# Patient Record
Sex: FEMALE | Race: WHITE | HISPANIC OR LATINO | Employment: OTHER | URBAN - METROPOLITAN AREA
[De-identification: names, ages, dates, MRNs, and addresses within clinical notes are randomized per-mention and may not be internally consistent; named-entity substitution may affect disease eponyms.]

---

## 2023-02-12 PROBLEM — M25.552 LEFT HIP PAIN: Status: ACTIVE | Noted: 2023-02-12

## 2023-02-12 PROBLEM — S99.929A FOOT INJURY: Status: ACTIVE | Noted: 2023-02-12

## 2023-02-12 PROBLEM — G47.00 INSOMNIA: Status: ACTIVE | Noted: 2023-02-12

## 2023-02-12 PROBLEM — M19.90 ARTHRITIS: Status: ACTIVE | Noted: 2023-02-12

## 2023-02-12 PROBLEM — M54.9 CHRONIC BACK PAIN: Status: ACTIVE | Noted: 2023-02-12

## 2023-02-12 PROBLEM — E55.9 VITAMIN D DEFICIENCY: Status: ACTIVE | Noted: 2023-02-12

## 2023-02-12 PROBLEM — G89.29 CHRONIC BACK PAIN: Status: ACTIVE | Noted: 2023-02-12

## 2023-02-12 PROBLEM — B35.1 ONYCHOMYCOSIS: Status: ACTIVE | Noted: 2023-02-12

## 2023-02-12 PROBLEM — E11.9 DIABETES (MULTI): Status: ACTIVE | Noted: 2023-02-12

## 2023-02-12 PROBLEM — R05.9 COUGH: Status: ACTIVE | Noted: 2023-02-12

## 2023-02-12 RX ORDER — DULOXETIN HYDROCHLORIDE 60 MG/1
60 CAPSULE, DELAYED RELEASE ORAL DAILY
COMMUNITY
Start: 2017-10-16 | End: 2023-06-26

## 2023-02-12 RX ORDER — TERBINAFINE HYDROCHLORIDE 250 MG/1
250 TABLET ORAL WEEKLY
Status: ON HOLD | COMMUNITY
Start: 2022-08-01 | End: 2024-03-27 | Stop reason: ALTCHOICE

## 2023-02-12 RX ORDER — ACETAMINOPHEN 500 MG
2 TABLET ORAL DAILY
COMMUNITY
Start: 2017-10-16 | End: 2024-04-15 | Stop reason: ALTCHOICE

## 2023-02-12 RX ORDER — RIZATRIPTAN BENZOATE 10 MG/1
TABLET ORAL
COMMUNITY
Start: 2022-10-26 | End: 2023-08-14

## 2023-02-12 RX ORDER — AMLODIPINE BESYLATE 10 MG/1
10 TABLET ORAL DAILY
COMMUNITY
Start: 2021-09-17 | End: 2023-09-19 | Stop reason: SDUPTHER

## 2023-02-12 RX ORDER — ZOLPIDEM TARTRATE 10 MG/1
10 TABLET ORAL NIGHTLY PRN
COMMUNITY
Start: 2022-08-09 | End: 2023-03-23

## 2023-02-12 RX ORDER — KETOROLAC TROMETHAMINE 10 MG/1
10 TABLET, FILM COATED ORAL EVERY 6 HOURS
COMMUNITY
Start: 2022-10-26 | End: 2023-04-12 | Stop reason: ALTCHOICE

## 2023-02-12 RX ORDER — HYDROCHLOROTHIAZIDE 25 MG/1
25 TABLET ORAL DAILY
COMMUNITY
End: 2024-02-05

## 2023-02-12 RX ORDER — BUPRENORPHINE HCL 150 MCG
150 FILM, MEDICATED (EA) BUCCAL 2 TIMES DAILY
COMMUNITY
End: 2023-04-12 | Stop reason: ALTCHOICE

## 2023-02-12 RX ORDER — LEFLUNOMIDE 10 MG/1
10 TABLET ORAL DAILY
COMMUNITY
Start: 2017-10-16 | End: 2024-03-28 | Stop reason: HOSPADM

## 2023-02-12 RX ORDER — OMEPRAZOLE 20 MG/1
20 CAPSULE, DELAYED RELEASE ORAL
COMMUNITY
End: 2023-09-26 | Stop reason: SDUPTHER

## 2023-02-12 RX ORDER — LOSARTAN POTASSIUM 100 MG/1
100 TABLET ORAL DAILY
COMMUNITY
End: 2023-09-19 | Stop reason: SDUPTHER

## 2023-04-12 ENCOUNTER — OFFICE VISIT (OUTPATIENT)
Dept: PRIMARY CARE | Facility: CLINIC | Age: 48
End: 2023-04-12
Payer: COMMERCIAL

## 2023-04-12 VITALS
WEIGHT: 211.9 LBS | OXYGEN SATURATION: 98 % | SYSTOLIC BLOOD PRESSURE: 124 MMHG | BODY MASS INDEX: 39 KG/M2 | HEIGHT: 62 IN | TEMPERATURE: 96.8 F | DIASTOLIC BLOOD PRESSURE: 76 MMHG | HEART RATE: 110 BPM

## 2023-04-12 DIAGNOSIS — G47.00 INSOMNIA, UNSPECIFIED TYPE: Primary | ICD-10-CM

## 2023-04-12 PROBLEM — I10 HYPERTENSION: Status: ACTIVE | Noted: 2023-04-12

## 2023-04-12 PROBLEM — M62.81 MUSCLE WEAKNESS: Status: ACTIVE | Noted: 2023-04-12

## 2023-04-12 PROBLEM — G43.909 MIGRAINE HEADACHE: Status: ACTIVE | Noted: 2023-04-12

## 2023-04-12 PROBLEM — R20.0 NUMBNESS: Status: ACTIVE | Noted: 2023-04-12

## 2023-04-12 PROBLEM — J40 BRONCHITIS: Status: ACTIVE | Noted: 2023-04-12

## 2023-04-12 PROBLEM — R07.9 CHEST PAIN AT REST: Status: ACTIVE | Noted: 2023-04-12

## 2023-04-12 PROBLEM — E66.01 MORBID OBESITY (MULTI): Status: ACTIVE | Noted: 2023-04-12

## 2023-04-12 PROBLEM — M79.602 PAIN OF LEFT UPPER EXTREMITY: Status: ACTIVE | Noted: 2023-04-12

## 2023-04-12 PROBLEM — I51.89 FAMILIAL HEART DISEASE: Status: ACTIVE | Noted: 2023-04-12

## 2023-04-12 PROBLEM — M54.16 LUMBAR RADICULOPATHY: Status: ACTIVE | Noted: 2023-04-12

## 2023-04-12 PROBLEM — M79.601 PAIN OF RIGHT UPPER EXTREMITY: Status: ACTIVE | Noted: 2023-04-12

## 2023-04-12 LAB — C REACTIVE PROTEIN (MG/L) IN SER/PLAS: 0.72 MG/DL

## 2023-04-12 PROCEDURE — 4010F ACE/ARB THERAPY RXD/TAKEN: CPT | Performed by: STUDENT IN AN ORGANIZED HEALTH CARE EDUCATION/TRAINING PROGRAM

## 2023-04-12 PROCEDURE — 3008F BODY MASS INDEX DOCD: CPT | Performed by: STUDENT IN AN ORGANIZED HEALTH CARE EDUCATION/TRAINING PROGRAM

## 2023-04-12 PROCEDURE — 3078F DIAST BP <80 MM HG: CPT | Performed by: STUDENT IN AN ORGANIZED HEALTH CARE EDUCATION/TRAINING PROGRAM

## 2023-04-12 PROCEDURE — 3074F SYST BP LT 130 MM HG: CPT | Performed by: STUDENT IN AN ORGANIZED HEALTH CARE EDUCATION/TRAINING PROGRAM

## 2023-04-12 PROCEDURE — 99214 OFFICE O/P EST MOD 30 MIN: CPT | Performed by: STUDENT IN AN ORGANIZED HEALTH CARE EDUCATION/TRAINING PROGRAM

## 2023-04-12 RX ORDER — GABAPENTIN 100 MG/1
1 CAPSULE ORAL 3 TIMES DAILY
COMMUNITY
Start: 2023-03-29 | End: 2023-09-22 | Stop reason: DRUGHIGH

## 2023-04-12 RX ORDER — TIZANIDINE 4 MG/1
1 TABLET ORAL NIGHTLY
COMMUNITY
Start: 2023-04-05 | End: 2024-02-05

## 2023-04-12 NOTE — PROGRESS NOTES
"Subjective   Patient ID: Kat Kay is a 47 y.o. female who presents for Hypertension and Diabetes.    HPI     Review of Systems    Objective   /76 (BP Location: Right arm, Patient Position: Sitting)   Pulse 110   Temp 36 °C (96.8 °F) (Temporal)   Ht 1.575 m (5' 2\")   Wt 96.1 kg (211 lb 14.4 oz)   SpO2 98%   BMI 38.76 kg/m²     Physical Exam    Assessment/Plan          "

## 2023-04-12 NOTE — PROGRESS NOTES
Subjective   Reason for Visit: Kat Kay is an 47 y.o. female here for a follow up    Patient Care Team:  Dagoberto Garza DO as PCP - General     Review of Systems   All other systems reviewed and are negative.  OARRS:  No data recorded  I have personally reviewed the OARRS report for Kat Kay. I have considered the risks of abuse, dependence, addiction and diversion    Is the patient prescribed a combination of a benzodiazepine and opioid?  Yes, I feel it is clincially indicated to continue the medication and have discussed with the patient risks/benefits/alternatives.    Last Urine Drug Screen / ordered today: No  Recent Results (from the past 07355 hour(s))   OPIATE/OPIOID/BENZO PRESCRIPTION COMPLIANCE    Collection Time: 10/26/22 11:19 AM   Result Value Ref Range    DRUG SCREEN COMMENT URINE SEE BELOW     Creatine, Urine 296.2 mg/dL    Amphetamine Screen, Urine PRESUMPTIVE NEGATIVE NEGATIVE    Barbiturate Screen, Urine PRESUMPTIVE NEGATIVE NEGATIVE    Cannabinoid Screen, Urine PRESUMPTIVE NEGATIVE NEGATIVE    Cocaine Screen, Urine PRESUMPTIVE NEGATIVE NEGATIVE    PCP Screen, Urine PRESUMPTIVE NEGATIVE NEGATIVE    7-Aminoclonazepam <25 Cutoff <25 ng/mL    Alpha-Hydroxyalprazolam <25 Cutoff <25 ng/mL    Alpha-Hydroxymidazolam <25 Cutoff <25 ng/mL    Alprazolam <25 Cutoff <25 ng/mL    Chlordiazepoxide <25 Cutoff <25 ng/mL    Clonazepam <25 Cutoff <25 ng/mL    Diazepam <25 Cutoff <25 ng/mL    Lorazepam <25 Cutoff <25 ng/mL    Midazolam <25 Cutoff <25 ng/mL    Nordiazepam <25 Cutoff <25 ng/mL    Oxazepam <25 Cutoff <25 ng/mL    Temazepam <25 Cutoff <25 ng/mL    Zolpidem 97 (A) Cutoff <25 ng/mL    Zolpidem Metabolite (ZCA) >1000 (A) Cutoff <25 ng/mL    6-Acetylmorphine <25 Cutoff <25 ng/mL    Codeine <50 Cutoff <50 ng/mL    Hydrocodone <25 Cutoff <25 ng/mL    Hydromorphone <25 Cutoff <25 ng/mL    Morphine Urine <50 Cutoff <50 ng/mL    Norhydrocodone <25 Cutoff <25 ng/mL    Noroxycodone <25 Cutoff  "<25 ng/mL    Oxycodone <25 Cutoff <25 ng/mL    Oxymorphone <25 Cutoff <25 ng/mL    Tramadol <50 Cutoff <50 ng/mL    O-Desmethyltramadol <50 Cutoff <50 ng/mL    Fentanyl <2.5 Cutoff<2.5 ng/mL    Norfentanyl <2.5 Cutoff<2.5 ng/mL    METHADONE CONFIRMATION,URINE <25 Cutoff <25 ng/mL    EDDP <25 Cutoff <25 ng/mL     Results are as expected.     Controlled Substance Agreement:  Date of the Last Agreement: last visit  Reviewed Controlled Substance Agreement including but not limited to the benefits, risks, and alternatives to treatment with a Controlled Substance medication(s).    Sleep Aids:   What is the patient's goal of therapy? Sleep aid  Is this being achieved with current treatment? yes    Activities of Daily Living:   Is your overall impression that this patient is benefiting (symptom reduction outweighs side effects) from sleep aid therapy? Yes     1. Physical Functioning: Better  2. Family Relationship: Better  3. Social Relationship: Better  4. Mood: Better  5. Sleep Patterns: Better  6. Overall Function: Better      Objective   Vitals:  /76 (BP Location: Right arm, Patient Position: Sitting)   Pulse 110   Temp 36 °C (96.8 °F) (Temporal)   Ht 1.575 m (5' 2\")   Wt 96.1 kg (211 lb 14.4 oz)   SpO2 98%   BMI 38.76 kg/m²       Physical Exam  Constitutional:       Appearance: Normal appearance.   HENT:      Head: Normocephalic and atraumatic.      Right Ear: Tympanic membrane and ear canal normal.      Left Ear: Tympanic membrane and ear canal normal.      Mouth/Throat:      Mouth: Mucous membranes are moist.      Pharynx: Oropharynx is clear.   Eyes:      Extraocular Movements: Extraocular movements intact.      Conjunctiva/sclera: Conjunctivae normal.      Pupils: Pupils are equal, round, and reactive to light.   Cardiovascular:      Rate and Rhythm: Normal rate and regular rhythm.      Pulses: Normal pulses.      Heart sounds: Normal heart sounds.   Pulmonary:      Effort: Pulmonary effort is normal. "      Breath sounds: Normal breath sounds.   Abdominal:      General: Abdomen is flat. Bowel sounds are normal.      Palpations: Abdomen is soft.   Musculoskeletal:         General: Normal range of motion.      Cervical back: Normal range of motion and neck supple.   Skin:     General: Skin is warm and dry.      Capillary Refill: Capillary refill takes 2 to 3 seconds.   Neurological:      General: No focal deficit present.      Mental Status: She is alert and oriented to person, place, and time. Mental status is at baseline.   Psychiatric:         Mood and Affect: Mood normal.         Behavior: Behavior normal.         Thought Content: Thought content normal.         Judgment: Judgment normal.         Assessment/Plan   1. Insomnia, unspecified type  - OARRS reviewed  - refill needed in 2 weeks  - UDS reviewed  - CSA on file    2: post op swelling  - spinal sugery with post op swelling, draining was admitted and cleaned out

## 2023-04-23 DIAGNOSIS — G47.00 INSOMNIA, UNSPECIFIED: ICD-10-CM

## 2023-04-25 RX ORDER — ZOLPIDEM TARTRATE 10 MG/1
TABLET ORAL
Qty: 30 TABLET | Refills: 0 | Status: SHIPPED | OUTPATIENT
Start: 2023-04-25 | End: 2023-05-27

## 2023-05-03 DIAGNOSIS — M62.838 MUSCLE SPASM: Primary | ICD-10-CM

## 2023-05-04 RX ORDER — METHOCARBAMOL 500 MG/1
TABLET, FILM COATED ORAL
Qty: 90 TABLET | Refills: 0 | Status: SHIPPED | OUTPATIENT
Start: 2023-05-04 | End: 2023-09-06 | Stop reason: SDUPTHER

## 2023-05-25 DIAGNOSIS — G47.00 INSOMNIA, UNSPECIFIED: ICD-10-CM

## 2023-05-27 RX ORDER — ZOLPIDEM TARTRATE 10 MG/1
TABLET ORAL
Qty: 30 TABLET | Refills: 0 | Status: SHIPPED | OUTPATIENT
Start: 2023-05-27 | End: 2023-06-26

## 2023-05-30 ENCOUNTER — APPOINTMENT (OUTPATIENT)
Dept: PRIMARY CARE | Facility: CLINIC | Age: 48
End: 2023-05-30

## 2023-06-24 DIAGNOSIS — G47.00 INSOMNIA, UNSPECIFIED: ICD-10-CM

## 2023-06-24 DIAGNOSIS — M19.90 UNSPECIFIED OSTEOARTHRITIS, UNSPECIFIED SITE: ICD-10-CM

## 2023-06-26 RX ORDER — ZOLPIDEM TARTRATE 10 MG/1
TABLET ORAL
Qty: 30 TABLET | Refills: 0 | Status: SHIPPED | OUTPATIENT
Start: 2023-06-26 | End: 2023-07-31

## 2023-06-26 RX ORDER — DULOXETIN HYDROCHLORIDE 60 MG/1
CAPSULE, DELAYED RELEASE ORAL
Qty: 90 CAPSULE | Refills: 1 | Status: SHIPPED | OUTPATIENT
Start: 2023-06-26 | End: 2023-12-14

## 2023-07-28 DIAGNOSIS — G47.00 INSOMNIA, UNSPECIFIED: ICD-10-CM

## 2023-07-31 RX ORDER — ZOLPIDEM TARTRATE 10 MG/1
10 TABLET ORAL NIGHTLY PRN
Qty: 30 TABLET | Refills: 1 | Status: SHIPPED | OUTPATIENT
Start: 2023-07-31 | End: 2023-10-19

## 2023-08-12 DIAGNOSIS — G43.909 MIGRAINE, UNSPECIFIED, NOT INTRACTABLE, WITHOUT STATUS MIGRAINOSUS: ICD-10-CM

## 2023-08-14 RX ORDER — RIZATRIPTAN BENZOATE 10 MG/1
TABLET ORAL
Qty: 9 TABLET | Refills: 4 | Status: SHIPPED | OUTPATIENT
Start: 2023-08-14 | End: 2024-03-28

## 2023-09-06 DIAGNOSIS — M62.838 MUSCLE SPASM: ICD-10-CM

## 2023-09-07 RX ORDER — METHOCARBAMOL 500 MG/1
TABLET, FILM COATED ORAL
Qty: 90 TABLET | Refills: 0 | Status: SHIPPED | OUTPATIENT
Start: 2023-09-07 | End: 2023-09-22 | Stop reason: DRUGHIGH

## 2023-09-19 DIAGNOSIS — I10 PRIMARY HYPERTENSION: ICD-10-CM

## 2023-09-19 RX ORDER — LOSARTAN POTASSIUM 100 MG/1
100 TABLET ORAL DAILY
Qty: 90 TABLET | Refills: 1 | Status: SHIPPED | OUTPATIENT
Start: 2023-09-19 | End: 2024-04-15 | Stop reason: ALTCHOICE

## 2023-09-19 RX ORDER — AMLODIPINE BESYLATE 10 MG/1
10 TABLET ORAL DAILY
Qty: 90 TABLET | Refills: 1 | Status: SHIPPED | OUTPATIENT
Start: 2023-09-19 | End: 2024-03-28 | Stop reason: HOSPADM

## 2023-09-22 PROBLEM — F41.8 MIXED ANXIETY DEPRESSIVE DISORDER: Status: ACTIVE | Noted: 2023-09-22

## 2023-09-22 PROBLEM — E53.8 VITAMIN B12 DEFICIENCY (NON ANEMIC): Status: ACTIVE | Noted: 2023-09-22

## 2023-09-22 PROBLEM — K21.9 GASTROESOPHAGEAL REFLUX DISEASE: Status: ACTIVE | Noted: 2023-09-22

## 2023-09-22 PROBLEM — M51.369 LUMBAR DEGENERATIVE DISC DISEASE: Status: ACTIVE | Noted: 2023-09-22

## 2023-09-22 PROBLEM — M51.36 LUMBAR DEGENERATIVE DISC DISEASE: Status: ACTIVE | Noted: 2023-09-22

## 2023-09-22 PROBLEM — E87.6 HYPOKALEMIA: Status: ACTIVE | Noted: 2023-09-22

## 2023-09-22 PROBLEM — M16.12 PRIMARY OSTEOARTHRITIS OF LEFT HIP: Status: ACTIVE | Noted: 2023-09-22

## 2023-09-22 RX ORDER — CYCLOBENZAPRINE HCL 10 MG
10 TABLET ORAL 2 TIMES DAILY PRN
COMMUNITY
Start: 2023-09-07 | End: 2024-03-27

## 2023-09-22 RX ORDER — METHOCARBAMOL 750 MG/1
750 TABLET, FILM COATED ORAL 3 TIMES DAILY PRN
Status: ON HOLD | COMMUNITY
Start: 2023-05-17 | End: 2024-03-27 | Stop reason: ALTCHOICE

## 2023-09-22 RX ORDER — GABAPENTIN 300 MG/1
300 CAPSULE ORAL 3 TIMES DAILY
COMMUNITY
Start: 2023-08-24 | End: 2024-02-05

## 2023-09-22 RX ORDER — APIXABAN 2.5 MG/1
2.5 TABLET, FILM COATED ORAL 2 TIMES DAILY
Status: ON HOLD | COMMUNITY
Start: 2023-08-22 | End: 2024-03-27 | Stop reason: ALTCHOICE

## 2023-09-26 DIAGNOSIS — K21.9 GASTROESOPHAGEAL REFLUX DISEASE, UNSPECIFIED WHETHER ESOPHAGITIS PRESENT: ICD-10-CM

## 2023-09-26 RX ORDER — OMEPRAZOLE 20 MG/1
20 CAPSULE, DELAYED RELEASE ORAL
Qty: 90 CAPSULE | Refills: 1 | Status: SHIPPED | OUTPATIENT
Start: 2023-09-26 | End: 2024-02-05

## 2023-10-05 ENCOUNTER — HOSPITAL ENCOUNTER (EMERGENCY)
Facility: HOSPITAL | Age: 48
Discharge: HOME | End: 2023-10-06
Payer: MEDICARE

## 2023-10-05 ENCOUNTER — OFFICE VISIT (OUTPATIENT)
Dept: PRIMARY CARE | Facility: CLINIC | Age: 48
End: 2023-10-05
Payer: MEDICARE

## 2023-10-05 VITALS
HEIGHT: 62 IN | HEART RATE: 82 BPM | DIASTOLIC BLOOD PRESSURE: 66 MMHG | SYSTOLIC BLOOD PRESSURE: 126 MMHG | BODY MASS INDEX: 38.7 KG/M2 | WEIGHT: 210.3 LBS | OXYGEN SATURATION: 97 %

## 2023-10-05 DIAGNOSIS — U07.1 COVID: Primary | ICD-10-CM

## 2023-10-05 DIAGNOSIS — M79.605 LEG PAIN, BILATERAL: ICD-10-CM

## 2023-10-05 DIAGNOSIS — E87.6 HYPOKALEMIA: ICD-10-CM

## 2023-10-05 DIAGNOSIS — J01.01 ACUTE RECURRENT MAXILLARY SINUSITIS: Primary | ICD-10-CM

## 2023-10-05 DIAGNOSIS — M79.604 LEG PAIN, BILATERAL: ICD-10-CM

## 2023-10-05 PROCEDURE — 99285 EMERGENCY DEPT VISIT HI MDM: CPT | Mod: 25 | Performed by: EMERGENCY MEDICINE

## 2023-10-05 PROCEDURE — 4010F ACE/ARB THERAPY RXD/TAKEN: CPT | Performed by: STUDENT IN AN ORGANIZED HEALTH CARE EDUCATION/TRAINING PROGRAM

## 2023-10-05 PROCEDURE — 99284 EMERGENCY DEPT VISIT MOD MDM: CPT

## 2023-10-05 PROCEDURE — 96365 THER/PROPH/DIAG IV INF INIT: CPT | Performed by: EMERGENCY MEDICINE

## 2023-10-05 PROCEDURE — 3008F BODY MASS INDEX DOCD: CPT | Performed by: STUDENT IN AN ORGANIZED HEALTH CARE EDUCATION/TRAINING PROGRAM

## 2023-10-05 PROCEDURE — 1036F TOBACCO NON-USER: CPT | Performed by: STUDENT IN AN ORGANIZED HEALTH CARE EDUCATION/TRAINING PROGRAM

## 2023-10-05 PROCEDURE — 96366 THER/PROPH/DIAG IV INF ADDON: CPT | Performed by: EMERGENCY MEDICINE

## 2023-10-05 PROCEDURE — 3078F DIAST BP <80 MM HG: CPT | Performed by: STUDENT IN AN ORGANIZED HEALTH CARE EDUCATION/TRAINING PROGRAM

## 2023-10-05 PROCEDURE — 99213 OFFICE O/P EST LOW 20 MIN: CPT | Performed by: STUDENT IN AN ORGANIZED HEALTH CARE EDUCATION/TRAINING PROGRAM

## 2023-10-05 PROCEDURE — 96375 TX/PRO/DX INJ NEW DRUG ADDON: CPT | Performed by: EMERGENCY MEDICINE

## 2023-10-05 PROCEDURE — 3074F SYST BP LT 130 MM HG: CPT | Performed by: STUDENT IN AN ORGANIZED HEALTH CARE EDUCATION/TRAINING PROGRAM

## 2023-10-05 RX ORDER — METHYLPREDNISOLONE 4 MG/1
TABLET ORAL
Qty: 21 TABLET | Refills: 0 | Status: SHIPPED | OUTPATIENT
Start: 2023-10-05 | End: 2023-10-12

## 2023-10-05 RX ORDER — AZITHROMYCIN 250 MG/1
TABLET, FILM COATED ORAL
Qty: 6 TABLET | Refills: 0 | Status: SHIPPED | OUTPATIENT
Start: 2023-10-05 | End: 2023-10-10

## 2023-10-05 ASSESSMENT — COLUMBIA-SUICIDE SEVERITY RATING SCALE - C-SSRS
2. HAVE YOU ACTUALLY HAD ANY THOUGHTS OF KILLING YOURSELF?: NO
6. HAVE YOU EVER DONE ANYTHING, STARTED TO DO ANYTHING, OR PREPARED TO DO ANYTHING TO END YOUR LIFE?: NO
1. IN THE PAST MONTH, HAVE YOU WISHED YOU WERE DEAD OR WISHED YOU COULD GO TO SLEEP AND NOT WAKE UP?: NO

## 2023-10-05 ASSESSMENT — ENCOUNTER SYMPTOMS
DEPRESSION: 1
OCCASIONAL FEELINGS OF UNSTEADINESS: 1
LOSS OF SENSATION IN FEET: 0

## 2023-10-05 NOTE — PROGRESS NOTES
"Subjective   Patient ID: Kat Salazar is a 47 y.o. female who presents for Dizziness, Insomnia, Hypertension, Anxiety, and Depression.    HPI     Patient comes in today for having 2 days of upper respiratory infection feeling she has COVID symptoms.  Generalized body aches fevers chills.  Ear pain loss of appetite sinus pressure and congestion.  She was to have her hip aspirated.  She is far with orthopedics tomorrow.  She is having any more pain than normal.  Do not think septic joint.  However she has a post progress per infection.  We have to treat.    Review of Systems   All other systems reviewed and are negative.      Objective   /66 (BP Location: Right arm, Patient Position: Sitting)   Pulse 82   Ht 1.575 m (5' 2\")   Wt 95.4 kg (210 lb 4.8 oz)   SpO2 97%   BMI 38.46 kg/m²     Physical Exam  Constitutional:       Appearance: Normal appearance.   HENT:      Head: Normocephalic and atraumatic.      Right Ear: Tympanic membrane and ear canal normal.      Left Ear: Tympanic membrane and ear canal normal.      Mouth/Throat:      Mouth: Mucous membranes are moist.      Pharynx: Oropharynx is clear.   Eyes:      Extraocular Movements: Extraocular movements intact.      Conjunctiva/sclera: Conjunctivae normal.      Pupils: Pupils are equal, round, and reactive to light.   Cardiovascular:      Rate and Rhythm: Normal rate and regular rhythm.      Pulses: Normal pulses.      Heart sounds: Normal heart sounds.   Pulmonary:      Effort: Pulmonary effort is normal.      Breath sounds: Normal breath sounds.   Abdominal:      General: Abdomen is flat. Bowel sounds are normal.      Palpations: Abdomen is soft.   Musculoskeletal:         General: Normal range of motion.      Cervical back: Normal range of motion and neck supple.   Skin:     General: Skin is warm and dry.      Capillary Refill: Capillary refill takes 2 to 3 seconds.   Neurological:      General: No focal deficit present.      Mental Status: " She is alert and oriented to person, place, and time. Mental status is at baseline.   Psychiatric:         Mood and Affect: Mood normal.         Behavior: Behavior normal.         Thought Content: Thought content normal.         Judgment: Judgment normal.         Assessment/Plan   1. Acute recurrent maxillary sinusitis    - azithromycin (Zithromax) 250 mg tablet; Take 2 tablets (500 mg) by mouth once daily for 1 day, THEN 1 tablet (250 mg) once daily for 4 days. Take 2 tabs (500 mg) by mouth today, than 1 daily for 4 days..  Dispense: 6 tablet; Refill: 0  - methylPREDNISolone (Medrol Dospak) 4 mg tablets; Take as directed on package.  Dispense: 21 tablet; Refill: 0  If she is worse or expensing worsening symptoms should go straight to the ER.  Advised her to check at home COVID test is positive in your Paxlovid.

## 2023-10-05 NOTE — Clinical Note
Kat Salazar was seen and treated in our emergency department on 10/5/2023.  She may return to work on 10/10/2023.       If you have any questions or concerns, please don't hesitate to call.      Tricia Carvajal PA-C

## 2023-10-06 ENCOUNTER — APPOINTMENT (OUTPATIENT)
Dept: ORTHOPEDIC SURGERY | Facility: HOSPITAL | Age: 48
End: 2023-10-06
Payer: COMMERCIAL

## 2023-10-06 ENCOUNTER — APPOINTMENT (OUTPATIENT)
Dept: RADIOLOGY | Facility: HOSPITAL | Age: 48
End: 2023-10-06
Payer: MEDICARE

## 2023-10-06 ENCOUNTER — APPOINTMENT (OUTPATIENT)
Dept: ORTHOPEDIC SURGERY | Facility: CLINIC | Age: 48
End: 2023-10-06
Payer: MEDICARE

## 2023-10-06 VITALS
TEMPERATURE: 99.9 F | SYSTOLIC BLOOD PRESSURE: 102 MMHG | OXYGEN SATURATION: 92 % | RESPIRATION RATE: 18 BRPM | DIASTOLIC BLOOD PRESSURE: 78 MMHG | HEIGHT: 62 IN | HEART RATE: 91 BPM | BODY MASS INDEX: 38.64 KG/M2 | WEIGHT: 210 LBS

## 2023-10-06 LAB
ANION GAP SERPL CALC-SCNC: 12 MMOL/L (ref 10–20)
BUN SERPL-MCNC: 10 MG/DL (ref 6–23)
CALCIUM SERPL-MCNC: 8.4 MG/DL (ref 8.6–10.3)
CARDIAC TROPONIN I PNL SERPL HS: 7 NG/L (ref 0–13)
CHLORIDE SERPL-SCNC: 99 MMOL/L (ref 98–107)
CO2 SERPL-SCNC: 29 MMOL/L (ref 21–32)
CREAT SERPL-MCNC: 0.84 MG/DL (ref 0.5–1.05)
ERYTHROCYTE [DISTWIDTH] IN BLOOD BY AUTOMATED COUNT: 16.7 % (ref 11.5–14.5)
GFR SERPL CREATININE-BSD FRML MDRD: 85 ML/MIN/1.73M*2
GLUCOSE SERPL-MCNC: 95 MG/DL (ref 74–99)
HCT VFR BLD AUTO: 33.4 % (ref 36–46)
HGB BLD-MCNC: 10.2 G/DL (ref 12–16)
MCH RBC QN AUTO: 23.9 PG (ref 26–34)
MCHC RBC AUTO-ENTMCNC: 30.5 G/DL (ref 32–36)
MCV RBC AUTO: 78 FL (ref 80–100)
NRBC BLD-RTO: 0 /100 WBCS (ref 0–0)
PLATELET # BLD AUTO: 347 X10*3/UL (ref 150–450)
PMV BLD AUTO: 9.6 FL (ref 7.5–11.5)
POTASSIUM SERPL-SCNC: 2.8 MMOL/L (ref 3.5–5.3)
POTASSIUM SERPL-SCNC: 3.1 MMOL/L (ref 3.5–5.3)
RBC # BLD AUTO: 4.26 X10*6/UL (ref 4–5.2)
SODIUM SERPL-SCNC: 137 MMOL/L (ref 136–145)
WBC # BLD AUTO: 6.3 X10*3/UL (ref 4.4–11.3)

## 2023-10-06 PROCEDURE — 36415 COLL VENOUS BLD VENIPUNCTURE: CPT | Performed by: PHYSICIAN ASSISTANT

## 2023-10-06 PROCEDURE — 93970 EXTREMITY STUDY: CPT

## 2023-10-06 PROCEDURE — 80048 BASIC METABOLIC PNL TOTAL CA: CPT | Performed by: PHYSICIAN ASSISTANT

## 2023-10-06 PROCEDURE — 2500000001 HC RX 250 WO HCPCS SELF ADMINISTERED DRUGS (ALT 637 FOR MEDICARE OP): Performed by: PHYSICIAN ASSISTANT

## 2023-10-06 PROCEDURE — 71046 X-RAY EXAM CHEST 2 VIEWS: CPT | Mod: FY,FR

## 2023-10-06 PROCEDURE — 2500000004 HC RX 250 GENERAL PHARMACY W/ HCPCS (ALT 636 FOR OP/ED): Performed by: PHYSICIAN ASSISTANT

## 2023-10-06 PROCEDURE — 71046 X-RAY EXAM CHEST 2 VIEWS: CPT | Mod: FOREIGN READ | Performed by: RADIOLOGY

## 2023-10-06 PROCEDURE — 85027 COMPLETE CBC AUTOMATED: CPT | Performed by: PHYSICIAN ASSISTANT

## 2023-10-06 PROCEDURE — 84484 ASSAY OF TROPONIN QUANT: CPT | Performed by: PHYSICIAN ASSISTANT

## 2023-10-06 PROCEDURE — 84132 ASSAY OF SERUM POTASSIUM: CPT | Mod: 59 | Performed by: PHYSICIAN ASSISTANT

## 2023-10-06 PROCEDURE — 93970 EXTREMITY STUDY: CPT | Performed by: SURGERY

## 2023-10-06 RX ORDER — POTASSIUM CHLORIDE 14.9 MG/ML
20 INJECTION INTRAVENOUS ONCE
Status: COMPLETED | OUTPATIENT
Start: 2023-10-06 | End: 2023-10-06

## 2023-10-06 RX ORDER — NAPROXEN SODIUM 220 MG/1
81 TABLET, FILM COATED ORAL ONCE
Status: COMPLETED | OUTPATIENT
Start: 2023-10-06 | End: 2023-10-06

## 2023-10-06 RX ORDER — ONDANSETRON HYDROCHLORIDE 2 MG/ML
4 INJECTION, SOLUTION INTRAVENOUS ONCE
Status: COMPLETED | OUTPATIENT
Start: 2023-10-06 | End: 2023-10-06

## 2023-10-06 RX ORDER — POTASSIUM CHLORIDE 20 MEQ/1
40 TABLET, EXTENDED RELEASE ORAL ONCE
Status: COMPLETED | OUTPATIENT
Start: 2023-10-06 | End: 2023-10-06

## 2023-10-06 RX ORDER — OXYCODONE AND ACETAMINOPHEN 5; 325 MG/1; MG/1
1 TABLET ORAL ONCE
Status: COMPLETED | OUTPATIENT
Start: 2023-10-06 | End: 2023-10-06

## 2023-10-06 RX ORDER — POTASSIUM CHLORIDE 20 MEQ/1
20 TABLET, EXTENDED RELEASE ORAL DAILY
Qty: 4 TABLET | Refills: 0 | Status: SHIPPED | OUTPATIENT
Start: 2023-10-06 | End: 2023-10-10

## 2023-10-06 RX ORDER — KETOROLAC TROMETHAMINE 30 MG/ML
30 INJECTION, SOLUTION INTRAMUSCULAR; INTRAVENOUS ONCE
Status: COMPLETED | OUTPATIENT
Start: 2023-10-06 | End: 2023-10-06

## 2023-10-06 RX ADMIN — POTASSIUM CHLORIDE 20 MEQ: 14.9 INJECTION, SOLUTION INTRAVENOUS at 03:26

## 2023-10-06 RX ADMIN — KETOROLAC TROMETHAMINE 30 MG: 30 INJECTION, SOLUTION INTRAMUSCULAR; INTRAVENOUS at 04:27

## 2023-10-06 RX ADMIN — ONDANSETRON 4 MG: 2 INJECTION INTRAMUSCULAR; INTRAVENOUS at 03:25

## 2023-10-06 RX ADMIN — ASPIRIN 81 MG CHEWABLE TABLET 81 MG: 81 TABLET CHEWABLE at 01:00

## 2023-10-06 RX ADMIN — POTASSIUM CHLORIDE 40 MEQ: 20 TABLET, EXTENDED RELEASE ORAL at 03:25

## 2023-10-06 RX ADMIN — OXYCODONE HYDROCHLORIDE AND ACETAMINOPHEN 1 TABLET: 5; 325 TABLET ORAL at 01:00

## 2023-10-06 ASSESSMENT — PAIN SCALES - GENERAL: PAINLEVEL_OUTOF10: 7

## 2023-10-06 NOTE — ED PROVIDER NOTES
HPI   Chief Complaint   Patient presents with    Generalized Body Aches     Pt presents to ED for productive cough, SOB, generalized body aches, at home pos covid test, headache, nausea, diarrhea, decreased appetitive, loss of smell/taste, abd pain, upper back pain x2 days.     Leg Pain     Pt endorsing worsening pain in L thigh/knee and R hip pain. Pt had hip replacement in  8/23. Pt being treated for R ear infection and prescribed atx today and r side of throat pain. Pt denies taking atx today.      HPI  Patient is a 47-year-old female with complicated PMH including multiple surgeries including recent left hip prosthesis dislocation that required open reduction (complicated by sepsis and infection), HTN, HLD, RA, chronic back pain who presents to the ED for multiple complaints.  She states that she is having left leg/buttock pain.  This pain has been present for a few days.  Starts in her buttock.  Radiates into her left groin and down the anterior portion of her left leg to her knee.  She endorses nontraumatic bruising over the left buttock.  She also endorses right lower extremity pain.  She says it hurts in her right groin.  She thinks is from her left side.  Patient has been having 3 days of nasal congestion, myalgias, headache, ear pain and sore throat.  She saw her PCP yesterday who gave her prescription for azithromycin and Medrol Dosepak for an ear infection and for her cough.  She has not picked it up nor started taking these medications yet.  Went home and took a COVID test which tested positive.  This is her second time having COVID.  She also complaining of chest pain.  She developed chest pain earlier today.  She localizes it to her left anterior chest near left clavicle, radiates down her left arm into her left elbow.  It comes and goes.  Currently 7/10.  It is worse with coughing and deep breathing.  No relieving factors.  She denies hemoptysis, palpitations, shortness of breath, personal or family  history of bleeding or clotting disorder, personal or family history of connective tissue disorder, nausea, abdominal pain.     Patient says she is status post hysterectomy 2013 has not had menstrual cycle since then.  She says she has had C1-C4 surgery and then had to go and had C4-C7 revision.  Left THR with recent open reduction for dislocated prosthesis    Did the medications methocarbamol, gabapentin, tizanidine, losartan, HCTZ.  She reports that she is diet-controlled diabetic.  Allergies: ACE inhibitors allergic reaction cough, tape-rash  Patient History   No past medical history on file.  Past Surgical History:   Procedure Laterality Date    CT GUIDED SOFT TISSUE FLUID DRAIN  6/2/2022    CT GUIDED SOFT TISSUE FLUID DRAIN [facility]    US GUIDED SOFT TISSUE FLUID DRAIN  9/30/2021    US GUIDED SOFT TISSUE FLUID DRAIN [facility]     Family History   Problem Relation Name Age of Onset    Diabetes Mother      Hypertension Mother      Arthritis Other      Psoriasis Neg Hx      Irritable bowel syndrome Neg Hx       Social History     Tobacco Use    Smoking status: Never    Smokeless tobacco: Never   Vaping Use    Vaping Use: Never used   Substance Use Topics    Alcohol use: Not Currently    Drug use: Never     Physical Exam   ED Triage Vitals [10/05/23 2034]   Temp Heart Rate Resp BP   37.7 °C (99.9 °F) 89 20 140/86      SpO2 Temp Source Heart Rate Source Patient Position   100 % Oral -- --      BP Location FiO2 (%)     -- --       Physical Exam  Vitals and nursing note reviewed.   Constitutional:       Appearance: Normal appearance.      Comments: Elevated BMI.   HENT:      Head: Normocephalic and atraumatic.      Right Ear: External ear normal. There is impacted cerumen.      Left Ear: Tympanic membrane, ear canal and external ear normal.      Nose: Congestion and rhinorrhea present.      Mouth/Throat:      Pharynx: Oropharynx is clear. Posterior oropharyngeal erythema present. No oropharyngeal  exudate.   Eyes:      Extraocular Movements: Extraocular movements intact.      Pupils: Pupils are equal, round, and reactive to light.   Cardiovascular:      Rate and Rhythm: Normal rate and regular rhythm.      Pulses: Normal pulses.      Heart sounds: Normal heart sounds.   Pulmonary:      Effort: Pulmonary effort is normal.      Breath sounds: Normal breath sounds.   Abdominal:      General: Bowel sounds are normal.      Palpations: Abdomen is soft.      Tenderness: There is no abdominal tenderness. There is no guarding.   Musculoskeletal:      Cervical back: Normal range of motion.      Comments: 5/5 strength bilateral lower extremities.  Right calf 41 cm circumference.  Left calf 37 cm circumference.  Bilateral hips painful with range of motion.  Normal range of motion of bilateral ankles.  Reports discomfort with ROM testing of knees.  Normal sensation.  Patient has bruising on her left buttock.  Incision appears normal without signs of wound infection.   Lymphadenopathy:      Cervical: Cervical adenopathy present.   Skin:     Capillary Refill: Capillary refill takes less than 2 seconds.   Neurological:      General: No focal deficit present.      Mental Status: She is alert.      Cranial Nerves: No cranial nerve deficit.   Psychiatric:         Mood and Affect: Mood normal.         Behavior: Behavior normal.     ED Course & MDM   ED Course as of 10/06/23 0707   Fri Oct 06, 2023   0057 Patient is 47-year-old female presents to the ED complaining of left leg pain, right groin pain, chest pain and back pain.  Tested positive for COVID today.  On exam her left clavicle is tender to palpation and appears swollen.  Do not see evidence of otitis media.  Right calf 41 cm left calf 37 cm.  Low suspicion for cardiac etiology however her heart score is 3 points without ECG or troponin.  Will initiate cardiac work-up.  Will undergo duplex ultrasounds.  Patient to be given Percocet and aspirin. [KA]   023 I personally  viewed chest x-ray.  No evidence of pneumonia nor pleural effusion.  Radiology reads as cardiomegaly otherwise normal.  I personally reviewed labs.  Microcytic anemia.  Troponin and BNP still in process. [KA]   0300 Troponin normal.  Reviewed patient's BMP.  Hypokalemia of 2.8.  I have ordered 40 mEq of repletion p.o. and 20 mEq of ablation IV.  Patient received 4 mg IV Zofran. [KA]   0301 Personally reviewed ECG.  Normal sinus rhythm.  80 bpm.  Normal axis.  NY interval 138 ms normal.  QTc 454 ms normal.  No ST changes.  No muse. [KA]   0333 Ultrasound DVT negative bilaterally.  Patient reports that she is still experiencing pain.  We will order her dose of Toradol. [KA]   0659 Repeat K improving to 3.1. Will be discharged home with rx for 20 mEq every day x 3d. Follow up with PCP in the 1-2 days. Continue supportive care for COVID, follow CDC guidelines and protocol regarding masking and isolation. Return to ER for any new worsening symptoms.  [KA]   0707 Ambulated with pulse ox and O2 remained normal. [KA]      ED Course User Index  [KA] Tricia Carvajal PA-C         Diagnoses as of 10/06/23 0707   COVID   Hypokalemia   Leg pain, bilateral          Tricia Carvajal PA-C  10/06/23 0707

## 2023-10-06 NOTE — DISCHARGE INSTRUCTIONS
Take potassium pills daily for the next 4 days.   Follow-up with PCP in the next 1-3 days. Return to ER for any new or worsening chest pain, shortness of breath, coughing up blood.   Keep appointment with orthopedics  If your oxygen drops below 90% call ambulance to go to nearest ER.   Recommend rest, hydration, tylenol/ibuprofen as needed for pain and fever. Follow CDC guidelines and protocols regarding masking and isolation.

## 2023-10-06 NOTE — ED NOTES
To ED from home, pt c/o body, muscle and joint aches x 2 days. States SOB, HA, loss of smell and taste and f/c's as well. States neck stiffness/soreness. Was at her PCP's yesterday who diagnosed her with an ear infection. She took a home covid test and states it was positive. She states she is having chest pain, states after every time she coughs, her pain in her chest worsens.     Pt reports she had her left hip replaced on 8/23 and states her left leg has been bothering her the last 2 days as well.     Lungs are clear. Pt is A&Ox4. Neuro intact.      Lenny Dickens RN  10/06/23 0234

## 2023-10-06 NOTE — ED PROVIDER NOTES
I checked on patient prior to discharge.  She was interested in Paxlovid.  I discussed with pharmacy and okay to proceed.  I sent in a prescription to her pharmacy.     Rossy Gonzalez MD  10/06/23 0839

## 2023-10-12 ENCOUNTER — APPOINTMENT (OUTPATIENT)
Dept: PRIMARY CARE | Facility: CLINIC | Age: 48
End: 2023-10-12
Payer: MEDICARE

## 2023-10-18 DIAGNOSIS — G47.00 INSOMNIA, UNSPECIFIED: ICD-10-CM

## 2023-10-19 RX ORDER — ZOLPIDEM TARTRATE 10 MG/1
10 TABLET ORAL NIGHTLY PRN
Qty: 15 TABLET | Refills: 3 | Status: SHIPPED | OUTPATIENT
Start: 2023-10-19 | End: 2023-11-30

## 2023-11-01 ENCOUNTER — OFFICE VISIT (OUTPATIENT)
Dept: ORTHOPEDIC SURGERY | Facility: CLINIC | Age: 48
End: 2023-11-01
Payer: MEDICARE

## 2023-11-01 ENCOUNTER — ANCILLARY PROCEDURE (OUTPATIENT)
Dept: RADIOLOGY | Facility: CLINIC | Age: 48
End: 2023-11-01
Payer: MEDICARE

## 2023-11-01 DIAGNOSIS — Z96.642 S/P TOTAL LEFT HIP ARTHROPLASTY: Primary | ICD-10-CM

## 2023-11-01 DIAGNOSIS — Z96.642 S/P TOTAL LEFT HIP ARTHROPLASTY: ICD-10-CM

## 2023-11-01 PROCEDURE — 73502 X-RAY EXAM HIP UNI 2-3 VIEWS: CPT | Mod: LEFT SIDE | Performed by: RADIOLOGY

## 2023-11-01 PROCEDURE — 99024 POSTOP FOLLOW-UP VISIT: CPT | Performed by: STUDENT IN AN ORGANIZED HEALTH CARE EDUCATION/TRAINING PROGRAM

## 2023-11-01 PROCEDURE — 1036F TOBACCO NON-USER: CPT | Performed by: STUDENT IN AN ORGANIZED HEALTH CARE EDUCATION/TRAINING PROGRAM

## 2023-11-01 PROCEDURE — 4010F ACE/ARB THERAPY RXD/TAKEN: CPT | Performed by: STUDENT IN AN ORGANIZED HEALTH CARE EDUCATION/TRAINING PROGRAM

## 2023-11-01 PROCEDURE — 73502 X-RAY EXAM HIP UNI 2-3 VIEWS: CPT | Mod: LT,FY

## 2023-11-01 PROCEDURE — 3008F BODY MASS INDEX DOCD: CPT | Performed by: STUDENT IN AN ORGANIZED HEALTH CARE EDUCATION/TRAINING PROGRAM

## 2023-11-01 ASSESSMENT — PAIN SCALES - GENERAL: PAINLEVEL_OUTOF10: 3

## 2023-11-01 ASSESSMENT — PAIN - FUNCTIONAL ASSESSMENT: PAIN_FUNCTIONAL_ASSESSMENT: 0-10

## 2023-11-01 NOTE — PROGRESS NOTES
Diagnosis: L KYRIE instability  Procedure Performed: L total hip arthroplasty revision  Date of Surgery: August 17, 2023    Subjective:  Kat Salazar is here for regularly scheduled follow-up after the above procedure. The patient has no specific complaints other than occasional discomfort. The patient has weaned off all narcotic pain medicine, continues to use NSAID'S/Tylenol and ice as needed.      Kat Salazar has been compliant with KEVAN compression stocking as prescribed. They completed home physical therapy. The patient is ambulatory with a cane 50% weight bearing left lower extremity. Patient is compliant with posterior hip precautions including sleeping with bed time abduction pillow. Patient is no longer using abduction brace due to discomfort. The patient denies: fevers, chills, incisional drainage, joint instability, chest or calf pain, shortness of breath, and night sweats. The patient has been compliant with their prescribed aspirin for VTE prophylaxis. There are no complaints of numbness or tingling in the operative extremity.     There has been no interval change in this patient's past medical, surgical, medications, allergies, family history or social history since the most recent visit to a provider within our department.  14 point review of systems was performed, reviewed, and negative except for pertinent positives documented in the history of present illness.    Physical Examination:   General: Patient is alert and oriented x 3 and appears comfortable.  Gait: Patient ambulates with cane slight antalgic gait.  Wound: Incision is well healed. There is no erythema, incisional drainage, fluctuance, or suture abscess.   Hip Exam: ROM deferred given acute post-operative state but no pain with log roll   Knee: Painless ROM of the knee.   Calf: No swelling or tenderness  Able to dorsi-flex and plantar-flex the ankle with appropriate strength. Able to wiggle all toes.   The foot is warm and  well perfused with palpable pulses.   Sensation is intact to light touch.     Radiographs: AP Pelvis: Left total hip arthroplasty in place. There is no evidence of subsidence, fracture or dislocation. The joint is located. Compared to immediate post-operative x-rays, no change in appearance.     Assessment and Plan: The patient is progressing well approximately 10 weeks s/p revision left total hip arthroplasty.  I am pleased with the appearance of her radiographs.    1. A thorough discussion was had with the patient concerning the postoperative course and the patient is in agreement with the plan.    2. We reviewed posterior hip precautions. They will remain in place for life.  Patient can advance to full weightbearing at this juncture.    3. Tylenol as needed/tolerated for pain and stiffness.    4. Reviewed the need for prophylactic antibiotics prior to any dental or other invasive procedures.    5. Follow-up in 2 months with radiographs (3 views of the left hip: AP pelvis, frog lateral and shoot through lateral) or sooner if there are any concerns.    The patient was seen and examined with my resident Dr. Peter who assisted with documentation.  I independently interviewed the patient to obtain a history and performed physical examination.  I formulated the plan of care.    *This office note was dictated using Dragon voice to text software and was not proofread for spelling or grammatical errors *

## 2023-11-30 DIAGNOSIS — G47.00 INSOMNIA, UNSPECIFIED: ICD-10-CM

## 2023-11-30 RX ORDER — ZOLPIDEM TARTRATE 10 MG/1
10 TABLET ORAL NIGHTLY PRN
Qty: 90 TABLET | Refills: 1 | Status: SHIPPED | OUTPATIENT
Start: 2023-11-30 | End: 2024-06-10 | Stop reason: SDUPTHER

## 2023-12-01 ENCOUNTER — TELEPHONE (OUTPATIENT)
Dept: PRIMARY CARE | Facility: CLINIC | Age: 48
End: 2023-12-01
Payer: MEDICARE

## 2023-12-01 NOTE — TELEPHONE ENCOUNTER
Pt called in asking for us to re do her paperwork for accomodation for her to live with her mother as she has had many surgeries lately and need her assistance.

## 2023-12-04 RX ORDER — TIZANIDINE 4 MG/1
4 TABLET ORAL NIGHTLY
Qty: 30 TABLET | Refills: 1 | OUTPATIENT
Start: 2023-12-04

## 2023-12-14 DIAGNOSIS — M19.90 UNSPECIFIED OSTEOARTHRITIS, UNSPECIFIED SITE: ICD-10-CM

## 2023-12-14 RX ORDER — DULOXETIN HYDROCHLORIDE 60 MG/1
CAPSULE, DELAYED RELEASE ORAL
Qty: 90 CAPSULE | Refills: 1 | Status: SHIPPED | OUTPATIENT
Start: 2023-12-14

## 2024-01-03 ENCOUNTER — APPOINTMENT (OUTPATIENT)
Dept: ORTHOPEDIC SURGERY | Facility: CLINIC | Age: 49
End: 2024-01-03

## 2024-02-04 DIAGNOSIS — I10 ESSENTIAL (PRIMARY) HYPERTENSION: ICD-10-CM

## 2024-02-04 DIAGNOSIS — K21.9 GASTROESOPHAGEAL REFLUX DISEASE, UNSPECIFIED WHETHER ESOPHAGITIS PRESENT: ICD-10-CM

## 2024-02-05 ENCOUNTER — APPOINTMENT (OUTPATIENT)
Dept: CARDIOLOGY | Facility: CLINIC | Age: 49
End: 2024-02-05
Payer: MEDICARE

## 2024-02-05 RX ORDER — OMEPRAZOLE 20 MG/1
20 CAPSULE, DELAYED RELEASE ORAL
Qty: 90 CAPSULE | Refills: 1 | Status: SHIPPED | OUTPATIENT
Start: 2024-02-05 | End: 2024-05-13

## 2024-02-05 RX ORDER — HYDROCHLOROTHIAZIDE 25 MG/1
25 TABLET ORAL DAILY
Qty: 90 TABLET | Refills: 3 | Status: SHIPPED | OUTPATIENT
Start: 2024-02-05 | End: 2024-04-15 | Stop reason: ALTCHOICE

## 2024-02-13 ENCOUNTER — TELEPHONE (OUTPATIENT)
Dept: SURGERY | Facility: CLINIC | Age: 49
End: 2024-02-13

## 2024-02-13 NOTE — TELEPHONE ENCOUNTER
Called office for appointment/ patient has not been seen since 2019.  Patient describes pain in area where she had hernia repair/states that she can not tell if there is a bulge/ patient has nausea.  There is pain when she bends.  She states the pain started over the weekend.  Hx since she has been here last include two surgeries for her hip, and she did have covid.  Patient made aware that Dr. Santana will be notified of pain/ and she will get a call back on 02/14/2024 to be scheduled/ patient states understanding and agrees.

## 2024-02-20 ENCOUNTER — APPOINTMENT (OUTPATIENT)
Dept: RADIOLOGY | Facility: HOSPITAL | Age: 49
End: 2024-02-20
Payer: MEDICAID

## 2024-02-20 ENCOUNTER — APPOINTMENT (OUTPATIENT)
Dept: CARDIOLOGY | Facility: HOSPITAL | Age: 49
End: 2024-02-20
Payer: MEDICAID

## 2024-02-20 ENCOUNTER — HOSPITAL ENCOUNTER (EMERGENCY)
Facility: HOSPITAL | Age: 49
Discharge: HOME | End: 2024-02-20
Attending: STUDENT IN AN ORGANIZED HEALTH CARE EDUCATION/TRAINING PROGRAM
Payer: MEDICAID

## 2024-02-20 VITALS
HEART RATE: 63 BPM | WEIGHT: 211.2 LBS | RESPIRATION RATE: 13 BRPM | HEIGHT: 62 IN | OXYGEN SATURATION: 97 % | TEMPERATURE: 98.4 F | SYSTOLIC BLOOD PRESSURE: 105 MMHG | BODY MASS INDEX: 38.87 KG/M2 | DIASTOLIC BLOOD PRESSURE: 67 MMHG

## 2024-02-20 DIAGNOSIS — R07.89 ANTERIOR CHEST WALL PAIN: Primary | ICD-10-CM

## 2024-02-20 LAB
ALBUMIN SERPL-MCNC: 3.9 G/DL (ref 3.5–5)
ALP BLD-CCNC: 110 U/L (ref 35–125)
ALT SERPL-CCNC: 9 U/L (ref 5–40)
ANION GAP SERPL CALC-SCNC: 10 MMOL/L
AST SERPL-CCNC: 15 U/L (ref 5–40)
BASOPHILS # BLD AUTO: 0.03 X10*3/UL (ref 0–0.1)
BASOPHILS NFR BLD AUTO: 0.3 %
BILIRUB SERPL-MCNC: 0.4 MG/DL (ref 0.1–1.2)
BUN SERPL-MCNC: 10 MG/DL (ref 8–25)
CALCIUM SERPL-MCNC: 9.3 MG/DL (ref 8.5–10.4)
CHLORIDE SERPL-SCNC: 106 MMOL/L (ref 97–107)
CO2 SERPL-SCNC: 25 MMOL/L (ref 24–31)
CREAT SERPL-MCNC: 0.6 MG/DL (ref 0.4–1.6)
EGFRCR SERPLBLD CKD-EPI 2021: >90 ML/MIN/1.73M*2
EOSINOPHIL # BLD AUTO: 0.03 X10*3/UL (ref 0–0.7)
EOSINOPHIL NFR BLD AUTO: 0.3 %
ERYTHROCYTE [DISTWIDTH] IN BLOOD BY AUTOMATED COUNT: 14.4 % (ref 11.5–14.5)
FLUAV RNA RESP QL NAA+PROBE: NOT DETECTED
FLUBV RNA RESP QL NAA+PROBE: NOT DETECTED
GLUCOSE SERPL-MCNC: 117 MG/DL (ref 65–99)
HCT VFR BLD AUTO: 35.7 % (ref 36–46)
HGB BLD-MCNC: 11.4 G/DL (ref 12–16)
IMM GRANULOCYTES # BLD AUTO: 0.04 X10*3/UL (ref 0–0.7)
IMM GRANULOCYTES NFR BLD AUTO: 0.4 % (ref 0–0.9)
LYMPHOCYTES # BLD AUTO: 1.97 X10*3/UL (ref 1.2–4.8)
LYMPHOCYTES NFR BLD AUTO: 21.9 %
MCH RBC QN AUTO: 25.6 PG (ref 26–34)
MCHC RBC AUTO-ENTMCNC: 31.9 G/DL (ref 32–36)
MCV RBC AUTO: 80 FL (ref 80–100)
MONOCYTES # BLD AUTO: 0.51 X10*3/UL (ref 0.1–1)
MONOCYTES NFR BLD AUTO: 5.7 %
NEUTROPHILS # BLD AUTO: 6.4 X10*3/UL (ref 1.2–7.7)
NEUTROPHILS NFR BLD AUTO: 71.4 %
NRBC BLD-RTO: 0 /100 WBCS (ref 0–0)
NT-PROBNP SERPL-MCNC: 208 PG/ML (ref 0–192)
PLATELET # BLD AUTO: 370 X10*3/UL (ref 150–450)
POTASSIUM SERPL-SCNC: 3.5 MMOL/L (ref 3.4–5.1)
PROT SERPL-MCNC: 7.2 G/DL (ref 5.9–7.9)
RBC # BLD AUTO: 4.46 X10*6/UL (ref 4–5.2)
SARS-COV-2 RNA RESP QL NAA+PROBE: NOT DETECTED
SODIUM SERPL-SCNC: 141 MMOL/L (ref 133–145)
TROPONIN T SERPL-MCNC: 10 NG/L
TROPONIN T SERPL-MCNC: 7 NG/L
WBC # BLD AUTO: 9 X10*3/UL (ref 4.4–11.3)

## 2024-02-20 PROCEDURE — 84484 ASSAY OF TROPONIN QUANT: CPT | Performed by: STUDENT IN AN ORGANIZED HEALTH CARE EDUCATION/TRAINING PROGRAM

## 2024-02-20 PROCEDURE — 99284 EMERGENCY DEPT VISIT MOD MDM: CPT | Mod: 25

## 2024-02-20 PROCEDURE — 93005 ELECTROCARDIOGRAM TRACING: CPT

## 2024-02-20 PROCEDURE — 71046 X-RAY EXAM CHEST 2 VIEWS: CPT

## 2024-02-20 PROCEDURE — 36415 COLL VENOUS BLD VENIPUNCTURE: CPT | Performed by: STUDENT IN AN ORGANIZED HEALTH CARE EDUCATION/TRAINING PROGRAM

## 2024-02-20 PROCEDURE — 80053 COMPREHEN METABOLIC PANEL: CPT | Performed by: STUDENT IN AN ORGANIZED HEALTH CARE EDUCATION/TRAINING PROGRAM

## 2024-02-20 PROCEDURE — 96375 TX/PRO/DX INJ NEW DRUG ADDON: CPT

## 2024-02-20 PROCEDURE — 83880 ASSAY OF NATRIURETIC PEPTIDE: CPT | Performed by: PHYSICIAN ASSISTANT

## 2024-02-20 PROCEDURE — 74177 CT ABD & PELVIS W/CONTRAST: CPT

## 2024-02-20 PROCEDURE — 2500000004 HC RX 250 GENERAL PHARMACY W/ HCPCS (ALT 636 FOR OP/ED): Performed by: PHYSICIAN ASSISTANT

## 2024-02-20 PROCEDURE — 87636 SARSCOV2 & INF A&B AMP PRB: CPT | Performed by: STUDENT IN AN ORGANIZED HEALTH CARE EDUCATION/TRAINING PROGRAM

## 2024-02-20 PROCEDURE — 96374 THER/PROPH/DIAG INJ IV PUSH: CPT | Mod: 59

## 2024-02-20 PROCEDURE — 96376 TX/PRO/DX INJ SAME DRUG ADON: CPT

## 2024-02-20 PROCEDURE — 85025 COMPLETE CBC W/AUTO DIFF WBC: CPT | Performed by: STUDENT IN AN ORGANIZED HEALTH CARE EDUCATION/TRAINING PROGRAM

## 2024-02-20 PROCEDURE — 2550000001 HC RX 255 CONTRASTS: Performed by: STUDENT IN AN ORGANIZED HEALTH CARE EDUCATION/TRAINING PROGRAM

## 2024-02-20 RX ORDER — KETOROLAC TROMETHAMINE 30 MG/ML
15 INJECTION, SOLUTION INTRAMUSCULAR; INTRAVENOUS ONCE
Status: COMPLETED | OUTPATIENT
Start: 2024-02-20 | End: 2024-02-20

## 2024-02-20 RX ORDER — ONDANSETRON HYDROCHLORIDE 2 MG/ML
4 INJECTION, SOLUTION INTRAVENOUS ONCE
Status: COMPLETED | OUTPATIENT
Start: 2024-02-20 | End: 2024-02-20

## 2024-02-20 RX ADMIN — ONDANSETRON 4 MG: 2 INJECTION INTRAMUSCULAR; INTRAVENOUS at 11:22

## 2024-02-20 RX ADMIN — KETOROLAC TROMETHAMINE 15 MG: 30 INJECTION INTRAMUSCULAR; INTRAVENOUS at 11:21

## 2024-02-20 RX ADMIN — KETOROLAC TROMETHAMINE 15 MG: 30 INJECTION INTRAMUSCULAR; INTRAVENOUS at 12:47

## 2024-02-20 RX ADMIN — IOHEXOL 75 ML: 350 INJECTION, SOLUTION INTRAVENOUS at 11:55

## 2024-02-20 ASSESSMENT — PAIN DESCRIPTION - ORIENTATION
ORIENTATION: MID
ORIENTATION: MID

## 2024-02-20 ASSESSMENT — PAIN - FUNCTIONAL ASSESSMENT
PAIN_FUNCTIONAL_ASSESSMENT: 0-10

## 2024-02-20 ASSESSMENT — PAIN DESCRIPTION - ONSET: ONSET: GRADUAL

## 2024-02-20 ASSESSMENT — PAIN SCALES - GENERAL
PAINLEVEL_OUTOF10: 8
PAINLEVEL_OUTOF10: 6

## 2024-02-20 ASSESSMENT — PAIN DESCRIPTION - DESCRIPTORS
DESCRIPTORS: ACHING
DESCRIPTORS: ACHING

## 2024-02-20 ASSESSMENT — COLUMBIA-SUICIDE SEVERITY RATING SCALE - C-SSRS
6. HAVE YOU EVER DONE ANYTHING, STARTED TO DO ANYTHING, OR PREPARED TO DO ANYTHING TO END YOUR LIFE?: NO
2. HAVE YOU ACTUALLY HAD ANY THOUGHTS OF KILLING YOURSELF?: NO
1. IN THE PAST MONTH, HAVE YOU WISHED YOU WERE DEAD OR WISHED YOU COULD GO TO SLEEP AND NOT WAKE UP?: NO

## 2024-02-20 ASSESSMENT — PAIN DESCRIPTION - LOCATION
LOCATION: CHEST

## 2024-02-20 ASSESSMENT — PAIN DESCRIPTION - PROGRESSION
CLINICAL_PROGRESSION: NOT CHANGED
CLINICAL_PROGRESSION: GRADUALLY WORSENING

## 2024-02-20 ASSESSMENT — PAIN DESCRIPTION - FREQUENCY: FREQUENCY: CONSTANT/CONTINUOUS

## 2024-02-20 ASSESSMENT — PAIN DESCRIPTION - PAIN TYPE: TYPE: ACUTE PAIN

## 2024-02-20 NOTE — ED PROVIDER NOTES
HPI   Chief Complaint   Patient presents with    Chest Pain     Chest pain started this morning radiates down left arm, arm pain is recreateable, pt is nausea, hx of rheumatoid arthritis, abd pain x 1 week concerned its her hernia , pt has headache and ear pressure    Abdominal Pain    Flu Symptoms       HPI     Patient is a 48-year-old female with a history of rheumatoid arthritis senting for evaluation of chest pain that began last night.  The chest pain is located to the left side of her chest and radiates into her armpit.  It is worse with movement and with palpation.  Pain is intermittent.  She is also complaining of abdominal discomfort for 1 week and believes it may be her hernia where mesh was placed.  She admitted to some nausea without vomiting.  No shortness of breath.  No vomiting, diarrhea, constipation or urinary symptoms.               [place] Coma Scale Score: 15                     Patient History   No past medical history on file.  Past Surgical History:   Procedure Laterality Date    CT GUIDED SOFT TISSUE FLUID DRAIN  6/2/2022    CT GUIDED SOFT TISSUE FLUID DRAIN [facility]    US GUIDED SOFT TISSUE FLUID DRAIN  9/30/2021    US GUIDED SOFT TISSUE FLUID DRAIN [facility]     Family History   Problem Relation Name Age of Onset    Diabetes Mother      Hypertension Mother      Arthritis Other      Psoriasis Neg Hx      Irritable bowel syndrome Neg Hx       Social History     Tobacco Use    Smoking status: Never    Smokeless tobacco: Never   Vaping Use    Vaping Use: Never used   Substance Use Topics    Alcohol use: Not Currently    Drug use: Never       Physical Exam   ED Triage Vitals [02/20/24 0947]   Temperature Heart Rate Respirations BP   36.9 °C (98.4 °F) 87 18 (!) 144/97      Pulse Ox Temp Source Heart Rate Source Patient Position   98 % Oral Monitor Sitting      BP Location FiO2 (%)     Right arm --       Physical Exam    GENERAL: Well nourished and well developed. Answers  questions appropriately.    SKIN: Clean and dry without evidence of rashes.    HEENT: Skull normocephalic, atraumatic. No scleral icterus. PERRLA, with EOMI.  Mucous membranes moist and pink in color.     RESPIRATORY: Clear to ausculation with normal effort. No adventitious sounds, intercostal retractions or shallow breathing.    CARDIOVASCULAR: Regular rate and rhythm with normal S1, S2. No S3, S4, murmurs or gallops. Capillary refill brisk, less than 3 seconds bilaterally. No unilateral lower extremity edema.    ABD/: Soft, nontender abdomen. Bowel sounds equal in all four quadrants. No tenderness, rebound, guarding or rigidity.    MSK: Spontaneously moves all 4 extremities without difficulty.  No injury or obvious deformity.  Reproducible chest pain on palpation of the left anterior chest wall, worse near the collarbone.  Full range of motion intact of the left upper extremity, including the shoulder, elbow and wrist.    NEURO/PSYCH: A&Ox3. Cranial nerves II-XII grossly intact. No focal motor or sensory deficits. Appropriate mood and behavior.    ED Course & MDM   Diagnoses as of 02/20/24 1357   Anterior chest wall pain       Medical Decision Making  Parts of this chart have been completed using voice recognition software. Please excuse any errors of transcription. Despite the medical decision making time stamp above-my medical decision making has taken place during the patient's entire visit. My thought process and reason for plan has been formulated from the time that I saw the patient until the time of disposition and is not specific to one specific moment during their visit and furthermore my MDM encompasses this entire chart and not only this text box.      HPI: Detailed above.    Exam: A medically appropriate exam performed, outlined above, given the known history and presentation.    History obtained from: Patient    Social Determinants of Health considered during this visit: Age    EKG interpreted by my  attending physician, reviewed by myself.    Labs Reviewed   COMPREHENSIVE METABOLIC PANEL - Abnormal       Result Value    Glucose 117 (*)     Sodium 141      Potassium 3.5      Chloride 106      Bicarbonate 25      Urea Nitrogen 10      Creatinine 0.60      eGFR >90      Calcium 9.3      Albumin 3.9      Alkaline Phosphatase 110      Total Protein 7.2      AST 15      Bilirubin, Total 0.4      ALT 9      Anion Gap 10     CBC WITH AUTO DIFFERENTIAL - Abnormal    WBC 9.0      nRBC 0.0      RBC 4.46      Hemoglobin 11.4 (*)     Hematocrit 35.7 (*)     MCV 80      MCH 25.6 (*)     MCHC 31.9 (*)     RDW 14.4      Platelets 370      Neutrophils % 71.4      Immature Granulocytes %, Automated 0.4      Lymphocytes % 21.9      Monocytes % 5.7      Eosinophils % 0.3      Basophils % 0.3      Neutrophils Absolute 6.40      Immature Granulocytes Absolute, Automated 0.04      Lymphocytes Absolute 1.97      Monocytes Absolute 0.51      Eosinophils Absolute 0.03      Basophils Absolute 0.03     N-TERMINAL PROBNP - Abnormal    PROBNP 208 (*)     Narrative:     Reference ranges are based on clinical submission data. These ranges represent the 95th percentile of normal cut-off points. As NT Pro- BNP values approach 1000 pg/ml, clinical symptoms are more likely associated with CHF.   SARS-COV-2 AND INFLUENZA A/B PCR - Normal    Flu A Result Not Detected      Flu B Result Not Detected      Coronavirus 2019, PCR Not Detected      Narrative:     This assay has received FDA Emergency Use Authorization (EUA) and  is only authorized for the duration of time that circumstances exist to justify the authorization of the emergency use of in vitro diagnostic tests for the detection of SARS-CoV-2 virus and/or diagnosis of COVID-19 infection under section 564(b)(1) of the Act, 21 U.S.C. 360bbb-3(b)(1). Testing for SARS-CoV-2 is only recommended for patients who meet current clinical and/or epidemiological criteria as defined by federal, state, or  local public health directives. This assay is an in vitro diagnostic nucleic acid amplification test for the qualitative detection of SARS-CoV-2, Influenza A, and Influenza B from nasopharyngeal specimens and has been validated for use at [facility]. Negative results do not preclude COVID-19 infections or Influenza A/B infections, and should not be used as the sole basis for diagnosis, treatment, or other management decisions. If Influenza A/B and RSV PCR results are negative, testing for Parainfluenza virus, Adenovirus and Metapneumovirus is routinely performed for [facility] pediatric oncology and intensive care inpatients, and is available on other patients by placing an add-on request.    SERIAL TROPONIN, INITIAL ([facility]) - Normal    Troponin T, High Sensitivity 7     SERIAL TROPONIN,  2 HOUR ([facility]) - Normal    Troponin T, High Sensitivity 10     TROPONIN T SERIES, HIGH SENSITIVITY (0, 2 HR, 6 HR)    Narrative:     The following orders were created for panel order Troponin T Series, High Sensitivity (0, 2HR, 6HR).  Procedure                               Abnormality         Status                     ---------                               -----------         ------                     Serial Troponin, Initial...[[id number]]  Normal              Final result               Serial Troponin, 2 Hour ...[[id number]]  Normal              Final result               Serial Troponin, 6 Hour ...[[id number]]                                                   Please view results for these tests on the individual orders.   SERIAL TROPONIN, 6 HOUR ([facility])      CT abdomen pelvis w IV contrast   Final Result   No CT evidence for acute pathology within the abdomen or pelvis.        There are a couple of left adnexal cysts, the larger measuring 2.3 cm   in diameter.        Signed by: Supa Alexander 2/20/2024 1:38 PM   Dictation workstation:   [id number]      XR chest 2 views   Final Result   No acute cardiopulmonary  process.             Signed by: Supa Alexander 2/20/2024 11:32 AM   Dictation workstation:   [id number]           Medications   ketorolac (Toradol) injection 15 mg (15 mg intravenous Given 2/20/24 1121)   ondansetron (Zofran) injection 4 mg (4 mg intravenous Given 2/20/24 1122)   iohexol (OMNIPaque) 350 mg iodine/mL solution 75 mL (75 mL intravenous Given 2/20/24 1155)   ketorolac (Toradol) injection 15 mg (15 mg intravenous Given 2/20/24 1247)        Differential diagnoses considered for this visit include: Acute coronary syndrome, musculoskeletal sprain, musculoskeletal strain, electrolyte imbalance, robotic service, fluid overload    Considerations/further MDM:    Patient is a 48-year-old female complaining of chest and abdominal pain.  Vital signs within normal limits and hemodynamically stable.  Physical exam appreciated reproducible pain of the chest with palpation as documented.  CBC and CMP were unremarkable.  Initial troponin of 7, repeat of 10.  proBNP 208.  COVID and influenza test both negative. Chest x-ray was interpreted by radiology and reviewed myself, negative for acute cardiopulmonary processes. Chest x-ray was interpreted by radiology reviewed by myself, negative for acute cardiac pulmonary processes.  CT abdomen pelvis was interpreted by radiology reviewed by myself, negative for evidence of acute pathology within the abdomen or pelvis.  Evidence of left adnexal cyst.  Results discussed with the patient at the bedside.  There were no acute findings warranting further workup or hospital admission.  Heart score of 2 for age and hypertension.  I do not believe that the patient's chest pain is ischemic or cardiac in nature given the reproducible pain.  The pain is most likely secondary to a rheumatoid arthritis flare or musculoskeletal in nature.  The patient was counseled on these results and we had a discussion at the bedside regarding her disposition.  Discharge was found to be appropriate.   Patient was understanding during the discussion and felt comfortable to follow-up outpatient after this workup today.  She will continue to take her steroid Dosepak as prescribed.  Return precautions were discussed with the patient, and she was ultimately released to home in good condition.    I estimate there is LOW risk for ACUTE CORONARY SYNDROME INCLUDING MI, INTRACRANIAL HEMORRHAGE, MALIGNANT DYSRHYTHMIA or HYPERTENSION, PERICARDIAL TAMPONADE, PNEUMOTHORAX, PULMONARY EMBOLISM, SEPSIS, SUBARACHNOID HEMORRHAGE, SUBDURAL HEMATOMA, STROKE, TIA, PNA RESPIRATORY DISTRESS/COMPRIMISE, PERICARIDIAL EFFUSION, or THORACIC AORTIC DISSECTION, thus I consider the discharge disposition reasonable. We have discussed the diagnosis and risks, and we agree with discharging home to follow-up with their primary doctor or specialist as discussed and as provided. We also discussed returning to the Emergency Department immediately if new or worsening symptoms occur. We have discussed the symptoms which are most concerning (e.g., bloody sputum, fever, worsening pain or shortness of breath, vomiting, weakness) that necessitate immediate return.    Patient was seen in conjunction with attending physician Dr. Guido Chow   Patient's history, physical exam, diagnostic studies, and treatment plan were discussed thoroughly.    Procedure  Procedures     Malena Vazquez PA-C  02/20/24 8488

## 2024-02-20 NOTE — ED PROVIDER NOTES
Supervisory note:  Patient seen in conjunction with HARINI Serrano.  Patient presents with multiple complaints.  She reports that over the last several days, she feels as though she may be having a rheumatoid arthritis flare.  She did see her physician who prescribed her a tapering steroid Dosepak.  Despite this, she continues to have pain on the left side of the chest as well as the left collarbone and into the left armpit.  She also has pain in the periumbilical region.  Her doctor did prescribe her nystatin cream which has helped with some of the redness, smell, and discharge from the umbilicus but the pain continues.  On examination, there is no significant surrounding erythema of the umbilicus.  Heart and lung sounds are unremarkable.  Pain in the chest is somewhat reproducible with palpation.    EKG interpreted by me: Normal sinus rhythm, rate 73.  Normal axis.  Borderline LVH with repolarization abnormality.    Laboratory studies reveal mild anemia but are otherwise unremarkable.  My suspicion for acute coronary syndrome is very low.  Abdominal CT does not reveal any acute findings but does reveal some adnexal cysts.  Location of pain not felt to be consistent with these cysts.  My suspicion for bowel obstruction, ischemia, AAA, aortic dissection, appendicitis, cholecystitis, ectopic pregnancy, ovarian torsion as etiology of symptoms is very low.  Patient advised to follow-up with primary care physician.  Return precautions given for any worsening symptoms.    I personally saw the patient and made/approved the management plan and take responsiblity for the patient management.  Parts of this chart were completed with dictation software, please excuse any errors in transcription.     Guido Chow MD  02/20/24 9717

## 2024-02-20 NOTE — DISCHARGE INSTRUCTIONS
Thank you for choosing [facility]  for your emergency care.    Please return to the Emergency Department immediately if new or worsening symptoms occur. Symptoms of that are most concerning include worsening chest pain or shortness of breath.    It is important to remember that your care does not end here and you must continue to monitor your condition closely. Please return to the emergency department for any worsening or concerning signs or symptoms as directed by our conversations and the discharge instructions. Otherwise please follow up with your doctor in 2 days if no better or worse. If you do not have a doctor please contact the referral number on your discharge instructions. Please contact any physician specialists provided in your discharge notes as it is very important to follow up with them regarding your condition. If you are unable to reach the physicians provided, please come back to the Emergency Department at any time.      As always, please take medications as directed. If you have any questions at all regarding your medications, please contact the pharmacist, the emergency department, or your doctor. Before taking any medication prescribed in the Emergency Department, please review the medication side effects and drug interactions as they may interact with your home medications.     Education materials have been provided to you about your encounter today.  Please review the attachments at your earliest convenience.

## 2024-02-20 NOTE — Clinical Note
Kat Salazar was seen and treated in our emergency department on 2/20/2024.  She may return to work on 02/22/2024.       If you have any questions or concerns, please don't hesitate to call.      Guido Chow MD

## 2024-03-12 ENCOUNTER — PATIENT MESSAGE (OUTPATIENT)
Dept: PRIMARY CARE | Facility: CLINIC | Age: 49
End: 2024-03-12
Payer: COMMERCIAL

## 2024-03-12 NOTE — TELEPHONE ENCOUNTER
Have her see me I can try to get her set up with infusions. Her blood counts are fine so there is no urgency

## 2024-03-19 ENCOUNTER — HOSPITAL ENCOUNTER (EMERGENCY)
Facility: HOSPITAL | Age: 49
Discharge: HOME | End: 2024-03-19
Payer: COMMERCIAL

## 2024-03-19 ENCOUNTER — APPOINTMENT (OUTPATIENT)
Dept: PRIMARY CARE | Facility: CLINIC | Age: 49
End: 2024-03-19
Payer: COMMERCIAL

## 2024-03-19 VITALS
WEIGHT: 202.82 LBS | HEIGHT: 62 IN | RESPIRATION RATE: 15 BRPM | BODY MASS INDEX: 37.32 KG/M2 | SYSTOLIC BLOOD PRESSURE: 125 MMHG | DIASTOLIC BLOOD PRESSURE: 72 MMHG | TEMPERATURE: 97 F | OXYGEN SATURATION: 96 % | HEART RATE: 73 BPM

## 2024-03-19 DIAGNOSIS — H66.90 ACUTE OTITIS MEDIA, UNSPECIFIED OTITIS MEDIA TYPE: ICD-10-CM

## 2024-03-19 DIAGNOSIS — M54.50 LUMBAR BACK PAIN: ICD-10-CM

## 2024-03-19 DIAGNOSIS — H60.501 ACUTE OTITIS EXTERNA OF RIGHT EAR, UNSPECIFIED TYPE: Primary | ICD-10-CM

## 2024-03-19 LAB
ALBUMIN SERPL-MCNC: 4 G/DL (ref 3.5–5)
ALP BLD-CCNC: 105 U/L (ref 35–125)
ALT SERPL-CCNC: 9 U/L (ref 5–40)
ANION GAP SERPL CALC-SCNC: 9 MMOL/L
APPEARANCE UR: CLEAR
AST SERPL-CCNC: 13 U/L (ref 5–40)
BASOPHILS # BLD AUTO: 0.03 X10*3/UL (ref 0–0.1)
BASOPHILS NFR BLD AUTO: 0.3 %
BILIRUB SERPL-MCNC: 0.3 MG/DL (ref 0.1–1.2)
BILIRUB UR STRIP.AUTO-MCNC: NEGATIVE MG/DL
BUN SERPL-MCNC: 10 MG/DL (ref 8–25)
CALCIUM SERPL-MCNC: 9.5 MG/DL (ref 8.5–10.4)
CHLORIDE SERPL-SCNC: 102 MMOL/L (ref 97–107)
CO2 SERPL-SCNC: 30 MMOL/L (ref 24–31)
COLOR UR: NORMAL
CREAT SERPL-MCNC: 0.7 MG/DL (ref 0.4–1.6)
EGFRCR SERPLBLD CKD-EPI 2021: >90 ML/MIN/1.73M*2
EOSINOPHIL # BLD AUTO: 0.06 X10*3/UL (ref 0–0.7)
EOSINOPHIL NFR BLD AUTO: 0.6 %
ERYTHROCYTE [DISTWIDTH] IN BLOOD BY AUTOMATED COUNT: 15.1 % (ref 11.5–14.5)
FLUAV RNA RESP QL NAA+PROBE: NOT DETECTED
FLUBV RNA RESP QL NAA+PROBE: NOT DETECTED
GLUCOSE SERPL-MCNC: 129 MG/DL (ref 65–99)
GLUCOSE UR STRIP.AUTO-MCNC: NORMAL MG/DL
HCT VFR BLD AUTO: 38.7 % (ref 36–46)
HGB BLD-MCNC: 12.3 G/DL (ref 12–16)
IMM GRANULOCYTES # BLD AUTO: 0.03 X10*3/UL (ref 0–0.7)
IMM GRANULOCYTES NFR BLD AUTO: 0.3 % (ref 0–0.9)
KETONES UR STRIP.AUTO-MCNC: NEGATIVE MG/DL
LEUKOCYTE ESTERASE UR QL STRIP.AUTO: NEGATIVE
LYMPHOCYTES # BLD AUTO: 1.35 X10*3/UL (ref 1.2–4.8)
LYMPHOCYTES NFR BLD AUTO: 13.1 %
MCH RBC QN AUTO: 25.4 PG (ref 26–34)
MCHC RBC AUTO-ENTMCNC: 31.8 G/DL (ref 32–36)
MCV RBC AUTO: 80 FL (ref 80–100)
MONOCYTES # BLD AUTO: 0.5 X10*3/UL (ref 0.1–1)
MONOCYTES NFR BLD AUTO: 4.9 %
NEUTROPHILS # BLD AUTO: 8.31 X10*3/UL (ref 1.2–7.7)
NEUTROPHILS NFR BLD AUTO: 80.8 %
NITRITE UR QL STRIP.AUTO: NEGATIVE
NRBC BLD-RTO: 0 /100 WBCS (ref 0–0)
PH UR STRIP.AUTO: 7.5 [PH]
PLATELET # BLD AUTO: 425 X10*3/UL (ref 150–450)
POTASSIUM SERPL-SCNC: 3.4 MMOL/L (ref 3.4–5.1)
PROT SERPL-MCNC: 7.4 G/DL (ref 5.9–7.9)
PROT UR STRIP.AUTO-MCNC: NEGATIVE MG/DL
RBC # BLD AUTO: 4.85 X10*6/UL (ref 4–5.2)
RBC # UR STRIP.AUTO: NEGATIVE /UL
SARS-COV-2 RNA RESP QL NAA+PROBE: NOT DETECTED
SODIUM SERPL-SCNC: 141 MMOL/L (ref 133–145)
SP GR UR STRIP.AUTO: 1.02
UROBILINOGEN UR STRIP.AUTO-MCNC: NORMAL MG/DL
WBC # BLD AUTO: 10.3 X10*3/UL (ref 4.4–11.3)

## 2024-03-19 PROCEDURE — 96361 HYDRATE IV INFUSION ADD-ON: CPT

## 2024-03-19 PROCEDURE — 80053 COMPREHEN METABOLIC PANEL: CPT | Performed by: PHYSICIAN ASSISTANT

## 2024-03-19 PROCEDURE — 87636 SARSCOV2 & INF A&B AMP PRB: CPT | Performed by: EMERGENCY MEDICINE

## 2024-03-19 PROCEDURE — 81003 URINALYSIS AUTO W/O SCOPE: CPT | Performed by: PHYSICIAN ASSISTANT

## 2024-03-19 PROCEDURE — 96374 THER/PROPH/DIAG INJ IV PUSH: CPT

## 2024-03-19 PROCEDURE — 99284 EMERGENCY DEPT VISIT MOD MDM: CPT | Mod: 25

## 2024-03-19 PROCEDURE — 2500000001 HC RX 250 WO HCPCS SELF ADMINISTERED DRUGS (ALT 637 FOR MEDICARE OP): Performed by: PHYSICIAN ASSISTANT

## 2024-03-19 PROCEDURE — 36415 COLL VENOUS BLD VENIPUNCTURE: CPT | Performed by: PHYSICIAN ASSISTANT

## 2024-03-19 PROCEDURE — 96375 TX/PRO/DX INJ NEW DRUG ADDON: CPT

## 2024-03-19 PROCEDURE — 85025 COMPLETE CBC W/AUTO DIFF WBC: CPT | Performed by: PHYSICIAN ASSISTANT

## 2024-03-19 PROCEDURE — 2500000004 HC RX 250 GENERAL PHARMACY W/ HCPCS (ALT 636 FOR OP/ED): Performed by: PHYSICIAN ASSISTANT

## 2024-03-19 RX ORDER — AMOXICILLIN AND CLAVULANATE POTASSIUM 875; 125 MG/1; MG/1
1 TABLET, FILM COATED ORAL EVERY 12 HOURS
Qty: 14 TABLET | Refills: 0 | Status: SHIPPED | OUTPATIENT
Start: 2024-03-19 | End: 2024-03-28 | Stop reason: HOSPADM

## 2024-03-19 RX ORDER — AMOXICILLIN AND CLAVULANATE POTASSIUM 875; 125 MG/1; MG/1
1 TABLET, FILM COATED ORAL ONCE
Status: COMPLETED | OUTPATIENT
Start: 2024-03-19 | End: 2024-03-19

## 2024-03-19 RX ORDER — KETOROLAC TROMETHAMINE 30 MG/ML
15 INJECTION, SOLUTION INTRAMUSCULAR; INTRAVENOUS ONCE
Status: COMPLETED | OUTPATIENT
Start: 2024-03-19 | End: 2024-03-19

## 2024-03-19 RX ORDER — AMOXICILLIN AND CLAVULANATE POTASSIUM 875; 125 MG/1; MG/1
1 TABLET, FILM COATED ORAL EVERY 12 HOURS
Qty: 14 TABLET | Refills: 0 | Status: SHIPPED | OUTPATIENT
Start: 2024-03-19 | End: 2024-03-19 | Stop reason: SDUPTHER

## 2024-03-19 RX ORDER — OFLOXACIN 3 MG/ML
3 SOLUTION AURICULAR (OTIC) 2 TIMES DAILY
Qty: 5 ML | Refills: 0 | Status: ON HOLD | OUTPATIENT
Start: 2024-03-19 | End: 2024-03-27 | Stop reason: ALTCHOICE

## 2024-03-19 RX ORDER — OFLOXACIN 3 MG/ML
5 SOLUTION AURICULAR (OTIC) DAILY
Status: DISCONTINUED | OUTPATIENT
Start: 2024-03-19 | End: 2024-03-19 | Stop reason: HOSPADM

## 2024-03-19 RX ORDER — OFLOXACIN 3 MG/ML
3 SOLUTION AURICULAR (OTIC) 2 TIMES DAILY
Qty: 5 ML | Refills: 0 | Status: SHIPPED | OUTPATIENT
Start: 2024-03-19 | End: 2024-03-19 | Stop reason: SDUPTHER

## 2024-03-19 RX ORDER — DIPHENHYDRAMINE HYDROCHLORIDE 50 MG/ML
25 INJECTION INTRAMUSCULAR; INTRAVENOUS ONCE
Status: COMPLETED | OUTPATIENT
Start: 2024-03-19 | End: 2024-03-19

## 2024-03-19 RX ORDER — IBUPROFEN 600 MG/1
600 TABLET ORAL EVERY 6 HOURS PRN
Qty: 12 TABLET | Refills: 0 | Status: SHIPPED | OUTPATIENT
Start: 2024-03-19 | End: 2024-03-19 | Stop reason: SDUPTHER

## 2024-03-19 RX ORDER — DEXAMETHASONE SODIUM PHOSPHATE 100 MG/10ML
10 INJECTION INTRAMUSCULAR; INTRAVENOUS ONCE
Status: COMPLETED | OUTPATIENT
Start: 2024-03-19 | End: 2024-03-19

## 2024-03-19 RX ORDER — IBUPROFEN 600 MG/1
600 TABLET ORAL EVERY 6 HOURS PRN
Qty: 12 TABLET | Refills: 0 | Status: SHIPPED | OUTPATIENT
Start: 2024-03-19 | End: 2024-03-28 | Stop reason: HOSPADM

## 2024-03-19 RX ORDER — PROCHLORPERAZINE EDISYLATE 5 MG/ML
10 INJECTION INTRAMUSCULAR; INTRAVENOUS ONCE
Status: COMPLETED | OUTPATIENT
Start: 2024-03-19 | End: 2024-03-19

## 2024-03-19 RX ADMIN — OFLOXACIN 5 DROP: 3 SOLUTION AURICULAR (OTIC) at 19:47

## 2024-03-19 RX ADMIN — PROCHLORPERAZINE EDISYLATE 10 MG: 5 INJECTION INTRAMUSCULAR; INTRAVENOUS at 19:47

## 2024-03-19 RX ADMIN — KETOROLAC TROMETHAMINE 15 MG: 30 INJECTION, SOLUTION INTRAMUSCULAR at 19:48

## 2024-03-19 RX ADMIN — DEXAMETHASONE SODIUM PHOSPHATE 10 MG: 10 INJECTION INTRAMUSCULAR; INTRAVENOUS at 19:47

## 2024-03-19 RX ADMIN — DIPHENHYDRAMINE HYDROCHLORIDE 25 MG: 50 INJECTION, SOLUTION INTRAMUSCULAR; INTRAVENOUS at 19:47

## 2024-03-19 RX ADMIN — AMOXICILLIN AND CLAVULANATE POTASSIUM 1 TABLET: 875; 125 TABLET, FILM COATED ORAL at 19:48

## 2024-03-19 RX ADMIN — SODIUM CHLORIDE 1000 ML: 900 INJECTION, SOLUTION INTRAVENOUS at 19:47

## 2024-03-19 ASSESSMENT — PAIN SCALES - GENERAL: PAINLEVEL_OUTOF10: 9

## 2024-03-19 ASSESSMENT — PAIN - FUNCTIONAL ASSESSMENT: PAIN_FUNCTIONAL_ASSESSMENT: 0-10

## 2024-03-19 ASSESSMENT — PAIN DESCRIPTION - LOCATION: LOCATION: EAR

## 2024-03-19 NOTE — ED PROVIDER NOTES
HPI   Chief Complaint   Patient presents with    Flu Symptoms     Flu like s/s. Right ear pain with pain radiating to neck with associated migraine        HPI   48-year-old female here for evaluation of right ear pain, started on Saturday got worse on Sunday, and it progressed now she is having pain throughout the right ear that is triggering a migraine, she has a history of migraines, she took her  Maxalt medication it helped a little bit but did not completely resolve it.  She also has chronic back issues and states that there was no injury or trauma or fall, she not having any saddle anesthesia numbness or tingling or urinary bladder or bowel incontinence or dysfunction but is having an exacerbation of her chronic lumbar back pain.                  [place] Coma Scale Score: 15                     Patient History   No past medical history on file.  Past Surgical History:   Procedure Laterality Date    CT GUIDED SOFT TISSUE FLUID DRAIN  6/2/2022    CT GUIDED SOFT TISSUE FLUID DRAIN [facility]    US GUIDED SOFT TISSUE FLUID DRAIN  9/30/2021    US GUIDED SOFT TISSUE FLUID DRAIN [facility]     Family History   Problem Relation Name Age of Onset    Diabetes Mother      Hypertension Mother      Arthritis Other      Psoriasis Neg Hx      Irritable bowel syndrome Neg Hx       Social History     Tobacco Use    Smoking status: Never    Smokeless tobacco: Never   Vaping Use    Vaping Use: Never used   Substance Use Topics    Alcohol use: Not Currently    Drug use: Never       Physical Exam   ED Triage Vitals [03/19/24 1811]   Temperature Heart Rate Respirations BP   36.1 °C (97 °F) 97 18 (!) 136/92      Pulse Ox Temp Source Heart Rate Source Patient Position   97 % Temporal Monitor --      BP Location FiO2 (%)     -- --       Physical Exam    PHYSICAL EXAMINATION    GENERAL APPEARANCE: Awake and alert.     VITAL SIGNS: As per the nurses' triage record.     HEENT: Normocephalic, atraumatic. Extraocular  muscles are intact. Pupils equal round and reactive to light. Conjunctiva are pink. Negative scleral icterus. Mucous membranes are moist. Tongue in the midline. Pharynx was without erythema or exudates, uvula midline  Left TM has some fluid visualized with a little bit of erythema.  However no sign of otitis externa.  No mastoid tenderness.  Right however has bulging erythematous with evidence of findings consistent with otitis media and otitis externa.  No clear perforation identified, no pain on palpation of the mastoid.    NECK: Soft Nontender and supple, full gross ROM, no meningeal signs.    CHEST: Nontender to palpation. Clear to auscultation bilaterally. No rales, rhonchi, or wheezing.     HEART: S1, S2. Regular rate and rhythm. No murmurs, gallops or rubs.  Strong and equal pulses in the extremities.     ABDOMEN: Soft, nontender, nondistended, positive bowel sounds, no palpable masses.    MUSCULOSKELETAL: The calves are nontender to palpation. Full gross active range of motion. Ambulating on own with no acute difficulties     NEUROLOGICAL: Awake, alert and oriented x 3. Power intact in the upper and lower extremities. Sensation is intact to light touch in the upper and lower extremities.       Interval Baseline; National Institutes of [employer] Stroke Scale Score  1a. LOC Alert, Keenly Responsive (0), 1b. LOC Questions Answers Both Questions Correctly (0), 1c. LOC Commands Performs Both Task Correctly (0), 2. Best Gaze Normal (0), 3. Visual No Visual Loss (0), 4. Facial Palsy Normal Symmetrical Movements (0), 5a. Motor Arm Left No Drift - Limb Holds 90 or 45 Degrees for Full 10 Seconds (0), 5b. Motor Arm Right No Drift - Limb Holds 90 or 45 Degrees for Full 10 Seconds (0), 6a. Motor Leg Left No Drift - Limb Holds 90 or 45 Degrees for Full 10 Seconds (0), 6b. Motor Leg Right No Drift - Limb Holds 90 or 45 Degrees for Full 10 Seconds (0), 7. Limb Ataxia Absent (0), 8. Sensory Normal - No Sensory Loss (0),  9. Best Language No Aphasia - Normal (0), 10. Dysarthria Normal (0), 11. Extinction and Inattention No Abnormality (0), Stroke Scale Total 0.    IMMUNOLOGICAL:   Minimal right-sided anterior cervical chain lymphadenopathy appreciated    DERM: No petechiae, rashes, or ecchymoses.  ED Course & MDM   Diagnoses as of 03/19/24 2055   Acute otitis externa of right ear, unspecified type   Lumbar back pain   Acute otitis media, unspecified otitis media type       Medical Decision Making  Parts of this chart have been completed using voice recognition software. Please excuse any errors of transcription.  My thought process and reason for plan has been formulated from the time that I saw the patient until the time of disposition and is not specific to one specific moment during their visit and furthermore my MDM encompasses this entire chart and not only this text box.      HPI: Detailed above.    Exam: A medically appropriate exam performed, outlined above, given the known history and presentation.    History Limited by:  nothing    History obtained from:  the patient    External/internal records reviewed:  reviewed ED record from February 20, 2024     Social Determinants of Health considered during this visit:  lives at home    Chronic conditions impacting care:  history of migraine headaches     Medications given during visit:  Medications   sodium chloride 0.9 % bolus 1,000 mL (has no administration in time range)   ketorolac (Toradol) injection 15 mg (has no administration in time range)   diphenhydrAMINE (BENADryl) injection 25 mg (has no administration in time range)   prochlorperazine (Compazine) injection 10 mg (has no administration in time range)   dexAMETHasone (Decadron) injection 10 mg (has no administration in time range)   amoxicillin-pot clavulanate (Augmentin) 875-125 mg per tablet 1 tablet (has no administration in time range)   ofloxacin (Floxin) 0.3 % otic solution 5 drop (has no administration in time  range)        Diagnostic/tests  Labs Reviewed   SARS-COV-2 AND INFLUENZA A/B PCR - Normal       Result Value    Flu A Result Not Detected      Flu B Result Not Detected      Coronavirus 2019, PCR Not Detected      Narrative:     This assay has received FDA Emergency Use Authorization (EUA) and  is only authorized for the duration of time that circumstances exist to justify the authorization of the emergency use of in vitro diagnostic tests for the detection of SARS-CoV-2 virus and/or diagnosis of COVID-19 infection under section 564(b)(1) of the Act, 21 U.S.C. 360bbb-3(b)(1). Testing for SARS-CoV-2 is only recommended for patients who meet current clinical and/or epidemiological criteria as defined by federal, state, or local public health directives. This assay is an in vitro diagnostic nucleic acid amplification test for the qualitative detection of SARS-CoV-2, Influenza A, and Influenza B from nasopharyngeal specimens and has been validated for use at [facility]. Negative results do not preclude COVID-19 infections or Influenza A/B infections, and should not be used as the sole basis for diagnosis, treatment, or other management decisions. If Influenza A/B and RSV PCR results are negative, testing for Parainfluenza virus, Adenovirus and Metapneumovirus is routinely performed for [facility] pediatric oncology and intensive care inpatients, and is available on other patients by placing an add-on request.    CBC WITH AUTO DIFFERENTIAL   COMPREHENSIVE METABOLIC PANEL   URINALYSIS WITH REFLEX CULTURE AND MICROSCOPIC    Narrative:     The following orders were created for panel order Urinalysis with Reflex Culture and Microscopic.  Procedure                               Abnormality         Status                     ---------                               -----------         ------                     Urinalysis with Reflex C...[[id number]]                                                 Extra Urine  Alberto Tube[[id number]]                                                         Please view results for these tests on the individual orders.   URINALYSIS WITH REFLEX CULTURE AND MICROSCOPIC   EXTRA URINE GRAY TUBE      No orders to display              Considerations/further MDM:  My differential includes otitis externa, otitis media, mastoiditis, TM perforation, retained foreign object, cholesteatoma, also considered sinusitis and viral etiology.  Here the patient's back pain she does have some chronic back issues, I believe that this is an acute exacerbation of her chronic symptoms.  She has no neurologic deficits or new neurologic findings, no saddle anesthesia or urinary bladder bowel incontinence or dysfunction or any other such symptoms that would increase my concern for cauda equina or spinal epidural involvement.  The patient is feeling much better with the medications, she also has a history of migraines which medication were given in the ER she feels much better now.  Recommend antibiotics for the otitis media/otitis externa, anti-inflammatories for the low back pain, some steroids were given which will also assist in the low back pain complaint, return precautions follow-up instructions and PCP follow-up recommended.      Procedure  Procedures     Prosper Morin PA-C  03/19/24 2057

## 2024-03-26 ENCOUNTER — PATIENT MESSAGE (OUTPATIENT)
Dept: PRIMARY CARE | Facility: CLINIC | Age: 49
End: 2024-03-26
Payer: COMMERCIAL

## 2024-03-26 PROCEDURE — 99285 EMERGENCY DEPT VISIT HI MDM: CPT

## 2024-03-26 PROCEDURE — 99291 CRITICAL CARE FIRST HOUR: CPT | Performed by: EMERGENCY MEDICINE

## 2024-03-27 ENCOUNTER — HOSPITAL ENCOUNTER (OUTPATIENT)
Dept: CARDIOLOGY | Facility: HOSPITAL | Age: 49
Discharge: HOME | End: 2024-03-27
Payer: COMMERCIAL

## 2024-03-27 ENCOUNTER — HOSPITAL ENCOUNTER (OUTPATIENT)
Facility: HOSPITAL | Age: 49
Setting detail: OBSERVATION
Discharge: HOME | End: 2024-03-28
Attending: EMERGENCY MEDICINE | Admitting: INTERNAL MEDICINE
Payer: COMMERCIAL

## 2024-03-27 ENCOUNTER — APPOINTMENT (OUTPATIENT)
Dept: RADIOLOGY | Facility: HOSPITAL | Age: 49
End: 2024-03-27
Payer: COMMERCIAL

## 2024-03-27 ENCOUNTER — APPOINTMENT (OUTPATIENT)
Dept: PRIMARY CARE | Facility: CLINIC | Age: 49
End: 2024-03-27
Payer: COMMERCIAL

## 2024-03-27 ENCOUNTER — PHARMACY VISIT (OUTPATIENT)
Dept: PHARMACY | Facility: CLINIC | Age: 49
End: 2024-03-27
Payer: MEDICAID

## 2024-03-27 DIAGNOSIS — R79.89 ELEVATED D-DIMER: ICD-10-CM

## 2024-03-27 DIAGNOSIS — R60.0 BILATERAL LOWER EXTREMITY EDEMA: ICD-10-CM

## 2024-03-27 DIAGNOSIS — N30.00 ACUTE CYSTITIS WITHOUT HEMATURIA: ICD-10-CM

## 2024-03-27 DIAGNOSIS — E87.6 HYPOKALEMIA: ICD-10-CM

## 2024-03-27 DIAGNOSIS — S39.011A STRAIN OF MUSCLE OF RIGHT GROIN REGION: ICD-10-CM

## 2024-03-27 DIAGNOSIS — M54.17 LUMBOSACRAL RADICULOPATHY: ICD-10-CM
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
## 2024-03-27 DIAGNOSIS — R07.9 CHEST PAIN, UNSPECIFIED TYPE: ICD-10-CM

## 2024-03-27 DIAGNOSIS — S70.02XA CONTUSION OF LEFT HIP, INITIAL ENCOUNTER: ICD-10-CM

## 2024-03-27 LAB
ALBUMIN SERPL-MCNC: 4 G/DL (ref 3.5–5)
ALP BLD-CCNC: 99 U/L (ref 35–125)
ALT SERPL-CCNC: 5 U/L (ref 5–40)
ANION GAP SERPL CALC-SCNC: 10 MMOL/L
APPEARANCE UR: ABNORMAL
AST SERPL-CCNC: 12 U/L (ref 5–40)
BACTERIA #/AREA URNS AUTO: ABNORMAL /HPF
BASOPHILS # BLD AUTO: 0.04 X10*3/UL (ref 0–0.1)
BASOPHILS NFR BLD AUTO: 0.4 %
BILIRUB SERPL-MCNC: <0.2 MG/DL (ref 0.1–1.2)
BILIRUB UR STRIP.AUTO-MCNC: NEGATIVE MG/DL
BUN SERPL-MCNC: 11 MG/DL (ref 8–25)
CALCIUM SERPL-MCNC: 9.1 MG/DL (ref 8.5–10.4)
CHLORIDE SERPL-SCNC: 103 MMOL/L (ref 97–107)
CO2 SERPL-SCNC: 26 MMOL/L (ref 24–31)
COLOR UR: ABNORMAL
CREAT SERPL-MCNC: 0.7 MG/DL (ref 0.4–1.6)
D DIMER PPP FEU-MCNC: 1.54 MG/L FEU (ref 0.19–0.5)
EGFRCR SERPLBLD CKD-EPI 2021: >90 ML/MIN/1.73M*2
EOSINOPHIL # BLD AUTO: 0.04 X10*3/UL (ref 0–0.7)
EOSINOPHIL NFR BLD AUTO: 0.4 %
ERYTHROCYTE [DISTWIDTH] IN BLOOD BY AUTOMATED COUNT: 15.2 % (ref 11.5–14.5)
GLUCOSE BLD MANUAL STRIP-MCNC: 156 MG/DL (ref 74–99)
GLUCOSE BLD MANUAL STRIP-MCNC: 197 MG/DL (ref 74–99)
GLUCOSE BLD MANUAL STRIP-MCNC: 204 MG/DL (ref 74–99)
GLUCOSE SERPL-MCNC: 103 MG/DL (ref 65–99)
GLUCOSE UR STRIP.AUTO-MCNC: NORMAL MG/DL
HCT VFR BLD AUTO: 34.7 % (ref 36–46)
HGB BLD-MCNC: 11 G/DL (ref 12–16)
HOLD SPECIMEN: NORMAL
IMM GRANULOCYTES # BLD AUTO: 0.05 X10*3/UL (ref 0–0.7)
IMM GRANULOCYTES NFR BLD AUTO: 0.5 % (ref 0–0.9)
KETONES UR STRIP.AUTO-MCNC: NEGATIVE MG/DL
LEUKOCYTE ESTERASE UR QL STRIP.AUTO: ABNORMAL
LYMPHOCYTES # BLD AUTO: 2.47 X10*3/UL (ref 1.2–4.8)
LYMPHOCYTES NFR BLD AUTO: 23.9 %
MAGNESIUM SERPL-MCNC: 2.1 MG/DL (ref 1.6–3.1)
MCH RBC QN AUTO: 25.3 PG (ref 26–34)
MCHC RBC AUTO-ENTMCNC: 31.7 G/DL (ref 32–36)
MCV RBC AUTO: 80 FL (ref 80–100)
MONOCYTES # BLD AUTO: 0.7 X10*3/UL (ref 0.1–1)
MONOCYTES NFR BLD AUTO: 6.8 %
MUCOUS THREADS #/AREA URNS AUTO: ABNORMAL /LPF
NEUTROPHILS # BLD AUTO: 7.02 X10*3/UL (ref 1.2–7.7)
NEUTROPHILS NFR BLD AUTO: 68 %
NITRITE UR QL STRIP.AUTO: NEGATIVE
NRBC BLD-RTO: 0 /100 WBCS (ref 0–0)
NT-PROBNP SERPL-MCNC: 76 PG/ML (ref 0–192)
PH UR STRIP.AUTO: 7 [PH]
PLATELET # BLD AUTO: 408 X10*3/UL (ref 150–450)
POTASSIUM SERPL-SCNC: 3.2 MMOL/L (ref 3.4–5.1)
PROT SERPL-MCNC: 7.3 G/DL (ref 5.9–7.9)
PROT UR STRIP.AUTO-MCNC: ABNORMAL MG/DL
RBC # BLD AUTO: 4.35 X10*6/UL (ref 4–5.2)
RBC # UR STRIP.AUTO: NEGATIVE /UL
RBC #/AREA URNS AUTO: >20 /HPF
SODIUM SERPL-SCNC: 139 MMOL/L (ref 133–145)
SP GR UR STRIP.AUTO: >1.05
SQUAMOUS #/AREA URNS AUTO: ABNORMAL /HPF
TROPONIN T SERPL-MCNC: 6 NG/L
TROPONIN T SERPL-MCNC: 7 NG/L
TROPONIN T SERPL-MCNC: 9 NG/L
UROBILINOGEN UR STRIP.AUTO-MCNC: NORMAL MG/DL
WBC # BLD AUTO: 10.3 X10*3/UL (ref 4.4–11.3)
WBC #/AREA URNS AUTO: ABNORMAL /HPF

## 2024-03-27 PROCEDURE — 2500000002 HC RX 250 W HCPCS SELF ADMINISTERED DRUGS (ALT 637 FOR MEDICARE OP, ALT 636 FOR OP/ED): Performed by: NURSE PRACTITIONER

## 2024-03-27 PROCEDURE — 82947 ASSAY GLUCOSE BLOOD QUANT: CPT | Mod: 59

## 2024-03-27 PROCEDURE — 2500000004 HC RX 250 GENERAL PHARMACY W/ HCPCS (ALT 636 FOR OP/ED): Performed by: EMERGENCY MEDICINE

## 2024-03-27 PROCEDURE — 2500000005 HC RX 250 GENERAL PHARMACY W/O HCPCS: Performed by: EMERGENCY MEDICINE

## 2024-03-27 PROCEDURE — 93970 EXTREMITY STUDY: CPT

## 2024-03-27 PROCEDURE — G0378 HOSPITAL OBSERVATION PER HR: HCPCS

## 2024-03-27 PROCEDURE — 2500000004 HC RX 250 GENERAL PHARMACY W/ HCPCS (ALT 636 FOR OP/ED): Performed by: NURSE PRACTITIONER

## 2024-03-27 PROCEDURE — 83880 ASSAY OF NATRIURETIC PEPTIDE: CPT | Performed by: EMERGENCY MEDICINE

## 2024-03-27 PROCEDURE — 73521 X-RAY EXAM HIPS BI 2 VIEWS: CPT

## 2024-03-27 PROCEDURE — 93010 ELECTROCARDIOGRAM REPORT: CPT | Performed by: INTERNAL MEDICINE

## 2024-03-27 PROCEDURE — 2500000001 HC RX 250 WO HCPCS SELF ADMINISTERED DRUGS (ALT 637 FOR MEDICARE OP): Performed by: NURSE PRACTITIONER

## 2024-03-27 PROCEDURE — 99222 1ST HOSP IP/OBS MODERATE 55: CPT | Performed by: NURSE PRACTITIONER

## 2024-03-27 PROCEDURE — 96376 TX/PRO/DX INJ SAME DRUG ADON: CPT

## 2024-03-27 PROCEDURE — 93971 EXTREMITY STUDY: CPT | Performed by: RADIOLOGY

## 2024-03-27 PROCEDURE — 84484 ASSAY OF TROPONIN QUANT: CPT | Performed by: EMERGENCY MEDICINE

## 2024-03-27 PROCEDURE — 87086 URINE CULTURE/COLONY COUNT: CPT | Mod: WESLAB | Performed by: EMERGENCY MEDICINE

## 2024-03-27 PROCEDURE — 71046 X-RAY EXAM CHEST 2 VIEWS: CPT | Performed by: RADIOLOGY

## 2024-03-27 PROCEDURE — 85300 ANTITHROMBIN III ACTIVITY: CPT | Performed by: EMERGENCY MEDICINE

## 2024-03-27 PROCEDURE — 71046 X-RAY EXAM CHEST 2 VIEWS: CPT

## 2024-03-27 PROCEDURE — RXMED WILLOW AMBULATORY MEDICATION CHARGE

## 2024-03-27 PROCEDURE — 74177 CT ABD & PELVIS W/CONTRAST: CPT | Performed by: RADIOLOGY

## 2024-03-27 PROCEDURE — 84075 ASSAY ALKALINE PHOSPHATASE: CPT | Performed by: EMERGENCY MEDICINE

## 2024-03-27 PROCEDURE — 96375 TX/PRO/DX INJ NEW DRUG ADDON: CPT

## 2024-03-27 PROCEDURE — 2550000001 HC RX 255 CONTRASTS: Performed by: EMERGENCY MEDICINE

## 2024-03-27 PROCEDURE — 93005 ELECTROCARDIOGRAM TRACING: CPT

## 2024-03-27 PROCEDURE — 36415 COLL VENOUS BLD VENIPUNCTURE: CPT | Performed by: EMERGENCY MEDICINE

## 2024-03-27 PROCEDURE — 74177 CT ABD & PELVIS W/CONTRAST: CPT

## 2024-03-27 PROCEDURE — 85025 COMPLETE CBC W/AUTO DIFF WBC: CPT | Performed by: EMERGENCY MEDICINE

## 2024-03-27 PROCEDURE — 96374 THER/PROPH/DIAG INJ IV PUSH: CPT | Mod: 59

## 2024-03-27 PROCEDURE — 81001 URINALYSIS AUTO W/SCOPE: CPT | Performed by: EMERGENCY MEDICINE

## 2024-03-27 PROCEDURE — 80053 COMPREHEN METABOLIC PANEL: CPT | Performed by: EMERGENCY MEDICINE

## 2024-03-27 PROCEDURE — 83735 ASSAY OF MAGNESIUM: CPT | Performed by: EMERGENCY MEDICINE

## 2024-03-27 PROCEDURE — 2500000001 HC RX 250 WO HCPCS SELF ADMINISTERED DRUGS (ALT 637 FOR MEDICARE OP): Performed by: EMERGENCY MEDICINE

## 2024-03-27 PROCEDURE — 73523 X-RAY EXAM HIPS BI 5/> VIEWS: CPT | Mod: BILATERAL PROCEDURE | Performed by: RADIOLOGY

## 2024-03-27 PROCEDURE — 2500000002 HC RX 250 W HCPCS SELF ADMINISTERED DRUGS (ALT 637 FOR MEDICARE OP, ALT 636 FOR OP/ED): Performed by: EMERGENCY MEDICINE

## 2024-03-27 PROCEDURE — 71260 CT THORAX DX C+: CPT | Performed by: RADIOLOGY

## 2024-03-27 PROCEDURE — 96372 THER/PROPH/DIAG INJ SC/IM: CPT | Mod: 59 | Performed by: NURSE PRACTITIONER

## 2024-03-27 RX ORDER — ONDANSETRON HYDROCHLORIDE 2 MG/ML
4 INJECTION, SOLUTION INTRAVENOUS ONCE
Status: COMPLETED | OUTPATIENT
Start: 2024-03-27 | End: 2024-03-27

## 2024-03-27 RX ORDER — MORPHINE SULFATE 4 MG/ML
4 INJECTION, SOLUTION INTRAMUSCULAR; INTRAVENOUS ONCE
Status: COMPLETED | OUTPATIENT
Start: 2024-03-27 | End: 2024-03-27

## 2024-03-27 RX ORDER — DULOXETIN HYDROCHLORIDE 60 MG/1
60 CAPSULE, DELAYED RELEASE ORAL DAILY
Status: DISCONTINUED | OUTPATIENT
Start: 2024-03-27 | End: 2024-03-28 | Stop reason: HOSPADM

## 2024-03-27 RX ORDER — ZOLPIDEM TARTRATE 5 MG/1
10 TABLET ORAL NIGHTLY PRN
Status: DISCONTINUED | OUTPATIENT
Start: 2024-03-27 | End: 2024-03-28 | Stop reason: HOSPADM

## 2024-03-27 RX ORDER — HYDROCHLOROTHIAZIDE 25 MG/1
25 TABLET ORAL DAILY
Status: DISCONTINUED | OUTPATIENT
Start: 2024-03-27 | End: 2024-03-28 | Stop reason: HOSPADM

## 2024-03-27 RX ORDER — HEPARIN SODIUM 5000 [USP'U]/ML
5000 INJECTION, SOLUTION INTRAVENOUS; SUBCUTANEOUS EVERY 8 HOURS
Status: DISCONTINUED | OUTPATIENT
Start: 2024-03-27 | End: 2024-03-28 | Stop reason: HOSPADM

## 2024-03-27 RX ORDER — DEXAMETHASONE SODIUM PHOSPHATE 100 MG/10ML
10 INJECTION INTRAMUSCULAR; INTRAVENOUS ONCE
Status: COMPLETED | OUTPATIENT
Start: 2024-03-27 | End: 2024-03-27

## 2024-03-27 RX ORDER — DEXTROSE 50 % IN WATER (D50W) INTRAVENOUS SYRINGE
25
Status: DISCONTINUED | OUTPATIENT
Start: 2024-03-27 | End: 2024-03-28 | Stop reason: HOSPADM

## 2024-03-27 RX ORDER — AMLODIPINE BESYLATE 10 MG/1
10 TABLET ORAL DAILY
Status: DISCONTINUED | OUTPATIENT
Start: 2024-03-27 | End: 2024-03-27

## 2024-03-27 RX ORDER — ONDANSETRON HYDROCHLORIDE 2 MG/ML
4 INJECTION, SOLUTION INTRAVENOUS EVERY 6 HOURS PRN
Status: DISCONTINUED | OUTPATIENT
Start: 2024-03-27 | End: 2024-03-28 | Stop reason: HOSPADM

## 2024-03-27 RX ORDER — CEPHALEXIN 500 MG/1
500 CAPSULE ORAL 2 TIMES DAILY
Qty: 14 CAPSULE | Refills: 0 | Status: SHIPPED | OUTPATIENT
Start: 2024-03-27 | End: 2024-03-27 | Stop reason: HOSPADM

## 2024-03-27 RX ORDER — KETOROLAC TROMETHAMINE 30 MG/ML
15 INJECTION, SOLUTION INTRAMUSCULAR; INTRAVENOUS ONCE
Status: COMPLETED | OUTPATIENT
Start: 2024-03-27 | End: 2024-03-27

## 2024-03-27 RX ORDER — ACETAMINOPHEN 325 MG/1
650 TABLET ORAL EVERY 4 HOURS PRN
Status: DISCONTINUED | OUTPATIENT
Start: 2024-03-27 | End: 2024-03-28 | Stop reason: HOSPADM

## 2024-03-27 RX ORDER — CYANOCOBALAMIN (VITAMIN B-12) 250 MCG
250 TABLET ORAL DAILY
COMMUNITY
End: 2024-04-15 | Stop reason: ALTCHOICE

## 2024-03-27 RX ORDER — AMLODIPINE BESYLATE 2.5 MG/1
2.5 TABLET ORAL DAILY
Qty: 30 TABLET | Refills: 0 | Status: SHIPPED | OUTPATIENT
Start: 2024-03-28 | End: 2024-04-15 | Stop reason: ALTCHOICE

## 2024-03-27 RX ORDER — LIDOCAINE 560 MG/1
1 PATCH PERCUTANEOUS; TOPICAL; TRANSDERMAL DAILY
Status: DISCONTINUED | OUTPATIENT
Start: 2024-03-28 | End: 2024-03-28 | Stop reason: HOSPADM

## 2024-03-27 RX ORDER — CYCLOBENZAPRINE HCL 10 MG
10 TABLET ORAL 2 TIMES DAILY PRN
Qty: 20 TABLET | Refills: 0 | Status: SHIPPED | OUTPATIENT
Start: 2024-03-27 | End: 2024-03-27 | Stop reason: HOSPADM

## 2024-03-27 RX ORDER — FUROSEMIDE 20 MG/1
20 TABLET ORAL DAILY
Qty: 10 TABLET | Refills: 0 | Status: SHIPPED | OUTPATIENT
Start: 2024-03-27 | End: 2024-03-27 | Stop reason: HOSPADM

## 2024-03-27 RX ORDER — PANTOPRAZOLE SODIUM 40 MG/1
40 TABLET, DELAYED RELEASE ORAL DAILY
Status: DISCONTINUED | OUTPATIENT
Start: 2024-03-27 | End: 2024-03-28 | Stop reason: HOSPADM

## 2024-03-27 RX ORDER — TIZANIDINE 4 MG/1
4 TABLET ORAL NIGHTLY
Status: DISCONTINUED | OUTPATIENT
Start: 2024-03-27 | End: 2024-03-28 | Stop reason: HOSPADM

## 2024-03-27 RX ORDER — POTASSIUM CHLORIDE 20 MEQ/1
20 TABLET, EXTENDED RELEASE ORAL 2 TIMES DAILY
Qty: 20 TABLET | Refills: 0 | Status: SHIPPED | OUTPATIENT
Start: 2024-03-27 | End: 2024-03-27 | Stop reason: HOSPADM

## 2024-03-27 RX ORDER — BISMUTH SUBSALICYLATE 262 MG
1 TABLET,CHEWABLE ORAL DAILY
COMMUNITY

## 2024-03-27 RX ORDER — CEPHALEXIN 500 MG/1
500 CAPSULE ORAL ONCE
Status: COMPLETED | OUTPATIENT
Start: 2024-03-27 | End: 2024-03-27

## 2024-03-27 RX ORDER — POTASSIUM CHLORIDE 1.5 G/1.58G
40 POWDER, FOR SOLUTION ORAL ONCE
Status: DISCONTINUED | OUTPATIENT
Start: 2024-03-27 | End: 2024-03-27

## 2024-03-27 RX ORDER — LIDOCAINE 560 MG/1
1 PATCH PERCUTANEOUS; TOPICAL; TRANSDERMAL ONCE
Status: COMPLETED | OUTPATIENT
Start: 2024-03-27 | End: 2024-03-27

## 2024-03-27 RX ORDER — CEPHALEXIN 500 MG/1
500 CAPSULE ORAL 2 TIMES DAILY
Qty: 14 CAPSULE | Refills: 0 | Status: SHIPPED | OUTPATIENT
Start: 2024-03-27 | End: 2024-04-03

## 2024-03-27 RX ORDER — CEPHALEXIN 500 MG/1
500 CAPSULE ORAL EVERY 6 HOURS SCHEDULED
Status: DISCONTINUED | OUTPATIENT
Start: 2024-03-27 | End: 2024-03-27

## 2024-03-27 RX ORDER — GABAPENTIN 300 MG/1
300 CAPSULE ORAL ONCE
Status: COMPLETED | OUTPATIENT
Start: 2024-03-27 | End: 2024-03-27

## 2024-03-27 RX ORDER — INSULIN LISPRO 100 [IU]/ML
0-5 INJECTION, SOLUTION INTRAVENOUS; SUBCUTANEOUS
Status: DISCONTINUED | OUTPATIENT
Start: 2024-03-27 | End: 2024-03-28 | Stop reason: HOSPADM

## 2024-03-27 RX ORDER — LOSARTAN POTASSIUM 100 MG/1
100 TABLET ORAL DAILY
Status: DISCONTINUED | OUTPATIENT
Start: 2024-03-27 | End: 2024-03-28 | Stop reason: HOSPADM

## 2024-03-27 RX ORDER — ACETAMINOPHEN 160 MG/5ML
650 SOLUTION ORAL EVERY 4 HOURS PRN
Status: DISCONTINUED | OUTPATIENT
Start: 2024-03-27 | End: 2024-03-28 | Stop reason: HOSPADM

## 2024-03-27 RX ORDER — KETOROLAC TROMETHAMINE 30 MG/ML
15 INJECTION, SOLUTION INTRAMUSCULAR; INTRAVENOUS EVERY 6 HOURS PRN
Status: DISCONTINUED | OUTPATIENT
Start: 2024-03-27 | End: 2024-03-28 | Stop reason: HOSPADM

## 2024-03-27 RX ORDER — ACETAMINOPHEN 325 MG/1
650 TABLET ORAL ONCE
Status: COMPLETED | OUTPATIENT
Start: 2024-03-27 | End: 2024-03-27

## 2024-03-27 RX ORDER — LANOLIN ALCOHOL/MO/W.PET/CERES
400 CREAM (GRAM) TOPICAL ONCE
Status: COMPLETED | OUTPATIENT
Start: 2024-03-27 | End: 2024-03-27

## 2024-03-27 RX ORDER — DEXTROSE 50 % IN WATER (D50W) INTRAVENOUS SYRINGE
12.5
Status: DISCONTINUED | OUTPATIENT
Start: 2024-03-27 | End: 2024-03-28 | Stop reason: HOSPADM

## 2024-03-27 RX ORDER — POLYETHYLENE GLYCOL 3350 17 G/17G
17 POWDER, FOR SOLUTION ORAL 2 TIMES DAILY
Status: DISCONTINUED | OUTPATIENT
Start: 2024-03-27 | End: 2024-03-28 | Stop reason: HOSPADM

## 2024-03-27 RX ORDER — GABAPENTIN 300 MG/1
300 CAPSULE ORAL 3 TIMES DAILY
Status: DISCONTINUED | OUTPATIENT
Start: 2024-03-27 | End: 2024-03-28 | Stop reason: HOSPADM

## 2024-03-27 RX ORDER — NITROGLYCERIN 0.4 MG/1
0.4 TABLET SUBLINGUAL EVERY 5 MIN PRN
Status: DISCONTINUED | OUTPATIENT
Start: 2024-03-27 | End: 2024-03-28 | Stop reason: HOSPADM

## 2024-03-27 RX ORDER — ACETAMINOPHEN 650 MG/1
650 SUPPOSITORY RECTAL EVERY 4 HOURS PRN
Status: DISCONTINUED | OUTPATIENT
Start: 2024-03-27 | End: 2024-03-28 | Stop reason: HOSPADM

## 2024-03-27 RX ORDER — FUROSEMIDE 10 MG/ML
40 INJECTION INTRAMUSCULAR; INTRAVENOUS ONCE
Status: COMPLETED | OUTPATIENT
Start: 2024-03-27 | End: 2024-03-27

## 2024-03-27 RX ORDER — AMLODIPINE BESYLATE 2.5 MG/1
2.5 TABLET ORAL DAILY
Status: DISCONTINUED | OUTPATIENT
Start: 2024-03-27 | End: 2024-03-28 | Stop reason: HOSPADM

## 2024-03-27 RX ORDER — POTASSIUM CHLORIDE 20 MEQ/1
20 TABLET, EXTENDED RELEASE ORAL
Status: DISCONTINUED | OUTPATIENT
Start: 2024-03-28 | End: 2024-03-28

## 2024-03-27 RX ORDER — POTASSIUM CHLORIDE 20 MEQ/1
40 TABLET, EXTENDED RELEASE ORAL ONCE
Status: COMPLETED | OUTPATIENT
Start: 2024-03-27 | End: 2024-03-27

## 2024-03-27 RX ORDER — POTASSIUM CHLORIDE 20 MEQ/1
20 TABLET, EXTENDED RELEASE ORAL
Qty: 30 TABLET | Refills: 0 | Status: SHIPPED | OUTPATIENT
Start: 2024-03-28 | End: 2024-04-27

## 2024-03-27 RX ORDER — CEPHALEXIN 500 MG/1
500 CAPSULE ORAL 2 TIMES DAILY
Status: DISCONTINUED | OUTPATIENT
Start: 2024-03-27 | End: 2024-03-28 | Stop reason: HOSPADM

## 2024-03-27 RX ORDER — NYSTATIN 100000 U/G
CREAM TOPICAL AS NEEDED
COMMUNITY

## 2024-03-27 RX ADMIN — CEPHALEXIN 500 MG: 500 CAPSULE ORAL at 20:51

## 2024-03-27 RX ADMIN — MORPHINE SULFATE 4 MG: 4 INJECTION, SOLUTION INTRAMUSCULAR; INTRAVENOUS at 05:07

## 2024-03-27 RX ADMIN — ACETAMINOPHEN 650 MG: 325 TABLET ORAL at 01:27

## 2024-03-27 RX ADMIN — LIDOCAINE 1 PATCH: 4 PATCH TOPICAL at 01:28

## 2024-03-27 RX ADMIN — FUROSEMIDE 40 MG: 10 INJECTION, SOLUTION INTRAMUSCULAR; INTRAVENOUS at 08:41

## 2024-03-27 RX ADMIN — HYDROCHLOROTHIAZIDE 25 MG: 25 TABLET ORAL at 12:55

## 2024-03-27 RX ADMIN — TIZANIDINE 4 MG: 4 TABLET ORAL at 20:51

## 2024-03-27 RX ADMIN — LOSARTAN POTASSIUM 100 MG: 100 TABLET, FILM COATED ORAL at 12:55

## 2024-03-27 RX ADMIN — ACETAMINOPHEN 650 MG: 325 TABLET ORAL at 20:51

## 2024-03-27 RX ADMIN — Medication 400 MG: at 10:30

## 2024-03-27 RX ADMIN — KETOROLAC TROMETHAMINE 15 MG: 30 INJECTION, SOLUTION INTRAMUSCULAR at 18:55

## 2024-03-27 RX ADMIN — GABAPENTIN 300 MG: 300 CAPSULE ORAL at 14:53

## 2024-03-27 RX ADMIN — IOHEXOL 75 ML: 350 INJECTION, SOLUTION INTRAVENOUS at 02:53

## 2024-03-27 RX ADMIN — AMLODIPINE BESYLATE 2.5 MG: 2.5 TABLET ORAL at 12:55

## 2024-03-27 RX ADMIN — HEPARIN SODIUM 5000 UNITS: 5000 INJECTION, SOLUTION INTRAVENOUS; SUBCUTANEOUS at 13:00

## 2024-03-27 RX ADMIN — DULOXETINE HYDROCHLORIDE 60 MG: 60 CAPSULE, DELAYED RELEASE ORAL at 12:55

## 2024-03-27 RX ADMIN — DEXAMETHASONE SODIUM PHOSPHATE 10 MG: 10 INJECTION INTRAMUSCULAR; INTRAVENOUS at 01:27

## 2024-03-27 RX ADMIN — MORPHINE SULFATE 4 MG: 4 INJECTION, SOLUTION INTRAMUSCULAR; INTRAVENOUS at 08:41

## 2024-03-27 RX ADMIN — GABAPENTIN 300 MG: 300 CAPSULE ORAL at 20:51

## 2024-03-27 RX ADMIN — POTASSIUM CHLORIDE 40 MEQ: 1500 TABLET, EXTENDED RELEASE ORAL at 10:30

## 2024-03-27 RX ADMIN — HEPARIN SODIUM 5000 UNITS: 5000 INJECTION, SOLUTION INTRAVENOUS; SUBCUTANEOUS at 22:02

## 2024-03-27 RX ADMIN — PANTOPRAZOLE SODIUM 40 MG: 40 TABLET, DELAYED RELEASE ORAL at 12:55

## 2024-03-27 RX ADMIN — CEPHALEXIN 500 MG: 500 CAPSULE ORAL at 10:30

## 2024-03-27 RX ADMIN — ONDANSETRON 4 MG: 2 INJECTION INTRAMUSCULAR; INTRAVENOUS at 05:07

## 2024-03-27 RX ADMIN — KETOROLAC TROMETHAMINE 15 MG: 30 INJECTION, SOLUTION INTRAMUSCULAR at 01:27

## 2024-03-27 RX ADMIN — GABAPENTIN 300 MG: 300 CAPSULE ORAL at 01:27

## 2024-03-27 SDOH — SOCIAL STABILITY: SOCIAL INSECURITY: ABUSE: ADULT

## 2024-03-27 SDOH — ECONOMIC STABILITY: TRANSPORTATION INSECURITY
IN THE PAST 12 MONTHS, HAS LACK OF TRANSPORTATION KEPT YOU FROM MEETINGS, WORK, OR FROM GETTING THINGS NEEDED FOR DAILY LIVING?: NO

## 2024-03-27 SDOH — SOCIAL STABILITY: SOCIAL INSECURITY: HAVE YOU HAD THOUGHTS OF HARMING ANYONE ELSE?: NO

## 2024-03-27 SDOH — ECONOMIC STABILITY: INCOME INSECURITY: HOW HARD IS IT FOR YOU TO PAY FOR THE VERY BASICS LIKE FOOD, HOUSING, MEDICAL CARE, AND HEATING?: NOT HARD AT ALL

## 2024-03-27 SDOH — SOCIAL STABILITY: SOCIAL INSECURITY: HAS ANYONE EVER THREATENED TO HURT YOUR FAMILY OR YOUR PETS?: NO

## 2024-03-27 SDOH — SOCIAL STABILITY: SOCIAL INSECURITY: DO YOU FEEL ANYONE HAS EXPLOITED OR TAKEN ADVANTAGE OF YOU FINANCIALLY OR OF YOUR PERSONAL PROPERTY?: NO

## 2024-03-27 SDOH — ECONOMIC STABILITY: HOUSING INSECURITY: IN THE LAST 12 MONTHS, HOW MANY PLACES HAVE YOU LIVED?: 1

## 2024-03-27 SDOH — ECONOMIC STABILITY: INCOME INSECURITY: IN THE LAST 12 MONTHS, WAS THERE A TIME WHEN YOU WERE NOT ABLE TO PAY THE MORTGAGE OR RENT ON TIME?: NO

## 2024-03-27 SDOH — SOCIAL STABILITY: SOCIAL INSECURITY: ARE YOU OR HAVE YOU BEEN THREATENED OR ABUSED PHYSICALLY, EMOTIONALLY, OR SEXUALLY BY ANYONE?: NO

## 2024-03-27 SDOH — HEALTH STABILITY: PHYSICAL HEALTH: ON AVERAGE, HOW MANY MINUTES DO YOU ENGAGE IN EXERCISE AT THIS LEVEL?: 30 MIN

## 2024-03-27 SDOH — ECONOMIC STABILITY: INCOME INSECURITY
IN THE PAST 12 MONTHS, HAS THE ELECTRIC, GAS, OIL, OR WATER COMPANY THREATENED TO SHUT OFF SERVICE IN YOUR HOME?: PATIENT DECLINED

## 2024-03-27 SDOH — ECONOMIC STABILITY: HOUSING INSECURITY
IN THE LAST 12 MONTHS, WAS THERE A TIME WHEN YOU DID NOT HAVE A STEADY PLACE TO SLEEP OR SLEPT IN A SHELTER (INCLUDING NOW)?: NO

## 2024-03-27 SDOH — SOCIAL STABILITY: SOCIAL INSECURITY: DOES ANYONE TRY TO KEEP YOU FROM HAVING/CONTACTING OTHER FRIENDS OR DOING THINGS OUTSIDE YOUR HOME?: NO

## 2024-03-27 SDOH — SOCIAL STABILITY: SOCIAL INSECURITY: DO YOU FEEL UNSAFE GOING BACK TO THE PLACE WHERE YOU ARE LIVING?: NO

## 2024-03-27 SDOH — SOCIAL STABILITY: SOCIAL INSECURITY: WERE YOU ABLE TO COMPLETE ALL THE BEHAVIORAL HEALTH SCREENINGS?: YES

## 2024-03-27 SDOH — SOCIAL STABILITY: SOCIAL INSECURITY: ARE THERE ANY APPARENT SIGNS OF INJURIES/BEHAVIORS THAT COULD BE RELATED TO ABUSE/NEGLECT?: NO

## 2024-03-27 SDOH — ECONOMIC STABILITY: TRANSPORTATION INSECURITY
IN THE PAST 12 MONTHS, HAS THE LACK OF TRANSPORTATION KEPT YOU FROM MEDICAL APPOINTMENTS OR FROM GETTING MEDICATIONS?: NO

## 2024-03-27 SDOH — HEALTH STABILITY: PHYSICAL HEALTH: ON AVERAGE, HOW MANY DAYS PER WEEK DO YOU ENGAGE IN MODERATE TO STRENUOUS EXERCISE (LIKE A BRISK WALK)?: 3 DAYS

## 2024-03-27 ASSESSMENT — COGNITIVE AND FUNCTIONAL STATUS - GENERAL
MOBILITY SCORE: 19
DAILY ACTIVITIY SCORE: 24
CLIMB 3 TO 5 STEPS WITH RAILING: A LITTLE
TOILETING: A LITTLE
WALKING IN HOSPITAL ROOM: A LITTLE
MOBILITY SCORE: 19
MOBILITY SCORE: 23
WALKING IN HOSPITAL ROOM: A LITTLE
CLIMB 3 TO 5 STEPS WITH RAILING: A LITTLE
TOILETING: A LITTLE
PATIENT BASELINE BEDBOUND: NO
MOVING TO AND FROM BED TO CHAIR: A LITTLE
STANDING UP FROM CHAIR USING ARMS: A LITTLE
STANDING UP FROM CHAIR USING ARMS: A LITTLE
MOVING TO AND FROM BED TO CHAIR: A LITTLE
TURNING FROM BACK TO SIDE WHILE IN FLAT BAD: A LITTLE
CLIMB 3 TO 5 STEPS WITH RAILING: A LITTLE
DAILY ACTIVITIY SCORE: 22
DRESSING REGULAR LOWER BODY CLOTHING: A LITTLE
DAILY ACTIVITIY SCORE: 22
DRESSING REGULAR LOWER BODY CLOTHING: A LITTLE
TURNING FROM BACK TO SIDE WHILE IN FLAT BAD: A LITTLE

## 2024-03-27 ASSESSMENT — ACTIVITIES OF DAILY LIVING (ADL)
HEARING - RIGHT EAR: FUNCTIONAL
LACK_OF_TRANSPORTATION: NO
BATHING: INDEPENDENT
GROOMING: INDEPENDENT
WALKS IN HOME: INDEPENDENT
HEARING - LEFT EAR: FUNCTIONAL
DRESSING YOURSELF: INDEPENDENT
PATIENT'S MEMORY ADEQUATE TO SAFELY COMPLETE DAILY ACTIVITIES?: YES
TOILETING: NEEDS ASSISTANCE
FEEDING YOURSELF: INDEPENDENT
ADEQUATE_TO_COMPLETE_ADL: YES
JUDGMENT_ADEQUATE_SAFELY_COMPLETE_DAILY_ACTIVITIES: YES

## 2024-03-27 ASSESSMENT — LIFESTYLE VARIABLES
EVER FELT BAD OR GUILTY ABOUT YOUR DRINKING: NO
AUDIT-C TOTAL SCORE: 0
PRESCIPTION_ABUSE_PAST_12_MONTHS: NO
TOTAL SCORE: 0
SUBSTANCE_ABUSE_PAST_12_MONTHS: NO
HAVE PEOPLE ANNOYED YOU BY CRITICIZING YOUR DRINKING: NO
SKIP TO QUESTIONS 9-10: 1
HOW OFTEN DO YOU HAVE A DRINK CONTAINING ALCOHOL: NEVER
HOW MANY STANDARD DRINKS CONTAINING ALCOHOL DO YOU HAVE ON A TYPICAL DAY: PATIENT DOES NOT DRINK
HAVE YOU EVER FELT YOU SHOULD CUT DOWN ON YOUR DRINKING: NO
AUDIT-C TOTAL SCORE: 0
EVER HAD A DRINK FIRST THING IN THE MORNING TO STEADY YOUR NERVES TO GET RID OF A HANGOVER: NO
HOW OFTEN DO YOU HAVE 6 OR MORE DRINKS ON ONE OCCASION: NEVER

## 2024-03-27 ASSESSMENT — PATIENT HEALTH QUESTIONNAIRE - PHQ9
2. FEELING DOWN, DEPRESSED OR HOPELESS: NOT AT ALL
SUM OF ALL RESPONSES TO PHQ9 QUESTIONS 1 & 2: 0
1. LITTLE INTEREST OR PLEASURE IN DOING THINGS: NOT AT ALL

## 2024-03-27 ASSESSMENT — PAIN SCALES - GENERAL
PAINLEVEL_OUTOF10: 4
PAINLEVEL_OUTOF10: 9
PAINLEVEL_OUTOF10: 0 - NO PAIN
PAINLEVEL_OUTOF10: 3
PAINLEVEL_OUTOF10: 0 - NO PAIN
PAINLEVEL_OUTOF10: 6
PAINLEVEL_OUTOF10: 6

## 2024-03-27 ASSESSMENT — PAIN - FUNCTIONAL ASSESSMENT
PAIN_FUNCTIONAL_ASSESSMENT: 0-10

## 2024-03-27 ASSESSMENT — PAIN DESCRIPTION - ORIENTATION
ORIENTATION: RIGHT;LEFT
ORIENTATION: LEFT

## 2024-03-27 ASSESSMENT — PAIN DESCRIPTION - LOCATION
LOCATION: BACK
LOCATION: HIP
LOCATION: LEG

## 2024-03-27 ASSESSMENT — PAIN DESCRIPTION - PAIN TYPE: TYPE: CHRONIC PAIN;ACUTE PAIN

## 2024-03-27 NOTE — CARE PLAN
The patient's goals for the shift include      The clinical goals for the shift include no falls    Over the shift, the patient did not make progress toward the following goals. Barriers to progression include NO FALLS. Recommendations to address these barriers include DC IN AM.

## 2024-03-27 NOTE — ED PROVIDER NOTES
HPI   Chief Complaint   Patient presents with    Generalized Body Aches     Pt states that she fell on her left hip today about a hour ago, she had a hip replacement surgery in August of 2023 and a spine surgery in feb of 2023. She states that her right hips hurt as well and her legs are very swollen. This is the first time she seen legs get that big.Pt states he lower spine is aching as well.       48-year-old female with a history of hypertension, peripheral vascular disease, lumbar spine fusion comes to the emergency department with a chief complaint of back pain.  Patient states that she takes furosemide for lower extremity edema and has been well-controlled.  She notes that she awoke this morning and both of her legs were more swollen than normal she also states that they were feeling heavy and she then had a fall due to the heaviness and pain in her legs landing on her left hip and sustaining groin pain in her right hip midline lumbar spine pain and left hip pain.  She states that she has had surgeries on the lumbar spine previously.  She denies any paresthesias.  She had to drive herself to the emergency department so that she did not take any of her home medications that included gabapentin as needed.  She also notes that she has a strange discomfort above her chest that she describes as burning.      History provided by:  Patient   used: No                        No data recorded                   Patient History   Past Medical History:   Diagnosis Date    Diabetes mellitus (CMS/[facility])     Hypertension      Past Surgical History:   Procedure Laterality Date    CT GUIDED SOFT TISSUE FLUID DRAIN  6/2/2022    CT GUIDED SOFT TISSUE FLUID DRAIN [facility]    US GUIDED SOFT TISSUE FLUID DRAIN  9/30/2021    US GUIDED SOFT TISSUE FLUID DRAIN [facility]     Family History   Problem Relation Name Age of Onset    Diabetes Mother      Hypertension Mother      Arthritis Other       Psoriasis Neg Hx      Irritable bowel syndrome Neg Hx       Social History     Tobacco Use    Smoking status: Never    Smokeless tobacco: Never   Vaping Use    Vaping Use: Never used   Substance Use Topics    Alcohol use: Not Currently    Drug use: Never       Physical Exam   ED Triage Vitals [03/26/24 2342]   Temperature Heart Rate Respirations BP   36.6 °C (97.9 °F) 83 19 122/84      Pulse Ox Temp Source Heart Rate Source Patient Position   100 % Tympanic -- --      BP Location FiO2 (%)     -- --       Physical Exam  Vitals and nursing note reviewed.   Constitutional:       General: She is not in acute distress.     Appearance: She is well-developed.   HENT:      Head: Normocephalic and atraumatic.   Eyes:      Conjunctiva/sclera: Conjunctivae normal.   Cardiovascular:      Rate and Rhythm: Normal rate and regular rhythm.      Heart sounds: No murmur heard.  Pulmonary:      Effort: Pulmonary effort is normal. No respiratory distress.      Breath sounds: Normal breath sounds.   Abdominal:      Palpations: Abdomen is soft.      Tenderness: There is no abdominal tenderness.   Musculoskeletal:         General: Swelling and tenderness present.      Cervical back: Neck supple.      Right lower leg: Edema present.      Left lower leg: Edema present.      Comments: Bilateral lower extremities with nonpitting edema to the knees and marked socks.  Brisk capillary refill.  Diminished pulses equal bilateral.    Lumbar spine with remote surgical scar that is well-healed and significant midline tenderness without paraspinal tenderness or evidence of muscle spasm.  Able to bear weight with very antalgic gait.   Skin:     General: Skin is warm and dry.      Capillary Refill: Capillary refill takes less than 2 seconds.   Neurological:      Mental Status: She is alert.   Psychiatric:         Mood and Affect: Mood normal.         ED Course & MDM   ED Course as of 03/28/24 0813   Wed Mar 27, 2024   0148 D-Dimer, Quantitative Non  VTE(!)  Significantly elevated dimer at 1.54 [EG]   0149 CBC and Auto Differential(!)  Noncontributory [EG]   0149 XR chest 2 views  IMPRESSION:  No airspace consolidation or pleural effusion.   [EG]   0150 ECG 12 lead  ECG performed on March 27, 2024 and 1:37 AM and interpreted at 1:40 AM showing a sinus bradycardia with a ventricular rate of 59, borderline left axis deviation, intervals within normal limits, left ventricular hypertrophy, with repolarization abnormality.  No STEMI.  Similar to previous from October 6, 2023 [EG]   0843 Urinalysis with Reflex Culture and Microscopic(!)  Urinalysis with evidence of urinary tract infection with positive leukocyte Estrace and 11-20 white blood cells on microscopic with 4+ bacteria.  This still could be a contaminant as there are multiple squamous epithelial cells, however as this cannot be ruled out as an infection she will be given Keflex [EG]   0844 Comprehensive Metabolic Panel(!)  No acute kidney injury, within normal limits except for hypokalemia which is just below her normal [EG]   0846 CT chest abdomen pelvis w IV contrast  IMPRESSION:  No acute traumatic findings in the chest. Bronchial thickening. Heart  size is enlarged.      Hepatic steatosis. Changes of gastric bypass. Fusion changes detected  in the lumbar spine as well as left hip arthroplasty. The appearance  is similar. If concern for acute traumatic findings of the left hip  given the artifact, consider dedicated radiographs. Fluid collection  projects adjacent to the left greater trochanter presumed seroma. No  gas seen within it   [EG]   0846 Vascular US lower extremity venous duplex bilateral  IMPRESSION:  No sonographic evidence for deep vein thrombosis within the evaluated  veins of the bilateral lower extremities.   [EG]   0846 XR hips bilateral 2 VW w pelvis when performed  IMPRESSION:  No fracture or dislocation of either hip.   [EG]      ED Course User Index  [EG] Selene Osullivan MD          Diagnoses as of 03/28/24 0813   Fall, initial encounter   Contusion of left hip, initial encounter   Bilateral lower extremity edema   Strain of muscle of right groin region   Lumbosacral radiculopathy   Elevated d-dimer   Acute cystitis without hematuria   Hypokalemia       Medical Decision Making    HPI:  As Above  PMHx/PSHx/Meds/Allergies/SH/FH as per nursing documentation and reviewed.  Review of systems: Total of 10 systems reviewed and otherwise negative except as noted elsewhere    DDX: As described in [facility]    If performed, radiology listed above interpreted by me and confirmed by the Radiologist.  Medications administered during this visit (name and route): see MAR  Social determinants of health considered for this visit: lives at home  If performed, EKG interpreted by me and detailed above    [facility] Summary/considerations:  48-year-old female presenting with multiple areas of pain after a fall due to leg edema and heaviness.  Patient had a positive D-dimer which prompted evaluation for VTE.  She had complained of both chest pain, hip pain, and back pain as well as lower extremity pain with swelling.  CT scan of the chest is negative for pulmonary embolism and ultrasound of the bilateral lower extremities is also negative for DVT.  The patient was also evaluated with a CT scan of the abdomen and pelvis that show no acute bony abnormalities, however there is orthopedic hardware which causes artifact.  Repeat x-rays of the pelvis with bilateral hips show no evidence of acute fractures.  The patient's right-sided hip pain is most likely groin strain and the left hip likely has a contusion.  Reexamination of her hip throughout the emergency department stay shows no evidence of an active bleed or growing hematoma.  When the patient had her surgeries several months ago she was on a blood thinner and currently is not taking that blood thinner as she has never had VTE previously.  The patient initially was  treated conservatively without narcotic medications.  She has had multiple orthopedic interventions and is not narcotic naïve.  She was treated with morphine with minimal symptom relief.  She was given a second dose of morphine and was improving with her pain and able to ambulate, but with much difficulty.  She was concerned that she lives by herself and that she might have difficulty taking care of her dog as she is unable to ambulate without falling.  The patient's lower extremity swelling and pain is most likely peripheral vascular disease.  She initially had stated that she was on a blood thinner, however upon review of her current medications she is on hydrochlorothiazide and not furosemide.  The patient was given a dose of Lasix as well as potassium replacement as she has a minimally depleted potassium and Lasix can exacerbate hypokalemia.  She was going to be discharged home, however given her difficulty with her activities of daily living and multiple doses of narcotic medications to achieve minimal improvement of pain she will be admitted to the observation unit for PT OT eval.  Lab work was also showing evidence of a urinary tract infection and she was treated with ceftriaxone.    The patient was seen and triaged by our nursing/medic staff, their vitals were taken and the staff notes were reviewed.  If the patient arrived by an EMS squad or an outside agency, we discussed the case with transporting EMS medic, police, or other historians. My initial assessment was attention to their airway, breathing, and circulatory status.  We addressed any immediate or life threatening findings and completed a medical history and a physical exam if the patient or those legally responsible were in agreement with this.   Prior to the patient being discharged, I or my PA/NP or the nursing staff discussed the differential, results and discharge plan with the patient and/or family/friend/caregiver if present.  I emphasized  the importance of follow-up in 2-3 days unless otherwise specified.  I explained reasons for the patient to return to the Emergency Department. Additional verbal discharge instructions were also given and discussed with the patient to supplement those generated by the EMR. We also discussed medications that were prescribed (if any) including common side effects and interactions. The patient was advised to abstain from driving, operating heavy machinery or making significant decisions while taking medications such as antihistamines, benzodiazepines, opiates and muscle relaxers. All questions were addressed.  They understand return precautions and discharge instructions. The patient and/or family/friend/caregiver expressed understanding.  **Disclaimer:  This note was dictated by speech recognition technology.  Minor errors in transcription may be present.  Please contact for clarification or corrections.    In the case the patient eloped or refused treatment/admission, we offered to the best of our ability to provide care to the patient at the time of this encounter.    Amount and/or Complexity of Data Reviewed  External Data Reviewed: labs and ECG.  Labs: ordered. Decision-making details documented in ED Course.  Radiology: ordered and independent interpretation performed. Decision-making details documented in ED Course.  ECG/medicine tests: ordered and independent interpretation performed. Decision-making details documented in ED Course.        Procedure  Critical Care    Performed by: Selene Osullivan MD  Authorized by: Selene Osullivan MD    Critical care provider statement:     Critical care time (minutes):  31    Critical care time was exclusive of:  Separately billable procedures and treating other patients    Critical care was necessary to treat or prevent imminent or life-threatening deterioration of the following conditions: Concern for VTE.    Critical care was time spent personally by me on  the following activities:  Development of treatment plan with patient or surrogate, evaluation of patient's response to treatment, examination of patient, obtaining history from patient or surrogate, ordering and performing treatments and interventions, ordering and review of laboratory studies, ordering and review of radiographic studies, pulse oximetry and re-evaluation of patient's condition    Care discussed with: admitting provider         Selene Osullivan MD  03/28/24 0888

## 2024-03-27 NOTE — H&P
"History Of Present Illness  Kat Salazar is a 48 y.o. female PMH: HTN, Type II Diabetes, PVD, Lumbar spine fusion (02/23), RA,  left hip replacement (08/23), GERD, presenting with multiple issues including chest discomfort, ble/numbness, and bilateral hip pain s/p mechanical fall today (03/27/24), patient went to sit in a chair and states \" my feet let go, they felt numb\", she then assisted herself back into the chair at home by herself. She has been up walking independently since with no assisted devices.   Patient endorses chest pain as feeling like a \"sunburn\" with radiation to the back. No shortness of breath , no n/v, no diarrhea, no diaphoresis, no chills noted. No known history of ACS or PE. Endorses known history of HTN.   Endorses that chest discomfort started today after her fall. Reports that chest discomfort continues and feels like \"sunburn\" during my examination. On my examination BLE +1 noted. Patient endorses feeling \"sore\" to b/l hip areas s/p fall.     Last Echocardiogram 02/10/23: 1. Left ventricular systolic function is normal with a 60% estimated ejection fraction.   2. Spectral Doppler shows an impaired relaxation pattern of left ventricular diastolic filling.    ED Course:  EKG on arrival:  ECG performed on March 27, 2024 and 1:37 AM and interpreted at 1:40 AM showing a sinus bradycardia with a ventricular rate of 59, borderline left axis deviation, intervals within normal limits, left ventricular hypertrophy, with repolarization abnormality. No STEMI.   High Sensitivity Troponin's: 7, 6  Ddimer: 1.54  CT Chest/Abdomen/pelvis :No acute traumatic findings in the chest. Bronchial thickening. Heart   size is enlarged.   Hepatic steatosis. Changes of gastric bypass. Fusion changes detected   in the lumbar spine as well as left hip arthroplasty. The appearance   is similar. If concern for acute traumatic findings of the left hip   given the artifact, consider dedicated radiographs. Fluid " collection   projects adjacent to the left greater trochanter presumed seroma. No   gas seen within it .  CXR:No airspace consolidation or pleural effusion.   XR hips bilateral:No fracture or dislocation of either hip.   UA: Leukocytes, WBC, 4+Bacteria  ED Medications Administered:  Tylenol 650 mg x1  Keflex 500mg PO x1  Decadron 10mg IV push x1  Lasix 40 mg IV push x1  Neurontin 300mg PO x1  Toradol 15mg IV push x1  Mag Ox 400mg PO x1  Morphine 4mg IV push x2  Zofran 4mg IV push x1  Potassium Chloride 40 meq PO x1       Past Medical History  No past medical history on file.    Surgical History  Past Surgical History:   Procedure Laterality Date    CT GUIDED SOFT TISSUE FLUID DRAIN  6/2/2022    CT GUIDED SOFT TISSUE FLUID DRAIN [facility]    US GUIDED SOFT TISSUE FLUID DRAIN  9/30/2021    US GUIDED SOFT TISSUE FLUID DRAIN [facility]        Social History  She reports that she has never smoked. She has never used smokeless tobacco. She reports that she does not currently use alcohol. She reports that she does not use drugs.    Family History  Family History   Problem Relation Name Age of Onset    Diabetes Mother      Hypertension Mother      Arthritis Other      Psoriasis Neg Hx      Irritable bowel syndrome Neg Hx          Allergies  Ace inhibitors and Adhesive tape-silicones    Review of Systems     Physical Exam  Vitals and nursing note reviewed.   Constitutional:       Appearance: Normal appearance.   HENT:      Head: Normocephalic and atraumatic.      Nose: Nose normal.      Mouth/Throat:      Mouth: Mucous membranes are moist.   Eyes:      Pupils: Pupils are equal, round, and reactive to light.   Cardiovascular:      Rate and Rhythm: Normal rate and regular rhythm.      Comments: Bradycardia, hr 58  Pulmonary:      Effort: Pulmonary effort is normal.   Abdominal:      General: Abdomen is flat.   Genitourinary:     Comments: deferred  Musculoskeletal:         General: Normal range of motion.  "     Cervical back: Normal range of motion.      Right lower leg: Edema present.      Left lower leg: Edema present.      Comments: +1BLE nonpitting.   Skin:     Capillary Refill: Capillary refill takes less than 2 seconds.   Neurological:      General: No focal deficit present.      Mental Status: She is alert and oriented to person, place, and time. Mental status is at baseline.   Psychiatric:         Mood and Affect: Mood normal.         Behavior: Behavior normal.         Thought Content: Thought content normal.         Judgment: Judgment normal.          Last Recorded Vitals  Blood pressure 111/85, pulse 84, temperature 36.6 °C (97.9 °F), temperature source Tympanic, resp. rate 19, height 1.575 m (5' 2\"), weight 91.6 kg (202 lb), SpO2 99 %.    Relevant Results  Results for orders placed or performed during the hospital encounter of 03/27/24 (from the past 24 hour(s))   CBC and Auto Differential   Result Value Ref Range    WBC 10.3 4.4 - 11.3 x10*3/uL    nRBC 0.0 0.0 - 0.0 /100 WBCs    RBC 4.35 4.00 - 5.20 x10*6/uL    Hemoglobin 11.0 (L) 12.0 - 16.0 g/dL    Hematocrit 34.7 (L) 36.0 - 46.0 %    MCV 80 80 - 100 fL    MCH 25.3 (L) 26.0 - 34.0 pg    MCHC 31.7 (L) 32.0 - 36.0 g/dL    RDW 15.2 (H) 11.5 - 14.5 %    Platelets 408 150 - 450 x10*3/uL    Neutrophils % 68.0 40.0 - 80.0 %    Immature Granulocytes %, Automated 0.5 0.0 - 0.9 %    Lymphocytes % 23.9 13.0 - 44.0 %    Monocytes % 6.8 2.0 - 10.0 %    Eosinophils % 0.4 0.0 - 6.0 %    Basophils % 0.4 0.0 - 2.0 %    Neutrophils Absolute 7.02 1.20 - 7.70 x10*3/uL    Immature Granulocytes Absolute, Automated 0.05 0.00 - 0.70 x10*3/uL    Lymphocytes Absolute 2.47 1.20 - 4.80 x10*3/uL    Monocytes Absolute 0.70 0.10 - 1.00 x10*3/uL    Eosinophils Absolute 0.04 0.00 - 0.70 x10*3/uL    Basophils Absolute 0.04 0.00 - 0.10 x10*3/uL   Comprehensive Metabolic Panel   Result Value Ref Range    Glucose 103 (H) 65 - 99 mg/dL    Sodium 139 133 - 145 mmol/L    Potassium 3.2 (L) 3.4 " - 5.1 mmol/L    Chloride 103 97 - 107 mmol/L    Bicarbonate 26 24 - 31 mmol/L    Urea Nitrogen 11 8 - 25 mg/dL    Creatinine 0.70 0.40 - 1.60 mg/dL    eGFR >90 >60 mL/min/1.73m*2    Calcium 9.1 8.5 - 10.4 mg/dL    Albumin 4.0 3.5 - 5.0 g/dL    Alkaline Phosphatase 99 35 - 125 U/L    Total Protein 7.3 5.9 - 7.9 g/dL    AST 12 5 - 40 U/L    Bilirubin, Total <0.2 0.1 - 1.2 mg/dL    ALT 5 5 - 40 U/L    Anion Gap 10 <=19 mmol/L   Magnesium   Result Value Ref Range    Magnesium 2.10 1.60 - 3.10 mg/dL   NT Pro-BNP   Result Value Ref Range    PROBNP 76 0 - 192 pg/mL   D-Dimer, Quantitative Non VTE   Result Value Ref Range    D-Dimer Non VTE, Quant (mg/L FEU) 1.54 (H) 0.19 - 0.50 mg/L FEU   Serial Troponin, Initial ([facility])   Result Value Ref Range    Troponin T, High Sensitivity 7 <=14 ng/L   Urinalysis with Reflex Culture and Microscopic   Result Value Ref Range    Color, Urine Light-Yellow Light-Yellow, Yellow, Dark-Yellow    Appearance, Urine Turbid (N) Clear    Specific Gravity, Urine >1.050 (N) 1.005 - 1.035    pH, Urine 7.0 5.0, 5.5, 6.0, 6.5, 7.0, 7.5, 8.0    Protein, Urine 10 (TRACE) NEGATIVE, 10 (TRACE), 20 (TRACE) mg/dL    Glucose, Urine Normal Normal mg/dL    Blood, Urine NEGATIVE NEGATIVE    Ketones, Urine NEGATIVE NEGATIVE mg/dL    Bilirubin, Urine NEGATIVE NEGATIVE    Urobilinogen, Urine Normal Normal mg/dL    Nitrite, Urine NEGATIVE NEGATIVE    Leukocyte Esterase, Urine 500 Luis/µL (A) NEGATIVE   Microscopic Only, Urine   Result Value Ref Range    WBC, Urine 11-20 (A) 1-5, NONE /HPF    RBC, Urine >20 (A) NONE, 1-2, 3-5 /HPF    Squamous Epithelial Cells, Urine 26-50 (1+) Reference range not established. /HPF    Bacteria, Urine 4+ (A) NONE SEEN /HPF    Mucus, Urine FEW Reference range not established. /LPF   Serial Troponin, 2 Hour ([facility])   Result Value Ref Range    Troponin T, High Sensitivity 6 <=14 ng/L          Assessment/Plan   Principal Problem:    Chest pain  Chest Pain  -Trend High Sensitivity Troponin's:  7, 6  -EKG on arrival : Sinus bradycardia no ischemia noted.  -EKG PRN  -Telemetry  -PRN Nitroglycerin   -Vitals Q4h  -Cardiac Diet, NPO at midnight if warranted.    2. S/P Mechanical Fall/BLE  -PT/OT consult if warranted.   -Up walking by self in ED  -No fractures noted on imaging  -Lasix 40 mg IV push x1 given in ED, +1 BLE noted    3. Elevated Ddimer  -Ddimer 1.54  -CT imaging negative for blood clots  -Vascular US, negative    4. Hypokalemia  -Potassium 3.2, Potassium 40meq PO given x1 in ED  -Will repeat labs in am    5. UTI  -keflex 500mg Pox1 given in ED, will continue     6.HTN  -Continue home medications    7. Type II Diabetes  -Accuchecks per protocol  -SSI    8. DVT Prophylaxis  -SCDS  -Heparin subcutaneous    03/27/24 @1033 : Admit to CDU . Plan -Trend High Sensitivity Troponin's given patient ongoing chest discomfort, Telemetry monitoring. Will continue antibiotic for UTI. Will repeat lab in am after Potassium supplementation for hypokalemia. Anticipate discharge upon resolution of all symptoms.     Addendum 03/37/24 @1508:  Patient endorses ongoing numbness in lower extremities. Patient able to walk independently to bathroom. Patient is cleared for discharge from a cardiac standpoint. However, patient feels she would benefit from overnight stay. Will continue overnight telemetry, repeat labs in am. Plan-Patient to dc to n in am, will place discharge for am. Patient in agreement with plan. Attending updated.      I spent 40 minutes in the professional and overall care of this patient.      Phuong Phillips, APRN-CNP

## 2024-03-27 NOTE — PROGRESS NOTES
Pharmacy Medication History Review    Kat Salazar is a 48 y.o. female admitted for Chest pain. Pharmacy reviewed the patient's [id number] medications and allergies for accuracy.    Medications ADDED:  Nystatin cream  Vitamin B-12  Multivitamin  Medications CHANGED:  Leflunomide 10mg - holding   Augmentin - not taking  Medications REMOVED:   Amlodpine 10mg - duplicate  Eliquis 2.5mg - not taking  Eliquis 2.5mg - duplicate not taking  Cyclopenzaprine 10mg -   Doxycycline 100mg - not taking  Furosemide 20mg - not taking   Losartan 100mg - duplicate   Methocarbamol 750mg - not taking  Miralax - not taking  Terbinafine 250mg - not taking    The list below reflects the updated PTA list. Comments regarding how patient may be taking medications differently can be found in the Admit Orders Activity  Prior to Admission Medications   Prescriptions Last Dose Informant   DULoxetine (Cymbalta) 60 mg DR capsule 3/26/2024 Self   Sig: TAKE 1 CAPSULE BY MOUTH ONCE DAILY   amLODIPine (Norvasc) 10 mg tablet  Self   Sig: TAKE 1 TABLET BY MOUTH ONCE DAILY   cholecalciferol (Vitamin D-3) 5,000 Units tablet 3/26/2024 Self   Sig: Take 2 tablets (10,000 Units) by mouth once daily.   cyanocobalamin (Vitamin B-12) 250 mcg tablet 3/26/2024 Self   Sig: Take 1 tablet (250 mcg) by mouth once daily.   cyclobenzaprine (Flexeril) 10 mg tablet  Self   Sig: Take 1 tablet (10 mg) by mouth 2 times a day as needed for muscle spasms for up to 10 days.   gabapentin (Neurontin) 300 mg capsule 3/25/2024 Self   Sig: TAKE 1 CAPSULE BY MOUTH THREE TIMES A DAY   hydroCHLOROthiazide (HYDRODiuril) 25 mg tablet 3/26/2024 Self   Sig: TAKE 1 TABLET BY MOUTH EVERY DAY   ibuprofen 600 mg tablet     Sig: Take 1 tablet (600 mg) by mouth every 6 hours if needed for mild pain (1 - 3) for up to 7 days.   leflunomide (Arava) 10 mg tablet  Self   Sig: Take 1 tablet (10 mg) by mouth once daily.   losartan (Cozaar) 100 mg tablet 3/26/2024 Self   Sig:  TAKE 1 TABLET BY MOUTH ONCE DAILY   multivitamin tablet 3/26/2024 Self   Sig: Take 1 tablet by mouth once daily.   nystatin (Mycostatin) cream  Self   Sig: Apply topically if needed.   ofloxacin (Floxin) 0.3 % otic solution 3/26/2024 Self   Sig: Administer 3 drops into affected ear(s) 2 times a day for 7 days. 3 drops in affected ear   omeprazole (PriLOSEC) 20 mg DR capsule 3/26/2024 Self   Sig: TAKE 1 CAPSULE (20 MG) BY MOUTH ONCE DAILY IN THE MORNING. TAKE BEFORE MEALS. DO NOT CRUSH OR CHEW.   rizatriptan (Maxalt) 10 mg tablet 3/26/2024 Self   Sig: TAKE 1 TABLET AT ONSET OF HEADACHE. MAY REPEAT EVERY 2 HOURS AS NEEDED. MAXIMUM 3 TABLETS/24 HOURS.   tiZANidine (Zanaflex) 4 mg tablet 3/25/2024 Self   Sig: TAKE 1 TABLET BY MOUTH EVERYDAY AT BEDTIME   zolpidem (Ambien) 10 mg tablet 3/25/2024 Self   Sig: TAKE 1 TABLET BY MOUTH EVERY DAY AT BEDTIME AS NEEDED FOR SLEEP      Facility-Administered Medications: None        The list below reflects the updated allergy list. Please review each documented allergy for additional clarification and justification.  Allergies  Reviewed by Tamra Dorman on 3/27/2024        Severity Reactions Comments    Ace Inhibitors Medium Cough     Adhesive Tape-silicones Low Rash             Pharmacy has been updated to [facility].    Sources used to complete the med history include dispense history, AVS, PTA medication list, patient interview. Patient is a good historian.    Below are additional concerns with the patient's PTA list.  none    Tamra Dorman  Please reach out via [employer] Secure Chat for questions

## 2024-03-27 NOTE — DISCHARGE SUMMARY
"Discharge Diagnosis  Chest pain    Issues Requiring Follow-Up  Follow up with your PCP in 1-2 weeks post hospital discharge.    Test Results Pending At Discharge  Pending Labs       Order Current Status    Urine Culture In process            Hospital Course   HPI During Admission:   Kat Salazar is a 48 y.o. female PMH: HTN, Type II Diabetes, PVD, Lumbar spine fusion (02/23), RA,  left hip replacement (08/23), GERD, presenting with multiple issues including chest discomfort, ble/numbness, and bilateral hip pain s/p mechanical fall today (03/27/24), patient went to sit in a chair and states \" my feet let go, they felt numb\", she then assisted herself back into the chair at home by herself. She has been up walking independently since with no assisted devices.   Patient endorses chest pain as feeling like a \"sunburn\" with radiation to the back. No shortness of breath , no n/v, no diarrhea, no diaphoresis, no chills noted. No known history of ACS or PE. Endorses known history of HTN.   Endorses that chest discomfort started today after her fall. Reports that chest discomfort continues and feels like \"sunburn\" during my examination. On my examination BLE +1 noted. Patient endorses feeling \"sore\" to b/l hip areas s/p fall.      Last Echocardiogram 02/10/23: 1. Left ventricular systolic function is normal with a 60% estimated ejection fraction.   2. Spectral Doppler shows an impaired relaxation pattern of left ventricular diastolic filling.     ED Course:  EKG on arrival:  ECG performed on March 27, 2024 and 1:37 AM and interpreted at 1:40 AM showing a sinus bradycardia with a ventricular rate of 59, borderline left axis deviation, intervals within normal limits, left ventricular hypertrophy, with repolarization abnormality. No STEMI.   High Sensitivity Troponin's: 7, 6  Ddimer: 1.54  CT Chest/Abdomen/pelvis :No acute traumatic findings in the chest. Bronchial thickening. Heart   size is enlarged.   Hepatic " steatosis. Changes of gastric bypass. Fusion changes detected   in the lumbar spine as well as left hip arthroplasty. The appearance   is similar. If concern for acute traumatic findings of the left hip   given the artifact, consider dedicated radiographs. Fluid collection   projects adjacent to the left greater trochanter presumed seroma. No   gas seen within it .  CXR:No airspace consolidation or pleural effusion.   XR hips bilateral:No fracture or dislocation of either hip.   UA: Leukocytes, WBC, 4+Bacteria  ED Medications Administered:  Tylenol 650 mg x1  Keflex 500mg PO x1  Decadron 10mg IV push x1  Lasix 40 mg IV push x1  Neurontin 300mg PO x1  Toradol 15mg IV push x1  Mag Ox 400mg PO x1  Morphine 4mg IV push x2  Zofran 4mg IV push x1  Potassium Chloride 40 meq PO x1    Hospital Course:  You were seen by Cardiology and have been cleared for discharge 03/28/24 in am. Your cardiac workup has been negative. Please follow up with your PCP outpatient in 1-2 weeks post hospital discharge. You have been discharged on oral antibiotics (Keflex) for 7 days for UTI, and Potassium Chloride 20meq Po daily for hypokalemia. Your dose of amlodipine for high blood pressure has changed from 10 mg to 2.5mg oral daily. Above discussed in detail with Attending.    Pertinent Physical Exam At Time of Discharge    Physical Exam  deferred  Home Medications     Medication List      START taking these medications     * cephalexin 500 mg capsule; Commonly known as: Keflex; Take 1 capsule   (500 mg) by mouth 2 times a day for 7 days.   * cephalexin 500 mg capsule; Commonly known as: Keflex; Take 1 capsule   (500 mg) by mouth 2 times a day for 7 days.   potassium chloride CR 20 mEq ER tablet; Commonly known as: Klor-Con M20;   Take 1 tablet (20 mEq) by mouth once daily with breakfast. Do not crush or   chew. Do not start before March 28, 2024.; Start taking on: March 28, 2024  * This list has 2 medication(s) that are the same as other  medications   prescribed for you. Read the directions carefully, and ask your doctor or   other care provider to review them with you.     CHANGE how you take these medications     amLODIPine 2.5 mg tablet; Commonly known as: Norvasc; Take 1 tablet (2.5   mg) by mouth once daily. Do not start before March 28, 2024.; Start taking   on: March 28, 2024; What changed: medication strength, how much to take     CONTINUE taking these medications     cholecalciferol 5,000 Units tablet; Commonly known as: Vitamin D-3   cyanocobalamin 250 mcg tablet; Commonly known as: Vitamin B-12   cyclobenzaprine 10 mg tablet; Commonly known as: Flexeril; Take 1 tablet   (10 mg) by mouth 2 times a day as needed for muscle spasms for up to 10   days.   DULoxetine 60 mg DR capsule; Commonly known as: Cymbalta; TAKE 1 CAPSULE   BY MOUTH ONCE DAILY   gabapentin 300 mg capsule; Commonly known as: Neurontin; TAKE 1 CAPSULE   BY MOUTH THREE TIMES A DAY   hydroCHLOROthiazide 25 mg tablet; Commonly known as: HYDRODiuril; TAKE 1   TABLET BY MOUTH EVERY DAY   * losartan 100 mg tablet; Commonly known as: Cozaar; TAKE 1 TABLET BY   MOUTH ONCE DAILY   * losartan 100 mg tablet; Commonly known as: Cozaar; Take 1 tablet (100   mg) by mouth once daily.   multivitamin tablet   nystatin cream; Commonly known as: Mycostatin   omeprazole 20 mg DR capsule; Commonly known as: PriLOSEC; TAKE 1 CAPSULE   (20 MG) BY MOUTH ONCE DAILY IN THE MORNING. TAKE BEFORE MEALS. DO NOT   CRUSH OR CHEW.   rizatriptan 10 mg tablet; Commonly known as: Maxalt; TAKE 1 TABLET AT   ONSET OF HEADACHE. MAY REPEAT EVERY 2 HOURS AS NEEDED. MAXIMUM 3   TABLETS/24 HOURS.   tiZANidine 4 mg tablet; Commonly known as: Zanaflex; TAKE 1 TABLET BY   MOUTH EVERYDAY AT BEDTIME   zolpidem 10 mg tablet; Commonly known as: Ambien; TAKE 1 TABLET BY MOUTH   EVERY DAY AT BEDTIME AS NEEDED FOR SLEEP  * This list has 2 medication(s) that are the same as other medications   prescribed for you. Read the  directions carefully, and ask your doctor or   other care provider to review them with you.     STOP taking these medications     amoxicillin-pot clavulanate 875-125 mg tablet; Commonly known as:   Augmentin   ibuprofen 600 mg tablet   leflunomide 10 mg tablet; Commonly known as: Arava   SmoothLax 17 gram/dose powder; Generic drug: polyethylene glycol       Outpatient Follow-Up  Future Appointments   Date Time Provider Department Center   4/3/2024  1:30 PM Sanket Navas MD [id number] [facility]   4/12/2024  1:00 PM De Santana MD [id number] [facility]   4/15/2024  2:15 PM Gabe Lorenzo MD [id number] [facility]   4/16/2024  3:45 PM Jose Terry MD [id number] [facility]   5/10/2024  4:00 PM Ryan Quigley MD [id number] [facility]       Phuong Phillips, APRN-CNP

## 2024-03-27 NOTE — ED NOTES
Pt c/o left hip pain and right groin pain that started immediately after falling about 1 hour pta. She landed on her left hip on hardwood floor surface. Pt reports she woke with this am with bilateral leg swelling and states the swelling has significantly worsened throughout the day. She states this has made her legs weak and painful d/t the tightness and believes this is what caused her to fall. Pt also reports having urinary frequency for the past couple of days as well. Pt states she has chronic lower back pain, states since the fall the pain has worsened. She has hx of lumbar surgery.     Pt has hx of left hip replacement in August of 2023.    She states she has an appointment with her PCP tomorrow because of the urinary frequency and the edema, but states she came into the ER because of the fall and the pain in the left hip and right groin.     Pt denies hitting her. Denies HA, dizziness, vision changes, CP/SOB, abd pain, N/V/D, f/c's.   Denies C and T spines pain with palp.     Pt is A&Ox4. Neuro intact.      Lenny Dickens RN  03/27/24 [id number]

## 2024-03-27 NOTE — NURSING NOTE
RECEIVED PT FROM ER VIA CART PT WALKED TO BED PER SELF TOLERATED WELL. ORIENTED TO ROOM CALL SYSTEM ETC. TELE APPLIED

## 2024-03-28 VITALS
BODY MASS INDEX: 37.17 KG/M2 | TEMPERATURE: 97.2 F | OXYGEN SATURATION: 96 % | HEART RATE: 64 BPM | HEIGHT: 62 IN | DIASTOLIC BLOOD PRESSURE: 61 MMHG | WEIGHT: 202 LBS | SYSTOLIC BLOOD PRESSURE: 97 MMHG | RESPIRATION RATE: 17 BRPM

## 2024-03-28 DIAGNOSIS — G43.909 MIGRAINE, UNSPECIFIED, NOT INTRACTABLE, WITHOUT STATUS MIGRAINOSUS: ICD-10-CM

## 2024-03-28 LAB
ALBUMIN SERPL-MCNC: 3.6 G/DL (ref 3.5–5)
ALP BLD-CCNC: 88 U/L (ref 35–125)
ALT SERPL-CCNC: 13 U/L (ref 5–40)
ANION GAP SERPL CALC-SCNC: 8 MMOL/L
AST SERPL-CCNC: 13 U/L (ref 5–40)
BACTERIA UR CULT: NORMAL
BILIRUB SERPL-MCNC: 0.2 MG/DL (ref 0.1–1.2)
BUN SERPL-MCNC: 16 MG/DL (ref 8–25)
CALCIUM SERPL-MCNC: 8.9 MG/DL (ref 8.5–10.4)
CHLORIDE SERPL-SCNC: 105 MMOL/L (ref 97–107)
CO2 SERPL-SCNC: 28 MMOL/L (ref 24–31)
CREAT SERPL-MCNC: 0.8 MG/DL (ref 0.4–1.6)
EGFRCR SERPLBLD CKD-EPI 2021: 90 ML/MIN/1.73M*2
ERYTHROCYTE [DISTWIDTH] IN BLOOD BY AUTOMATED COUNT: 15.3 % (ref 11.5–14.5)
GLUCOSE BLD MANUAL STRIP-MCNC: 118 MG/DL (ref 74–99)
GLUCOSE BLD MANUAL STRIP-MCNC: 126 MG/DL (ref 74–99)
GLUCOSE SERPL-MCNC: 125 MG/DL (ref 65–99)
HCT VFR BLD AUTO: 33.7 % (ref 36–46)
HGB BLD-MCNC: 10.3 G/DL (ref 12–16)
MCH RBC QN AUTO: 24.6 PG (ref 26–34)
MCHC RBC AUTO-ENTMCNC: 30.6 G/DL (ref 32–36)
MCV RBC AUTO: 81 FL (ref 80–100)
NRBC BLD-RTO: 0 /100 WBCS (ref 0–0)
PLATELET # BLD AUTO: 401 X10*3/UL (ref 150–450)
POTASSIUM SERPL-SCNC: 3.3 MMOL/L (ref 3.4–5.1)
PROT SERPL-MCNC: 6.4 G/DL (ref 5.9–7.9)
RBC # BLD AUTO: 4.18 X10*6/UL (ref 4–5.2)
SODIUM SERPL-SCNC: 141 MMOL/L (ref 133–145)
WBC # BLD AUTO: 11.8 X10*3/UL (ref 4.4–11.3)

## 2024-03-28 PROCEDURE — 99232 SBSQ HOSP IP/OBS MODERATE 35: CPT

## 2024-03-28 PROCEDURE — 84075 ASSAY ALKALINE PHOSPHATASE: CPT | Performed by: NURSE PRACTITIONER

## 2024-03-28 PROCEDURE — 2500000001 HC RX 250 WO HCPCS SELF ADMINISTERED DRUGS (ALT 637 FOR MEDICARE OP): Performed by: NURSE PRACTITIONER

## 2024-03-28 PROCEDURE — 2500000002 HC RX 250 W HCPCS SELF ADMINISTERED DRUGS (ALT 637 FOR MEDICARE OP, ALT 636 FOR OP/ED)

## 2024-03-28 PROCEDURE — G0378 HOSPITAL OBSERVATION PER HR: HCPCS

## 2024-03-28 PROCEDURE — 2500000002 HC RX 250 W HCPCS SELF ADMINISTERED DRUGS (ALT 637 FOR MEDICARE OP, ALT 636 FOR OP/ED): Performed by: NURSE PRACTITIONER

## 2024-03-28 PROCEDURE — 85027 COMPLETE CBC AUTOMATED: CPT | Performed by: NURSE PRACTITIONER

## 2024-03-28 PROCEDURE — 96372 THER/PROPH/DIAG INJ SC/IM: CPT | Mod: ZK | Performed by: NURSE PRACTITIONER

## 2024-03-28 PROCEDURE — 2500000004 HC RX 250 GENERAL PHARMACY W/ HCPCS (ALT 636 FOR OP/ED): Performed by: NURSE PRACTITIONER

## 2024-03-28 PROCEDURE — 36415 COLL VENOUS BLD VENIPUNCTURE: CPT | Performed by: NURSE PRACTITIONER

## 2024-03-28 PROCEDURE — 2500000005 HC RX 250 GENERAL PHARMACY W/O HCPCS: Performed by: NURSE PRACTITIONER

## 2024-03-28 PROCEDURE — 82947 ASSAY GLUCOSE BLOOD QUANT: CPT | Mod: 59

## 2024-03-28 RX ORDER — RIZATRIPTAN BENZOATE 10 MG/1
TABLET ORAL
Qty: 9 TABLET | Refills: 4 | Status: SHIPPED | OUTPATIENT
Start: 2024-03-28

## 2024-03-28 RX ORDER — POTASSIUM CHLORIDE 20 MEQ/1
40 TABLET, EXTENDED RELEASE ORAL
Status: DISCONTINUED | OUTPATIENT
Start: 2024-03-28 | End: 2024-03-28 | Stop reason: HOSPADM

## 2024-03-28 RX ADMIN — GABAPENTIN 300 MG: 300 CAPSULE ORAL at 08:43

## 2024-03-28 RX ADMIN — LIDOCAINE 1 PATCH: 4 PATCH TOPICAL at 08:43

## 2024-03-28 RX ADMIN — POTASSIUM CHLORIDE 40 MEQ: 1500 TABLET, EXTENDED RELEASE ORAL at 08:43

## 2024-03-28 RX ADMIN — HYDROCHLOROTHIAZIDE 25 MG: 25 TABLET ORAL at 08:43

## 2024-03-28 RX ADMIN — CEPHALEXIN 500 MG: 500 CAPSULE ORAL at 08:43

## 2024-03-28 RX ADMIN — PANTOPRAZOLE SODIUM 40 MG: 40 TABLET, DELAYED RELEASE ORAL at 08:43

## 2024-03-28 RX ADMIN — LOSARTAN POTASSIUM 100 MG: 100 TABLET, FILM COATED ORAL at 08:43

## 2024-03-28 RX ADMIN — HEPARIN SODIUM 5000 UNITS: 5000 INJECTION, SOLUTION INTRAVENOUS; SUBCUTANEOUS at 05:31

## 2024-03-28 RX ADMIN — DULOXETINE HYDROCHLORIDE 60 MG: 60 CAPSULE, DELAYED RELEASE ORAL at 08:43

## 2024-03-28 RX ADMIN — AMLODIPINE BESYLATE 2.5 MG: 2.5 TABLET ORAL at 08:43

## 2024-03-28 ASSESSMENT — COGNITIVE AND FUNCTIONAL STATUS - GENERAL
DAILY ACTIVITIY SCORE: 22
WALKING IN HOSPITAL ROOM: A LITTLE
TOILETING: A LITTLE
DRESSING REGULAR LOWER BODY CLOTHING: A LITTLE
MOVING TO AND FROM BED TO CHAIR: A LITTLE
STANDING UP FROM CHAIR USING ARMS: A LITTLE
CLIMB 3 TO 5 STEPS WITH RAILING: A LITTLE
TURNING FROM BACK TO SIDE WHILE IN FLAT BAD: A LITTLE
MOBILITY SCORE: 19

## 2024-03-28 NOTE — NURSING NOTE
Discharge paperwork given to patient and explained . Patient acknowledged instructions verbally. Morning meds tolerated . IV discontinued  Transport initiated

## 2024-03-28 NOTE — CARE PLAN
The patient's goals for the shift include      The clinical goals for the shift include pain management, safety    Over the shift, the patient did make progress toward the following goals

## 2024-03-28 NOTE — CARE PLAN
Problem: Pain  Goal: My pain/discomfort is manageable  Outcome: Adequate for Discharge     Problem: Safety  Goal: Patient will be injury free during hospitalization  Outcome: Adequate for Discharge  Goal: I will remain free of falls  Outcome: Adequate for Discharge     Problem: Daily Care  Goal: Daily care needs are met  Outcome: Adequate for Discharge     Problem: Psychosocial Needs  Goal: Demonstrates ability to cope with hospitalization/illness  Outcome: Adequate for Discharge  Goal: Collaborate with me, my family, and caregiver to identify my specific goals  Outcome: Adequate for Discharge     Problem: Discharge Barriers  Goal: My discharge needs are met  Outcome: Adequate for Discharge     Problem: Fall/Injury  Goal: Not fall by end of shift  Outcome: Adequate for Discharge  Goal: Be free from injury by end of the shift  Outcome: Adequate for Discharge  Goal: Verbalize understanding of personal risk factors for fall in the hospital  Outcome: Adequate for Discharge  Goal: Verbalize understanding of risk factor reduction measures to prevent injury from fall in the home  Outcome: Adequate for Discharge  Goal: Use assistive devices by end of the shift  Outcome: Adequate for Discharge  Goal: Pace activities to prevent fatigue by end of the shift  Outcome: Adequate for Discharge     Problem: Pain - Adult  Goal: Verbalizes/displays adequate comfort level or baseline comfort level  Outcome: Adequate for Discharge     Problem: Safety - Adult  Goal: Free from fall injury  Outcome: Adequate for Discharge     Problem: Safety - Adult  Goal: Free from fall injury  Outcome: Adequate for Discharge     Problem: Discharge Planning  Goal: Discharge to home or other facility with appropriate resources  Outcome: Adequate for Discharge     Problem: Chronic Conditions and Co-morbidities  Goal: Patient's chronic conditions and co-morbidity symptoms are monitored and maintained or improved  Outcome: Adequate for Discharge      Problem: Diabetes  Goal: Achieve decreasing blood glucose levels by end of shift  Outcome: Adequate for Discharge  Goal: Increase stability of blood glucose readings by end of shift  Outcome: Adequate for Discharge  Goal: Decrease in ketones present in urine by end of shift  Outcome: Adequate for Discharge  Goal: Maintain electrolyte levels within acceptable range throughout shift  Outcome: Adequate for Discharge  Goal: Maintain glucose levels >70mg/dl to <250mg/dl throughout shift  Outcome: Adequate for Discharge  Goal: No changes in neurological exam by end of shift  Outcome: Adequate for Discharge  Goal: Learn about and adhere to nutrition recommendations by end of shift  Outcome: Adequate for Discharge  Goal: Vital signs within normal range for age by end of shift  Outcome: Adequate for Discharge  Goal: Increase self care and/or family involovement by end of shift  Outcome: Adequate for Discharge  Goal: Receive DSME education by end of shift  Outcome: Adequate for Discharge     Problem: Diabetes  Goal: Achieve decreasing blood glucose levels by end of shift  Outcome: Adequate for Discharge  Goal: Increase stability of blood glucose readings by end of shift  Outcome: Adequate for Discharge  Goal: Decrease in ketones present in urine by end of shift  Outcome: Adequate for Discharge  Goal: Maintain electrolyte levels within acceptable range throughout shift  Outcome: Adequate for Discharge  Goal: Maintain glucose levels >70mg/dl to <250mg/dl throughout shift  Outcome: Adequate for Discharge  Goal: No changes in neurological exam by end of shift  Outcome: Adequate for Discharge  Goal: Learn about and adhere to nutrition recommendations by end of shift  Outcome: Adequate for Discharge  Goal: Vital signs within normal range for age by end of shift  Outcome: Adequate for Discharge  Goal: Increase self care and/or family involovement by end of shift  Outcome: Adequate for Discharge  Goal: Receive DSME education by end  of shift  Outcome: Adequate for Discharge     Problem: Pain  Goal: Takes deep breaths with improved pain control throughout the shift  Outcome: Adequate for Discharge  Goal: Turns in bed with improved pain control throughout the shift  Outcome: Adequate for Discharge  Goal: Walks with improved pain control throughout the shift  Outcome: Adequate for Discharge  Goal: Performs ADL's with improved pain control throughout shift  Outcome: Adequate for Discharge  Goal: Participates in PT with improved pain control throughout the shift  Outcome: Adequate for Discharge  Goal: Free from opioid side effects throughout the shift  Outcome: Adequate for Discharge  Goal: Free from acute confusion related to pain meds throughout the shift  Outcome: Adequate for Discharge   The patient's goals for the shift include      The clinical goals for the shift include pain management, safety

## 2024-03-28 NOTE — PROGRESS NOTES
Kat Salazar is a 48 y.o. female on day 0 of admission presenting with Chest pain.      Subjective   Patient seen and examined, alert oriented x 3.  Patient denies chest pain, shortness of breath, fever, chills, dizziness/lightheadedness and nausea/vomiting/diarrhea.  No ACS reported overnight.  Patient is stable to discharge home today.       Objective     Last Recorded Vitals  BP 97/61 (BP Location: Right arm, Patient Position: Lying)   Pulse 64   Temp 36.2 °C (97.2 °F) (Oral)   Resp 17   Wt 91.6 kg (202 lb)   SpO2 96%   Intake/Output last 3 Shifts:    Intake/Output Summary (Last 24 hours) at 3/28/2024 0920  Last data filed at 3/28/2024 0800  Gross per 24 hour   Intake 600 ml   Output --   Net 600 ml       Admission Weight  Weight: 91.6 kg (202 lb) (03/26/24 2342)    Daily Weight  03/26/24 : 91.6 kg (202 lb)    Image Results  XR hips bilateral 2 VW w pelvis when performed  Narrative: Interpreted By:  Sandy Simon,   STUDY:  XR HIPS BILATERAL 2 VW WITH PELVIS WHEN PERFORMED 3/27/2024 5:38 am      INDICATION:  Bilateral hip pain after falling      COMPARISON:  11/01/2023      ACCESSION NUMBER(S):  [id number]      ORDERING CLINICIAN:  ALEE MAHER      TECHNIQUE:  AP view of the pelvis with AP and lateral views of each hip.      FINDINGS:  There is postoperative change from bipolar left hip arthroplasty. No  dislocation of the prosthesis is demonstrated. The hardware is intact.      No fracture or dislocation of either hip is seen. The pelvic ring is  maintained. Sacroiliac joints and pubic symphysis are normally  visualized.      There is postoperative change from midline decompressive laminectomy  and posterior spinal fusion at the L4-5 level with interbody disc  replacement at L4-5 level as well.      Impression: No fracture or dislocation of either hip.      Signed by: Sandy Simon 3/27/2024 8:06 AM  Dictation workstation:   [id number]  Vascular US lower extremity venous duplex  bilateral  Narrative: Interpreted By:  Warren Prescott,   STUDY:  VAS US LOWER EXTREMITY VENOUS DUPLEX BILATERAL;  3/27/2024 7:15 am      INDICATION:  Signs/Symptoms:Bilateral lower extremity swelling with elevated  D-dimer.      COMPARISON:  10/06/2023      ACCESSION NUMBER(S):  [id number]      ORDERING CLINICIAN:  ALEE MAHER      TECHNIQUE:  Vascular ultrasound of the bilateral lower extremities was performed.  Real-time compression views as well as Gray scale, color Doppler and  spectral Doppler waveform analysis was performed.      FINDINGS:  Evaluation of the visualized portions of the bilateral common  femoral, proximal, mid, and distal femoral, and popliteal veins was  performed.  Evaluation of the visualized portions of the posterior  tibial and peroneal veins was also performed.      Limitations: None      The evaluated veins demonstrate normal compressibility. There is  intact venous flow demonstrating normal respiratory variability and  normal augmentation of flow with calf compression.          Impression: No sonographic evidence for deep vein thrombosis within the evaluated  veins of the bilateral lower extremities.      MACRO:  None      Signed by: Warren Prescott 3/27/2024 7:28 AM  Dictation workstation:   [id number]  CT chest abdomen pelvis w IV contrast  Narrative: Interpreted By:  Arsh Tse,   STUDY:  CT CHEST ABDOMEN PELVIS W IV CONTRAST;  3/27/2024 3:01 am      INDICATION:  Signs/Symptoms:Chest pain, elevated dimer, fall, lower back and hip  pain.      COMPARISON:  02/20/2024      ACCESSION NUMBER(S):  [id number]      ORDERING CLINICIAN:  ALEE MAHER      TECHNIQUE:  Contiguous axial images of the chest, abdomen, and pelvis were  obtained after the intravenous administration of  mL Omnipaque 350  contrast.  Coronal and sagittal reformatted images were reconstructed  from the axial data.      Multiplanar reformatted images of the thoracic and lumbar spine  were  reconstructed from source data of concurrent CT chest/abdomen/pelvis  acquisition.      FINDINGS:  CT CHEST:  Bronchial thickening is detected. No pneumothorax. No pleural  effusion. Heart size is enlarged. No adenopathy. No central airway  lesion. No features of pneumonia or pulmonary edema          CT ABDOMEN/PELVIS:      Changes of gastric bypass      Hepatic steatosis. Unremarkable adrenal glands pancreas and spleen.  Gallbladder has been removed. There is no aneurysm. No ascites.  Stable kidneys. 1 cm left ovarian follicle or cyst. Right ovary is  stable. No free fluid. Left hip arthroplasty obscures evaluation of  the pelvis. There is a fluid collection adjacent to the left hip  arthroplasty measuring about 4.9 x 3.2 cm presumed seroma. This is  unchanged from prior examination.      Fusion changes detected in the lumbar spine. The hardware appears  intact. No findings of acute lumbar spine fracture.      Impression: No acute traumatic findings in the chest. Bronchial thickening. Heart  size is enlarged.      Hepatic steatosis. Changes of gastric bypass. Fusion changes detected  in the lumbar spine as well as left hip arthroplasty. The appearance  is similar. If concern for acute traumatic findings of the left hip  given the artifact, consider dedicated radiographs. Fluid collection  projects adjacent to the left greater trochanter presumed seroma. No  gas seen within it          Signed by: Arsh Tse 3/27/2024 3:55 AM  Dictation workstation:   [id number]  XR chest 2 views  Narrative: Interpreted By:  Eladio Hampton,   STUDY:  XR CHEST 2 VIEWS;  3/27/2024 1:13 am      INDICATION:  Signs/Symptoms:Chest Pain.      COMPARISON:  2/20/2024      ACCESSION NUMBER(S):  [id number]      ORDERING CLINICIAN:  ALEE MAHER      FINDINGS:  The cardiac silhouette is normal in size. No focal airspace  consolidation or pleural effusion. No pneumothorax.      Impression: No airspace consolidation or  pleural effusion.      MACRO:  None      Signed by: Eladio Hampton 3/27/2024 1:30 AM  Dictation workstation:   [id number]      Physical Exam  Vitals and nursing note reviewed.   Constitutional:       General: She is not in acute distress.     Appearance: She is obese. She is not diaphoretic.   HENT:      Head: Normocephalic and atraumatic.      Nose: Nose normal. No congestion or rhinorrhea.      Mouth/Throat:      Mouth: Mucous membranes are moist.   Eyes:      General: No scleral icterus.  Cardiovascular:      Rate and Rhythm: Normal rate and regular rhythm.      Pulses: Normal pulses.      Heart sounds: Normal heart sounds. No murmur heard.     No gallop.   Pulmonary:      Effort: Pulmonary effort is normal. No respiratory distress.      Breath sounds: Normal breath sounds. No wheezing or rales.   Abdominal:      General: Bowel sounds are normal. There is no distension.      Palpations: Abdomen is soft.      Tenderness: There is no abdominal tenderness. There is no right CVA tenderness, left CVA tenderness or guarding.   Musculoskeletal:         General: Normal range of motion.      Cervical back: Normal range of motion and neck supple. No rigidity or tenderness.      Right lower leg: No edema.      Left lower leg: No edema.   Skin:     General: Skin is warm and dry.      Capillary Refill: Capillary refill takes less than 2 seconds.   Neurological:      General: No focal deficit present.      Mental Status: She is alert and oriented to person, place, and time.   Psychiatric:         Mood and Affect: Mood normal.         Behavior: Behavior normal.         Relevant Results               Assessment/Plan      Principal Problem:    Chest pain  Chest Pain  -Trend High Sensitivity Troponin's: 7, 6, 9  -EKG on arrival : Sinus bradycardia no ischemia noted.  -EKG PRN  -Telemetry monitor HR 80's   -PRN Nitroglycerin   -Vitals Q4h  -Cardiac Diet, NPO at midnight if warranted.     2. S/P Mechanical Fall/BLE  -PT/OT consult  if warranted.   -Up walking by self in ED  -No fractures noted on imaging  -Lasix 40 mg IV push x1 given in ED, +1 BLE noted     3. Elevated Ddimer  -Ddimer 1.54  -CT imaging negative for blood clots  -Vascular US, negative     4. Hypokalemia  -Potassium 3.3  -Potassium 40meq PO x 1     5. UTI  -keflex 500mg Pox1      6.HTN  -Continue home medications  -Amlodipine 2.5 mg daily     7. Type II Diabetes  -Accuchecks per protocol  -SSI     8. DVT Prophylaxis  -SCDS  -Heparin subcutaneous  03/27/24 @1033 : Admit to CDU . Plan -Trend High Sensitivity Troponin's given patient ongoing chest discomfort, Telemetry monitoring. Will continue antibiotic for UTI. Will repeat lab in am after Potassium supplementation for hypokalemia. Anticipate discharge upon resolution of all symptoms.      Addendum 03/37/24 @1508:  Patient endorses ongoing numbness in lower extremities. Patient able to walk independently to bathroom. Patient is cleared for discharge from a cardiac standpoint. However, patient feels she would benefit from overnight stay. Will continue overnight telemetry, repeat labs in am. Plan-Patient to dc to n in am, will place discharge for am. Patient in agreement with plan. Attending updated.    03/28/2024 @ 0830: No ACS symptoms reported overnight, patient is chest pain-free, high-sensitivity troponins are negative.  Amlodipine decreased to 2.5 mg daily due to hypotension. Chest pain not typical for angina probably a musculoskeletal etiology.  Defer additional cardiac testing at this time.  Patient is stable to discharge home today and follow-up with her PCP and cardiology.     I spent 20 minutes in the professional and overall care of this patient.       Barrett Perez, APRN-CNP

## 2024-03-31 LAB
ATRIAL RATE: 73 BPM
P AXIS: 31 DEGREES
P OFFSET: 202 MS
P ONSET: 148 MS
PR INTERVAL: 142 MS
Q ONSET: 219 MS
QRS COUNT: 12 BEATS
QRS DURATION: 86 MS
QT INTERVAL: 400 MS
QTC CALCULATION(BAZETT): 440 MS
QTC FREDERICIA: 427 MS
R AXIS: 0 DEGREES
T AXIS: 65 DEGREES
T OFFSET: 419 MS
VENTRICULAR RATE: 73 BPM

## 2024-04-01 LAB
ATRIAL RATE: 59 BPM
P AXIS: 48 DEGREES
P OFFSET: 203 MS
P ONSET: 147 MS
PR INTERVAL: 140 MS
Q ONSET: 217 MS
QRS COUNT: 10 BEATS
QRS DURATION: 88 MS
QT INTERVAL: 438 MS
QTC CALCULATION(BAZETT): 433 MS
QTC FREDERICIA: 435 MS
R AXIS: -1 DEGREES
T AXIS: 31 DEGREES
T OFFSET: 436 MS
VENTRICULAR RATE: 59 BPM

## 2024-04-03 ENCOUNTER — APPOINTMENT (OUTPATIENT)
Dept: ORTHOPEDIC SURGERY | Facility: CLINIC | Age: 49
End: 2024-04-03
Payer: COMMERCIAL

## 2024-04-03 ENCOUNTER — APPOINTMENT (OUTPATIENT)
Dept: PRIMARY CARE | Facility: CLINIC | Age: 49
End: 2024-04-03
Payer: COMMERCIAL

## 2024-04-09 ENCOUNTER — OFFICE VISIT (OUTPATIENT)
Dept: PRIMARY CARE | Facility: CLINIC | Age: 49
End: 2024-04-09
Payer: COMMERCIAL

## 2024-04-09 VITALS
DIASTOLIC BLOOD PRESSURE: 88 MMHG | SYSTOLIC BLOOD PRESSURE: 128 MMHG | WEIGHT: 210 LBS | HEART RATE: 84 BPM | BODY MASS INDEX: 38.41 KG/M2 | OXYGEN SATURATION: 99 %

## 2024-04-09 DIAGNOSIS — M06.9 RHEUMATOID ARTHRITIS, INVOLVING UNSPECIFIED SITE, UNSPECIFIED WHETHER RHEUMATOID FACTOR PRESENT (MULTI): Primary | ICD-10-CM

## 2024-04-09 LAB
25(OH)D3 SERPL-MCNC: 41 NG/ML (ref 30–100)
CRP SERPL-MCNC: 0.37 MG/DL
ERYTHROCYTE [SEDIMENTATION RATE] IN BLOOD BY WESTERGREN METHOD: 24 MM/H (ref 0–20)
EST. AVERAGE GLUCOSE BLD GHB EST-MCNC: 137 MG/DL
HBA1C MFR BLD: 6.4 %
RHEUMATOID FACT SER NEPH-ACNC: 18 IU/ML (ref 0–15)
TSH SERPL-ACNC: 1.89 MIU/L (ref 0.44–3.98)

## 2024-04-09 PROCEDURE — 86431 RHEUMATOID FACTOR QUANT: CPT

## 2024-04-09 PROCEDURE — 36415 COLL VENOUS BLD VENIPUNCTURE: CPT

## 2024-04-09 PROCEDURE — 82306 VITAMIN D 25 HYDROXY: CPT

## 2024-04-09 PROCEDURE — 99214 OFFICE O/P EST MOD 30 MIN: CPT | Performed by: STUDENT IN AN ORGANIZED HEALTH CARE EDUCATION/TRAINING PROGRAM

## 2024-04-09 PROCEDURE — 1036F TOBACCO NON-USER: CPT | Performed by: STUDENT IN AN ORGANIZED HEALTH CARE EDUCATION/TRAINING PROGRAM

## 2024-04-09 PROCEDURE — 86140 C-REACTIVE PROTEIN: CPT

## 2024-04-09 PROCEDURE — 3079F DIAST BP 80-89 MM HG: CPT | Performed by: STUDENT IN AN ORGANIZED HEALTH CARE EDUCATION/TRAINING PROGRAM

## 2024-04-09 PROCEDURE — 4010F ACE/ARB THERAPY RXD/TAKEN: CPT | Performed by: STUDENT IN AN ORGANIZED HEALTH CARE EDUCATION/TRAINING PROGRAM

## 2024-04-09 PROCEDURE — 84443 ASSAY THYROID STIM HORMONE: CPT

## 2024-04-09 PROCEDURE — 85652 RBC SED RATE AUTOMATED: CPT

## 2024-04-09 PROCEDURE — 3074F SYST BP LT 130 MM HG: CPT | Performed by: STUDENT IN AN ORGANIZED HEALTH CARE EDUCATION/TRAINING PROGRAM

## 2024-04-09 PROCEDURE — 83036 HEMOGLOBIN GLYCOSYLATED A1C: CPT

## 2024-04-09 PROCEDURE — 3008F BODY MASS INDEX DOCD: CPT | Performed by: STUDENT IN AN ORGANIZED HEALTH CARE EDUCATION/TRAINING PROGRAM

## 2024-04-09 PROCEDURE — 86038 ANTINUCLEAR ANTIBODIES: CPT

## 2024-04-09 ASSESSMENT — PATIENT HEALTH QUESTIONNAIRE - PHQ9
1. LITTLE INTEREST OR PLEASURE IN DOING THINGS: NOT AT ALL
2. FEELING DOWN, DEPRESSED OR HOPELESS: NOT AT ALL
SUM OF ALL RESPONSES TO PHQ9 QUESTIONS 1 AND 2: 0

## 2024-04-09 NOTE — PROGRESS NOTES
Subjective   Patient ID: Kat Tineo is a 48 y.o. female who presents for Hospital Follow-up (Still swollen/dizzy/Can't get enough no drink/Has a bunch of specliast appts set up/3days in a row has had headaches).    HPI     Patient has multiple medical complaints.  States she UTI symptoms started amoxicillin started having leg swelling on.  She went to [facility].  He was kept overnight.  She had an MI blood pressure noted to chest pain.  For observation increase amlodipine to 10 mg.  She since then she is still having aching pain as she is a muscular chest pain swelling of fingers and digits.  She was off her arthritis meds the same to her surgery.  And is severe fatigue tenderness.  All her labs and imaging all negative negative.  ER visit was reviewed.    Review of Systems   All other systems reviewed and are negative.      Objective   /88 (BP Location: Right arm, Patient Position: Sitting, BP Cuff Size: Large adult)   Pulse 84   Wt 95.3 kg (210 lb)   SpO2 99%   BMI 38.41 kg/m²     Physical Exam  Constitutional:       Appearance: Normal appearance.   HENT:      Head: Normocephalic and atraumatic.      Right Ear: Tympanic membrane and ear canal normal.      Left Ear: Tympanic membrane and ear canal normal.      Mouth/Throat:      Mouth: Mucous membranes are moist.      Pharynx: Oropharynx is clear.   Eyes:      Extraocular Movements: Extraocular movements intact.      Conjunctiva/sclera: Conjunctivae normal.      Pupils: Pupils are equal, round, and reactive to light.   Cardiovascular:      Rate and Rhythm: Normal rate and regular rhythm.      Pulses: Normal pulses.      Heart sounds: Normal heart sounds.   Pulmonary:      Effort: Pulmonary effort is normal.      Breath sounds: Normal breath sounds.   Abdominal:      General: Abdomen is flat. Bowel sounds are normal.      Palpations: Abdomen is soft.   Musculoskeletal:         General: Normal range of motion.      Cervical back: Normal range  of motion and neck supple.   Skin:     General: Skin is warm and dry.      Capillary Refill: Capillary refill takes 2 to 3 seconds.   Neurological:      General: No focal deficit present.      Mental Status: She is alert and oriented to person, place, and time. Mental status is at baseline.   Psychiatric:         Mood and Affect: Mood normal.         Behavior: Behavior normal.         Thought Content: Thought content normal.         Judgment: Judgment normal.         Assessment/Plan   1. Rheumatoid arthritis, involving unspecified site, unspecified whether rheumatoid factor present (CMS/[facility])    - Hemoglobin A1C  - TSH with reflex to Free T4 if abnormal  - Vitamin D 25-Hydroxy,Total (for eval of Vitamin D levels)  - CHRISTINE with Reflex to LUI  - Sedimentation Rate  - C-Reactive Protein  - Rheumatoid Factor  I suspect her symptoms are secondary to her RA flareup.  Will get lab work and check this.  [place]al follow with rheumatologist for treatment.  Will monitor symptoms potentially start steroids pending lab work.

## 2024-04-10 PROBLEM — R10.9 ABDOMINAL PAIN: Status: ACTIVE | Noted: 2024-04-10

## 2024-04-10 PROBLEM — R60.0 EDEMA OF BOTH LOWER EXTREMITIES: Status: ACTIVE | Noted: 2024-04-10

## 2024-04-10 PROBLEM — M48.02 STENOSIS, CERVICAL SPINE: Status: ACTIVE | Noted: 2024-04-10

## 2024-04-10 PROBLEM — M79.605 PAIN IN BOTH LOWER EXTREMITIES: Status: ACTIVE | Noted: 2024-04-10

## 2024-04-10 PROBLEM — E66.812 CLASS 2 OBESITY: Status: ACTIVE | Noted: 2023-04-12

## 2024-04-10 PROBLEM — T81.9XXA COMPLICATION OF SURGICAL PROCEDURE: Status: ACTIVE | Noted: 2024-04-10

## 2024-04-10 PROBLEM — G62.9 POLYNEUROPATHY, UNSPECIFIED: Status: ACTIVE | Noted: 2022-05-29

## 2024-04-10 PROBLEM — M25.9 DISORDER OF HIP REGION: Status: ACTIVE | Noted: 2024-04-10

## 2024-04-10 PROBLEM — M54.17 LUMBOSACRAL RADICULITIS: Status: ACTIVE | Noted: 2024-04-10

## 2024-04-10 PROBLEM — S46.819A STRAIN OF TRAPEZIUS MUSCLE: Status: ACTIVE | Noted: 2024-04-10

## 2024-04-10 PROBLEM — S73.005A HIP DISLOCATION, LEFT (MULTI): Status: ACTIVE | Noted: 2024-04-10

## 2024-04-10 PROBLEM — G95.9 DISEASE OF SPINAL CORD, UNSPECIFIED (MULTI): Status: ACTIVE | Noted: 2022-10-01

## 2024-04-10 PROBLEM — I51.7 CARDIOMEGALY: Status: ACTIVE | Noted: 2024-04-10

## 2024-04-10 PROBLEM — M47.816 LUMBAR SPONDYLOSIS: Status: ACTIVE | Noted: 2024-04-10

## 2024-04-10 PROBLEM — S72.009A FRACTURE OF BONE OF HIP (MULTI): Status: ACTIVE | Noted: 2022-05-29

## 2024-04-10 PROBLEM — J06.9 UPPER RESPIRATORY TRACT INFECTION: Status: ACTIVE | Noted: 2024-04-10

## 2024-04-10 PROBLEM — L76.82 PAIN AT SURGICAL INCISION: Status: ACTIVE | Noted: 2024-04-10

## 2024-04-10 PROBLEM — Z98.1 S/P LUMBAR FUSION: Status: ACTIVE | Noted: 2024-04-10

## 2024-04-10 PROBLEM — M19.90 CHRONIC OSTEOARTHRITIS: Status: ACTIVE | Noted: 2022-05-29

## 2024-04-10 PROBLEM — E66.9 CLASS 2 OBESITY: Status: ACTIVE | Noted: 2023-04-12

## 2024-04-10 PROBLEM — Z96.642 HISTORY OF TOTAL LEFT HIP ARTHROPLASTY: Status: ACTIVE | Noted: 2024-04-10

## 2024-04-10 PROBLEM — M79.18 MUSCULOSKELETAL PAIN: Status: ACTIVE | Noted: 2024-04-10

## 2024-04-10 PROBLEM — R51.9 HEADACHE: Status: ACTIVE | Noted: 2024-04-10

## 2024-04-10 PROBLEM — R20.2 PARESTHESIA OF UPPER EXTREMITY: Status: ACTIVE | Noted: 2024-04-10

## 2024-04-10 PROBLEM — M70.62 TROCHANTERIC BURSITIS OF LEFT HIP: Status: ACTIVE | Noted: 2024-04-10

## 2024-04-10 PROBLEM — U07.1 DISEASE DUE TO SEVERE ACUTE RESPIRATORY SYNDROME CORONAVIRUS 2 (SARS-COV-2): Status: ACTIVE | Noted: 2022-05-29

## 2024-04-10 PROBLEM — M79.604 PAIN IN BOTH LOWER EXTREMITIES: Status: ACTIVE | Noted: 2024-04-10

## 2024-04-10 PROBLEM — M25.459: Status: ACTIVE | Noted: 2024-04-10

## 2024-04-10 PROBLEM — N39.0 ACUTE URINARY TRACT INFECTION: Status: ACTIVE | Noted: 2024-04-10

## 2024-04-10 PROBLEM — J04.0 LARYNGITIS: Status: ACTIVE | Noted: 2024-04-10

## 2024-04-10 PROBLEM — H60.501 ACUTE OTITIS EXTERNA OF RIGHT EAR: Status: ACTIVE | Noted: 2024-04-10

## 2024-04-10 PROBLEM — M79.606 PAIN OF LOWER EXTREMITY: Status: ACTIVE | Noted: 2024-04-10

## 2024-04-10 PROBLEM — R11.0 NAUSEA: Status: ACTIVE | Noted: 2024-04-10

## 2024-04-10 PROBLEM — L03.119 CELLULITIS OF LEG, EXCEPT FOOT: Status: ACTIVE | Noted: 2024-04-10

## 2024-04-10 PROBLEM — M79.18 MYOFASCIAL PAIN: Status: ACTIVE | Noted: 2024-04-10

## 2024-04-10 PROBLEM — K90.9 INTESTINAL MALABSORPTION (HHS-HCC): Status: ACTIVE | Noted: 2024-04-10

## 2024-04-10 PROBLEM — R42 LIGHTHEADEDNESS: Status: ACTIVE | Noted: 2024-04-10

## 2024-04-10 PROBLEM — D63.8 ANEMIA IN OTHER CHRONIC DISEASES CLASSIFIED ELSEWHERE: Status: ACTIVE | Noted: 2024-04-10

## 2024-04-10 PROBLEM — G89.18 POSTOPERATIVE PAIN: Status: ACTIVE | Noted: 2024-04-10
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
## 2024-04-10 PROBLEM — G47.9 SLEEP DISTURBANCE: Status: ACTIVE | Noted: 2024-04-10

## 2024-04-10 PROBLEM — K42.9 UMBILICAL HERNIA: Status: ACTIVE | Noted: 2024-04-10

## 2024-04-10 PROBLEM — J11.2 INFLUENZA WITH GASTROINTESTINAL TRACT INVOLVEMENT: Status: ACTIVE | Noted: 2024-04-10

## 2024-04-10 PROBLEM — K90.9 MALABSORPTION SYNDROME (HHS-HCC): Status: ACTIVE | Noted: 2024-04-10

## 2024-04-10 PROBLEM — Z98.84 BARIATRIC SURGERY STATUS: Status: ACTIVE | Noted: 2022-05-29

## 2024-04-10 PROBLEM — N91.2 AMENORRHEA: Status: ACTIVE | Noted: 2024-04-10

## 2024-04-10 PROBLEM — Z96.642 HISTORY OF REVISION OF TOTAL REPLACEMENT OF LEFT HIP JOINT: Status: ACTIVE | Noted: 2024-04-10

## 2024-04-10 PROBLEM — M25.352 INSTABILITY OF LEFT HIP JOINT: Status: ACTIVE | Noted: 2024-04-10

## 2024-04-10 PROBLEM — R94.31 ELECTROCARDIOGRAM ABNORMAL: Status: ACTIVE | Noted: 2024-04-10

## 2024-04-10 PROBLEM — K80.20 GALLSTONE: Status: ACTIVE | Noted: 2024-04-10

## 2024-04-10 PROBLEM — M89.8X5 PAIN IN FEMUR: Status: ACTIVE | Noted: 2024-04-10

## 2024-04-10 PROBLEM — M06.9 RHEUMATOID ARTHRITIS (MULTI): Status: ACTIVE | Noted: 2022-12-14

## 2024-04-10 LAB — ANA SER QL HEP2 SUBST: NEGATIVE

## 2024-04-10 RX ORDER — FLUCONAZOLE 150 MG/1
TABLET ORAL
COMMUNITY
Start: 2024-02-15 | End: 2024-04-15 | Stop reason: ALTCHOICE

## 2024-04-10 RX ORDER — PREDNISONE 10 MG/1
TABLET ORAL
COMMUNITY
Start: 2024-02-16 | End: 2024-04-15 | Stop reason: ALTCHOICE

## 2024-04-10 RX ORDER — COVID-19 ANTIGEN TEST
KIT MISCELLANEOUS
COMMUNITY
Start: 2024-03-12

## 2024-04-12 ENCOUNTER — TELEPHONE (OUTPATIENT)
Dept: PRIMARY CARE | Facility: CLINIC | Age: 49
End: 2024-04-12

## 2024-04-12 ENCOUNTER — PATIENT MESSAGE (OUTPATIENT)
Dept: PRIMARY CARE | Facility: CLINIC | Age: 49
End: 2024-04-12

## 2024-04-12 ENCOUNTER — LAB (OUTPATIENT)
Dept: LAB | Facility: LAB | Age: 49
End: 2024-04-12
Payer: COMMERCIAL

## 2024-04-12 ENCOUNTER — OFFICE VISIT (OUTPATIENT)
Dept: SURGERY | Facility: CLINIC | Age: 49
End: 2024-04-12
Payer: COMMERCIAL

## 2024-04-12 VITALS
DIASTOLIC BLOOD PRESSURE: 96 MMHG | HEIGHT: 62 IN | WEIGHT: 213 LBS | HEART RATE: 81 BPM | SYSTOLIC BLOOD PRESSURE: 143 MMHG | BODY MASS INDEX: 39.2 KG/M2

## 2024-04-12 DIAGNOSIS — Z98.84 S/P LAPAROSCOPIC SLEEVE GASTRECTOMY: ICD-10-CM

## 2024-04-12 DIAGNOSIS — E55.9 VITAMIN D DEFICIENCY: ICD-10-CM

## 2024-04-12 DIAGNOSIS — K21.9 GASTROESOPHAGEAL REFLUX DISEASE WITHOUT ESOPHAGITIS: Primary | ICD-10-CM

## 2024-04-12 DIAGNOSIS — R76.8 ELEVATED RHEUMATOID FACTOR: ICD-10-CM

## 2024-04-12 DIAGNOSIS — E53.8 VITAMIN B12 DEFICIENCY (NON ANEMIC): ICD-10-CM

## 2024-04-12 DIAGNOSIS — K90.9 INTESTINAL MALABSORPTION, UNSPECIFIED TYPE (HHS-HCC): ICD-10-CM

## 2024-04-12 LAB
FERRITIN SERPL-MCNC: 12 NG/ML (ref 13–150)
FOLATE SERPL-MCNC: 7.2 NG/ML (ref 4.2–19.9)
IRON SATN MFR SERPL: 18 % (ref 12–50)
IRON SERPL-MCNC: 66 UG/DL (ref 30–160)
PTH-INTACT SERPL-MCNC: 103.9 PG/ML (ref 18.5–88)
TIBC SERPL-MCNC: 363 UG/DL (ref 228–428)
UIBC SERPL-MCNC: 297 UG/DL (ref 110–370)
VIT B12 SERPL-MCNC: 1335 PG/ML (ref 211–946)

## 2024-04-12 PROCEDURE — 83540 ASSAY OF IRON: CPT

## 2024-04-12 PROCEDURE — 82525 ASSAY OF COPPER: CPT

## 2024-04-12 PROCEDURE — 3077F SYST BP >= 140 MM HG: CPT | Performed by: SURGERY

## 2024-04-12 PROCEDURE — 3044F HG A1C LEVEL LT 7.0%: CPT | Performed by: SURGERY

## 2024-04-12 PROCEDURE — 3008F BODY MASS INDEX DOCD: CPT | Performed by: SURGERY

## 2024-04-12 PROCEDURE — 83970 ASSAY OF PARATHORMONE: CPT

## 2024-04-12 PROCEDURE — 36415 COLL VENOUS BLD VENIPUNCTURE: CPT

## 2024-04-12 PROCEDURE — 82652 VIT D 1 25-DIHYDROXY: CPT

## 2024-04-12 PROCEDURE — 3080F DIAST BP >= 90 MM HG: CPT | Performed by: SURGERY

## 2024-04-12 PROCEDURE — 82746 ASSAY OF FOLIC ACID SERUM: CPT

## 2024-04-12 PROCEDURE — 83550 IRON BINDING TEST: CPT

## 2024-04-12 PROCEDURE — 82607 VITAMIN B-12: CPT

## 2024-04-12 PROCEDURE — 99204 OFFICE O/P NEW MOD 45 MIN: CPT | Performed by: SURGERY

## 2024-04-12 PROCEDURE — 84425 ASSAY OF VITAMIN B-1: CPT

## 2024-04-12 PROCEDURE — 82728 ASSAY OF FERRITIN: CPT

## 2024-04-12 PROCEDURE — 84630 ASSAY OF ZINC: CPT

## 2024-04-12 ASSESSMENT — COLUMBIA-SUICIDE SEVERITY RATING SCALE - C-SSRS
1. IN THE PAST MONTH, HAVE YOU WISHED YOU WERE DEAD OR WISHED YOU COULD GO TO SLEEP AND NOT WAKE UP?: NO
6. HAVE YOU EVER DONE ANYTHING, STARTED TO DO ANYTHING, OR PREPARED TO DO ANYTHING TO END YOUR LIFE?: NO
2. HAVE YOU ACTUALLY HAD ANY THOUGHTS OF KILLING YOURSELF?: NO

## 2024-04-12 ASSESSMENT — ENCOUNTER SYMPTOMS
LOSS OF SENSATION IN FEET: 0
OCCASIONAL FEELINGS OF UNSTEADINESS: 0
DEPRESSION: 0

## 2024-04-12 ASSESSMENT — PAIN SCALES - GENERAL: PAINLEVEL: 8

## 2024-04-12 ASSESSMENT — PATIENT HEALTH QUESTIONNAIRE - PHQ9
2. FEELING DOWN, DEPRESSED OR HOPELESS: NOT AT ALL
1. LITTLE INTEREST OR PLEASURE IN DOING THINGS: NOT AT ALL
SUM OF ALL RESPONSES TO PHQ9 QUESTIONS 1 AND 2: 0

## 2024-04-12 NOTE — PROGRESS NOTES
Subjective   Patient ID: Kat Tineo is a 48 y.o. female who presents for No chief complaint on file..  HPI  New patient (due to has not had an appt in over 3 yrs) having pain in area where had hernia repair w/ mesh  Went to the ER and was admitted for observation  Labs ordered by Dr. Garza  Had fuv with him on April 9th  Review of Systems     Allergy/Immunologic:          HIV / AIDS No.  Hepatitis A No.  Hepatitis B No.  Hepatitis C No.  Immunosuppressent drugs yes          HEENT:          Headache yes          CARDIOLOGY:   HAS ENLARGED HEART SEES CARDIOLOGY ANNUALLY         History of Hyperlipidemia No.  Last stress test N/A.  Last echocardiogram N/A.  Chest pain yes  High blood pressure yes  Irregular heart beat No.  Known coronary artery disease No.  Pacemaker No.  Palpitations No.         RESPIRATORY:          Hx steroid useyes   ER visits or Hospitalizations for breathing problems No.  Sleep Apnea SNORING, HAS MORNING HEADACHES, INSOMNIA  IBARRA (dyspnea on exertion) No.  Hx of Asthma/COPD No.         GASTROENTEROLOGY:          Peptic ulcer No, Last EGD N/A, Last UGI N/A.  Colonoscopy Last Colonoscopy N/A.  Heartburn yes         ENDOCRINOLOGY:          Diabetes gestastional dm  yes Thyroid disorder No.         EXTREMITIES:          Varicose Veins No.  Stasis Ulcers No.  Ankle swelling No.  Personal history DVT No.  Personal history PE No.  Personal history of other thrombolic events No.  Family history of VTE No.  Known genetic bleeding or clotting disorder No.         FEMALE REPRODUCTIVE:          Uterine fibroids No.  Ovarian Cyst No.  Infertility No.  Menstrual history Menopausal.         UROLOGY:          Kidney disease No.  Kidney stones No.  Previous UTIs No.  Urinary incontinence No.         MUSCULOSKELETAL:          Osteoporosis/Osteopenia No.  Arthritis yes RA.  Joint pain No.         SKIN:          Hidradenitis No.  Open skin wounds No.  Rosacea No.  Healing problems No.         PSYCHOLOGY:           Anxiety none.  Depression none.  Eating disorder denies.    Objective   Physical Exam    Assessment/Plan            Iram Villa LPN 04/12/24 12:53 PM

## 2024-04-12 NOTE — H&P
History Of Present Illness  Kat Tineo is a 48 y.o.woman with a history of vertical sleeve gastrectomy in [place] in 2017 that came to see me for abdominal discomfort in 2017.  I performed a cholecystectomy, lysis of adhesions from adhesions secondary to hysterectomy and repair of incisional hernia with 6 inch round ventralight ST mesh on 11/22/2017.  She saw me once in 2020 for incisional discomfort.  I have not seen her since 9/11/2020.  She takes an mvi, vitamin B12 (sublingual), vitamin D, calcium citrate once per day.  She has daily reflux.  She takes omeprazole 20mg daily.  If she misses a dose, she has nocturnal regurgitation and subxiphoid burning not related to eating or drinking.  She will occasionally have subxiphoid burning with oral intake but will not regurgitate her meals unless she lays down after eating or drinking.  She is now almost 7 years out from her G and has never had an EGD.  She tells me she has had a yeast infection in her umbilicus and was given nystatin.      Past Medical History  Past Medical History:   Diagnosis Date    Diabetes mellitus (Multi)     Hypertension     Rheumatoid arthritis (Multi)        Surgical History  Past Surgical History:   Procedure Laterality Date    CT GUIDED SOFT TISSUE FLUID DRAIN  06/02/2022    CT GUIDED SOFT TISSUE FLUID DRAIN [facility]    OTHER SURGICAL HISTORY  11/22/2017    laparoscopic cholecystectomy wiht intraoperative cholangiogram, lysis of adhesions, mesh repair of incisional henia using 6 in round ventralight st mesh    US GUIDED SOFT TISSUE FLUID DRAIN  09/30/2021    US GUIDED SOFT TISSUE FLUID DRAIN [facility]        Social History  She reports that she has never smoked. She has never used smokeless tobacco. She reports that she does not currently use alcohol. She reports that she does not use drugs.    Family History  Family History   Problem Relation Name Age of Onset    Diabetes Mother      Hypertension Mother       Arthritis Other      Psoriasis Neg Hx      Irritable bowel syndrome Neg Hx          Allergies  Ace inhibitors, Adhesive tape-silicones, and Amoxicillin    Review of Systems   Allergy/Immunologic:          HIV / AIDS No.  Hepatitis A No.  Hepatitis B No.  Hepatitis C No.  Immunosuppressent drugs yes          HEENT:          Headache yes          CARDIOLOGY:   HAS ENLARGED HEART SEES CARDIOLOGY ANNUALLY         History of Hyperlipidemia No.  Last stress test N/A.  Last echocardiogram N/A.  Chest pain yes  High blood pressure yes  Irregular heart beat No.  Known coronary artery disease No.  Pacemaker No.  Palpitations No.         RESPIRATORY:          Hx steroid use yes   ER visits or Hospitalizations for breathing problems No.  Sleep Apnea SNORING, HAS MORNING HEADACHES, INSOMNIA  IBARRA (dyspnea on exertion) No.  Hx of Asthma/COPD No.         GASTROENTEROLOGY:          Peptic ulcer No, Last EGD N/A, Last UGI N/A.  Colonoscopy Last Colonoscopy N/A.  Heartburn yes         ENDOCRINOLOGY:          Diabetes gestastional dm  yes Thyroid disorder No.         EXTREMITIES:          Varicose Veins No.  Stasis Ulcers No.  Ankle swelling No.  Personal history DVT No.  Personal history PE No.  Personal history of other thrombolic events No.  Family history of VTE No.  Known genetic bleeding or clotting disorder No.         FEMALE REPRODUCTIVE:          Uterine fibroids No.  Ovarian Cyst No.  Infertility No.  Menstrual history Menopausal.         UROLOGY:          Kidney disease No.  Kidney stones No.  Previous UTIs No.  Urinary incontinence No.         MUSCULOSKELETAL:          Osteoporosis/Osteopenia No.  Arthritis yes RA.  Joint pain No.         SKIN:          Hidradenitis No.  Open skin wounds No.  Rosacea No.  Healing problems No.         PSYCHOLOGY:          Anxiety none.  Depression none.  Eating disorder denies.          Physical Exam       General Examination:         GENERAL APPEARANCE: alert and oriented x 3, Pleasant and  "cooperative, No Acute Distress.          HEENT: PERRLA.          NECK: no lymphadenopathy, no thyromegaly, no JVD, normal flexion, normal extension.          HEART: regular rate and rhythm.          LUNGS: clear to auscultation bilaterally.          CHEST: normal shape and expansion.          ABDOMEN: obese, no palpable hernias present, umbilicus reddish-pink and irritated but no skin breakdown, soft and not tender, no guarding, no CVA tenderness.                 BACK: no CVA tenderness.       Last Recorded Vitals  Blood pressure (!) 143/96, pulse 81, height 1.575 m (5' 2\"), weight 96.6 kg (213 lb).    Relevant Results     CT chest abdomen pelvis w IV contrast  Status: Final result     PACS Images     Show images for CT chest abdomen pelvis w IV contrast  Signed by    Signed Time Phone Pager   Arsh Tse MD 3/27/2024 03:55 [phone]     Exam Information    Status Exam Begun Exam Ended   Final 3/27/2024 02:27 3/27/2024 03:01     Study Result    Narrative & Impression   Interpreted By:  Arsh Tse,   STUDY:  CT CHEST ABDOMEN PELVIS W IV CONTRAST;  3/27/2024 3:01 am      INDICATION:  Signs/Symptoms:Chest pain, elevated dimer, fall, lower back and hip  pain.      COMPARISON:  02/20/2024      ACCESSION NUMBER(S):  [id number]      ORDERING CLINICIAN:  ALEE MAHER      TECHNIQUE:  Contiguous axial images of the chest, abdomen, and pelvis were  obtained after the intravenous administration of  mL Omnipaque 350  contrast.  Coronal and sagittal reformatted images were reconstructed  from the axial data.      Multiplanar reformatted images of the thoracic and lumbar spine were  reconstructed from source data of concurrent CT chest/abdomen/pelvis  acquisition.      FINDINGS:  CT CHEST:  Bronchial thickening is detected. No pneumothorax. No pleural  effusion. Heart size is enlarged. No adenopathy. No central airway  lesion. No features of pneumonia or pulmonary edema          CT ABDOMEN/PELVIS:    "   Changes of gastric bypass      Hepatic steatosis. Unremarkable adrenal glands pancreas and spleen.  Gallbladder has been removed. There is no aneurysm. No ascites.  Stable kidneys. 1 cm left ovarian follicle or cyst. Right ovary is  stable. No free fluid. Left hip arthroplasty obscures evaluation of  the pelvis. There is a fluid collection adjacent to the left hip  arthroplasty measuring about 4.9 x 3.2 cm presumed seroma. This is  unchanged from prior examination.      Fusion changes detected in the lumbar spine. The hardware appears  intact. No findings of acute lumbar spine fracture.      IMPRESSION:  No acute traumatic findings in the chest. Bronchial thickening. Heart  size is enlarged.      Hepatic steatosis. Changes of gastric bypass. Fusion changes detected  in the lumbar spine as well as left hip arthroplasty. The appearance  is similar. If concern for acute traumatic findings of the left hip  given the artifact, consider dedicated radiographs. Fluid collection  projects adjacent to the left greater trochanter presumed seroma. No  gas seen within it          Signed by: Arsh Tse 3/27/2024 3:55 AM  Dictation workstation:   [id number]     Assessment/Plan   Problem List Items Addressed This Visit             ICD-10-CM    Vitamin D deficiency E55.9    Relevant Orders    Parathyroid Hormone, Intact    Vitamin D 1,25 Dihydroxy (for eval of hypercalcemia)    Gastroesophageal reflux disease - Primary K21.9    Vitamin B12 deficiency (non anemic) E53.8    Relevant Orders    Vitamin B12    S/P laparoscopic sleeve gastrectomy Z98.84    Malabsorption (HHS-HCC) K90.9    Relevant Orders    Copper, Blood    Ferritin    Folate    Iron and TIBC    Parathyroid Hormone, Intact    Vitamin B1, Whole Blood    Vitamin B12    Vitamin D 1,25 Dihydroxy (for eval of hypercalcemia)    Zinc, Serum or Plasma       We reviewed the rules necessary for long term success after weight loss surgery.  We discussed the need for  lifelong nutritional supplementation after weight loss surgery and she was given a handout.  We will obtain labwork today.  We discussed the importance of excercising with moderate intensity a minimum of five days per week for at least 30 minutes.  I gave her a lab req to review her bariatric labs.  I encouraged her to take calcium citrate bid and gave her a handout.  I told her there is fluid adjacent to her hip replacement from 8/2023 which she will mention to Dr. Terry.  I recommended an EGD as she is almost 7 years s/p VSG with chronic GERD and has not had an EGD to rule out Baron's.  I recommended that she continue to use nystatin in her umbilicus to avoid recurrent yeast infections.         De Santana MD

## 2024-04-15 ENCOUNTER — OFFICE VISIT (OUTPATIENT)
Dept: CARDIOLOGY | Facility: CLINIC | Age: 49
End: 2024-04-15
Payer: COMMERCIAL

## 2024-04-15 VITALS
HEIGHT: 62 IN | HEART RATE: 68 BPM | WEIGHT: 213 LBS | DIASTOLIC BLOOD PRESSURE: 82 MMHG | BODY MASS INDEX: 39.2 KG/M2 | SYSTOLIC BLOOD PRESSURE: 126 MMHG | OXYGEN SATURATION: 96 % | RESPIRATION RATE: 16 BRPM

## 2024-04-15 DIAGNOSIS — R07.9 CHEST PAIN AT REST: ICD-10-CM

## 2024-04-15 DIAGNOSIS — I10 PRIMARY HYPERTENSION: ICD-10-CM

## 2024-04-15 DIAGNOSIS — I51.89 FAMILIAL HEART DISEASE: Primary | ICD-10-CM

## 2024-04-15 DIAGNOSIS — R60.9 EDEMA, UNSPECIFIED TYPE: ICD-10-CM

## 2024-04-15 LAB
1,25(OH)2D3 SERPL-MCNC: 72.5 PG/ML (ref 19.9–79.3)
COPPER SERPL-MCNC: 107.6 UG/DL (ref 80–155)
ZINC SERPL-MCNC: 59.4 UG/DL (ref 60–120)

## 2024-04-15 PROCEDURE — 3079F DIAST BP 80-89 MM HG: CPT | Performed by: INTERNAL MEDICINE

## 2024-04-15 PROCEDURE — 99204 OFFICE O/P NEW MOD 45 MIN: CPT | Performed by: INTERNAL MEDICINE

## 2024-04-15 PROCEDURE — 99214 OFFICE O/P EST MOD 30 MIN: CPT | Performed by: INTERNAL MEDICINE

## 2024-04-15 PROCEDURE — 3074F SYST BP LT 130 MM HG: CPT | Performed by: INTERNAL MEDICINE

## 2024-04-15 PROCEDURE — 3008F BODY MASS INDEX DOCD: CPT | Performed by: INTERNAL MEDICINE

## 2024-04-15 PROCEDURE — 3044F HG A1C LEVEL LT 7.0%: CPT | Performed by: INTERNAL MEDICINE

## 2024-04-15 RX ORDER — LOSARTAN POTASSIUM AND HYDROCHLOROTHIAZIDE 25; 100 MG/1; MG/1
1 TABLET ORAL DAILY
Qty: 90 TABLET | Refills: 3 | Status: SHIPPED | OUTPATIENT
Start: 2024-04-15 | End: 2025-04-15

## 2024-04-15 ASSESSMENT — ENCOUNTER SYMPTOMS
OCCASIONAL FEELINGS OF UNSTEADINESS: 1
LOSS OF SENSATION IN FEET: 0
DEPRESSION: 0

## 2024-04-15 ASSESSMENT — PAIN SCALES - GENERAL: PAINLEVEL: 6

## 2024-04-15 NOTE — PATIENT INSTRUCTIONS
Follow up in six months  May discontinue Amlodipine   Lab work was done this month  Combine losartan and hydrochlorothiazide

## 2024-04-15 NOTE — PROGRESS NOTES
"Primary Care Physician: Dagoberto Garza DO  Date of Visit: 04/15/2024  2:15 PM EDT  Location of visit: [facility] [place] [address]     Chief Complaint: Hospital follow up for chest pain, hypertension, and leg edema.    HPI / Summary:   Kat Tineo is a 48 y.o. female presents to establish Cardiology care. Patient has multiple medical complaints. She went to [facility] in March after a fall with chest and hip pain- see discharge notes. She is still having muscular chest pain and leg and finger swelling. Complains of \"extreme fatigue\" and headaches. Reviewed medical and surgical history, lab/test results, and current medications.     ROS  All other systems reviewed and are negative.     Medical History:   She has a past medical history of Acid reflux, Diabetes mellitus (Multi), DM (diabetes mellitus), gestational (HHS-HCC), Hypertension, and Rheumatoid arthritis (Multi).  Surgical Hx:   She has a past surgical history that includes US guided soft tissue fluid drain (09/30/2021); CT guided soft tissue fluid drainage (06/02/2022); Other surgical history (11/22/2017); Neck surgery; Other surgical history (Left); Laparoscopy gastrectomy partial / total (2017); and Spine surgery (02/14/2023).   Social Hx:   She reports that she has never smoked. She has never used smokeless tobacco. She reports that she does not currently use alcohol. She reports that she does not use drugs.  Family Hx:   Her family history includes Arthritis in an other family member; Diabetes in her mother; Hypertension in her mother; htn in her father; morbid obesity in her mother.   Allergies:  Allergies   Allergen Reactions    Ace Inhibitors Cough, Shortness of breath and Unknown    Adhesive Tape-Silicones Rash and Unknown    Amoxicillin Unknown     Urinary      Outpatient Medications:  Current Outpatient Medications   Medication Instructions    amLODIPine (NORVASC) 2.5 mg, oral, Daily    cholecalciferol (Vitamin D-3) 5,000 Units tablet 2 tablets, oral, " Daily    cyanocobalamin (VITAMIN B-12) 250 mcg, oral, Daily    DULoxetine (Cymbalta) 60 mg DR capsule TAKE 1 CAPSULE BY MOUTH ONCE DAILY    fluconazole (Diflucan) 150 mg tablet 1 TABLET BY MOUTH DAILY ONE DOSE, MAY REPEAT ONCE    gabapentin (NEURONTIN) 300 mg, oral, 3 times daily    hydroCHLOROthiazide (HYDRODIURIL) 25 mg, oral, Daily    losartan (Cozaar) 100 mg tablet TAKE 1 TABLET BY MOUTH ONCE DAILY    losartan (COZAAR) 100 mg, oral, Daily    multivitamin tablet 1 tablet, oral, Daily    nystatin (Mycostatin) cream Topical, As needed    omeprazole (PRILOSEC) 20 mg, oral, Daily before breakfast, Do not crush or chew.    potassium chloride CR (Klor-Con M20) 20 mEq ER tablet 20 mEq, oral, Daily with breakfast, Do not crush or chew.    predniSONE (Deltasone) 10 mg tablet 4 TABLETS DAILY X3 DAYS, 3 TABS DAILY X3 DAYS, 2 TABS DAILY X3 DAYS, 1 TAB DAILY X3 DAYS THEN STOP    Quick[employer] At-Home COVID-19 Test kit     rizatriptan (Maxalt) 10 mg tablet TAKE 1 TABLET AT ONSET OF HEADACHE. MAY REPEAT EVERY 2 HOURS AS NEEDED. MAXIMUM 3 TABLETS/24 HOURS.    tiZANidine (ZANAFLEX) 4 mg, oral, Nightly    zolpidem (AMBIEN) 10 mg, oral, Nightly PRN     Physical Exam:  There were no vitals filed for this visit.  Wt Readings from Last 5 Encounters:   04/12/24 96.6 kg (213 lb)   04/09/24 95.3 kg (210 lb)   03/26/24 91.6 kg (202 lb)   03/19/24 92 kg (202 lb 13.2 oz)   02/20/24 95.8 kg (211 lb 3.2 oz)     Physical Exam  JVP not elevated. Carotid impulses are 2+ without overlying bruit.   Chest exhibits fair to good air movement with completely clear breath sounds.   The cardiac rhythm is regular with no premature beats.   Normal S1 and S2. No gallop, murmur or rub, or click.   Abdomen is soft and benign without focal tenderness.   With trace lower leg edema. The pedal pulses are intact.     Last Labs:  Lab on 04/12/2024   Component Date Value    Ferritin 04/12/2024 12 (L)     Folate, Serum 04/12/2024 7.2     Iron 04/12/2024 66     UIBC  04/12/2024 297     TIBC 04/12/2024 363     % Saturation 04/12/2024 18     Parathyroid Hormone, Int* 04/12/2024 103.9 (H)     Vitamin B12 04/12/2024 1,335 (H)     Vit D, 1,25-Dihydroxy 04/12/2024 72.5    Office Visit on 04/09/2024   Component Date Value    Hemoglobin A1C 04/09/2024 6.4 (H)     Estimated Average Glucose 04/09/2024 137     Thyroid Stimulating Horm* 04/09/2024 1.89     Vitamin D, 25-Hydroxy, T* 04/09/2024 41     CHRISTINE 04/09/2024 Negative     Sedimentation Rate 04/09/2024 24 (H)     C-Reactive Protein 04/09/2024 0.37     Rheumatoid Factor 04/09/2024 18 (H)    Admission on 03/27/2024, Discharged on 03/28/2024   Component Date Value    WBC 03/27/2024 10.3     nRBC 03/27/2024 0.0     RBC 03/27/2024 4.35     Hemoglobin 03/27/2024 11.0 (L)     Hematocrit 03/27/2024 34.7 (L)     MCV 03/27/2024 80     MCH 03/27/2024 25.3 (L)     MCHC 03/27/2024 31.7 (L)     RDW 03/27/2024 15.2 (H)     Platelets 03/27/2024 408     Neutrophils % 03/27/2024 68.0     Immature Granulocytes %,* 03/27/2024 0.5     Lymphocytes % 03/27/2024 23.9     Monocytes % 03/27/2024 6.8     Eosinophils % 03/27/2024 0.4     Basophils % 03/27/2024 0.4     Neutrophils Absolute 03/27/2024 7.02     Immature Granulocytes Ab* 03/27/2024 0.05     Lymphocytes Absolute 03/27/2024 2.47     Monocytes Absolute 03/27/2024 0.70     Eosinophils Absolute 03/27/2024 0.04     Basophils Absolute 03/27/2024 0.04     Glucose 03/27/2024 103 (H)     Sodium 03/27/2024 139     Potassium 03/27/2024 3.2 (L)     Chloride 03/27/2024 103     Bicarbonate 03/27/2024 26     Urea Nitrogen 03/27/2024 11     Creatinine 03/27/2024 0.70     eGFR 03/27/2024 >90     Calcium 03/27/2024 9.1     Albumin 03/27/2024 4.0     Alkaline Phosphatase 03/27/2024 99     Total Protein 03/27/2024 7.3     AST 03/27/2024 12     Bilirubin, Total 03/27/2024 <0.2     ALT 03/27/2024 5     Anion Gap 03/27/2024 10     Magnesium 03/27/2024 2.10     Ventricular Rate 03/27/2024 59     Atrial Rate 03/27/2024 59      HI Interval 03/27/2024 140     QRS Duration 03/27/2024 88     QT Interval 03/27/2024 438     QTC Calculation(Bazett) 03/27/2024 433     P Axis 03/27/2024 48     R Axis 03/27/2024 -1     T Axis 03/27/2024 31     QRS Count 03/27/2024 10     Q Onset 03/27/2024 217     P Onset 03/27/2024 147     P Offset 03/27/2024 203     T Offset 03/27/2024 436     QTC Fredericia 03/27/2024 435     PROBNP 03/27/2024 76     D-Dimer Non VTE, Quant (* 03/27/2024 1.54 (H)     Troponin T, High Sensiti* 03/27/2024 7     Color, Urine 03/27/2024 Light-Yellow     Appearance, Urine 03/27/2024 Turbid (N)     Specific Gravity, Urine 03/27/2024 >1.050 (N)     pH, Urine 03/27/2024 7.0     Protein, Urine 03/27/2024 10 (TRACE)     Glucose, Urine 03/27/2024 Normal     Blood, Urine 03/27/2024 NEGATIVE     Ketones, Urine 03/27/2024 NEGATIVE     Bilirubin, Urine 03/27/2024 NEGATIVE     Urobilinogen, Urine 03/27/2024 Normal     Nitrite, Urine 03/27/2024 NEGATIVE     Leukocyte Esterase, Urine 03/27/2024 500 Luis/µL (A)     Extra Tube 03/27/2024 Hold for add-ons.     Troponin T, High Sensiti* 03/27/2024 6     WBC, Urine 03/27/2024 11-20 (A)     RBC, Urine 03/27/2024 >20 (A)     Squamous Epithelial Cell* 03/27/2024 26-50 (1+)     Bacteria, Urine 03/27/2024 4+ (A)     Mucus, Urine 03/27/2024 FEW     Urine Culture 03/27/2024 Normal genitourinary brittney     Troponin T, High Sensiti* 03/27/2024 9     POCT Glucose 03/27/2024 204 (H)     POCT Glucose 03/27/2024 156 (H)     WBC 03/28/2024 11.8 (H)     nRBC 03/28/2024 0.0     RBC 03/28/2024 4.18     Hemoglobin 03/28/2024 10.3 (L)     Hematocrit 03/28/2024 33.7 (L)     MCV 03/28/2024 81     MCH 03/28/2024 24.6 (L)     MCHC 03/28/2024 30.6 (L)     RDW 03/28/2024 15.3 (H)     Platelets 03/28/2024 401     Glucose 03/28/2024 125 (H)     Sodium 03/28/2024 141     Potassium 03/28/2024 3.3 (L)     Chloride 03/28/2024 105     Bicarbonate 03/28/2024 28     Urea Nitrogen 03/28/2024 16     Creatinine 03/28/2024 0.80     eGFR  03/28/2024 90     Calcium 03/28/2024 8.9     Albumin 03/28/2024 3.6     Alkaline Phosphatase 03/28/2024 88     Total Protein 03/28/2024 6.4     AST 03/28/2024 13     Bilirubin, Total 03/28/2024 0.2     ALT 03/28/2024 13     Anion Gap 03/28/2024 8     POCT Glucose 03/27/2024 197 (H)     POCT Glucose 03/28/2024 118 (H)     POCT Glucose 03/28/2024 126 (H)    Admission on 03/19/2024, Discharged on 03/19/2024   Component Date Value    Flu A Result 03/19/2024 Not Detected     Flu B Result 03/19/2024 Not Detected     Coronavirus 2019, PCR 03/19/2024 Not Detected     WBC 03/19/2024 10.3     nRBC 03/19/2024 0.0     RBC 03/19/2024 4.85     Hemoglobin 03/19/2024 12.3     Hematocrit 03/19/2024 38.7     MCV 03/19/2024 80     MCH 03/19/2024 25.4 (L)     MCHC 03/19/2024 31.8 (L)     RDW 03/19/2024 15.1 (H)     Platelets 03/19/2024 425     Neutrophils % 03/19/2024 80.8     Immature Granulocytes %,* 03/19/2024 0.3     Lymphocytes % 03/19/2024 13.1     Monocytes % 03/19/2024 4.9     Eosinophils % 03/19/2024 0.6     Basophils % 03/19/2024 0.3     Neutrophils Absolute 03/19/2024 8.31 (H)     Immature Granulocytes Ab* 03/19/2024 0.03     Lymphocytes Absolute 03/19/2024 1.35     Monocytes Absolute 03/19/2024 0.50     Eosinophils Absolute 03/19/2024 0.06     Basophils Absolute 03/19/2024 0.03     Glucose 03/19/2024 129 (H)     Sodium 03/19/2024 141     Potassium 03/19/2024 3.4     Chloride 03/19/2024 102     Bicarbonate 03/19/2024 30     Urea Nitrogen 03/19/2024 10     Creatinine 03/19/2024 0.70     eGFR 03/19/2024 >90     Calcium 03/19/2024 9.5     Albumin 03/19/2024 4.0     Alkaline Phosphatase 03/19/2024 105     Total Protein 03/19/2024 7.4     AST 03/19/2024 13     Bilirubin, Total 03/19/2024 0.3     ALT 03/19/2024 9     Anion Gap 03/19/2024 9     Color, Urine 03/19/2024 Light-Yellow     Appearance, Urine 03/19/2024 Clear     Specific Gravity, Urine 03/19/2024 1.016     pH, Urine 03/19/2024 7.5     Protein, Urine 03/19/2024 NEGATIVE      Glucose, Urine 03/19/2024 Normal     Blood, Urine 03/19/2024 NEGATIVE     Ketones, Urine 03/19/2024 NEGATIVE     Bilirubin, Urine 03/19/2024 NEGATIVE     Urobilinogen, Urine 03/19/2024 Normal     Nitrite, Urine 03/19/2024 NEGATIVE     Leukocyte Esterase, Urine 03/19/2024 NEGATIVE    Admission on 02/20/2024, Discharged on 02/20/2024   Component Date Value    Ventricular Rate 02/20/2024 73     Atrial Rate 02/20/2024 73     VA Interval 02/20/2024 142     QRS Duration 02/20/2024 86     QT Interval 02/20/2024 400     QTC Calculation(Bazett) 02/20/2024 440     P [place] 02/20/2024 31     R Axis 02/20/2024 0     T Axis 02/20/2024 65     QRS Count 02/20/2024 12     Q Onset 02/20/2024 219     P Onset 02/20/2024 148     P Offset 02/20/2024 202     T Offset 02/20/2024 419     QTC Fredericia 02/20/2024 427     Glucose 02/20/2024 117 (H)     Sodium 02/20/2024 141     Potassium 02/20/2024 3.5     Chloride 02/20/2024 106     Bicarbonate 02/20/2024 25     Urea Nitrogen 02/20/2024 10     Creatinine 02/20/2024 0.60     eGFR 02/20/2024 >90     Calcium 02/20/2024 9.3     Albumin 02/20/2024 3.9     Alkaline Phosphatase 02/20/2024 110     Total Protein 02/20/2024 7.2     AST 02/20/2024 15     Bilirubin, Total 02/20/2024 0.4     ALT 02/20/2024 9     Anion Gap 02/20/2024 10     WBC 02/20/2024 9.0     nRBC 02/20/2024 0.0     RBC 02/20/2024 4.46     Hemoglobin 02/20/2024 11.4 (L)     Hematocrit 02/20/2024 35.7 (L)     MCV 02/20/2024 80     MCH 02/20/2024 25.6 (L)     MCHC 02/20/2024 31.9 (L)     RDW 02/20/2024 14.4     Platelets 02/20/2024 370     Neutrophils % 02/20/2024 71.4     Immature Granulocytes %,* 02/20/2024 0.4     Lymphocytes % 02/20/2024 21.9     Monocytes % 02/20/2024 5.7     Eosinophils % 02/20/2024 0.3     Basophils % 02/20/2024 0.3     Neutrophils Absolute 02/20/2024 6.40     Immature Granulocytes Ab* 02/20/2024 0.04     Lymphocytes Absolute 02/20/2024 1.97     Monocytes Absolute 02/20/2024 0.51     Eosinophils Absolute  02/20/2024 0.03     Basophils Absolute 02/20/2024 0.03     Flu A Result 02/20/2024 Not Detected     Flu B Result 02/20/2024 Not Detected     Coronavirus 2019, PCR 02/20/2024 Not Detected     Troponin T, High Sensiti* 02/20/2024 7     Troponin T, High Sensiti* 02/20/2024 10     PROBNP 02/20/2024 208 (H)       Assessment/Plan     1.  Hypertension.  Patient is a middle-aged white female who does have a longstanding history of hypertension currently treated with losartan, HCTZ, and amlodipine.  Blood pressure values are actually well within normal range.  Will discontinue amlodipine and combine the losartan 100 mg daily and HCTZ 25 mg daily into a single medication.  The patient did have a echocardiogram performed 2/10/2023 showing a normal LV ejection fraction of 60%.  Patient recently experienced a fall off a chair onto her left hip with some subsequent swelling.  She was seen at the emergency room.  Of note her EKG showed sinus bradycardia left axis deviation left ventricular hypertrophy with repolarization abnormality.  Lower extremity ultrasound negative for DVT.    2.  Type 2 diabetes.  The patient's diabetes is currently under dietary management.  Her last glycohemoglobin was 6.4% on 4/9/2024    3.?  Hyperlipidemia.  Diagnosis not confirmed by lipid panel in 5/2022 with cholesterol 167 LDL 89 HDL 42 triglyceride 182.    4.  Non-smoker.    5.  Status post cholecystectomy with hernia repair 2018.    6.  Status post lumbosacral spinal surgery 2/2022.    7.  Status post cervical spine surgery.    8.  Status post gastric sleeve 2017.  Patient is scheduled to follow-up with general surgery and will have an EGD repeated.She currently is experiencing some umbilical pain and was placed on omeprazole for reflux and regurgitation.    9.  Rheumatoid arthritis.  The patient is currently not on any biologic agent.  Lab work 4/9/2024 included sedimentation rate 24 CRP of 0.37    10.  Status post left total hip replacement.  The  patient required 3 revisions for dislocation with the last being in 8/2023.  The ball evidently had to be replaced with a ceramic device.  She is following up with [facility] orthopedics.  Orders:  No orders of the defined types were placed in this encounter.     Followup Appts:  Future Appointments   Date Time Provider Department Center   4/15/2024  2:15 PM Gabe Lorenzo MD [id number] [facility]   4/16/2024  3:45 PM Jose Terry MD [id number] [facility]   4/16/2024  3:45 PM [facility] RIS X-RAY 4 URDR [facility]   4/26/2024 10:50 AM Dagoberto Patel MD [id number] [facility]   5/10/2024  4:00 PM Ryan Quigley MD [id number] [facility]   5/30/2024  9:00 AM De Santana MD [id number] [facility]   6/20/2024 11:00 AM De Santana MD [id number] [facility]

## 2024-04-16 ENCOUNTER — OFFICE VISIT (OUTPATIENT)
Dept: ORTHOPEDIC SURGERY | Facility: CLINIC | Age: 49
End: 2024-04-16
Payer: COMMERCIAL

## 2024-04-16 ENCOUNTER — HOSPITAL ENCOUNTER (OUTPATIENT)
Dept: RADIOLOGY | Facility: CLINIC | Age: 49
Discharge: HOME | End: 2024-04-16
Payer: COMMERCIAL

## 2024-04-16 DIAGNOSIS — M16.11 PRIMARY OSTEOARTHRITIS OF RIGHT HIP: Primary | ICD-10-CM

## 2024-04-16 DIAGNOSIS — Z96.642 S/P TOTAL LEFT HIP ARTHROPLASTY: ICD-10-CM

## 2024-04-16 PROCEDURE — 99214 OFFICE O/P EST MOD 30 MIN: CPT | Performed by: STUDENT IN AN ORGANIZED HEALTH CARE EDUCATION/TRAINING PROGRAM

## 2024-04-16 PROCEDURE — 3044F HG A1C LEVEL LT 7.0%: CPT | Performed by: STUDENT IN AN ORGANIZED HEALTH CARE EDUCATION/TRAINING PROGRAM

## 2024-04-16 PROCEDURE — 3008F BODY MASS INDEX DOCD: CPT | Performed by: STUDENT IN AN ORGANIZED HEALTH CARE EDUCATION/TRAINING PROGRAM

## 2024-04-16 PROCEDURE — 73502 X-RAY EXAM HIP UNI 2-3 VIEWS: CPT | Mod: LEFT SIDE | Performed by: STUDENT IN AN ORGANIZED HEALTH CARE EDUCATION/TRAINING PROGRAM

## 2024-04-16 PROCEDURE — 73502 X-RAY EXAM HIP UNI 2-3 VIEWS: CPT | Mod: LT

## 2024-04-16 ASSESSMENT — PAIN SCALES - GENERAL: PAINLEVEL_OUTOF10: 7

## 2024-04-16 ASSESSMENT — PAIN DESCRIPTION - DESCRIPTORS: DESCRIPTORS: ACHING

## 2024-04-16 ASSESSMENT — PAIN - FUNCTIONAL ASSESSMENT: PAIN_FUNCTIONAL_ASSESSMENT: 0-10

## 2024-04-18 LAB — VIT B1 PYROPHOSHATE BLD-SCNC: 68 NMOL/L (ref 70–180)

## 2024-04-19 ENCOUNTER — DOCUMENTATION (OUTPATIENT)
Dept: SURGERY | Facility: CLINIC | Age: 49
End: 2024-04-19
Payer: COMMERCIAL

## 2024-04-23 DIAGNOSIS — K90.9 INTESTINAL MALABSORPTION, UNSPECIFIED TYPE (HHS-HCC): Primary | ICD-10-CM

## 2024-04-23 DIAGNOSIS — E60 ZINC DEFICIENCY: ICD-10-CM

## 2024-04-23 DIAGNOSIS — E51.9 THIAMINE DEFICIENCY: ICD-10-CM

## 2024-04-23 DIAGNOSIS — E53.8 VITAMIN B12 DEFICIENCY (NON ANEMIC): ICD-10-CM

## 2024-04-23 DIAGNOSIS — E21.3 HYPERPARATHYROIDISM (MULTI): ICD-10-CM

## 2024-04-23 DIAGNOSIS — E55.9 VITAMIN D DEFICIENCY: ICD-10-CM

## 2024-04-23 DIAGNOSIS — E61.1 IRON DEFICIENCY: ICD-10-CM

## 2024-04-23 RX ORDER — LANOLIN ALCOHOL/MO/W.PET/CERES
100 CREAM (GRAM) TOPICAL DAILY
Qty: 90 TABLET | Refills: 3 | Status: SHIPPED | OUTPATIENT
Start: 2024-04-23 | End: 2025-04-23

## 2024-04-25 ENCOUNTER — OFFICE VISIT (OUTPATIENT)
Dept: ORTHOPEDIC SURGERY | Facility: HOSPITAL | Age: 49
End: 2024-04-25
Payer: COMMERCIAL

## 2024-04-25 ENCOUNTER — HOSPITAL ENCOUNTER (OUTPATIENT)
Dept: RADIOLOGY | Facility: EXTERNAL LOCATION | Age: 49
Discharge: HOME | End: 2024-04-25

## 2024-04-25 ENCOUNTER — TRANSCRIBE ORDERS (OUTPATIENT)
Dept: ORTHOPEDIC SURGERY | Facility: HOSPITAL | Age: 49
End: 2024-04-25
Payer: COMMERCIAL

## 2024-04-25 VITALS — HEIGHT: 62 IN | BODY MASS INDEX: 39.56 KG/M2 | WEIGHT: 215 LBS

## 2024-04-25 DIAGNOSIS — M54.16 LUMBAR RADICULOPATHY: ICD-10-CM

## 2024-04-25 DIAGNOSIS — M16.11 PRIMARY LOCALIZED OSTEOARTHRITIS OF RIGHT HIP: Primary | ICD-10-CM

## 2024-04-25 DIAGNOSIS — M76.02 GLUTEAL TENDONITIS OF LEFT BUTTOCK: ICD-10-CM

## 2024-04-25 DIAGNOSIS — M19.90 ARTHRITIS: ICD-10-CM

## 2024-04-25 DIAGNOSIS — Z96.642 HISTORY OF REVISION OF TOTAL REPLACEMENT OF LEFT HIP JOINT: ICD-10-CM

## 2024-04-25 PROCEDURE — 2500000004 HC RX 250 GENERAL PHARMACY W/ HCPCS (ALT 636 FOR OP/ED): Performed by: FAMILY MEDICINE

## 2024-04-25 PROCEDURE — 20611 DRAIN/INJ JOINT/BURSA W/US: CPT | Mod: RT | Performed by: FAMILY MEDICINE

## 2024-04-25 PROCEDURE — 99214 OFFICE O/P EST MOD 30 MIN: CPT | Mod: 59 | Performed by: FAMILY MEDICINE

## 2024-04-25 PROCEDURE — 3008F BODY MASS INDEX DOCD: CPT | Performed by: FAMILY MEDICINE

## 2024-04-25 PROCEDURE — 20550 NJX 1 TENDON SHEATH/LIGAMENT: CPT | Performed by: FAMILY MEDICINE

## 2024-04-25 PROCEDURE — 3044F HG A1C LEVEL LT 7.0%: CPT | Performed by: FAMILY MEDICINE

## 2024-04-25 PROCEDURE — 99204 OFFICE O/P NEW MOD 45 MIN: CPT | Performed by: FAMILY MEDICINE

## 2024-04-25 PROCEDURE — 76942 ECHO GUIDE FOR BIOPSY: CPT | Performed by: FAMILY MEDICINE

## 2024-04-25 PROCEDURE — 2500000005 HC RX 250 GENERAL PHARMACY W/O HCPCS: Performed by: FAMILY MEDICINE

## 2024-04-25 PROCEDURE — 1036F TOBACCO NON-USER: CPT | Performed by: FAMILY MEDICINE

## 2024-04-25 RX ORDER — LIDOCAINE HYDROCHLORIDE 10 MG/ML
2 INJECTION INFILTRATION; PERINEURAL
Status: COMPLETED | OUTPATIENT
Start: 2024-04-25 | End: 2024-04-25

## 2024-04-25 RX ORDER — TRIAMCINOLONE ACETONIDE 40 MG/ML
80 INJECTION, SUSPENSION INTRA-ARTICULAR; INTRAMUSCULAR
Status: COMPLETED | OUTPATIENT
Start: 2024-04-25 | End: 2024-04-25

## 2024-04-25 RX ORDER — LIDOCAINE HYDROCHLORIDE 10 MG/ML
4 INJECTION INFILTRATION; PERINEURAL
Status: COMPLETED | OUTPATIENT
Start: 2024-04-25 | End: 2024-04-25

## 2024-04-25 RX ORDER — TRIAMCINOLONE ACETONIDE 40 MG/ML
20 INJECTION, SUSPENSION INTRA-ARTICULAR; INTRAMUSCULAR
Status: COMPLETED | OUTPATIENT
Start: 2024-04-25 | End: 2024-04-25

## 2024-04-25 RX ADMIN — TRIAMCINOLONE ACETONIDE 80 MG: 400 INJECTION, SUSPENSION INTRA-ARTICULAR; INTRAMUSCULAR at 15:57

## 2024-04-25 RX ADMIN — TRIAMCINOLONE ACETONIDE 20 MG: 400 INJECTION, SUSPENSION INTRA-ARTICULAR; INTRAMUSCULAR at 15:57

## 2024-04-25 RX ADMIN — LIDOCAINE HYDROCHLORIDE 4 ML: 10 INJECTION, SOLUTION INFILTRATION; PERINEURAL at 15:57

## 2024-04-25 RX ADMIN — LIDOCAINE HYDROCHLORIDE 2 ML: 10 INJECTION, SOLUTION INFILTRATION; PERINEURAL at 15:57

## 2024-04-25 ASSESSMENT — PAIN SCALES - GENERAL: PAINLEVEL_OUTOF10: 10 - WORST POSSIBLE PAIN

## 2024-04-25 ASSESSMENT — PAIN - FUNCTIONAL ASSESSMENT: PAIN_FUNCTIONAL_ASSESSMENT: 0-10

## 2024-04-25 ASSESSMENT — PAIN DESCRIPTION - DESCRIPTORS: DESCRIPTORS: RADIATING

## 2024-04-25 NOTE — PROGRESS NOTES
** Please excuse any errors in grammar or translation related to this dictation. Voice recognition software was utilized to prepare this document. **    Assessment & Plan:    Dx: 1. Right hip osteoarthritis       2. Left gluteal tendonitis    Due to the severity of her current symptoms, offered intra-articular steroid injection of her right hip and gluteal sheath steroid injection of her left hip.  Can utilize conservative measures of ice pack, heating pad and OTC analgesics as needed for further pain medication.  In regards to her left hip aspiration, I informed patient that I was not contacted by Dr. Elio Lew and I do not see his request documented for this.  I will reach out to Dr. Elio Lew to inquire about this and if he would like to have this performed, patient will be rescheduled accordingly.  All questions answered patient agrees with plan of care.      Chief complaint:  Bilateral hip OA    HPI:  49y/o patient, hx RA and left KYRIE with revision x2 most recently in August 2023 with Dr. Elio Lew, presents with bilateral hip pain.  Right hip pain starts in her groin and radiates down her anterior thigh.  It has been ongoing for several months but was exacerbated in the recent ground-level fall 2 weeks ago.  Reports she has dealt with left hip pain since revision surgery.  Pain is prominently over her lateral hip.  It was also recently exacerbated from a fall.  She reports that she was supposed to be started on a new medication for rheumatoid arthritis.  However given her left hip periprosthetic infection history she was advised to have testing done to ensure no active infection was present within her left hip.  She reports that she saw Dr. Elio Lew recently and he advised aspirations so testing could be done. Currently using gabapentin, and tizanidine, for symptom control.      Patient Active Problem List   Diagnosis    Arthritis    Chronic back pain    Cough    Diabetes (Multi)    Foot injury     Insomnia    Left hip pain    Onychomycosis    Vitamin D deficiency    Chest pain at rest    Familial heart disease    Hypertension    Lumbar radiculopathy    Migraine headache    Class 2 obesity    Muscle weakness    Numbness    Pain of left upper extremity    Pain of right upper extremity    Bronchitis    Gastroesophageal reflux disease    Hypokalemia    Lumbar degenerative disc disease    Mixed anxiety depressive disorder    Primary osteoarthritis of left hip    Vitamin B12 deficiency (non anemic)    Chest pain    Acute otitis externa of right ear    Acute urinary tract infection    Amenorrhea    Cardiomegaly    Cellulitis of leg, except foot    Chronic osteoarthritis    Complication of surgical procedure    Disease due to severe acute respiratory syndrome coronavirus 2 (SARS-CoV-2)    Disorder of hip region    Edema of both lower extremities    Electrocardiogram abnormal    Fall    Female infertility    Gallstone    Fracture of bone of hip (Multi)    Hip dislocation, left (Multi)    History of revision of total replacement of left hip joint    History of total left hip arthroplasty    Influenza with gastrointestinal tract involvement    Instability of left hip joint    Lightheadedness    Lumbar spondylosis    Lumbosacral radiculitis    Intestinal malabsorption (HHS-HCC)    Malabsorption syndrome (HHS-HCC)    Headache    Musculoskeletal pain    Myofascial pain    Pain at surgical incision    Nausea    Pain in both lower extremities    Pain in femur    Postoperative pain    S/P lumbar fusion    Sleep disturbance    Strain of trapezius muscle    Trochanteric bursitis of left hip    Umbilical hernia    Upper respiratory tract infection    Laryngitis    Rheumatoid arthritis (Multi)    S/P laparoscopic sleeve gastrectomy    Polyneuropathy, unspecified    Disease of spinal cord, unspecified (Multi)    Swelling of hip joint    Pain of lower extremity    Paresthesia of upper extremity    Abdominal pain    Stenosis,  cervical spine    Anemia in other chronic diseases classified elsewhere    Malabsorption ([facility])    Zinc deficiency    Iron deficiency    Thiamine deficiency     Past Surgical History:   Procedure Laterality Date    CT GUIDED SOFT TISSUE FLUID DRAIN  06/02/2022    CT GUIDED SOFT TISSUE FLUID DRAIN [facility]    LAPAROSCOPY GASTRECTOMY PARTIAL / TOTAL  2017    vertical sleeve    NECK SURGERY      OTHER SURGICAL HISTORY  11/22/2017    laparoscopic cholecystectomy wiht intraoperative cholangiogram, lysis of adhesions, mesh repair of incisional henia using 6 in round ventralight st mesh    OTHER SURGICAL HISTORY Left     revision x 2 hip    SPINE SURGERY  02/14/2023    L4-L5 SPINAL SURGERY ([facility])    US GUIDED SOFT TISSUE FLUID DRAIN  09/30/2021    US GUIDED SOFT TISSUE FLUID DRAIN [facility]     Current Outpatient Medications on File Prior to Visit   Medication Sig Dispense Refill    DULoxetine (Cymbalta) 60 mg DR capsule TAKE 1 CAPSULE BY MOUTH ONCE DAILY 90 capsule 1    gabapentin (Neurontin) 300 mg capsule TAKE 1 CAPSULE BY MOUTH THREE TIMES A DAY 90 capsule 3    losartan-hydrochlorothiazide (Hyzaar) 100-25 mg tablet Take 1 tablet by mouth once daily. 90 tablet 3    multivitamin tablet Take 1 tablet by mouth once daily.      nystatin (Mycostatin) cream Apply topically if needed.      omeprazole (PriLOSEC) 20 mg DR capsule TAKE 1 CAPSULE (20 MG) BY MOUTH ONCE DAILY IN THE MORNING. TAKE BEFORE MEALS. DO NOT CRUSH OR CHEW. 90 capsule 1    potassium chloride CR (Klor-Con M20) 20 mEq ER tablet Take 1 tablet (20 mEq) by mouth once daily with breakfast. Do not crush or chew. Do not start before March 28, 2024. 30 tablet 0    QuickVue At-Home COVID-19 Test kit       rizatriptan (Maxalt) 10 mg tablet TAKE 1 TABLET AT ONSET OF HEADACHE. MAY REPEAT EVERY 2 HOURS AS NEEDED. MAXIMUM 3 TABLETS/24 HOURS. 9 tablet 4    thiamine (Vitamin B-1) 100 mg tablet Take 1 tablet (100 mg) by mouth once daily. 90  tablet 3    tiZANidine (Zanaflex) 4 mg tablet TAKE 1 TABLET BY MOUTH EVERYDAY AT BEDTIME 30 tablet 1    zolpidem (Ambien) 10 mg tablet TAKE 1 TABLET BY MOUTH EVERY DAY AT BEDTIME AS NEEDED FOR SLEEP 90 tablet 1     No current facility-administered medications on file prior to visit.         Exam:  RIGHT Hip Exam:  Antalgic gait  No warmth, erythema or ecchymosis overlying.  Active flexion >90 degrees.  Limited IR and ER.  Mild TTP over greater trochanter  [5]/5 strength of hip flexion, abduction, & adduction  SILT  [ - ]Log roll pain, [+ ]FADIR pain, [ + ]VAUGHN pain, [ + ]Stinchfield    LEFT Hip Exam:  Antalgic gait  No warmth, erythema or ecchymosis overlying.  Active flexion >90 degrees.   Exquisite TTP over greater trochanter  [5]/5 strength of hip flexion, abduction, & adduction  SILT  [ - ]Log roll pain, [- ]FADIR pain, [ + ]VAUGHN pain, [ - ]Stinchfield  Increased pain with resisted side-lying abduction.    General Exam:  Constitutional - NAD, AAO x 3, conversing appropriately.  HEENT- Normocephalic and atraumatic. EOMI, PERRLA, No scleral icterus. No facial deformities. Hearing grossly normal.  Lungs - Breathing non-labored with normal rate. No accessory muscle use.  CV - Extremities warm and well-perfused, brisk capillary refill present.   Neuro - CN II-XII grossly intact.    Results:  X-rays of hips obtained 4/16/2024 personally interpreted as moderate degenerative changes of the right hip joint.  Normal-appearing total left hip arthroplasty without evidence of periprosthetic fracture.    Lab Results   Component Value Date    HGBA1C 6.4 (H) 04/09/2024    HGBA1C 6.1 07/25/2022    CREATININE 0.80 03/28/2024    EGFR 90 03/28/2024      Procedure:   Patient ID: Kat Tineo is a 48 y.o. female.    L Inj/Asp: R hip joint on 4/25/2024 3:57 PM  Indications: pain  Details: 22 G needle, ultrasound-guided anterior approach  Medications: 80 mg triamcinolone acetonide 40 mg/mL; 4 mL lidocaine 10 mg/mL (1 %)  Outcome:  tolerated well, no immediate complications    Procedure risk factors to include increased pain, bleeding, infection, neurovascular injury, soft tissue injury, transient elevation of blood glucose and blood pressure, and adverse reaction to medication were discussed with the patient. Patient understands there is a moderate risk of morbidity from undergoing the procedure.    Procedure, treatment alternatives, risks and benefits explained, specific risks discussed. Consent was given by the patient. Immediately prior to procedure a time out was called to verify the correct patient, procedure, equipment, support staff and site/side marked as required. Patient was prepped and draped in the usual sterile fashion.       Tendon Sheath Injection on 4/25/2024 3:57 PM  Indications: pain and therapeutic benefit  Details: 25 G needle, ultrasound-guided lateral approach  Medications: 20 mg triamcinolone acetonide 40 mg/mL; 2 mL lidocaine 10 mg/mL (1 %)  Outcome: tolerated well, no immediate complications    Procedure risk factors to include increased pain, bleeding, infection, neurovascular injury, soft tissue injury, transient elevation of blood glucose and blood pressure, and adverse reaction to medication were discussed with the patient. Patient understands there is a moderate risk of morbidity from undergoing the procedure.    Procedure, treatment alternatives, risks and benefits explained, specific risks discussed. Consent was given by the patient. Immediately prior to procedure a time out was called to verify the correct patient, procedure, equipment, support staff and site/side marked as required. Patient was prepped and draped in the usual sterile fashion.

## 2024-04-26 ENCOUNTER — OFFICE VISIT (OUTPATIENT)
Dept: ORTHOPEDIC SURGERY | Facility: CLINIC | Age: 49
End: 2024-04-26
Payer: COMMERCIAL

## 2024-04-26 ENCOUNTER — HOSPITAL ENCOUNTER (OUTPATIENT)
Dept: RADIOLOGY | Facility: CLINIC | Age: 49
Discharge: HOME | End: 2024-04-26
Payer: COMMERCIAL

## 2024-04-26 VITALS — BODY MASS INDEX: 39.56 KG/M2 | HEIGHT: 62 IN | WEIGHT: 215 LBS

## 2024-04-26 DIAGNOSIS — M96.1 LUMBAR POST-LAMINECTOMY SYNDROME: Primary | ICD-10-CM

## 2024-04-26 DIAGNOSIS — M54.16 LUMBAR RADICULOPATHY: ICD-10-CM

## 2024-04-26 PROCEDURE — 72110 X-RAY EXAM L-2 SPINE 4/>VWS: CPT

## 2024-04-26 PROCEDURE — 3008F BODY MASS INDEX DOCD: CPT | Performed by: ORTHOPAEDIC SURGERY

## 2024-04-26 PROCEDURE — 3044F HG A1C LEVEL LT 7.0%: CPT | Performed by: ORTHOPAEDIC SURGERY

## 2024-04-26 PROCEDURE — 99214 OFFICE O/P EST MOD 30 MIN: CPT | Performed by: ORTHOPAEDIC SURGERY

## 2024-04-26 PROCEDURE — 72110 X-RAY EXAM L-2 SPINE 4/>VWS: CPT | Performed by: RADIOLOGY

## 2024-04-26 NOTE — LETTER
April 29, 2024     Dagoberto Garza DO  [address] [place] [address]    Patient: Kat Tineo   YOB: 1975   Date of Visit: 4/26/2024       Dear Dr. Dagoberto Garza DO:    Thank you for referring Kat Tineo to me for evaluation. Below are my notes for this consultation.  If you have questions, please do not hesitate to call me. I look forward to following your patient along with you.       Sincerely,     Dagoberto Patel MD      CC: No Recipients  ______________________________________________________________________________________    HPI:Kat Tineo is a 48-year-old woman, and previous patient of Dr. Sanket Navas, who comes in today for a second opinion.  She had surgery with Dr. Navas in February 2023.  It sounds like, they had returned to the operating room postoperatively for a second procedure, which sounds like an evacuation of a hematoma.  She has struggled with back pain since.  She describes a throbbing and burning sensation which worsens when she stands.  It started about 6 months after surgery.  She also has a subjective popping or clicking sensation in her back.  She has not had any specific physical therapy for her back and/or steroid injections.      ROS:  Reviewed on EMR and patient intake sheet.    PMH/SH:  Reviewed on EMR and patient intake sheet.    Exam:  Physical Exam    Constitutional: Well appearing; no acute distress  Eyes: pupils are equal and round  Psych: normal affect  Respiratory: non-labored breathing  Cardiovascular: regular rate and rhythm  GI: non-distended abdomen  Musculoskeletal: no pain with range of motion of the hips bilaterally  Neurologic: [4]/5 strength in the lower extremities bilaterally]; [negative] straight leg raise    Radiology:     X-rays demonstrate L4-5 TLIF.  No obvious instrumentation failure is noted.    Diagnosis:    Lumbar laminectomy syndrome    Assessment and Plan:   48-year-old woman little over 1 year status post L4-5 TLIF with Dr. Coles  Yosef, who is having ongoing activity related back pain.  There may be a claudication component to it.  At this time I recommend some physical therapy and steroid injections.  However if her symptoms do not improve, then a follow-up MRI and possible CT scan to assess for fusion, would be appropriate.  I can see her back at that point if helpful.  She was grateful for the second opinion today.    The patient was in agreement with the plan. At the end of the visit today, the patient felt that all questions had been answered satisfactorily.  The patient was pleased with the visit and very appreciative for the care rendered.     Thank you very much for the kind referral.  It is a privilege, and a pleasure, to partner with you in the care of your patients.  I would be delighted to assist you with any further consultations as needed.          Dagoberto Patel MD    Chief of Spine Surgery, [facility]  Director of Spine Service, [facility]  , Department of Orthopaedics  [facility] [employer]  [address] [id number]  P: [phone]  [facility]    This note was dictated with voice recognition software.  It has not been proofread for grammatical errors, typographical mistakes or other semantic inconsistencies.

## 2024-04-28 NOTE — PROGRESS NOTES
Diagnosis: L KYRIE instability  Procedure Performed: L total hip arthroplasty revision  Date of Surgery: August 17, 2023    Subjective:  Kat Tineo is here for unscheduled follow-up after the above procedure.  The patient continues to complain of pain over the lateral aspect of her hip.  She also had a fall recently.  Pain is mostly located over the lateral aspect of the hip.  She denies any drainage from surgical incision.  She has been weightbearing as tolerated without assistive devices.  No subluxation, dislocation, clicking or snapping in the hip noted.    Kat has also noted an increase in her right hip pain.    There has been no interval change in this patient's past medical, surgical, medications, allergies, family history or social history since the most recent visit to a provider within our department.  14 point review of systems was performed, reviewed, and negative except for pertinent positives documented in the history of present illness.    Physical Examination:   General: Patient is alert and oriented x 3 and appears comfortable.  Gait: Patient ambulates with cane slight antalgic gait.  Wound: Incision is well healed. There is no erythema, incisional drainage, fluctuance, or suture abscess.   Hip Exam: No pain with gentle range of motion of the hip.  Knee: Painless ROM of the knee.   Calf: No swelling or tenderness  Able to dorsi-flex and plantar-flex the ankle with appropriate strength. Able to wiggle all toes.   The foot is warm and well perfused with palpable pulses.   Sensation is intact to light touch.     Radiographs: AP Pelvis, shoot through lateral and frog lateral: Left total hip arthroplasty in place. There is no evidence of subsidence, fracture or dislocation. The joint is located. Compared to immediate post-operative x-rays, no change in appearance.     Assessment and Plan: The patient is progressing well approximately 8 months s/p revision left total hip arthroplasty.  Patient is  complaining of lateral hip pain.  Given the fact that I increased her offset in order to achieve stability of her hip, I am not surprised that she has some lateral thigh pain.  It is likely due to gluteal tendinitis or trochanteric bursitis.    However, the possibility of an occult infection exists.  Patient recently had lab work.  Her CRP was within normal limits but her ESR was elevated slightly to 24 with a normal reference ranges 0-20.    I have a low index of suspicion for occult infection.  However, given her infection history I think is reasonable to consider hip aspiration at this juncture.  I did inform the patient that my suspicion is low and there are risks to aspiration as your could theoretically contaminate the joint and cause PJI. Patient did want to consider this option.     1. A thorough discussion was had with the patient concerning the postoperative course and the patient is in agreement with the plan.    2. We reviewed posterior hip precautions. They will remain in place for life.  Patient can advance to full weightbearing at this juncture.    3. Tylenol as needed/tolerated for pain and stiffness.    4. Reviewed the need for prophylactic antibiotics prior to any dental or other invasive procedures.    5.  Patient should follow-up with me in 6 months.  Sooner if needed.  I did refer the patient to Dr. Jones for consideration of an intra-articular injection to her right hip and a left hip aspiration.    *This office note was dictated using Dragon voice to text software and was not proofread for spelling or grammatical errors *

## 2024-04-29 NOTE — PROGRESS NOTES
HPI:Kat Tineo is a 48-year-old woman, and previous patient of Dr. Sanket Navas, who comes in today for a second opinion.  She had surgery with Dr. Navas in February 2023.  It sounds like, they had returned to the operating room postoperatively for a second procedure, which sounds like an evacuation of a hematoma.  She has struggled with back pain since.  She describes a throbbing and burning sensation which worsens when she stands.  It started about 6 months after surgery.  She also has a subjective popping or clicking sensation in her back.  She has not had any specific physical therapy for her back and/or steroid injections.      ROS:  Reviewed on EMR and patient intake sheet.    PMH/SH:  Reviewed on EMR and patient intake sheet.    Exam:  Physical Exam    Constitutional: Well appearing; no acute distress  Eyes: pupils are equal and round  Psych: normal affect  Respiratory: non-labored breathing  Cardiovascular: regular rate and rhythm  GI: non-distended abdomen  Musculoskeletal: no pain with range of motion of the hips bilaterally  Neurologic: [4]/5 strength in the lower extremities bilaterally]; [negative] straight leg raise    Radiology:     X-rays demonstrate L4-5 TLIF.  No obvious instrumentation failure is noted.    Diagnosis:    Lumbar laminectomy syndrome    Assessment and Plan:   48-year-old woman little over 1 year status post L4-5 TLIF with Dr. Sanket Navas, who is having ongoing activity related back pain.  There may be a claudication component to it.  At this time I recommend some physical therapy and steroid injections.  However if her symptoms do not improve, then a follow-up MRI and possible CT scan to assess for fusion, would be appropriate.  I can see her back at that point if helpful.  She was grateful for the second opinion today.    The patient was in agreement with the plan. At the end of the visit today, the patient felt that all questions had been answered satisfactorily.  The  patient was pleased with the visit and very appreciative for the care rendered.     Thank you very much for the kind referral.  It is a privilege, and a pleasure, to partner with you in the care of your patients.  I would be delighted to assist you with any further consultations as needed.          Dagoberto Patel MD    Chief of Spine Surgery, [facility]  Director of Spine Service, [facility]  , Department of Orthopaedics  [facility] [employer] [facility]  [address] [id number]  P: [phone]  [facility]    This note was dictated with voice recognition software.  It has not been proofread for grammatical errors, typographical mistakes or other semantic inconsistencies.

## 2024-05-01 DIAGNOSIS — I10 HYPERTENSION, UNSPECIFIED TYPE: Primary | ICD-10-CM

## 2024-05-01 RX ORDER — LOSARTAN POTASSIUM 100 MG/1
100 TABLET ORAL DAILY
Qty: 90 TABLET | Refills: 1 | Status: SHIPPED | OUTPATIENT
Start: 2024-05-01

## 2024-05-01 RX ORDER — AMLODIPINE BESYLATE 10 MG/1
10 TABLET ORAL DAILY
Qty: 90 TABLET | Refills: 1 | Status: SHIPPED | OUTPATIENT
Start: 2024-05-01

## 2024-05-09 ENCOUNTER — HOSPITAL ENCOUNTER (OUTPATIENT)
Dept: RADIOLOGY | Facility: EXTERNAL LOCATION | Age: 49
Discharge: HOME | End: 2024-05-09

## 2024-05-09 ENCOUNTER — OFFICE VISIT (OUTPATIENT)
Dept: ORTHOPEDIC SURGERY | Facility: HOSPITAL | Age: 49
End: 2024-05-09
Payer: COMMERCIAL

## 2024-05-09 DIAGNOSIS — Z96.642 S/P TOTAL LEFT HIP ARTHROPLASTY: ICD-10-CM

## 2024-05-09 DIAGNOSIS — M25.552 LEFT HIP PAIN: Primary | ICD-10-CM

## 2024-05-09 PROCEDURE — 3044F HG A1C LEVEL LT 7.0%: CPT | Performed by: FAMILY MEDICINE

## 2024-05-09 PROCEDURE — 3008F BODY MASS INDEX DOCD: CPT | Performed by: FAMILY MEDICINE

## 2024-05-09 PROCEDURE — 20611 DRAIN/INJ JOINT/BURSA W/US: CPT | Performed by: FAMILY MEDICINE

## 2024-05-09 PROCEDURE — 99213 OFFICE O/P EST LOW 20 MIN: CPT | Performed by: FAMILY MEDICINE

## 2024-05-09 PROCEDURE — 4010F ACE/ARB THERAPY RXD/TAKEN: CPT | Performed by: FAMILY MEDICINE

## 2024-05-09 PROCEDURE — 20610 DRAIN/INJ JOINT/BURSA W/O US: CPT | Mod: LT | Performed by: FAMILY MEDICINE

## 2024-05-09 NOTE — LETTER
May 9, 2024     Jose Terry MD  [address] [place] [address]    Patient: Kat Tineo   YOB: 1975   Date of Visit: 5/9/2024       Dear Dr. Jose Terry MD:    Thank you for referring Kat Tineo to me for evaluation. Below are my notes for this consultation.  If you have questions, please do not hesitate to call me. I look forward to following your patient along with you.       Sincerely,     Pb Jones, DO      CC: No Recipients  ______________________________________________________________________________________    ** Please excuse any errors in grammar or translation related to this dictation. Voice recognition software was utilized to prepare this document. **    Assessment & Plan:  Patient returns today for left hip aspiration.  She has a history of left hip PJI status post revision by Dr. Elio Lew.  I discussed patient case with Dr. Elio Lew.  It was his opinion that patient had low likelihood of having occult periprosthetic infection.  It was surmised that her elevated inflammatory markers are related to her rheumatological disease.  He felt it was reasonable to start on DMARDs without aspiration however if patient wanted to proceed with this he gave her that option.  Prior to aspiration attempt today patient was informed of possibility that no fluid would be collected.  She was also informed that introducing a needle into the hip joint could potentially contaminate it.  She elected to proceed with procedure and unfortunately no fluid was aspirated from the hip joint.  From an orthopedic standpoint, no additional evaluation is necessary and patient should be allowed to start on her Xeljanz prescription at the discretion of her rheumatologist as an occult infection risk is low.    Chief complaint:  Bilateral hip OA    HPI:  5/9/24: Patient returns today for completion of left hip aspiration.  She states that her rheumatologist wanted to ensure that she had no  current hip joint infection prior to starting on Xeljanz.  The steroid injection from 4/25 in the right hip provided her good relief.      4/25/24: 47y/o patient, hx RA and left KYRIE with revision x2 most recently in August 2023 with Dr. Elio Lew, presents with bilateral hip pain.  Right hip pain starts in her groin and radiates down her anterior thigh.  It has been ongoing for several months but was exacerbated in the recent ground-level fall 2 weeks ago.  Reports she has dealt with left hip pain since revision surgery.  Pain is prominently over her lateral hip.  It was also recently exacerbated from a fall.  She reports that she was supposed to be started on a new medication for rheumatoid arthritis.  However given her left hip periprosthetic infection history she was advised to have testing done to ensure no active infection was present within her left hip.  She reports that she saw Dr. Elio Lew recently and he advised aspirations so testing could be done. Currently using gabapentin, and tizanidine, for symptom control.      Patient Active Problem List   Diagnosis   • Arthritis   • Chronic back pain   • Cough   • Diabetes (Multi)   • Foot injury   • Insomnia   • Left hip pain   • Onychomycosis   • Vitamin D deficiency   • Chest pain at rest   • Familial heart disease   • Hypertension   • Lumbar radiculopathy   • Migraine headache   • Class 2 obesity   • Muscle weakness   • Numbness   • Pain of left upper extremity   • Pain of right upper extremity   • Bronchitis   • Gastroesophageal reflux disease   • Hypokalemia   • Lumbar degenerative disc disease   • Mixed anxiety depressive disorder   • Primary osteoarthritis of left hip   • Vitamin B12 deficiency (non anemic)   • Chest pain   • Acute otitis externa of right ear   • Acute urinary tract infection   • Amenorrhea   • Cardiomegaly   • Cellulitis of leg, except foot   • Chronic osteoarthritis   • Complication of surgical procedure   • Disease due to severe  acute respiratory syndrome coronavirus 2 (SARS-CoV-2)   • Disorder of hip region   • Edema of both lower extremities   • Electrocardiogram abnormal   • Fall   • Female infertility   • Gallstone   • Fracture of bone of hip (Multi)   • Hip dislocation, left (Multi)   • History of revision of total replacement of left hip joint   • History of total left hip arthroplasty   • Influenza with gastrointestinal tract involvement   • Instability of left hip joint   • Lightheadedness   • Lumbar spondylosis   • Lumbosacral radiculitis   • Intestinal malabsorption (HHS-HCC)   • Malabsorption syndrome (HHS-HCC)   • Headache   • Musculoskeletal pain   • Myofascial pain   • Pain at surgical incision   • Nausea   • Pain in both lower extremities   • Pain in femur   • Postoperative pain   • S/P lumbar fusion   • Sleep disturbance   • Strain of trapezius muscle   • Trochanteric bursitis of left hip   • Umbilical hernia   • Upper respiratory tract infection   • Laryngitis   • Rheumatoid arthritis (Multi)   • S/P laparoscopic sleeve gastrectomy   • Polyneuropathy, unspecified   • Disease of spinal cord, unspecified (Multi)   • Swelling of hip joint   • Pain of lower extremity   • Paresthesia of upper extremity   • Abdominal pain   • Stenosis, cervical spine   • Anemia in other chronic diseases classified elsewhere   • Malabsorption (HHS-HCC)   • Zinc deficiency   • Iron deficiency   • Thiamine deficiency     Past Surgical History:   Procedure Laterality Date   • CT GUIDED SOFT TISSUE FLUID DRAIN  06/02/2022    CT GUIDED SOFT TISSUE FLUID DRAIN [facility]   • LAPAROSCOPY GASTRECTOMY PARTIAL / TOTAL  2017    vertical sleeve   • NECK SURGERY     • OTHER SURGICAL HISTORY  11/22/2017    laparoscopic cholecystectomy wiht intraoperative cholangiogram, lysis of adhesions, mesh repair of incisional henia using 6 in round ventralight st mesh   • OTHER SURGICAL HISTORY Left     revision x 2 hip   • SPINE SURGERY  02/14/2023    L4-L5  SPINAL SURGERY ( [facility])   • US GUIDED SOFT TISSUE FLUID DRAIN  09/30/2021    US GUIDED SOFT TISSUE FLUID DRAIN [facility]     Current Outpatient Medications on File Prior to Visit   Medication Sig Dispense Refill   • amLODIPine (Norvasc) 10 mg tablet TAKE 1 TABLET BY MOUTH EVERY DAY 90 tablet 1   • DULoxetine (Cymbalta) 60 mg DR capsule TAKE 1 CAPSULE BY MOUTH ONCE DAILY 90 capsule 1   • gabapentin (Neurontin) 300 mg capsule TAKE 1 CAPSULE BY MOUTH THREE TIMES A DAY 90 capsule 3   • losartan (Cozaar) 100 mg tablet TAKE 1 TABLET BY MOUTH EVERY DAY 90 tablet 1   • losartan-hydrochlorothiazide (Hyzaar) 100-25 mg tablet Take 1 tablet by mouth once daily. 90 tablet 3   • multivitamin tablet Take 1 tablet by mouth once daily.     • nystatin (Mycostatin) cream Apply topically if needed.     • omeprazole (PriLOSEC) 20 mg DR capsule TAKE 1 CAPSULE (20 MG) BY MOUTH ONCE DAILY IN THE MORNING. TAKE BEFORE MEALS. DO NOT CRUSH OR CHEW. 90 capsule 1   • QuickVue At-Home COVID-19 Test kit      • rizatriptan (Maxalt) 10 mg tablet TAKE 1 TABLET AT ONSET OF HEADACHE. MAY REPEAT EVERY 2 HOURS AS NEEDED. MAXIMUM 3 TABLETS/24 HOURS. 9 tablet 4   • thiamine (Vitamin B-1) 100 mg tablet Take 1 tablet (100 mg) by mouth once daily. 90 tablet 3   • tiZANidine (Zanaflex) 4 mg tablet TAKE 1 TABLET BY MOUTH EVERYDAY AT BEDTIME 30 tablet 1   • zolpidem (Ambien) 10 mg tablet TAKE 1 TABLET BY MOUTH EVERY DAY AT BEDTIME AS NEEDED FOR SLEEP 90 tablet 1     No current facility-administered medications on file prior to visit.       Exam:  No overlying warmth, ecchymosis or erythema of the left hip joint.    General Exam:  Constitutional - NAD, AAO x 3, conversing appropriately.  HEENT- Normocephalic and atraumatic. EOMI, PERRLA, No scleral icterus. No facial deformities. Hearing grossly normal.  Lungs - Breathing non-labored with normal rate. No accessory muscle use.  CV - Extremities warm and well-perfused, brisk capillary refill  present.   Neuro - CN II-XII grossly intact.    Results:  X-rays of hips obtained 4/16/2024 personally interpreted as moderate degenerative changes of the right hip joint.  Normal-appearing total left hip arthroplasty without evidence of periprosthetic fracture.    Lab Results   Component Value Date    HGBA1C 6.4 (H) 04/09/2024    HGBA1C 6.1 07/25/2022    CREATININE 0.80 03/28/2024    EGFR 90 03/28/2024      Procedure:   Patient ID: Kat Tineo is a 48 y.o. female.    L Inj/Asp: L hip joint on 5/9/2024 9:19 AM  Indications: pain  Details: 22 G needle, ultrasound-guided  Aspirate: 0 mL  Outcome: tolerated well, no immediate complications    Procedure risk factors to include increased pain, bleeding, infection, neurovascular injury, soft tissue injury were discussed with the patient.  Patient understands that even with aspiration attempt, there is possibly no fluid is collected.  Patient understands there is a moderate risk of morbidity from undergoing the procedure.    Procedure, treatment alternatives, risks and benefits explained, specific risks discussed. Consent was given by the patient. Immediately prior to procedure a time out was called to verify the correct patient, procedure, equipment, support staff and site/side marked as required. Patient was prepped and draped in the usual sterile fashion.

## 2024-05-09 NOTE — PROGRESS NOTES
** Please excuse any errors in grammar or translation related to this dictation. Voice recognition software was utilized to prepare this document. **    Assessment & Plan:  Patient returns today for left hip aspiration.  She has a history of left hip PJI status post revision by Dr. Elio Lew.  I discussed patient case with Dr. Elio Lew.  It was his opinion that patient had low likelihood of having occult periprosthetic infection.  It was surmised that her elevated inflammatory markers are related to her rheumatological disease.  He felt it was reasonable to start on DMARDs without aspiration however if patient wanted to proceed with this he gave her that option.  Prior to aspiration attempt today patient was informed of possibility that no fluid would be collected.  She was also informed that introducing a needle into the hip joint could potentially contaminate it.  She elected to proceed with procedure and unfortunately no fluid was aspirated from the hip joint.  From an orthopedic standpoint, no additional evaluation is necessary and patient should be allowed to start on her Xeljanz prescription at the discretion of her rheumatologist as an occult infection risk is low.    Chief complaint:  Bilateral hip OA    HPI:  5/9/24: Patient returns today for completion of left hip aspiration.  She states that her rheumatologist wanted to ensure that she had no current hip joint infection prior to starting on Xeljanz.  The steroid injection from 4/25 in the right hip provided her good relief.      4/25/24: 49y/o patient, hx RA and left KYRIE with revision x2 most recently in August 2023 with Dr. Elio Lew, presents with bilateral hip pain.  Right hip pain starts in her groin and radiates down her anterior thigh.  It has been ongoing for several months but was exacerbated in the recent ground-level fall 2 weeks ago.  Reports she has dealt with left hip pain since revision surgery.  Pain is prominently over her lateral hip.   It was also recently exacerbated from a fall.  She reports that she was supposed to be started on a new medication for rheumatoid arthritis.  However given her left hip periprosthetic infection history she was advised to have testing done to ensure no active infection was present within her left hip.  She reports that she saw Dr. Elio Lew recently and he advised aspirations so testing could be done. Currently using gabapentin, and tizanidine, for symptom control.      Patient Active Problem List   Diagnosis    Arthritis    Chronic back pain    Cough    Diabetes (Multi)    Foot injury    Insomnia    Left hip pain    Onychomycosis    Vitamin D deficiency    Chest pain at rest    Familial heart disease    Hypertension    Lumbar radiculopathy    Migraine headache    Class 2 obesity    Muscle weakness    Numbness    Pain of left upper extremity    Pain of right upper extremity    Bronchitis    Gastroesophageal reflux disease    Hypokalemia    Lumbar degenerative disc disease    Mixed anxiety depressive disorder    Primary osteoarthritis of left hip    Vitamin B12 deficiency (non anemic)    Chest pain    Acute otitis externa of right ear    Acute urinary tract infection    Amenorrhea    Cardiomegaly    Cellulitis of leg, except foot    Chronic osteoarthritis    Complication of surgical procedure    Disease due to severe acute respiratory syndrome coronavirus 2 (SARS-CoV-2)    Disorder of hip region    Edema of both lower extremities    Electrocardiogram abnormal    Fall    Female infertility    Gallstone    Fracture of bone of hip (Multi)    Hip dislocation, left (Multi)    History of revision of total replacement of left hip joint    History of total left hip arthroplasty    Influenza with gastrointestinal tract involvement    Instability of left hip joint    Lightheadedness    Lumbar spondylosis    Lumbosacral radiculitis    Intestinal malabsorption (HHS-HCC)    Malabsorption syndrome (HHS-HCC)    Headache     Musculoskeletal pain    Myofascial pain    Pain at surgical incision    Nausea    Pain in both lower extremities    Pain in femur    Postoperative pain    S/P lumbar fusion    Sleep disturbance    Strain of trapezius muscle    Trochanteric bursitis of left hip    Umbilical hernia    Upper respiratory tract infection    Laryngitis    Rheumatoid arthritis (Multi)    S/P laparoscopic sleeve gastrectomy    Polyneuropathy, unspecified    Disease of spinal cord, unspecified (Multi)    Swelling of hip joint    Pain of lower extremity    Paresthesia of upper extremity    Abdominal pain    Stenosis, cervical spine    Anemia in other chronic diseases classified elsewhere    Malabsorption (HHS-HCC)    Zinc deficiency    Iron deficiency    Thiamine deficiency     Past Surgical History:   Procedure Laterality Date    CT GUIDED SOFT TISSUE FLUID DRAIN  06/02/2022    CT GUIDED SOFT TISSUE FLUID DRAIN [facility]    LAPAROSCOPY GASTRECTOMY PARTIAL / TOTAL  2017    vertical sleeve    NECK SURGERY      OTHER SURGICAL HISTORY  11/22/2017    laparoscopic cholecystectomy wiht intraoperative cholangiogram, lysis of adhesions, mesh repair of incisional henia using 6 in round ventralight st mesh    OTHER SURGICAL HISTORY Left     revision x 2 hip    SPINE SURGERY  02/14/2023    L4-L5 SPINAL SURGERY ([facility])    US GUIDED SOFT TISSUE FLUID DRAIN  09/30/2021    US GUIDED SOFT TISSUE FLUID DRAIN [facility]     Current Outpatient Medications on File Prior to Visit   Medication Sig Dispense Refill    amLODIPine (Norvasc) 10 mg tablet TAKE 1 TABLET BY MOUTH EVERY DAY 90 tablet 1    DULoxetine (Cymbalta) 60 mg DR capsule TAKE 1 CAPSULE BY MOUTH ONCE DAILY 90 capsule 1    gabapentin (Neurontin) 300 mg capsule TAKE 1 CAPSULE BY MOUTH THREE TIMES A DAY 90 capsule 3    losartan (Cozaar) 100 mg tablet TAKE 1 TABLET BY MOUTH EVERY DAY 90 tablet 1    losartan-hydrochlorothiazide (Hyzaar) 100-25 mg tablet Take 1 tablet by mouth  once daily. 90 tablet 3    multivitamin tablet Take 1 tablet by mouth once daily.      nystatin (Mycostatin) cream Apply topically if needed.      omeprazole (PriLOSEC) 20 mg DR capsule TAKE 1 CAPSULE (20 MG) BY MOUTH ONCE DAILY IN THE MORNING. TAKE BEFORE MEALS. DO NOT CRUSH OR CHEW. 90 capsule 1    QuickVue At-Home COVID-19 Test kit       rizatriptan (Maxalt) 10 mg tablet TAKE 1 TABLET AT ONSET OF HEADACHE. MAY REPEAT EVERY 2 HOURS AS NEEDED. MAXIMUM 3 TABLETS/24 HOURS. 9 tablet 4    thiamine (Vitamin B-1) 100 mg tablet Take 1 tablet (100 mg) by mouth once daily. 90 tablet 3    tiZANidine (Zanaflex) 4 mg tablet TAKE 1 TABLET BY MOUTH EVERYDAY AT BEDTIME 30 tablet 1    zolpidem (Ambien) 10 mg tablet TAKE 1 TABLET BY MOUTH EVERY DAY AT BEDTIME AS NEEDED FOR SLEEP 90 tablet 1     No current facility-administered medications on file prior to visit.       Exam:  No overlying warmth, ecchymosis or erythema of the left hip joint.    General Exam:  Constitutional - NAD, AAO x 3, conversing appropriately.  HEENT- Normocephalic and atraumatic. EOMI, PERRLA, No scleral icterus. No facial deformities. Hearing grossly normal.  Lungs - Breathing non-labored with normal rate. No accessory muscle use.  CV - Extremities warm and well-perfused, brisk capillary refill present.   Neuro - CN II-XII grossly intact.    Results:  X-rays of hips obtained 4/16/2024 personally interpreted as moderate degenerative changes of the right hip joint.  Normal-appearing total left hip arthroplasty without evidence of periprosthetic fracture.    Lab Results   Component Value Date    HGBA1C 6.4 (H) 04/09/2024    HGBA1C 6.1 07/25/2022    CREATININE 0.80 03/28/2024    EGFR 90 03/28/2024      Procedure:   Patient ID: Kat Tineo is a 48 y.o. female.    L Inj/Asp: L hip joint on 5/9/2024 9:19 AM  Indications: pain  Details: 22 G needle, ultrasound-guided  Aspirate: 0 mL  Outcome: tolerated well, no immediate complications    Procedure risk factors to  include increased pain, bleeding, infection, neurovascular injury, soft tissue injury were discussed with the patient.  Patient understands that even with aspiration attempt, there is possibly no fluid is collected.  Patient understands there is a moderate risk of morbidity from undergoing the procedure.    Procedure, treatment alternatives, risks and benefits explained, specific risks discussed. Consent was given by the patient. Immediately prior to procedure a time out was called to verify the correct patient, procedure, equipment, support staff and site/side marked as required. Patient was prepped and draped in the usual sterile fashion.

## 2024-05-10 ENCOUNTER — OFFICE VISIT (OUTPATIENT)
Dept: HEMATOLOGY/ONCOLOGY | Facility: CLINIC | Age: 49
End: 2024-05-10
Payer: COMMERCIAL

## 2024-05-10 VITALS
WEIGHT: 216.49 LBS | HEIGHT: 62 IN | RESPIRATION RATE: 18 BRPM | BODY MASS INDEX: 39.84 KG/M2 | TEMPERATURE: 97.7 F | SYSTOLIC BLOOD PRESSURE: 168 MMHG | OXYGEN SATURATION: 97 % | HEART RATE: 81 BPM | DIASTOLIC BLOOD PRESSURE: 119 MMHG

## 2024-05-10 DIAGNOSIS — E61.1 IRON DEFICIENCY: Primary | ICD-10-CM

## 2024-05-10 DIAGNOSIS — K91.89 COMPLICATIONS OF GASTRIC BYPASS SURGERY: ICD-10-CM
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
## 2024-05-10 DIAGNOSIS — D64.9 SEVERE ANEMIA: ICD-10-CM

## 2024-05-10 DIAGNOSIS — D63.8 ANEMIA IN OTHER CHRONIC DISEASES CLASSIFIED ELSEWHERE: ICD-10-CM

## 2024-05-10 PROCEDURE — 99214 OFFICE O/P EST MOD 30 MIN: CPT | Performed by: INTERNAL MEDICINE

## 2024-05-10 PROCEDURE — 3008F BODY MASS INDEX DOCD: CPT | Performed by: INTERNAL MEDICINE

## 2024-05-10 PROCEDURE — 3044F HG A1C LEVEL LT 7.0%: CPT | Performed by: INTERNAL MEDICINE

## 2024-05-10 PROCEDURE — 4010F ACE/ARB THERAPY RXD/TAKEN: CPT | Performed by: INTERNAL MEDICINE

## 2024-05-10 PROCEDURE — 3080F DIAST BP >= 90 MM HG: CPT | Performed by: INTERNAL MEDICINE

## 2024-05-10 PROCEDURE — 99204 OFFICE O/P NEW MOD 45 MIN: CPT | Performed by: INTERNAL MEDICINE

## 2024-05-10 PROCEDURE — 3077F SYST BP >= 140 MM HG: CPT | Performed by: INTERNAL MEDICINE

## 2024-05-10 RX ORDER — HEPARIN SODIUM,PORCINE/PF 10 UNIT/ML
50 SYRINGE (ML) INTRAVENOUS AS NEEDED
Status: CANCELLED | OUTPATIENT
Start: 2024-05-10

## 2024-05-10 RX ORDER — EPINEPHRINE 0.3 MG/.3ML
0.3 INJECTION SUBCUTANEOUS EVERY 5 MIN PRN
OUTPATIENT
Start: 2024-05-17

## 2024-05-10 RX ORDER — ALBUTEROL SULFATE 0.83 MG/ML
3 SOLUTION RESPIRATORY (INHALATION) AS NEEDED
Status: CANCELLED | OUTPATIENT
Start: 2024-05-22

## 2024-05-10 RX ORDER — DIPHENHYDRAMINE HYDROCHLORIDE 50 MG/ML
50 INJECTION INTRAMUSCULAR; INTRAVENOUS AS NEEDED
Status: CANCELLED | OUTPATIENT
Start: 2024-05-22

## 2024-05-10 RX ORDER — ALBUTEROL SULFATE 0.83 MG/ML
3 SOLUTION RESPIRATORY (INHALATION) AS NEEDED
OUTPATIENT
Start: 2024-05-17

## 2024-05-10 RX ORDER — EPINEPHRINE 0.3 MG/.3ML
0.3 INJECTION SUBCUTANEOUS EVERY 5 MIN PRN
Status: CANCELLED | OUTPATIENT
Start: 2024-05-22

## 2024-05-10 RX ORDER — FAMOTIDINE 10 MG/ML
20 INJECTION INTRAVENOUS ONCE AS NEEDED
Status: CANCELLED | OUTPATIENT
Start: 2024-05-22

## 2024-05-10 RX ORDER — FAMOTIDINE 10 MG/ML
20 INJECTION INTRAVENOUS ONCE AS NEEDED
OUTPATIENT
Start: 2024-05-17

## 2024-05-10 RX ORDER — DIPHENHYDRAMINE HYDROCHLORIDE 50 MG/ML
50 INJECTION INTRAMUSCULAR; INTRAVENOUS AS NEEDED
OUTPATIENT
Start: 2024-05-17

## 2024-05-10 RX ORDER — HEPARIN 100 UNIT/ML
500 SYRINGE INTRAVENOUS AS NEEDED
Status: CANCELLED | OUTPATIENT
Start: 2024-05-10

## 2024-05-10 ASSESSMENT — PATIENT HEALTH QUESTIONNAIRE - PHQ9
2. FEELING DOWN, DEPRESSED OR HOPELESS: SEVERAL DAYS
1. LITTLE INTEREST OR PLEASURE IN DOING THINGS: NOT AT ALL
10. IF YOU CHECKED OFF ANY PROBLEMS, HOW DIFFICULT HAVE THESE PROBLEMS MADE IT FOR YOU TO DO YOUR WORK, TAKE CARE OF THINGS AT HOME, OR GET ALONG WITH OTHER PEOPLE: VERY DIFFICULT
SUM OF ALL RESPONSES TO PHQ9 QUESTIONS 1 AND 2: 1

## 2024-05-10 ASSESSMENT — ENCOUNTER SYMPTOMS
LOSS OF SENSATION IN FEET: 0
DEPRESSION: 0
OCCASIONAL FEELINGS OF UNSTEADINESS: 0

## 2024-05-10 ASSESSMENT — COLUMBIA-SUICIDE SEVERITY RATING SCALE - C-SSRS
6. HAVE YOU EVER DONE ANYTHING, STARTED TO DO ANYTHING, OR PREPARED TO DO ANYTHING TO END YOUR LIFE?: NO
1. IN THE PAST MONTH, HAVE YOU WISHED YOU WERE DEAD OR WISHED YOU COULD GO TO SLEEP AND NOT WAKE UP?: NO
2. HAVE YOU ACTUALLY HAD ANY THOUGHTS OF KILLING YOURSELF?: NO

## 2024-05-10 ASSESSMENT — PAIN SCALES - GENERAL: PAINLEVEL: 9

## 2024-05-10 NOTE — PROGRESS NOTES
"Patient ID: Kat Tineo is a 48 y.o. female.  Referring Physician: Dagoberto Garza DO  [address] [place] [address]  Primary Care Provider: Dagoberto Garza DO  Visit Type:  Initial Visit     Subjective    HPI  Anemia: Rheumatoid Arthritis 20 years ago.Gastric Bypass surgery 2017: Iron deficiency: Malabsorption.:   Review of Systems   Constitutional: Negative.    HENT:  Negative.     Eyes: Negative.    Respiratory: Negative.     Musculoskeletal:  Positive for arthralgias and myalgias.        Objective   BSA: 2.08 meters squared  BP (!) 168/119 (BP Location: Left arm, Patient Position: Sitting, BP Cuff Size: Large adult)   Pulse 81   Temp 36.5 °C (97.7 °F) (Temporal)   Resp 18   Ht (S) 1.585 m (5' 2.4\")   Wt 98.2 kg (216 lb 7.9 oz)   SpO2 97%   BMI 39.09 kg/m²      has a past medical history of Acid reflux, Diabetes mellitus (Multi), DM (diabetes mellitus), gestational ([facility]-HCC), Hypertension, and Rheumatoid arthritis (Multi).   has a past surgical history that includes US guided soft tissue fluid drain (09/30/2021); CT guided soft tissue fluid drainage (06/02/2022); Other surgical history (11/22/2017); Neck surgery; Other surgical history (Left); Laparoscopy gastrectomy partial / total (2017); and Spine surgery (02/14/2023).  Family History   Problem Relation Name Age of Onset    Diabetes Mother      Hypertension Mother      Other (morbid obesity) Mother      Other (htn) Father      Arthritis Other      Psoriasis Neg Hx      Irritable bowel syndrome Neg Hx       Oncology History    No history exists.       Kat Tineo  reports that she has never smoked. She has never used smokeless tobacco.  She  reports that she does not currently use alcohol.  She  reports no history of drug use.    Physical Exam  Constitutional:       Appearance: Normal appearance.   HENT:      Head: Normocephalic and atraumatic.      Mouth/Throat:      Pharynx: No oropharyngeal exudate.   Eyes:      Pupils: Pupils are equal, round, and " reactive to light.   Neurological:      Mental Status: She is alert.         WBC   Date/Time Value Ref Range Status   03/28/2024 05:35 AM 11.8 (H) 4.4 - 11.3 x10*3/uL Final   03/27/2024 01:23 AM 10.3 4.4 - 11.3 x10*3/uL Final   03/19/2024 07:37 PM 10.3 4.4 - 11.3 x10*3/uL Final     nRBC   Date Value Ref Range Status   03/28/2024 0.0 0.0 - 0.0 /100 WBCs Final   03/27/2024 0.0 0.0 - 0.0 /100 WBCs Final   03/19/2024 0.0 0.0 - 0.0 /100 WBCs Final     RBC   Date Value Ref Range Status   03/28/2024 4.18 4.00 - 5.20 x10*6/uL Final   03/27/2024 4.35 4.00 - 5.20 x10*6/uL Final   03/19/2024 4.85 4.00 - 5.20 x10*6/uL Final     Hemoglobin   Date Value Ref Range Status   03/28/2024 10.3 (L) 12.0 - 16.0 g/dL Final   03/27/2024 11.0 (L) 12.0 - 16.0 g/dL Final   03/19/2024 12.3 12.0 - 16.0 g/dL Final     Hematocrit   Date Value Ref Range Status   03/28/2024 33.7 (L) 36.0 - 46.0 % Final   03/27/2024 34.7 (L) 36.0 - 46.0 % Final   03/19/2024 38.7 36.0 - 46.0 % Final     MCV   Date/Time Value Ref Range Status   03/28/2024 05:35 AM 81 80 - 100 fL Final   03/27/2024 01:23 AM 80 80 - 100 fL Final   03/19/2024 07:37 PM 80 80 - 100 fL Final     MCH   Date/Time Value Ref Range Status   03/28/2024 05:35 AM 24.6 (L) 26.0 - 34.0 pg Final   03/27/2024 01:23 AM 25.3 (L) 26.0 - 34.0 pg Final   03/19/2024 07:37 PM 25.4 (L) 26.0 - 34.0 pg Final     MCHC   Date/Time Value Ref Range Status   03/28/2024 05:35 AM 30.6 (L) 32.0 - 36.0 g/dL Final   03/27/2024 01:23 AM 31.7 (L) 32.0 - 36.0 g/dL Final   03/19/2024 07:37 PM 31.8 (L) 32.0 - 36.0 g/dL Final     RDW   Date/Time Value Ref Range Status   03/28/2024 05:35 AM 15.3 (H) 11.5 - 14.5 % Final   03/27/2024 01:23 AM 15.2 (H) 11.5 - 14.5 % Final   03/19/2024 07:37 PM 15.1 (H) 11.5 - 14.5 % Final     Platelets   Date/Time Value Ref Range Status   03/28/2024 05:35  150 - 450 x10*3/uL Final   03/27/2024 01:23  150 - 450 x10*3/uL Final   03/19/2024 07:37  150 - 450 x10*3/uL Final     MPV    Date/Time Value Ref Range Status   10/06/2023 02:06 AM 9.6 7.5 - 11.5 fL Final   04/29/2023 07:31 PM 10.0 7.0 - 12.6 CU Final   12/14/2022 02:39 PM 10.2 7.0 - 12.6 CU Final     Neutrophils %   Date/Time Value Ref Range Status   03/27/2024 01:23 AM 68.0 40.0 - 80.0 % Final   03/19/2024 07:37 PM 80.8 40.0 - 80.0 % Final   02/20/2024 11:17 AM 71.4 40.0 - 80.0 % Final     Immature Granulocytes %, Automated   Date/Time Value Ref Range Status   03/27/2024 01:23 AM 0.5 0.0 - 0.9 % Final     Comment:     Immature Granulocyte Count (IG) includes promyelocytes, myelocytes and metamyelocytes but does not include bands. Percent differential counts (%) should be interpreted in the context of the absolute cell counts (cells/UL).   03/19/2024 07:37 PM 0.3 0.0 - 0.9 % Final     Comment:     Immature Granulocyte Count (IG) includes promyelocytes, myelocytes and metamyelocytes but does not include bands. Percent differential counts (%) should be interpreted in the context of the absolute cell counts (cells/UL).   02/20/2024 11:17 AM 0.4 0.0 - 0.9 % Final     Comment:     Immature Granulocyte Count (IG) includes promyelocytes, myelocytes and metamyelocytes but does not include bands. Percent differential counts (%) should be interpreted in the context of the absolute cell counts (cells/UL).     Lymphocytes %   Date/Time Value Ref Range Status   03/27/2024 01:23 AM 23.9 13.0 - 44.0 % Final   03/19/2024 07:37 PM 13.1 13.0 - 44.0 % Final   02/20/2024 11:17 AM 21.9 13.0 - 44.0 % Final     Monocytes %   Date/Time Value Ref Range Status   03/27/2024 01:23 AM 6.8 2.0 - 10.0 % Final   03/19/2024 07:37 PM 4.9 2.0 - 10.0 % Final   02/20/2024 11:17 AM 5.7 2.0 - 10.0 % Final     Eosinophils %   Date/Time Value Ref Range Status   03/27/2024 01:23 AM 0.4 0.0 - 6.0 % Final   03/19/2024 07:37 PM 0.6 0.0 - 6.0 % Final   02/20/2024 11:17 AM 0.3 0.0 - 6.0 % Final     Basophils %   Date/Time Value Ref Range Status   03/27/2024 01:23 AM 0.4 0.0 - 2.0 %  "Final   03/19/2024 07:37 PM 0.3 0.0 - 2.0 % Final   02/20/2024 11:17 AM 0.3 0.0 - 2.0 % Final     Neutrophils Absolute   Date/Time Value Ref Range Status   03/27/2024 01:23 AM 7.02 1.20 - 7.70 x10*3/uL Final     Comment:     Percent differential counts (%) should be interpreted in the context of the absolute cell counts (cells/uL).   03/19/2024 07:37 PM 8.31 (H) 1.20 - 7.70 x10*3/uL Final     Comment:     Percent differential counts (%) should be interpreted in the context of the absolute cell counts (cells/uL).   02/20/2024 11:17 AM 6.40 1.20 - 7.70 x10*3/uL Final     Comment:     Percent differential counts (%) should be interpreted in the context of the absolute cell counts (cells/uL).     Immature Granulocytes Absolute, Automated   Date/Time Value Ref Range Status   03/27/2024 01:23 AM 0.05 0.00 - 0.70 x10*3/uL Final   03/19/2024 07:37 PM 0.03 0.00 - 0.70 x10*3/uL Final   02/20/2024 11:17 AM 0.04 0.00 - 0.70 x10*3/uL Final     Lymphocytes Absolute   Date/Time Value Ref Range Status   03/27/2024 01:23 AM 2.47 1.20 - 4.80 x10*3/uL Final   03/19/2024 07:37 PM 1.35 1.20 - 4.80 x10*3/uL Final   02/20/2024 11:17 AM 1.97 1.20 - 4.80 x10*3/uL Final     Monocytes Absolute   Date/Time Value Ref Range Status   03/27/2024 01:23 AM 0.70 0.10 - 1.00 x10*3/uL Final   03/19/2024 07:37 PM 0.50 0.10 - 1.00 x10*3/uL Final   02/20/2024 11:17 AM 0.51 0.10 - 1.00 x10*3/uL Final     Eosinophils Absolute   Date/Time Value Ref Range Status   03/27/2024 01:23 AM 0.04 0.00 - 0.70 x10*3/uL Final   03/19/2024 07:37 PM 0.06 0.00 - 0.70 x10*3/uL Final   02/20/2024 11:17 AM 0.03 0.00 - 0.70 x10*3/uL Final     Basophils Absolute   Date/Time Value Ref Range Status   03/27/2024 01:23 AM 0.04 0.00 - 0.10 x10*3/uL Final   03/19/2024 07:37 PM 0.03 0.00 - 0.10 x10*3/uL Final   02/20/2024 11:17 AM 0.03 0.00 - 0.10 x10*3/uL Final       No components found for: \"PT\"  aPTT   Date/Time Value Ref Range Status   08/17/2023 05:40 AM 27 27 - 38 sec Final     " Comment:     Note new reference range as of 6/20/2023 at 10:00am.   03/03/2023 11:51 AM 27 26 - 39 sec Final     Comment:       THE APTT IS NO LONGER USED FOR MONITORING     UNFRACTIONATED HEPARIN THERAPY.    FOR MONITORING HEPARIN THERAPY,     USE THE HEPARIN ASSAY.     02/02/2023 10:46 AM 24 (L) 26 - 39 sec Final     Comment:       THE APTT IS NO LONGER USED FOR MONITORING     UNFRACTIONATED HEPARIN THERAPY.    FOR MONITORING HEPARIN THERAPY,     USE THE HEPARIN ASSAY.         Assessment/Plan      Iron Deficiency: S/P Gastric bypass surgery: PCP to check Ferritin Q 6 months : If Low refer for IV iron: Feraheme ordered and patient to follow with PCP:RTC PRN.     Diagnoses and all orders for this visit:  Iron deficiency  Severe anemia  -     Referral to Hematology and Oncology  -     Celiac Panel; Future  -     CBC and Auto Differential; Future  -     Iron and TIBC; Future  -     Serum Protein Electrophoresis; Future  Complications of gastric bypass surgery  Anemia in other chronic diseases classified elsewhere  Other orders  -     ferumoxytol (Feraheme) 510 mg in sodium chloride 0.9% 100 mL IV  -     sodium chloride 0.9 % bolus 500 mL  -     dextrose 5 % in water (D5W) bolus  -     diphenhydrAMINE (BENADryl) injection 50 mg  -     methylPREDNISolone sod succinate (SOLU-Medrol) 40 mg/mL injection 40 mg  -     famotidine PF (Pepcid) injection 20 mg  -     EPINEPHrine (Epipen) injection syringe 0.3 mg  -     albuterol 2.5 mg /3 mL (0.083 %) nebulizer solution 3 mL  -     heparin flush 10 unit/mL syringe 50 Units  -     heparin flush 100 unit/mL syringe 500 Units  -     alteplase (Cathflo Activase) injection 2 mg  -     ferumoxytol (Feraheme) 510 mg in sodium chloride 0.9% 100 mL IV  -     sodium chloride 0.9 % bolus 500 mL  -     dextrose 5 % in water (D5W) bolus  -     diphenhydrAMINE (BENADryl) injection 50 mg  -     methylPREDNISolone sod succinate (SOLU-Medrol) 40 mg/mL injection 40 mg  -     famotidine PF  (Pepcid) injection 20 mg  -     EPINEPHrine (Epipen) injection syringe 0.3 mg  -     albuterol 2.5 mg /3 mL (0.083 %) nebulizer solution 3 mL           Ryan Quigley MD

## 2024-05-10 NOTE — PATIENT INSTRUCTIONS
Today you met with your hematologist/oncologist.  Recent concerns were discussed and questions answered.  Scheduling orders were placed.  While we appreciate that you verbalized understanding, if any questions arise after leaving, please do not hesitate to call the office to discuss.  [phone] Elizabeth Lopez.    You will be receiving Feraheme.  You were given an information sheet to assist you.  Your follow up with be with your PCP and if you are not feeling well, please follow up and if any infusion is indicated, Dr. Quigley will assist with ordering your medication.   Feel free to call with any concerns.

## 2024-05-11 DIAGNOSIS — K21.9 GASTROESOPHAGEAL REFLUX DISEASE, UNSPECIFIED WHETHER ESOPHAGITIS PRESENT: ICD-10-CM

## 2024-05-11 ASSESSMENT — ENCOUNTER SYMPTOMS
EYES NEGATIVE: 1
CONSTITUTIONAL NEGATIVE: 1
ARTHRALGIAS: 1
RESPIRATORY NEGATIVE: 1
MYALGIAS: 1

## 2024-05-13 RX ORDER — OMEPRAZOLE 20 MG/1
20 CAPSULE, DELAYED RELEASE ORAL
Qty: 90 CAPSULE | Refills: 1 | OUTPATIENT
Start: 2024-05-13 | End: 2024-05-30

## 2024-05-21 ENCOUNTER — LAB (OUTPATIENT)
Dept: LAB | Facility: LAB | Age: 49
End: 2024-05-21
Payer: COMMERCIAL

## 2024-05-21 DIAGNOSIS — K91.89 COMPLICATIONS OF GASTRIC BYPASS SURGERY: ICD-10-CM
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
## 2024-05-21 DIAGNOSIS — E61.1 IRON DEFICIENCY: ICD-10-CM

## 2024-05-21 DIAGNOSIS — D64.9 SEVERE ANEMIA: ICD-10-CM

## 2024-05-21 LAB
BASOPHILS # BLD AUTO: 0.03 X10*3/UL (ref 0–0.1)
BASOPHILS NFR BLD AUTO: 0.2 %
EOSINOPHIL # BLD AUTO: 0.06 X10*3/UL (ref 0–0.7)
EOSINOPHIL NFR BLD AUTO: 0.5 %
ERYTHROCYTE [DISTWIDTH] IN BLOOD BY AUTOMATED COUNT: 16.5 % (ref 11.5–14.5)
FERRITIN SERPL-MCNC: 13 NG/ML (ref 13–150)
HCT VFR BLD AUTO: 42.2 % (ref 36–46)
HGB BLD-MCNC: 12.7 G/DL (ref 12–16)
IMM GRANULOCYTES # BLD AUTO: 0.05 X10*3/UL (ref 0–0.7)
IMM GRANULOCYTES NFR BLD AUTO: 0.4 % (ref 0–0.9)
IRON SATN MFR SERPL: 13 % (ref 12–50)
IRON SERPL-MCNC: 51 UG/DL (ref 30–160)
LYMPHOCYTES # BLD AUTO: 2.06 X10*3/UL (ref 1.2–4.8)
LYMPHOCYTES NFR BLD AUTO: 16.2 %
MCH RBC QN AUTO: 24.5 PG (ref 26–34)
MCHC RBC AUTO-ENTMCNC: 30.1 G/DL (ref 32–36)
MCV RBC AUTO: 81 FL (ref 80–100)
MONOCYTES # BLD AUTO: 0.59 X10*3/UL (ref 0.1–1)
MONOCYTES NFR BLD AUTO: 4.6 %
NEUTROPHILS # BLD AUTO: 9.94 X10*3/UL (ref 1.2–7.7)
NEUTROPHILS NFR BLD AUTO: 78.1 %
NRBC BLD-RTO: 0 /100 WBCS (ref 0–0)
PLATELET # BLD AUTO: 471 X10*3/UL (ref 150–450)
RBC # BLD AUTO: 5.19 X10*6/UL (ref 4–5.2)
TIBC SERPL-MCNC: 405 UG/DL (ref 228–428)
UIBC SERPL-MCNC: 354 UG/DL (ref 110–370)
WBC # BLD AUTO: 12.7 X10*3/UL (ref 4.4–11.3)

## 2024-05-21 PROCEDURE — 83516 IMMUNOASSAY NONANTIBODY: CPT

## 2024-05-21 PROCEDURE — 84165 PROTEIN E-PHORESIS SERUM: CPT

## 2024-05-21 PROCEDURE — 84155 ASSAY OF PROTEIN SERUM: CPT

## 2024-05-21 PROCEDURE — 82728 ASSAY OF FERRITIN: CPT

## 2024-05-21 PROCEDURE — 85025 COMPLETE CBC W/AUTO DIFF WBC: CPT

## 2024-05-21 PROCEDURE — 36415 COLL VENOUS BLD VENIPUNCTURE: CPT

## 2024-05-21 PROCEDURE — 83540 ASSAY OF IRON: CPT

## 2024-05-21 PROCEDURE — 83550 IRON BINDING TEST: CPT

## 2024-05-21 PROCEDURE — 84165 PROTEIN E-PHORESIS SERUM: CPT | Performed by: INTERNAL MEDICINE

## 2024-05-22 ENCOUNTER — INFUSION (OUTPATIENT)
Dept: HEMATOLOGY/ONCOLOGY | Facility: CLINIC | Age: 49
End: 2024-05-22
Payer: COMMERCIAL

## 2024-05-22 VITALS
OXYGEN SATURATION: 95 % | HEART RATE: 84 BPM | DIASTOLIC BLOOD PRESSURE: 71 MMHG | BODY MASS INDEX: 39.27 KG/M2 | RESPIRATION RATE: 18 BRPM | SYSTOLIC BLOOD PRESSURE: 120 MMHG | WEIGHT: 217.48 LBS | TEMPERATURE: 98.6 F

## 2024-05-22 DIAGNOSIS — K90.9 INTESTINAL MALABSORPTION, UNSPECIFIED TYPE (HHS-HCC): ICD-10-CM

## 2024-05-22 DIAGNOSIS — E61.1 IRON DEFICIENCY: ICD-10-CM

## 2024-05-22 DIAGNOSIS — K91.89 COMPLICATIONS OF GASTRIC BYPASS SURGERY: ICD-10-CM

## 2024-05-22 DIAGNOSIS — D64.9 SEVERE ANEMIA: ICD-10-CM
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
## 2024-05-22 LAB
ALBUMIN: 4 G/DL (ref 3.4–5)
ALPHA 1 GLOBULIN: 0.3 G/DL (ref 0.2–0.6)
ALPHA 2 GLOBULIN: 0.9 G/DL (ref 0.4–1.1)
BETA GLOBULIN: 1 G/DL (ref 0.5–1.2)
GAMMA GLOBULIN: 1.3 G/DL (ref 0.5–1.4)
GLIADIN PEPTIDE IGA SER IA-ACNC: <1 U/ML
PATH REVIEW-SERUM PROTEIN ELECTROPHORESIS: NORMAL
PROT SERPL-MCNC: 7.5 G/DL (ref 6.4–8.2)
PROTEIN ELECTROPHORESIS COMMENT: NORMAL
TTG IGA SER IA-ACNC: <1 U/ML

## 2024-05-22 PROCEDURE — 2500000004 HC RX 250 GENERAL PHARMACY W/ HCPCS (ALT 636 FOR OP/ED): Mod: JZ,SE | Performed by: INTERNAL MEDICINE

## 2024-05-22 PROCEDURE — 96365 THER/PROPH/DIAG IV INF INIT: CPT | Mod: INF

## 2024-05-22 RX ORDER — EPINEPHRINE 0.3 MG/.3ML
0.3 INJECTION SUBCUTANEOUS EVERY 5 MIN PRN
OUTPATIENT
Start: 2024-05-29

## 2024-05-22 RX ORDER — DIPHENHYDRAMINE HYDROCHLORIDE 50 MG/ML
50 INJECTION INTRAMUSCULAR; INTRAVENOUS AS NEEDED
OUTPATIENT
Start: 2024-05-29

## 2024-05-22 RX ORDER — EPINEPHRINE 0.3 MG/.3ML
0.3 INJECTION SUBCUTANEOUS EVERY 5 MIN PRN
Status: DISCONTINUED | OUTPATIENT
Start: 2024-05-22 | End: 2024-05-22 | Stop reason: HOSPADM

## 2024-05-22 RX ORDER — ALBUTEROL SULFATE 0.83 MG/ML
3 SOLUTION RESPIRATORY (INHALATION) AS NEEDED
Status: DISCONTINUED | OUTPATIENT
Start: 2024-05-22 | End: 2024-05-22 | Stop reason: HOSPADM

## 2024-05-22 RX ORDER — FAMOTIDINE 10 MG/ML
20 INJECTION INTRAVENOUS ONCE AS NEEDED
OUTPATIENT
Start: 2024-05-29

## 2024-05-22 RX ORDER — HEPARIN 100 UNIT/ML
500 SYRINGE INTRAVENOUS AS NEEDED
Status: CANCELLED | OUTPATIENT
Start: 2024-05-22

## 2024-05-22 RX ORDER — FAMOTIDINE 10 MG/ML
20 INJECTION INTRAVENOUS ONCE AS NEEDED
Status: DISCONTINUED | OUTPATIENT
Start: 2024-05-22 | End: 2024-05-22 | Stop reason: HOSPADM

## 2024-05-22 RX ORDER — HEPARIN SODIUM,PORCINE/PF 10 UNIT/ML
50 SYRINGE (ML) INTRAVENOUS AS NEEDED
Status: CANCELLED | OUTPATIENT
Start: 2024-05-22

## 2024-05-22 RX ORDER — ALBUTEROL SULFATE 0.83 MG/ML
3 SOLUTION RESPIRATORY (INHALATION) AS NEEDED
OUTPATIENT
Start: 2024-05-29

## 2024-05-22 RX ORDER — DIPHENHYDRAMINE HYDROCHLORIDE 50 MG/ML
50 INJECTION INTRAMUSCULAR; INTRAVENOUS AS NEEDED
Status: DISCONTINUED | OUTPATIENT
Start: 2024-05-22 | End: 2024-05-22 | Stop reason: HOSPADM

## 2024-05-22 RX ADMIN — FERUMOXYTOL 510 MG: 510 INJECTION INTRAVENOUS at 14:19

## 2024-05-22 ASSESSMENT — PAIN SCALES - GENERAL: PAINLEVEL: 7

## 2024-05-23 LAB
GLIADIN PEPTIDE IGG SER IA-ACNC: 0.63 FLU (ref 0–4.99)
TTG IGG SER IA-ACNC: <0.82 FLU (ref 0–4.99)

## 2024-05-29 ENCOUNTER — ANESTHESIA EVENT (OUTPATIENT)
Dept: GASTROENTEROLOGY | Facility: HOSPITAL | Age: 49
End: 2024-05-29
Payer: COMMERCIAL

## 2024-05-30 ENCOUNTER — HOSPITAL ENCOUNTER (OUTPATIENT)
Dept: GASTROENTEROLOGY | Facility: HOSPITAL | Age: 49
Discharge: HOME | End: 2024-05-30
Payer: COMMERCIAL

## 2024-05-30 ENCOUNTER — ANESTHESIA (OUTPATIENT)
Dept: GASTROENTEROLOGY | Facility: HOSPITAL | Age: 49
End: 2024-05-30
Payer: COMMERCIAL

## 2024-05-30 ENCOUNTER — APPOINTMENT (OUTPATIENT)
Dept: PAIN MEDICINE | Facility: HOSPITAL | Age: 49
End: 2024-05-30
Payer: COMMERCIAL

## 2024-05-30 VITALS
DIASTOLIC BLOOD PRESSURE: 77 MMHG | HEART RATE: 74 BPM | RESPIRATION RATE: 16 BRPM | TEMPERATURE: 96.8 F | SYSTOLIC BLOOD PRESSURE: 135 MMHG | OXYGEN SATURATION: 100 %

## 2024-05-30 DIAGNOSIS — K21.9 GASTRO-ESOPHAGEAL REFLUX DISEASE WITHOUT ESOPHAGITIS: ICD-10-CM

## 2024-05-30 DIAGNOSIS — K21.9 GASTROESOPHAGEAL REFLUX DISEASE, UNSPECIFIED WHETHER ESOPHAGITIS PRESENT: ICD-10-CM

## 2024-05-30 LAB — GLUCOSE BLD MANUAL STRIP-MCNC: 112 MG/DL (ref 74–99)

## 2024-05-30 PROCEDURE — 7100000001 HC RECOVERY ROOM TIME - INITIAL BASE CHARGE

## 2024-05-30 PROCEDURE — 7100000010 HC PHASE TWO TIME - EACH INCREMENTAL 1 MINUTE

## 2024-05-30 PROCEDURE — 82947 ASSAY GLUCOSE BLOOD QUANT: CPT

## 2024-05-30 PROCEDURE — 7100000002 HC RECOVERY ROOM TIME - EACH INCREMENTAL 1 MINUTE

## 2024-05-30 PROCEDURE — 43239 EGD BIOPSY SINGLE/MULTIPLE: CPT | Performed by: SURGERY

## 2024-05-30 PROCEDURE — 2500000004 HC RX 250 GENERAL PHARMACY W/ HCPCS (ALT 636 FOR OP/ED): Performed by: SURGERY

## 2024-05-30 PROCEDURE — 2500000004 HC RX 250 GENERAL PHARMACY W/ HCPCS (ALT 636 FOR OP/ED): Performed by: ANESTHESIOLOGIST ASSISTANT

## 2024-05-30 PROCEDURE — 88305 TISSUE EXAM BY PATHOLOGIST: CPT | Mod: TC | Performed by: SURGERY

## 2024-05-30 PROCEDURE — 3700000002 HC GENERAL ANESTHESIA TIME - EACH INCREMENTAL 1 MINUTE

## 2024-05-30 PROCEDURE — 3700000001 HC GENERAL ANESTHESIA TIME - INITIAL BASE CHARGE

## 2024-05-30 PROCEDURE — 7100000009 HC PHASE TWO TIME - INITIAL BASE CHARGE

## 2024-05-30 RX ORDER — SODIUM CHLORIDE, SODIUM LACTATE, POTASSIUM CHLORIDE, CALCIUM CHLORIDE 600; 310; 30; 20 MG/100ML; MG/100ML; MG/100ML; MG/100ML
50 INJECTION, SOLUTION INTRAVENOUS CONTINUOUS
Status: ACTIVE | OUTPATIENT
Start: 2024-05-30 | End: 2024-05-30

## 2024-05-30 RX ORDER — OMEPRAZOLE 40 MG/1
40 CAPSULE, DELAYED RELEASE ORAL
Qty: 30 CAPSULE | Refills: 11 | Status: SHIPPED | OUTPATIENT
Start: 2024-05-30 | End: 2025-05-30

## 2024-05-30 RX ORDER — SODIUM CHLORIDE, SODIUM LACTATE, POTASSIUM CHLORIDE, CALCIUM CHLORIDE 600; 310; 30; 20 MG/100ML; MG/100ML; MG/100ML; MG/100ML
50 INJECTION, SOLUTION INTRAVENOUS CONTINUOUS
Status: DISCONTINUED | OUTPATIENT
Start: 2024-05-30 | End: 2024-05-31 | Stop reason: HOSPADM

## 2024-05-30 RX ORDER — ALBUTEROL SULFATE 0.83 MG/ML
2.5 SOLUTION RESPIRATORY (INHALATION) ONCE AS NEEDED
Status: DISPENSED | OUTPATIENT
Start: 2024-05-30 | End: 2024-05-30

## 2024-05-30 RX ORDER — ONDANSETRON HYDROCHLORIDE 2 MG/ML
4 INJECTION, SOLUTION INTRAVENOUS ONCE AS NEEDED
Status: DISCONTINUED | OUTPATIENT
Start: 2024-05-30 | End: 2024-05-31 | Stop reason: HOSPADM

## 2024-05-30 RX ORDER — MIDAZOLAM HYDROCHLORIDE 1 MG/ML
INJECTION, SOLUTION INTRAMUSCULAR; INTRAVENOUS AS NEEDED
Status: DISCONTINUED | OUTPATIENT
Start: 2024-05-30 | End: 2024-05-30

## 2024-05-30 RX ORDER — PROPOFOL 10 MG/ML
INJECTION, EMULSION INTRAVENOUS AS NEEDED
Status: DISCONTINUED | OUTPATIENT
Start: 2024-05-30 | End: 2024-05-30

## 2024-05-30 RX ORDER — SODIUM CHLORIDE, SODIUM LACTATE, POTASSIUM CHLORIDE, CALCIUM CHLORIDE 600; 310; 30; 20 MG/100ML; MG/100ML; MG/100ML; MG/100ML
50 INJECTION, SOLUTION INTRAVENOUS CONTINUOUS
Status: CANCELLED | OUTPATIENT
Start: 2024-05-30

## 2024-05-30 RX ADMIN — SODIUM CHLORIDE, SODIUM LACTATE, POTASSIUM CHLORIDE, AND CALCIUM CHLORIDE 50 ML/HR: 600; 310; 30; 20 INJECTION, SOLUTION INTRAVENOUS at 07:55

## 2024-05-30 RX ADMIN — PROPOFOL 100 MG: 10 INJECTION, EMULSION INTRAVENOUS at 08:11

## 2024-05-30 RX ADMIN — PROPOFOL 100 MG: 10 INJECTION, EMULSION INTRAVENOUS at 08:06

## 2024-05-30 RX ADMIN — PROPOFOL 50 MG: 10 INJECTION, EMULSION INTRAVENOUS at 08:20

## 2024-05-30 RX ADMIN — MIDAZOLAM 2 MG: 1 INJECTION INTRAMUSCULAR; INTRAVENOUS at 08:06

## 2024-05-30 SDOH — HEALTH STABILITY: MENTAL HEALTH: CURRENT SMOKER: 0

## 2024-05-30 ASSESSMENT — PAIN - FUNCTIONAL ASSESSMENT
PAIN_FUNCTIONAL_ASSESSMENT: 0-10
PAIN_FUNCTIONAL_ASSESSMENT: 0-10
PAIN_FUNCTIONAL_ASSESSMENT: FLACC (FACE, LEGS, ACTIVITY, CRY, CONSOLABILITY)
PAIN_FUNCTIONAL_ASSESSMENT: 0-10
PAIN_FUNCTIONAL_ASSESSMENT: 0-10

## 2024-05-30 ASSESSMENT — PAIN SCALES - GENERAL
PAINLEVEL_OUTOF10: 0 - NO PAIN
PAIN_LEVEL: 2
PAINLEVEL_OUTOF10: 0 - NO PAIN
PAINLEVEL_OUTOF10: 0 - NO PAIN

## 2024-05-30 NOTE — ANESTHESIA POSTPROCEDURE EVALUATION
Patient: Kat Colon    Procedure Summary       Date: 05/30/24 Room / Location: [facility]    Anesthesia Start: 0803 Anesthesia Stop: 0828    Procedure: EGD Diagnosis: Gastro-esophageal reflux disease without esophagitis    Scheduled Providers: De Santana MD; KEILA Campbell; Basilia Paulson MD Responsible Provider: Basilia Paulson MD    Anesthesia Type: MAC ASA Status: 3            Anesthesia Type: MAC    Vitals Value Taken Time   /87 05/30/24 0828   Temp 36 05/30/24 0828   Pulse 73 05/30/24 0828   Resp 18 05/30/24 0828   SpO2 100 05/30/24 0828       Anesthesia Post Evaluation    Patient location during evaluation: PACU  Patient participation: complete - patient participated  Level of consciousness: awake and alert  Pain score: 2  Pain management: adequate  Airway patency: patent  Cardiovascular status: acceptable  Respiratory status: acceptable and face mask  Hydration status: acceptable  Postoperative Nausea and Vomiting: none        No notable events documented.

## 2024-05-30 NOTE — ANESTHESIA PREPROCEDURE EVALUATION
Patient: Kat Colon    Procedure Information       Date/Time: 05/30/24 0900    Scheduled providers: De Santana MD; KEILA Campbell; Basilia Paulson MD    Procedure: EGD    Location: [facility]            Relevant Problems   Cardiac   (+) Hypertension      Neuro   (+) Lumbar radiculopathy   (+) Mixed anxiety depressive disorder   (+) Polyneuropathy, unspecified      GI   (+) Gastroesophageal reflux disease      Endocrine   (+) Obesity      Musculoskeletal   (+) Chronic osteoarthritis   (+) Lumbar degenerative disc disease   (+) Rheumatoid arthritis (Multi)   (+) Stenosis, cervical spine      Circulatory   (+) Cardiomegaly      Musculoskeletal and Injuries   (+) Chronic back pain     Past Surgical History:   Procedure Laterality Date   • CT GUIDED SOFT TISSUE FLUID DRAIN  06/02/2022    CT GUIDED SOFT TISSUE FLUID DRAIN [facility]   • HYSTERECTOMY  2012   • LAPAROSCOPY GASTRECTOMY PARTIAL / TOTAL  2017    vertical sleeve   • NECK SURGERY     • OTHER SURGICAL HISTORY  11/22/2017    laparoscopic cholecystectomy wiht intraoperative cholangiogram, lysis of adhesions, mesh repair of incisional henia using 6 in round ventralight st mesh   • OTHER SURGICAL HISTORY Left     revision x 2 hip   • SPINE SURGERY  02/14/2023    L4-L5 SPINAL SURGERY ( [facility])   • US GUIDED SOFT TISSUE FLUID DRAIN  09/30/2021    US GUIDED SOFT TISSUE FLUID DRAIN [facility]       Clinical information reviewed:   Tobacco  Allergies  Meds   Med Hx  Surg Hx   Fam Hx  Soc Hx        NPO Detail:  No data recorded     Physical Exam    Airway  Mallampati: III  TM distance: >3 FB  Neck ROM: full     Cardiovascular    Dental    Pulmonary    Abdominal        Anesthesia Plan    History of general anesthesia?: yes  History of complications of general anesthesia?: no    ASA 3     MAC     The patient is not a current smoker.  Education provided regarding risk of obstructive sleep apnea.  intravenous  induction   Anesthetic plan and risks discussed with patient.    Plan discussed with [facility]AA.

## 2024-05-30 NOTE — POST-PROCEDURE NOTE
0920:  Patient returned to Phase II alert and oriented.  IV intact and infusing with LR at KVO.  Fluids d/c'd.    Patient given beverage and snack, family called to bedside    0950:  Dr. Santana at bedside    1000:  Home going instructions reviewed with patient and family, no questions or concerns

## 2024-05-30 NOTE — DISCHARGE INSTRUCTIONS
Instructions  Patient Instructions after a Upper GI,       The anesthetics, sedatives or narcotics which were given to you today will be acting in your body for the next 24 hours, so you might feel a little sleepy or groggy.  This feeling should slowly wear off. Carefully read and follow the instructions.      You received sedation today:  - Do not drive or operate any machinery or power tools of any kind.   - No alcoholic beverages today, not even beer or wine.  - Do not make any important decisions or sign any legal documents.  - No over the counter medications that contain alcohol or that may cause drowsiness.  - Do not make any important decisions or sign any legal documents.     While it is common to experience mild to moderate abdominal distention, gas, or belching after your procedure, if any of these symptoms occur following discharge from the GI Lab or within one week of having your procedure, call the [facility] to be advised whether a visit to your nearest Urgent Care or Emergency Department is indicated.  Take this paper with you if you go.      - If you develop an allergic reaction to the medications that were given during your procedure such as difficulty breathing, rash, hives, severe nausea, vomiting or lightheadedness.- If you experience chest pain, shortness of breath, severe abdominal pain, fevers and chills.     -If you develop signs and symptoms of bleeding such as blood in your spit, if your stools turn black, tarry, or bloody     - If you have not urinated within 8 hours following your procedure.- If your IV site becomes painful, red, inflamed, or looks infected.     If you received a biopsy/polypectomy/sphincterotomy the following instructions apply below:     - Do not use Aspirin containing products, non-steroidal medications or anti-coagulants for one week following your procedure. (Examples of these types of medications are: Advil, Arthrotec, Aleve, Coumadin, Ecotrin,  Heparin, Ibuprofen, Indocin, Motrin, Naprosyn, Nuprin, Plavix, Vioxx, and Voltarin, or their generic forms.  This list is not all-inclusive.  Check with your physician or pharmacist before resuming medications.)      - Eat a soft diet today.  Avoid foods that are poorly digested for the next 24 hours.  These foods would include: nuts, beans, lettuce, red meats, and fried foods.       - Start with liquids and advance your diet as tolerated, gradually work up to eating solids.      - Do not have a Barium Study or Enema for one week.     Your physician recommends the additional following instructions:     Upper GI endoscopy: Resume your previous diet, continue your medications, recommendation to repeat upper endoscopy in three months for follow up of Baron's ablation. Return to normal activity tomorrow.

## 2024-05-31 ENCOUNTER — INFUSION (OUTPATIENT)
Dept: HEMATOLOGY/ONCOLOGY | Facility: CLINIC | Age: 49
End: 2024-05-31
Payer: COMMERCIAL

## 2024-05-31 VITALS
RESPIRATION RATE: 16 BRPM | TEMPERATURE: 97.9 F | SYSTOLIC BLOOD PRESSURE: 136 MMHG | WEIGHT: 219.58 LBS | BODY MASS INDEX: 39.65 KG/M2 | OXYGEN SATURATION: 99 % | DIASTOLIC BLOOD PRESSURE: 78 MMHG | HEART RATE: 72 BPM

## 2024-05-31 DIAGNOSIS — K91.89 COMPLICATIONS OF GASTRIC BYPASS SURGERY: ICD-10-CM
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
## 2024-05-31 DIAGNOSIS — E61.1 IRON DEFICIENCY: ICD-10-CM

## 2024-05-31 DIAGNOSIS — K90.9 INTESTINAL MALABSORPTION, UNSPECIFIED TYPE (HHS-HCC): ICD-10-CM

## 2024-05-31 DIAGNOSIS — D64.9 SEVERE ANEMIA: ICD-10-CM

## 2024-05-31 PROCEDURE — 2500000004 HC RX 250 GENERAL PHARMACY W/ HCPCS (ALT 636 FOR OP/ED): Mod: JZ,SE | Performed by: INTERNAL MEDICINE

## 2024-05-31 PROCEDURE — 96365 THER/PROPH/DIAG IV INF INIT: CPT | Mod: INF

## 2024-05-31 RX ORDER — EPINEPHRINE 0.3 MG/.3ML
0.3 INJECTION SUBCUTANEOUS EVERY 5 MIN PRN
OUTPATIENT
Start: 2024-06-05

## 2024-05-31 RX ORDER — HEPARIN SODIUM,PORCINE/PF 10 UNIT/ML
50 SYRINGE (ML) INTRAVENOUS AS NEEDED
OUTPATIENT
Start: 2024-05-31

## 2024-05-31 RX ORDER — FAMOTIDINE 10 MG/ML
20 INJECTION INTRAVENOUS ONCE AS NEEDED
OUTPATIENT
Start: 2024-06-05

## 2024-05-31 RX ORDER — HEPARIN 100 UNIT/ML
500 SYRINGE INTRAVENOUS AS NEEDED
OUTPATIENT
Start: 2024-05-31

## 2024-05-31 RX ORDER — DIPHENHYDRAMINE HYDROCHLORIDE 50 MG/ML
50 INJECTION INTRAMUSCULAR; INTRAVENOUS AS NEEDED
OUTPATIENT
Start: 2024-06-05

## 2024-05-31 RX ORDER — ALBUTEROL SULFATE 0.83 MG/ML
3 SOLUTION RESPIRATORY (INHALATION) AS NEEDED
OUTPATIENT
Start: 2024-06-05

## 2024-05-31 RX ADMIN — FERUMOXYTOL 510 MG: 510 INJECTION INTRAVENOUS at 15:03

## 2024-05-31 ASSESSMENT — PAIN SCALES - GENERAL: PAINLEVEL: 8

## 2024-06-03 LAB
LABORATORY COMMENT REPORT: NORMAL
PATH REPORT.FINAL DX SPEC: NORMAL
PATH REPORT.GROSS SPEC: NORMAL
PATH REPORT.RELEVANT HX SPEC: NORMAL
PATH REPORT.TOTAL CANCER: NORMAL

## 2024-06-09 ENCOUNTER — PATIENT MESSAGE (OUTPATIENT)
Dept: PRIMARY CARE | Facility: CLINIC | Age: 49
End: 2024-06-09
Payer: COMMERCIAL

## 2024-06-09 DIAGNOSIS — G47.00 INSOMNIA, UNSPECIFIED: ICD-10-CM

## 2024-06-10 RX ORDER — ZOLPIDEM TARTRATE 10 MG/1
10 TABLET ORAL NIGHTLY PRN
Qty: 90 TABLET | Refills: 1 | Status: SHIPPED | OUTPATIENT
Start: 2024-06-10

## 2024-06-12 NOTE — PROGRESS NOTES
Subjective   Patient ID: Kat Tineo is a 48 y.o. female who presents for No chief complaint on file..  HPI  REVIEW EGD  Review of Systems  CONSTITUTIONAL:          Chills No.  Fatigue yes Fever No.         CARDIOLOGY:          Negative for dizziness, chest pain, palpitations, shortness of breath.         RESPIRATORY:          Sleep Apnea No.  Negative for chest congestion, cough, wheezing.         GASTROENTEROLOGY:          Food Intolerance No.  Abdominal pain yes  Acid reflux yes  Black stools No.  Constipation No.  Diarrhea No.  Loss of appetite no.  Nausea yes  Vomiting yes.         UROLOGY:          Negative for dysuria, urinary urgency, kidney stones.         PSYCHOLOGY:          Negative for depression, anxiety, high stress level.    Objective   Physical Exam    Assessment/Plan            Iram Villa LPN 06/12/24 4:11 PM

## 2024-06-18 ENCOUNTER — OFFICE VISIT (OUTPATIENT)
Dept: PAIN MEDICINE | Facility: HOSPITAL | Age: 49
End: 2024-06-18
Payer: COMMERCIAL

## 2024-06-18 ENCOUNTER — LAB (OUTPATIENT)
Dept: LAB | Facility: LAB | Age: 49
End: 2024-06-18
Payer: COMMERCIAL

## 2024-06-18 DIAGNOSIS — E61.1 IRON DEFICIENCY: ICD-10-CM

## 2024-06-18 DIAGNOSIS — E55.9 VITAMIN D DEFICIENCY: ICD-10-CM

## 2024-06-18 DIAGNOSIS — K90.9 INTESTINAL MALABSORPTION, UNSPECIFIED TYPE (HHS-HCC): ICD-10-CM

## 2024-06-18 DIAGNOSIS — E60 ZINC DEFICIENCY: ICD-10-CM

## 2024-06-18 DIAGNOSIS — E21.3 HYPERPARATHYROIDISM (MULTI): ICD-10-CM

## 2024-06-18 DIAGNOSIS — E51.9 THIAMINE DEFICIENCY: ICD-10-CM

## 2024-06-18 DIAGNOSIS — M54.16 LUMBAR RADICULOPATHY: ICD-10-CM

## 2024-06-18 DIAGNOSIS — E53.8 VITAMIN B12 DEFICIENCY (NON ANEMIC): ICD-10-CM

## 2024-06-18 LAB
25(OH)D3 SERPL-MCNC: 35 NG/ML (ref 30–100)
ALBUMIN SERPL BCP-MCNC: 4.2 G/DL (ref 3.4–5)
ALP SERPL-CCNC: 74 U/L (ref 33–110)
ALT SERPL W P-5'-P-CCNC: 13 U/L (ref 7–45)
ANION GAP SERPL CALC-SCNC: 11 MMOL/L (ref 10–20)
AST SERPL W P-5'-P-CCNC: 15 U/L (ref 9–39)
BASOPHILS # BLD AUTO: 0.02 X10*3/UL (ref 0–0.1)
BASOPHILS NFR BLD AUTO: 0.3 %
BILIRUB SERPL-MCNC: 0.5 MG/DL (ref 0–1.2)
BUN SERPL-MCNC: 13 MG/DL (ref 6–23)
CALCIUM SERPL-MCNC: 9.3 MG/DL (ref 8.6–10.3)
CHLORIDE SERPL-SCNC: 106 MMOL/L (ref 98–107)
CO2 SERPL-SCNC: 29 MMOL/L (ref 21–32)
CREAT SERPL-MCNC: 0.68 MG/DL (ref 0.5–1.05)
EGFRCR SERPLBLD CKD-EPI 2021: >90 ML/MIN/1.73M*2
EOSINOPHIL # BLD AUTO: 0.02 X10*3/UL (ref 0–0.7)
EOSINOPHIL NFR BLD AUTO: 0.3 %
ERYTHROCYTE [DISTWIDTH] IN BLOOD BY AUTOMATED COUNT: 19.7 % (ref 11.5–14.5)
FERRITIN SERPL-MCNC: 174 NG/ML (ref 8–150)
FOLATE SERPL-MCNC: 11 NG/ML
GLUCOSE SERPL-MCNC: 104 MG/DL (ref 74–99)
HCT VFR BLD AUTO: 41.7 % (ref 36–46)
HGB BLD-MCNC: 12.8 G/DL (ref 12–16)
IMM GRANULOCYTES # BLD AUTO: 0.06 X10*3/UL (ref 0–0.7)
IMM GRANULOCYTES NFR BLD AUTO: 0.8 % (ref 0–0.9)
IRON SATN MFR SERPL: 31 % (ref 25–45)
IRON SERPL-MCNC: 100 UG/DL (ref 35–150)
LYMPHOCYTES # BLD AUTO: 1.69 X10*3/UL (ref 1.2–4.8)
LYMPHOCYTES NFR BLD AUTO: 21.5 %
MCH RBC QN AUTO: 26.3 PG (ref 26–34)
MCHC RBC AUTO-ENTMCNC: 30.7 G/DL (ref 32–36)
MCV RBC AUTO: 86 FL (ref 80–100)
MONOCYTES # BLD AUTO: 0.48 X10*3/UL (ref 0.1–1)
MONOCYTES NFR BLD AUTO: 6.1 %
NEUTROPHILS # BLD AUTO: 5.6 X10*3/UL (ref 1.2–7.7)
NEUTROPHILS NFR BLD AUTO: 71 %
NRBC BLD-RTO: 0 /100 WBCS (ref 0–0)
PLATELET # BLD AUTO: 335 X10*3/UL (ref 150–450)
POTASSIUM SERPL-SCNC: 3.8 MMOL/L (ref 3.5–5.3)
PROT SERPL-MCNC: 7 G/DL (ref 6.4–8.2)
PTH-INTACT SERPL-MCNC: 70.2 PG/ML (ref 18.5–88)
RBC # BLD AUTO: 4.86 X10*6/UL (ref 4–5.2)
SODIUM SERPL-SCNC: 142 MMOL/L (ref 136–145)
TIBC SERPL-MCNC: 327 UG/DL (ref 240–445)
UIBC SERPL-MCNC: 227 UG/DL (ref 110–370)
VIT B12 SERPL-MCNC: 655 PG/ML (ref 211–911)
WBC # BLD AUTO: 7.9 X10*3/UL (ref 4.4–11.3)

## 2024-06-18 PROCEDURE — 82306 VITAMIN D 25 HYDROXY: CPT

## 2024-06-18 PROCEDURE — 80053 COMPREHEN METABOLIC PANEL: CPT

## 2024-06-18 PROCEDURE — 85025 COMPLETE CBC W/AUTO DIFF WBC: CPT

## 2024-06-18 PROCEDURE — 83540 ASSAY OF IRON: CPT

## 2024-06-18 PROCEDURE — 83550 IRON BINDING TEST: CPT

## 2024-06-18 PROCEDURE — 83970 ASSAY OF PARATHORMONE: CPT

## 2024-06-18 PROCEDURE — 84425 ASSAY OF VITAMIN B-1: CPT

## 2024-06-18 PROCEDURE — 99214 OFFICE O/P EST MOD 30 MIN: CPT | Performed by: ANESTHESIOLOGY

## 2024-06-18 PROCEDURE — 99204 OFFICE O/P NEW MOD 45 MIN: CPT | Performed by: ANESTHESIOLOGY

## 2024-06-18 PROCEDURE — 4010F ACE/ARB THERAPY RXD/TAKEN: CPT | Performed by: ANESTHESIOLOGY

## 2024-06-18 PROCEDURE — 84630 ASSAY OF ZINC: CPT

## 2024-06-18 PROCEDURE — 82525 ASSAY OF COPPER: CPT

## 2024-06-18 PROCEDURE — 82746 ASSAY OF FOLIC ACID SERUM: CPT

## 2024-06-18 PROCEDURE — 3044F HG A1C LEVEL LT 7.0%: CPT | Performed by: ANESTHESIOLOGY

## 2024-06-18 PROCEDURE — 3008F BODY MASS INDEX DOCD: CPT | Performed by: ANESTHESIOLOGY

## 2024-06-18 PROCEDURE — 36415 COLL VENOUS BLD VENIPUNCTURE: CPT

## 2024-06-18 PROCEDURE — 82607 VITAMIN B-12: CPT

## 2024-06-18 PROCEDURE — 82728 ASSAY OF FERRITIN: CPT

## 2024-06-18 ASSESSMENT — PAIN SCALES - GENERAL: PAINLEVEL: 8

## 2024-06-18 NOTE — PROGRESS NOTES
Pain Management Clinic Note     Chief Complaint: low back pain   Referred by: [facility]     History Of Present Illness  Kat Tineo is a 48 y.o. female with a past medical history of L4-5 lami and fusion (Feb 2023) and left hip replacement s/p revisions who presents for evaluation left low back pain and hip pain. She reports the pain is in her left low back around her SI joint and that it started since August 2023 which was when she had her most recent left hip KYRIE revision. She is going to start PT soon. She reports pain with prolonged sitting, standing, transitioning. She is going to start PT soon. She reports her left leg is longer than her right leg. Pain is described as a throbbing/aching pain. Denies n/t, weakness. Pain is worse with prolonged sitting, standing,and transitioning. She also fell on her buttock a few months ago which exacerbated her pain. She is on cymbalta and tizanidine. Take OTC Aleve.    MRI L Spine shows Degenerative changes with moderate spinal canal stenosis at L2-L3 and L3-L4. Also with postsurgical fluid at old laminectomy surgery.      The pain causes significant stress in the patient's life, specifically interferes with general activity, mood, walking ability, ability to perform tasks at home and/or work.  Patient participates in physical therapy and continues to perform physician directed exercises at home. Denies any bowel or bladder incontinence, saddle anesthesia, worsening pain, weakness or falls.     Past Medical History  She has a past medical history of Acid reflux, Diabetes mellitus (Multi), DM (diabetes mellitus), gestational ([facility]), Hypertension, and Rheumatoid arthritis (Multi).    Surgical History  She has a past surgical history that includes US guided soft tissue fluid drain (09/30/2021); CT guided soft tissue fluid drainage (06/02/2022); Other surgical history (11/22/2017); Neck surgery; Other surgical history (Left); Laparoscopy gastrectomy partial /  total (2017); Spine surgery (02/14/2023); and Hysterectomy (2012).     Social History  She reports that she has never smoked. She has never used smokeless tobacco. She reports that she does not currently use alcohol. She reports that she does not use drugs.    Family History  Family History   Problem Relation Name Age of Onset    Diabetes Mother      Hypertension Mother      Other (morbid obesity) Mother      Other (htn) Father      Arthritis Other      Psoriasis Neg Hx      Irritable bowel syndrome Neg Hx          Allergies  Ace inhibitors, Adhesive tape-silicones, and Amoxicillin    Review of Symptoms:   Constitutional: Negative for chills, diaphoresis or fever  HENT: Negative for neck swelling  Eyes:.  Negative for eye pain  Respiratory:.  Negative for cough, shortness of breath or wheezing    Cardiovascular:.  Negative for chest pain or palpitations  Gastrointestinal:.  Negative for abdominal pain, nausea and vomiting  Genitourinary:.  Negative for urgency  Musculoskeletal:  Positive for back pain. Positive for joint pain. Denies falls within the past 3 months.  Skin: Negative for wounds or itching   Neurological: Negative for dizziness, seizures, loss of consciousness and weakness  Endo/Heme/Allergies: Does not bruise/bleed easily  Psychiatric/Behavioral: Negative for depression. The patient does not appear anxious.       PHYSICAL EXAM  Vitals signs reviewed  Constitutional:       General: Not in acute distress     Appearance: Normal appearance. Not ill-appearing.  HENT:     Head: Normocephalic and atraumatic  Eyes:     Conjunctiva/sclera: Conjunctivae normal  Cardiovascular:     Rate and Rhythm: Normal rate and regular rhythm  Pulmonary:     Effort: No respiratory distress  Abdominal:     Palpations: Abdomen is soft  Musculoskeletal: SOTELO  Skin:     General: Skin is warm and dry  Neurological:     General: No focal deficit present  Psychiatric:         Mood and Affect: Mood normal         Behavior: Behavior  normal    Advanced Exam   Inspection: +surgical scars  Palpation: Tenderness of patient of lumbar midline, lumbar paraspinals, bilateral SI joints especially left   ROM: Normal range of motion of the lumbar flexion extension  Motor: 5/5 strength upper and lower extremities  Sensory: Negative for sensory abnormalities in upper and lower extremities  Lumbar: Negative straight leg raising bilaterally  Sacral: +Luis Antonio, +Gaenslen's, +thigh thrust, +distraction   Hip: negative FADIR, + for internal/external rotation of the hip, negative logroll     Last Recorded Vitals  There were no vitals taken for this visit.    Relevant Results  Current Outpatient Medications   Medication Instructions    amLODIPine (NORVASC) 10 mg, oral, Daily    DULoxetine (Cymbalta) 60 mg DR capsule TAKE 1 CAPSULE BY MOUTH ONCE DAILY    gabapentin (NEURONTIN) 300 mg, oral, 3 times daily    losartan (COZAAR) 100 mg, oral, Daily    losartan-hydrochlorothiazide (Hyzaar) 100-25 mg tablet 1 tablet, oral, Daily    multivitamin tablet 1 tablet, oral, Daily    nystatin (Mycostatin) cream Topical, As needed    omeprazole (PRILOSEC) 40 mg, oral, Daily before breakfast, Do not crush or chew.    [employer] At-Home COVID-19 Test kit     rizatriptan (Maxalt) 10 mg tablet TAKE 1 TABLET AT ONSET OF HEADACHE. MAY REPEAT EVERY 2 HOURS AS NEEDED. MAXIMUM 3 TABLETS/24 HOURS.    thiamine (VITAMIN B-1) 100 mg, oral, Daily    tiZANidine (ZANAFLEX) 4 mg, oral, Nightly    zolpidem (AMBIEN) 10 mg, oral, Nightly PRN         MR lumbar spine wo IV contrast 12/30/2022    Narrative  MRN: [id number]  Patient Name: HALEY GARCÍA    STUDY:  MRI L-SPINE WO;  12/30/2022 11:32 am    INDICATION:  lumbar radiculopathy  M54.16: Lumbar radiculopathy PT HAS LT HIP  REPLACE SINCE 6/22 AND NECK WITH PLATES 5YRS.    COMPARISON:  MRI of lumbar spine from 09/30/2020    ACCESSION NUMBER(S):  [id number]    ORDERING CLINICIAN:  FRITZ RECIO    TECHNIQUE:  Sagittal and axial T1 and T2 weighted  images of the lumbar spine were  acquired. Sagittal STIR imaging was also performed    FINDINGS:  The vertebral bodies demonstrate expected height and alignment. The  marrow signal is within normal limits. Mild desiccation of several  intervertebral discs is noted with minimal loss of disc height at  L2-L3, L3-L4 and L4-L5 levels.    The lower thoracic cord is unremarkable. The conus terminates  appropriately at L1-L2.    At L5-S1 there is circumferential disc bulge with bilateral facet  hypertrophy. There is a small synovial cyst arising from the medial  aspect of the right facet joint encroaching upon the right proximal  neural foramina. This cyst measures 3 x 4 mm in size and also causes  minimal impingement upon the right lateral recess. There is no  overall central canal stenosis. There is mild right neural foraminal  narrowing.    At L4-L5 there is a circumferential disc bulge with marked bilateral  facet and ligamentum flavum hypertrophy. There is moderate bilateral  facet joint effusion. There is a prominent lobulated septate T2  hyperintense focus arising from the right-sided facet joint which  measures 14 x 10 mm in craniocaudal and AP dimension and 8 mm in  maximum transverse dimension. It causes effacement of right lateral  recess with mild-to-moderate mass effect on thecal sac with central  canal stenosis. There is additional T2 hyperintense focus arising  from inferior aspect of the left-sided facet joint which measures 10  x 7 mm in size in cross-sectional dimension and 10 mm in craniocaudal  dimension. It causes effacement of left lateral recess at upper L5  level with mild mass effect on the thecal sac. There is moderate  overall central canal stenosis. There is minimal bilateral neural  foraminal narrowing.    At L3-L4, there is a circumferential disc bulge with bilateral facet  and ligamentum flavum hypertrophy. There are bilateral facet joint  effusions. There is a multilobulated small focus of  T2 hyperintense  signal with peripheral hypointensity arising from the right-sided  facet joint which measures approximately 10 mm in maximum  craniocaudal dimension and 7 x 6 mm in maximum cross-sectional  dimension. This finding is also suggestive of a synovial cyst. It  causes effacement of right lateral recess with mild overall central  canal stenosis. There is moderate right neural foraminal narrowing.    At L2-L3, there is circumferential disc bulge with bilateral facet  and ligamentum flavum hypertrophy. There are bilateral facet joint  effusions. There is mild central canal stenosis with mild left neural  foraminal narrowing.    At L1-L2, there is posterior disc bulge with bilateral facet  hypertrophy and facet joint effusions. There is no central canal  stenosis or neural foraminal narrowing.    There is no evidence of significant central canal stenosis or neural  foraminal narrowing in the lower thoracic region.    The anterior and posterior paraspinous soft tissues are within normal  limits.    Impression  Lumbar spondylosis most significantly affecting L3-L4, L4-L5 and  L5-S1 with multiple small synovial cysts associated with the facet  joints causing impingement upon lateral recess, spinal canal and  neural foramina. These findings are new since the prior study.     No image results found.       1. Lumbar radiculopathy  Referral to Pain Management           ASSESSMENT/PLAN  Kat Tineo is a 48 y.o. female with a past medical history of L4-5 lami and fusion (Feb 2023) and left hip replacement s/p revisions who presents for evaluation left low back pain and hip pain. She reports the pain is in her left low back around her SI joint and that it started since August 2023 which was when she had her most recent left hip KYRIE revision. She is going to start PT soon. She reports pain with prolonged sitting, standing, transitioning. Positive SI physical exam findings including gaenslan, VAUGHN, compression  bilaterally. She has received intraarticular hip injections which gave her minor relief.        Based on history, available imaging and physical exam, the patient's primary pain etiology is likely due to bilateral sacroiliitis and hip osteoarthritis        Our plan is as follows:  - Bilateral SI joint injection.  Procedure, risk, benefits, alternatives reviewed.  - Continue to participate in physical therapy as well as physician directed home exercises  - Continue pain medications as prescribed  - In the future patient may benefit from obturator/femoral sensory nerve branch blocks.    If SI joint injection does not help then we will evaluate further spine etiology and may consider MRI of the lumbar spine.       Penny Duarte DO   Pain Fellow

## 2024-06-18 NOTE — H&P (VIEW-ONLY)
Pain Management Clinic Note     Chief Complaint: low back pain   Referred by: [facility]     History Of Present Illness  Kat Tineo is a 48 y.o. female with a past medical history of L4-5 lami and fusion (Feb 2023) and left hip replacement s/p revisions who presents for evaluation left low back pain and hip pain. She reports the pain is in her left low back around her SI joint and that it started since August 2023 which was when she had her most recent left hip KYRIE revision. She is going to start PT soon. She reports pain with prolonged sitting, standing, transitioning. She is going to start PT soon. She reports her left leg is longer than her right leg. Pain is described as a throbbing/aching pain. Denies n/t, weakness. Pain is worse with prolonged sitting, standing,and transitioning. She also fell on her buttock a few months ago which exacerbated her pain. She is on cymbalta and tizanidine. Take OTC Aleve.    MRI L Spine shows Degenerative changes with moderate spinal canal stenosis at L2-L3 and L3-L4. Also with postsurgical fluid at old laminectomy surgery.      The pain causes significant stress in the patient's life, specifically interferes with general activity, mood, walking ability, ability to perform tasks at home and/or work.  Patient participates in physical therapy and continues to perform physician directed exercises at home. Denies any bowel or bladder incontinence, saddle anesthesia, worsening pain, weakness or falls.     Past Medical History  She has a past medical history of Acid reflux, Diabetes mellitus (Multi), DM (diabetes mellitus), gestational ([facility]CC), Hypertension, and Rheumatoid arthritis (Multi).    Surgical History  She has a past surgical history that includes US guided soft tissue fluid drain (09/30/2021); CT guided soft tissue fluid drainage (06/02/2022); Other surgical history (11/22/2017); Neck surgery; Other surgical history (Left); Laparoscopy gastrectomy partial /  total (2017); Spine surgery (02/14/2023); and Hysterectomy (2012).     Social History  She reports that she has never smoked. She has never used smokeless tobacco. She reports that she does not currently use alcohol. She reports that she does not use drugs.    Family History  Family History   Problem Relation Name Age of Onset    Diabetes Mother      Hypertension Mother      Other (morbid obesity) Mother      Other (htn) Father      Arthritis Other      Psoriasis Neg Hx      Irritable bowel syndrome Neg Hx          Allergies  Ace inhibitors, Adhesive tape-silicones, and Amoxicillin    Review of Symptoms:   Constitutional: Negative for chills, diaphoresis or fever  HENT: Negative for neck swelling  Eyes:.  Negative for eye pain  Respiratory:.  Negative for cough, shortness of breath or wheezing    Cardiovascular:.  Negative for chest pain or palpitations  Gastrointestinal:.  Negative for abdominal pain, nausea and vomiting  Genitourinary:.  Negative for urgency  Musculoskeletal:  Positive for back pain. Positive for joint pain. Denies falls within the past 3 months.  Skin: Negative for wounds or itching   Neurological: Negative for dizziness, seizures, loss of consciousness and weakness  Endo/Heme/Allergies: Does not bruise/bleed easily  Psychiatric/Behavioral: Negative for depression. The patient does not appear anxious.       PHYSICAL EXAM  Vitals signs reviewed  Constitutional:       General: Not in acute distress     Appearance: Normal appearance. Not ill-appearing.  HENT:     Head: Normocephalic and atraumatic  Eyes:     Conjunctiva/sclera: Conjunctivae normal  Cardiovascular:     Rate and Rhythm: Normal rate and regular rhythm  Pulmonary:     Effort: No respiratory distress  Abdominal:     Palpations: Abdomen is soft  Musculoskeletal: SOTELO  Skin:     General: Skin is warm and dry  Neurological:     General: No focal deficit present  Psychiatric:         Mood and Affect: Mood normal         Behavior: Behavior  normal    Advanced Exam   Inspection: +surgical scars  Palpation: Tenderness of patient of lumbar midline, lumbar paraspinals, bilateral SI joints especially left   ROM: Normal range of motion of the lumbar flexion extension  Motor: 5/5 strength upper and lower extremities  Sensory: Negative for sensory abnormalities in upper and lower extremities  Lumbar: Negative straight leg raising bilaterally  Sacral: +Luis Antonio, +Gaenslen's, +thigh thrust, +distraction   Hip: negative FADIR, + for internal/external rotation of the hip, negative logroll     Last Recorded Vitals  There were no vitals taken for this visit.    Relevant Results  Current Outpatient Medications   Medication Instructions    amLODIPine (NORVASC) 10 mg, oral, Daily    DULoxetine (Cymbalta) 60 mg DR capsule TAKE 1 CAPSULE BY MOUTH ONCE DAILY    gabapentin (NEURONTIN) 300 mg, oral, 3 times daily    losartan (COZAAR) 100 mg, oral, Daily    losartan-hydrochlorothiazide (Hyzaar) 100-25 mg tablet 1 tablet, oral, Daily    multivitamin tablet 1 tablet, oral, Daily    nystatin (Mycostatin) cream Topical, As needed    omeprazole (PRILOSEC) 40 mg, oral, Daily before breakfast, Do not crush or chew.    [employer] At-Home COVID-19 Test kit     rizatriptan (Maxalt) 10 mg tablet TAKE 1 TABLET AT ONSET OF HEADACHE. MAY REPEAT EVERY 2 HOURS AS NEEDED. MAXIMUM 3 TABLETS/24 HOURS.    thiamine (VITAMIN B-1) 100 mg, oral, Daily    tiZANidine (ZANAFLEX) 4 mg, oral, Nightly    zolpidem (AMBIEN) 10 mg, oral, Nightly PRN         MR lumbar spine wo IV contrast 12/30/2022    Narrative  MRN: [id number]  Patient Name: HALEY GARCÍA    STUDY:  MRI L-SPINE WO;  12/30/2022 11:32 am    INDICATION:  lumbar radiculopathy  M54.16: Lumbar radiculopathy PT HAS LT HIP  REPLACE SINCE 6/22 AND NECK WITH PLATES 5YRS.    COMPARISON:  MRI of lumbar spine from 09/30/2020    ACCESSION NUMBER(S):  [id number]    ORDERING CLINICIAN:  FRITZ RECIO    TECHNIQUE:  Sagittal and axial T1 and T2 weighted  images of the lumbar spine were  acquired. Sagittal STIR imaging was also performed    FINDINGS:  The vertebral bodies demonstrate expected height and alignment. The  marrow signal is within normal limits. Mild desiccation of several  intervertebral discs is noted with minimal loss of disc height at  L2-L3, L3-L4 and L4-L5 levels.    The lower thoracic cord is unremarkable. The conus terminates  appropriately at L1-L2.    At L5-S1 there is circumferential disc bulge with bilateral facet  hypertrophy. There is a small synovial cyst arising from the medial  aspect of the right facet joint encroaching upon the right proximal  neural foramina. This cyst measures 3 x 4 mm in size and also causes  minimal impingement upon the right lateral recess. There is no  overall central canal stenosis. There is mild right neural foraminal  narrowing.    At L4-L5 there is a circumferential disc bulge with marked bilateral  facet and ligamentum flavum hypertrophy. There is moderate bilateral  facet joint effusion. There is a prominent lobulated septate T2  hyperintense focus arising from the right-sided facet joint which  measures 14 x 10 mm in craniocaudal and AP dimension and 8 mm in  maximum transverse dimension. It causes effacement of right lateral  recess with mild-to-moderate mass effect on thecal sac with central  canal stenosis. There is additional T2 hyperintense focus arising  from inferior aspect of the left-sided facet joint which measures 10  x 7 mm in size in cross-sectional dimension and 10 mm in craniocaudal  dimension. It causes effacement of left lateral recess at upper L5  level with mild mass effect on the thecal sac. There is moderate  overall central canal stenosis. There is minimal bilateral neural  foraminal narrowing.    At L3-L4, there is a circumferential disc bulge with bilateral facet  and ligamentum flavum hypertrophy. There are bilateral facet joint  effusions. There is a multilobulated small focus of  T2 hyperintense  signal with peripheral hypointensity arising from the right-sided  facet joint which measures approximately 10 mm in maximum  craniocaudal dimension and 7 x 6 mm in maximum cross-sectional  dimension. This finding is also suggestive of a synovial cyst. It  causes effacement of right lateral recess with mild overall central  canal stenosis. There is moderate right neural foraminal narrowing.    At L2-L3, there is circumferential disc bulge with bilateral facet  and ligamentum flavum hypertrophy. There are bilateral facet joint  effusions. There is mild central canal stenosis with mild left neural  foraminal narrowing.    At L1-L2, there is posterior disc bulge with bilateral facet  hypertrophy and facet joint effusions. There is no central canal  stenosis or neural foraminal narrowing.    There is no evidence of significant central canal stenosis or neural  foraminal narrowing in the lower thoracic region.    The anterior and posterior paraspinous soft tissues are within normal  limits.    Impression  Lumbar spondylosis most significantly affecting L3-L4, L4-L5 and  L5-S1 with multiple small synovial cysts associated with the facet  joints causing impingement upon lateral recess, spinal canal and  neural foramina. These findings are new since the prior study.     No image results found.       1. Lumbar radiculopathy  Referral to Pain Management           ASSESSMENT/PLAN  Kat Tineo is a 48 y.o. female with a past medical history of L4-5 lami and fusion (Feb 2023) and left hip replacement s/p revisions who presents for evaluation left low back pain and hip pain. She reports the pain is in her left low back around her SI joint and that it started since August 2023 which was when she had her most recent left hip KYRIE revision. She is going to start PT soon. She reports pain with prolonged sitting, standing, transitioning. Positive SI physical exam findings including gaenslan, VAUGHN, compression  bilaterally. She has received intraarticular hip injections which gave her minor relief.        Based on history, available imaging and physical exam, the patient's primary pain etiology is likely due to bilateral sacroiliitis and hip osteoarthritis        Our plan is as follows:  - Bilateral SI joint injection.  Procedure, risk, benefits, alternatives reviewed.  - Continue to participate in physical therapy as well as physician directed home exercises  - Continue pain medications as prescribed  - In the future patient may benefit from obturator/femoral sensory nerve branch blocks.    If SI joint injection does not help then we will evaluate further spine etiology and may consider MRI of the lumbar spine.       Penny Duarte DO   Pain Fellow

## 2024-06-20 ENCOUNTER — APPOINTMENT (OUTPATIENT)
Dept: SURGERY | Facility: CLINIC | Age: 49
End: 2024-06-20
Payer: COMMERCIAL

## 2024-06-20 VITALS
DIASTOLIC BLOOD PRESSURE: 98 MMHG | HEART RATE: 65 BPM | BODY MASS INDEX: 40.48 KG/M2 | SYSTOLIC BLOOD PRESSURE: 152 MMHG | WEIGHT: 220 LBS | HEIGHT: 62 IN

## 2024-06-20 DIAGNOSIS — I10 PRIMARY HYPERTENSION: ICD-10-CM

## 2024-06-20 DIAGNOSIS — K21.00 GASTROESOPHAGEAL REFLUX DISEASE WITH ESOPHAGITIS WITHOUT HEMORRHAGE: Primary | ICD-10-CM

## 2024-06-20 DIAGNOSIS — E66.01 MORBID OBESITY WITH BMI OF 40.0-44.9, ADULT (MULTI): ICD-10-CM

## 2024-06-20 DIAGNOSIS — K44.9 HIATAL HERNIA: ICD-10-CM

## 2024-06-20 DIAGNOSIS — K90.9 INTESTINAL MALABSORPTION, UNSPECIFIED TYPE (HHS-HCC): ICD-10-CM

## 2024-06-20 LAB
COPPER SERPL-MCNC: 96.2 UG/DL (ref 80–155)
ZINC SERPL-MCNC: 69.7 UG/DL (ref 60–120)

## 2024-06-20 PROCEDURE — 3008F BODY MASS INDEX DOCD: CPT | Performed by: SURGERY

## 2024-06-20 PROCEDURE — 3080F DIAST BP >= 90 MM HG: CPT | Performed by: SURGERY

## 2024-06-20 PROCEDURE — 1036F TOBACCO NON-USER: CPT | Performed by: SURGERY

## 2024-06-20 PROCEDURE — 99215 OFFICE O/P EST HI 40 MIN: CPT | Performed by: SURGERY

## 2024-06-20 PROCEDURE — 3077F SYST BP >= 140 MM HG: CPT | Performed by: SURGERY

## 2024-06-20 PROCEDURE — 4010F ACE/ARB THERAPY RXD/TAKEN: CPT | Performed by: SURGERY

## 2024-06-20 PROCEDURE — 3044F HG A1C LEVEL LT 7.0%: CPT | Performed by: SURGERY

## 2024-06-20 ASSESSMENT — PAIN SCALES - GENERAL: PAINLEVEL: 6

## 2024-06-20 ASSESSMENT — PATIENT HEALTH QUESTIONNAIRE - PHQ9
SUM OF ALL RESPONSES TO PHQ9 QUESTIONS 1 AND 2: 0
1. LITTLE INTEREST OR PLEASURE IN DOING THINGS: NOT AT ALL
2. FEELING DOWN, DEPRESSED OR HOPELESS: NOT AT ALL

## 2024-06-20 NOTE — H&P
History Of Present Illness  Kat Tineo is a 48 y.o. woman that comes in for a follow up appointment. Kat had an EGD on 5/30/2024 for reflux symptoms.   She had a vertical sleeve gastrectomy at [facility] on 4/26/2017.  She has daily reflux.  She has nocturnal regurgitation and subxiphoid burning not related to eating or drinking.  She was on omeprazole 20 mg daily and was increased to omeprazole 40 mg daily after seeing a hiatal hernia and a dilated gastric sleeve with esophagitis.  Biopsies were negative for Baron's on EGD.     Past Medical History  She has a past medical history of Acid reflux, Diabetes mellitus (Multi), DM (diabetes mellitus), gestational (HHS-HCC), Hypertension, and Rheumatoid arthritis (Multi).    Surgical History  She has a past surgical history that includes US guided soft tissue fluid drain (09/30/2021); CT guided soft tissue fluid drainage (06/02/2022); Other surgical history (11/22/2017); Neck surgery; Other surgical history (Left); Laparoscopy gastrectomy partial / total (2017); Spine surgery (02/14/2023); and Hysterectomy (2012).     Social History  She reports that she has never smoked. She has never used smokeless tobacco. She reports that she does not currently use alcohol. She reports that she does not use drugs.    Family History  Family History   Problem Relation Name Age of Onset    Diabetes Mother      Hypertension Mother      Other (morbid obesity) Mother      Other (htn) Father      Arthritis Other      Psoriasis Neg Hx      Irritable bowel syndrome Neg Hx          Allergies  Ace inhibitors, Adhesive tape-silicones, and Amoxicillin    Review of Systems   CONSTITUTIONAL:          Chills No.  Fatigue yes Fever No.         CARDIOLOGY:          Negative for dizziness, chest pain, palpitations, shortness of breath.         RESPIRATORY:          Sleep Apnea No.  Negative for chest congestion, cough, wheezing.         GASTROENTEROLOGY:          Food Intolerance No.   "Abdominal pain yes  Acid reflux yes  Black stools No.  Constipation No.  Diarrhea No.  Loss of appetite no.  Nausea yes  Vomiting yes.         UROLOGY:          Negative for dysuria, urinary urgency, kidney stones.         PSYCHOLOGY:          Negative for depression, anxiety, high stress level.   Physical Exam       General Examination:         GENERAL APPEARANCE: alert and oriented x 3, Pleasant and cooperative, No Acute Distress.          HEENT: PERRLA.         HEART: regular rate and rhythm.          LUNGS: clear to auscultation bilaterally.          CHEST: normal shape and expansion.          ABDOMEN: obese, no hernias present, soft and not tender, no guarding, no CVA tenderness.          EXTREMITIES: no edema, no ulcerations.          SKIN: normal, no rash, tattoos.          NEUROLOGIC EXAM: CN's II-XII grossly intact, gait normal.          BACK: no CVA tenderness.       Last Recorded Vitals  Blood pressure (!) 152/98, pulse 65, height 1.575 m (5' 2\"), weight 99.8 kg (220 lb).    Relevant Results  Esophagogastroduodenoscopy (EGD)  Order# [id number]  Reading physician: De Santana MD Ordering provider: Pancho Frazier MD Study date: 5/30/24     Result Information    Status: Final result (Exam End: 5/30/2024 08:24) Provider Status: Reviewed     Result Text    Result Text   Impression  Small type II hiatal hernia  Irregular Z-line  Performed forceps biopsies in the esophagus to rule out Baron's esophagus  Esophagitis  Healthy previous sleeve gastrectomy        Findings  Small herniation of gastric fundus into thoracic cavity (type II hiatal hernia) without David lesions present - GE junction 38 cm from the incisors, confirmed by retroflexion. Hill grade IV hiatal hernia  Irregular Z-line 38 cm from the incisors  Performed multiple forceps biopsies in the esophagus to rule out Baron's esophagus  Esophagitis  Healthy previous sleeve gastrectomy; no bleeding was identified. Dilated gastric sleeve- able " "to retroflex.  Bile within stomach.        Recommendation    Await pathology results     Follow up with me in clinic      Surgical Pathology Exam: [id number]  Order: [id number]   Collected 5/30/2024 08:18       Status: Final result       Visible to patient: Yes (seen)       Dx: Gastro-esophageal reflux disease with...    0 Result Notes      Component    FINAL DIAGNOSIS   A. GE JUNCTION BIOPSY:   -- Gastric and squamous esophageal mucosa with mild reactive epithelial change and mild chronic inflammation.  -- No intestinal metaplasia identified.       Electronically signed by Johnnie Turner MD on 6/3/2024 at 1732        By the signature on this report, the individual or group listed as making the Final Interpretation/Diagnosis certifies that they have reviewed this case.    Clinical History    Gastroesophageal reflux disease without esophagitis (K21.9)   Gross Description    A: Received in formalin labeled with the patient's name and hospital number and \"GE junction biopsy\", is 1 irregular and tan-gray soft tissue fragment measuring 0.5 x 0.3 x 0.2 cm.  The specimen is submitted in toto in 1 cassette.  [facility]     Gross dissection performed at:  [place] [id number]  Phone: [phone]  Fax: [phone]           Latest Reference Range & Units 06/18/24 14:22   GLUCOSE 74 - 99 mg/dL 104 (H)   SODIUM 136 - 145 mmol/L 142   POTASSIUM 3.5 - 5.3 mmol/L 3.8   CHLORIDE 98 - 107 mmol/L 106   Bicarbonate 21 - 32 mmol/L 29   Anion Gap 10 - 20 mmol/L 11   Blood Urea Nitrogen 6 - 23 mg/dL 13   Creatinine 0.50 - 1.05 mg/dL 0.68   EGFR >60 mL/min/1.73m*2 >90   Calcium 8.6 - 10.3 mg/dL 9.3   Albumin 3.4 - 5.0 g/dL 4.2   Alkaline Phosphatase 33 - 110 U/L 74   ALT 7 - 45 U/L 13   AST 9 - 39 U/L 15   Bilirubin Total 0.0 - 1.2 mg/dL 0.5   FERRITIN 8 - 150 ng/mL 174 (H)   FOLATE >5.0 ng/mL 11.0   Total Protein 6.4 - 8.2 g/dL 7.0   IRON 35 - 150 ug/dL 100   TIBC 240 - " 445 ug/dL 327   UIBC 110 - 370 ug/dL 227   % Saturation 25 - 45 % 31   Vitamin B12 211 - 911 pg/mL 655   Parathyroid Hormone, Intact 18.5 - 88.0 pg/mL 70.2   Vitamin D, 25-Hydroxy, Total 30 - 100 ng/mL 35   LEUKOCYTES (10*3/UL) IN BLOOD BY AUTOMATED COUNT, Angolan 4.4 - 11.3 x10*3/uL 7.9   nRBC 0.0 - 0.0 /100 WBCs 0.0   ERYTHROCYTES (10*6/UL) IN BLOOD BY AUTOMATED COUNT, [place] 4.00 - 5.20 x10*6/uL 4.86   HEMOGLOBIN 12.0 - 16.0 g/dL 12.8   HEMATOCRIT 36.0 - 46.0 % 41.7   MCV 80 - 100 fL 86   MCH 26.0 - 34.0 pg 26.3   MCHC 32.0 - 36.0 g/dL 30.7 (L)   RED CELL DISTRIBUTION WIDTH 11.5 - 14.5 % 19.7 (H)   PLATELETS (10*3/UL) IN BLOOD AUTOMATED COUNT, [place]olan 150 - 450 x10*3/uL 335   NEUTROPHILS/100 LEUKOCYTES IN BLOOD BY AUTOMATED COUNT, [place]olan 40.0 - 80.0 % 71.0   Immature Granulocytes %, Automated 0.0 - 0.9 % 0.8   Lymphocytes % 13.0 - 44.0 % 21.5   Monocytes % 2.0 - 10.0 % 6.1   Eosinophils % 0.0 - 6.0 % 0.3   Basophils % 0.0 - 2.0 % 0.3   NEUTROPHILS (10*3/UL) IN BLOOD BY AUTOMATED COUNT, [place] 1.20 - 7.70 x10*3/uL 5.60   Immature Granulocytes Absolute, Automated 0.00 - 0.70 x10*3/uL 0.06   Lymphocytes Absolute 1.20 - 4.80 x10*3/uL 1.69   Monocytes Absolute 0.10 - 1.00 x10*3/uL 0.48   Eosinophils Absolute 0.00 - 0.70 x10*3/uL 0.02   Basophils Absolute 0.00 - 0.10 x10*3/uL 0.02   (H): Data is abnormally high  (L): Data is abnormally low     Assessment/Plan   Problem List Items Addressed This Visit             ICD-10-CM    Hypertension I10    Gastroesophageal reflux disease - Primary K21.9    Intestinal malabsorption (HHS-HCC) K90.9    Morbid obesity with BMI of 40.0-44.9, adult (Multi) E66.01, Z68.41    Hiatal hernia K44.9       We reviewed Kat's symptoms in light of her EGD showing a large volume stomach and a hiatal hernia.  I explained that there were two options available in the treatment of her reflux and hiatal hernia was repair of hiatal hernia alone or conversion of vertical sleeve gastrectomy to  Jaci-en-Y gastric bypass and hiatal hernia repair.  We reviewed the differences between RYGB and VSG.  I explained that conversion of anatomy would be a more durable long term treatment option for her symptoms.  Additionally, I explained that at a BMI of 40 and with the endoscopic appearance of her stomach, I would recommend conversion to RYGB to assist with restriction as well as weight loss/maintenance.  We reviewed the differences between RYGB and VSG.  We reviewed dumping syndrome, sensitivity to alcohol, risk of gastrojejunal ulcer.  We reviewed the recommended lifelong supplementation and the rationale behind it.  We reviewed the operative steps, the risks of surgery and the physical and dietary recovery.  We reviewed the risks of death, VTE, GI tract injury/leak, bleeding, bowel obstruction, need for reoperation.  She would like to proceed.       I spent 59 minutes in the professional and overall care of this patient.  49 minutes face to face  10 minutes documentation    De Santana MD

## 2024-06-23 LAB — VIT B1 PYROPHOSHATE BLD-SCNC: 132 NMOL/L (ref 70–180)

## 2024-07-03 ENCOUNTER — APPOINTMENT (OUTPATIENT)
Dept: SURGERY | Facility: CLINIC | Age: 49
End: 2024-07-03
Payer: COMMERCIAL

## 2024-07-03 VITALS
HEART RATE: 90 BPM | DIASTOLIC BLOOD PRESSURE: 87 MMHG | WEIGHT: 217 LBS | SYSTOLIC BLOOD PRESSURE: 130 MMHG | BODY MASS INDEX: 39.93 KG/M2 | HEIGHT: 62 IN

## 2024-07-03 DIAGNOSIS — Z98.84 S/P LAPAROSCOPIC SLEEVE GASTRECTOMY: ICD-10-CM

## 2024-07-03 DIAGNOSIS — K21.9 GASTROESOPHAGEAL REFLUX DISEASE, UNSPECIFIED WHETHER ESOPHAGITIS PRESENT: ICD-10-CM

## 2024-07-03 DIAGNOSIS — E66.01 MORBID OBESITY WITH BMI OF 40.0-44.9, ADULT (MULTI): ICD-10-CM

## 2024-07-03 DIAGNOSIS — K44.9 HIATAL HERNIA: ICD-10-CM

## 2024-07-03 DIAGNOSIS — K90.9 INTESTINAL MALABSORPTION, UNSPECIFIED TYPE (HHS-HCC): Primary | ICD-10-CM

## 2024-07-03 DIAGNOSIS — I10 PRIMARY HYPERTENSION: ICD-10-CM

## 2024-07-03 PROCEDURE — 3044F HG A1C LEVEL LT 7.0%: CPT

## 2024-07-03 PROCEDURE — 99213 OFFICE O/P EST LOW 20 MIN: CPT

## 2024-07-03 PROCEDURE — 3008F BODY MASS INDEX DOCD: CPT

## 2024-07-03 PROCEDURE — 1036F TOBACCO NON-USER: CPT

## 2024-07-03 RX ORDER — UPADACITINIB 15 MG/1
15 TABLET, EXTENDED RELEASE ORAL DAILY
COMMUNITY

## 2024-07-03 RX ORDER — OMEPRAZOLE 40 MG/1
40 CAPSULE, DELAYED RELEASE ORAL 2 TIMES DAILY
Qty: 60 CAPSULE | Refills: 2 | Status: SHIPPED | OUTPATIENT
Start: 2024-07-03 | End: 2024-10-01

## 2024-07-03 ASSESSMENT — PAIN SCALES - GENERAL: PAINLEVEL: 5

## 2024-07-03 NOTE — PROGRESS NOTES
Subjective   Patient ID: Kat Tineo is a 48 y.o. female who presents for No chief complaint on file..  HPI  Kat is here for MWL 1. She has had a VSG, increased her walking in attempts to loose weight. She is limited by pain. She has had multiple hip surgeries and a back surgery. She recently started seeing pain management. She will have a protein shake for breakfast or skip. Lunch can be a turkey wrap or salad. Dinner is a smart one or a lean cuisine. She avoids red sauce, fried food, do to her GERD. She is drinking regular soda, brisk ice tea. She also drinks water/flavored water. She was having a lot of regurgitation at night. She Is walking for exercise. She can walk for about 20-30 min before the pain starts. She has appointments with psych and is on the wait list for her sleep study.     Objective   Physical Exam  Constitutional:       Appearance: Normal appearance.   Eyes:      Pupils: Pupils are equal, round, and reactive to light.   Cardiovascular:      Rate and Rhythm: Normal rate.   Pulmonary:      Effort: Pulmonary effort is normal.   Abdominal:      Palpations: Abdomen is soft.   Musculoskeletal:         General: Normal range of motion.   Skin:     General: Skin is warm and dry.   Neurological:      General: No focal deficit present.      Mental Status: She is alert and oriented to person, place, and time.   Psychiatric:         Mood and Affect: Mood normal.         Assessment/Plan   Problem List Items Addressed This Visit             ICD-10-CM    Hypertension I10    Gastroesophageal reflux disease K21.9    Relevant Medications    omeprazole (PriLOSEC) 40 mg DR capsule    S/P laparoscopic sleeve gastrectomy Z98.84    Malabsorption ([facility]) - Primary K90.9    Morbid obesity with BMI of 40.0-44.9, adult (Multi) E66.01, Z68.41    Hiatal hernia K44.9     Kat and I reviewed the program requirements. We will increase her omeprazole to BID to see if that helps reduce her reflux symptoms further. She  will also try to add chair exercises to her routine until she is cleared by her physical therapist to do water aerobics. She also tells me that she is going to cut out soda this month. She will follow up in 1 month.       DEWEY Moore 07/03/24 10:35 AM

## 2024-07-03 NOTE — PROGRESS NOTES
Initial Medical Weight Loss Appointment (MWL 1)     Starting Weight: 220 lbs   Current Weight: 217 lbs   Current BMI: 39.69    Diet Experience: Kat had Bariatric surgery (sleeve) done at the beginning of 2017 - in [place]. She then followed up with Dr. Clayton and had a lap kary in 2017. Before her Bariatric surgery, she was ~260 lbs and got down to her lowest weight of 199lbs. Her Weight has remained relatively stable between the 199-220 lb range.   Kat reports worsening abdominal pain and reflux since the beginning of this year 2024. She feels very bloated. Sometimes reflux will come up in the middle of the night. She is currently taking omeprazole, which was increased to 40mg about 1 month ago per pt report. However, she is still experiencing abdominal pain, discomfort and reflux.   Pt Seeking: Revision and hernia repair surgery  Pertinent Co-morbidities: GERD  Current Daily Intake: On average 2 meals per day   Breakfast- skips. Tries to do a protein shake (premier protein) but does not always have  Lunch (noon) Makes a Caesar salad or a turkey wrap using a low carb wrap.   Dinner (6pm) smart one TV dinner. Yesterday had ~1/4-1/3 cup whole wheat pasta with meat sauce   Does not eat past 7pm to prevent reflux at night. She usually goes to sleep around 11pm. In the middle of the night, she wakes up hungry. Has been grabbing a glass of ice water.   Volume size: ~1/2 cup  per meal   How many times do you order takeout, fast food and/or go out to eat per week?  Snacks: Typically snacks every 2 hours - dark chocolate, almonds, sometimes chips, string cheese or cubed cheese, sometimes fruit. Feels hungry.  Beverages: Water, regular gingerale or sprite (12oz can daily)   Alcohol Intake: very rare - holidays   Food Allergies? NKFAs   Food Intolerances? Acidic food, red sauces, tomatoes, pastas/noodles/rice -can trigger reflux and sit heavy. Can not tolerate Ice cream since surgery.   Food Dislikes? Mushrooms,  regular fish - does like tuna or tilapia    Do you eat when you are not hungry and/or when you feel full?  Do you eat when you are bored/stressed/emotional?  Current Exercise/Exercise Hx: Currently Walking at least twice per day. She is limited on what she can do; she had 3 hip replacements over the past 3 years, in addition to spinal surgery. She would like to get into swimming - going to reach out to PT.   Hours of sleep per night: ~6 hours per night. Has insomnia and is going to get a sleep study done.   Current vitamins: Dailey MVI, calcium citrate once a day, vitamin D, vitamin B12, vitamin B1, vitamin C   Work schedule: Was a . Now on disability related to surgeries.   Who is in the household? Herself  Who does the cooking/grocery shopping? She does both    Estimated ability to achieve goals: good     Today's Lesson: Review of Dr. Santana's lifelong rules, calorie counting, food label reading, promoting feelings of satiety, and energy balance.  Reviewed dumping syndrome and ways to prevent after surgery. Fruit handout was provided   Provided substitutions for pop intake. Ex: humm zero sugar probiotic kombucha   Diet Goals: Practice the lifelong rules  Exercise Recommendations: Gradually work up to 150 minutes of moderate intensity exercise per week.  Behavior Changes: will be assessed every month  Goals for the month:  Cut out pop completely  2.   Start to have at least 3 meals per day (examples: a protein shake, [employer]ik[employer] Greek yogurt with 1-2 tbsp almonds, a grilled chicken caesar salad with [employer] dressing)    Plan: Will follow up next month. Will continue to monitor pt's weight, dietary & behavior changes.      Suzy Chi, MS, RD, LD

## 2024-07-12 ENCOUNTER — HOSPITAL ENCOUNTER (OUTPATIENT)
Dept: RADIOLOGY | Facility: HOSPITAL | Age: 49
Discharge: HOME | End: 2024-07-12
Payer: COMMERCIAL

## 2024-07-12 VITALS
TEMPERATURE: 98 F | BODY MASS INDEX: 38.64 KG/M2 | OXYGEN SATURATION: 97 % | WEIGHT: 210 LBS | HEIGHT: 62 IN | DIASTOLIC BLOOD PRESSURE: 87 MMHG | SYSTOLIC BLOOD PRESSURE: 135 MMHG | HEART RATE: 72 BPM | RESPIRATION RATE: 16 BRPM

## 2024-07-12 DIAGNOSIS — M54.16 LUMBAR RADICULOPATHY: ICD-10-CM

## 2024-07-12 PROCEDURE — 27096 INJECT SACROILIAC JOINT: CPT | Performed by: ANESTHESIOLOGY

## 2024-07-12 PROCEDURE — 2500000004 HC RX 250 GENERAL PHARMACY W/ HCPCS (ALT 636 FOR OP/ED): Performed by: ANESTHESIOLOGY

## 2024-07-12 PROCEDURE — 27096 INJECT SACROILIAC JOINT: CPT | Mod: 50 | Performed by: ANESTHESIOLOGY

## 2024-07-12 RX ORDER — MIDAZOLAM HYDROCHLORIDE 1 MG/ML
INJECTION, SOLUTION INTRAMUSCULAR; INTRAVENOUS
Status: COMPLETED | OUTPATIENT
Start: 2024-07-12 | End: 2024-07-12

## 2024-07-12 ASSESSMENT — PAIN SCALES - GENERAL
PAINLEVEL_OUTOF10: 8
PAINLEVEL_OUTOF10: 10 - WORST POSSIBLE PAIN
PAINLEVEL_OUTOF10: 5 - MODERATE PAIN

## 2024-07-12 ASSESSMENT — PAIN - FUNCTIONAL ASSESSMENT
PAIN_FUNCTIONAL_ASSESSMENT: 0-10

## 2024-07-18 ENCOUNTER — EVALUATION (OUTPATIENT)
Dept: PHYSICAL THERAPY | Facility: CLINIC | Age: 49
End: 2024-07-18
Payer: COMMERCIAL

## 2024-07-18 DIAGNOSIS — M54.16 LUMBAR RADICULOPATHY: ICD-10-CM

## 2024-07-18 PROCEDURE — 97162 PT EVAL MOD COMPLEX 30 MIN: CPT | Mod: GP

## 2024-07-18 ASSESSMENT — PAIN - FUNCTIONAL ASSESSMENT: PAIN_FUNCTIONAL_ASSESSMENT: 0-10

## 2024-07-18 ASSESSMENT — PAIN SCALES - GENERAL: PAINLEVEL_OUTOF10: 8

## 2024-07-18 NOTE — PROGRESS NOTES
"    Physical Therapy Evaluation     Patient Name: Kat Tineo  MRN: [id number]  Today's Date: 7/18/2024  Time Calculation  Start Time: 0930  Stop Time: 1015  Time Calculation (min): 45 min  PT Evaluation Time Entry  PT Evaluation (Moderate) Time Entry: 45          Insurance Considerations:  EVAL ONLY, AUTH REQUIRED, 100% COVERAGE, [facility]     Problem List Items Addressed This Visit             ICD-10-CM    Lumbar radiculopathy M54.16    Relevant Orders    Follow Up In Physical Therapy       Relevant Imaging:  Lumbar xray 4/26/24  \"FINDINGS:  Laminectomy with L4-5 anterior and posterior fusion with  posterolateral fusion masses, grossly satisfactory and unchanged from  prior study.  No pathologic motion.      IMPRESSION:  Satisfactory appearance L4-5 laminectomy and fusion.\"       Surgery:   L4-5 TLIF in 2023 with subsequent hematoma removal     Subjective  /General:  General  Reason for Referral: lumbar radiculopathy  Referred By: Dagoberto Patel  Patient reported hx of current condition: The pt states that she did have a period of relief after the back surgery. In August of 2023, she had to go in for a KYRIE revision and has had back pain again since then (had home PT at that time) . She did have cortisone injections to bilat hips in the early summer and that helped decrease the pain. She also had a nerve block injection last week, but that did not help. The pt states that she has most pain on the L low back and into the L hip, but the R side is beginning to be impacted. She is concerned about her lack of endurance and strength, noting that she struggles to walk her dog and play with her grand kids. She also has goals of returning to work, however she is concerned that she will not be able to pass the physical required to return.     Aggravating factors: any prolonged position -- wbing >10mins, laying down >1 hour (standing is most problematic)   Relieving factors: laying on back with LEs elevated, side lying with " pillow between knees.     Precautions/ Red Flags:  Precautions  Medical Precautions: Fall precautions  Post-Surgical Precautions: Left hip precautions (Posterior precautions until she see's hip surgeon next - pt has history of 3 dislocation after hip replacements.)  Red flags   Hx of CA No   Pacemaker/ Electronic Implant No   Saddle Anesthesia No   Bowel/Bladder Changes Yes - frequency and urgency   Sudden Weakness Yes - noticing loss of strength in hands and catches her foot when walking (she is unsure if this is related to her RA - pt advised to communicate these concerns with referring provider)    Recent Falls (within last 6mo) Yes 2 falls (March and April) - loss of balance on steps and then fell off rolling chair      Relevant PMH:  RA  L KYRIE (+ revision x3)   Gastric sleeve  Cervical fusion with revision    Pain:  Pain Assessment  Pain Assessment: 0-10  0-10 (Numeric) Pain Score: 8  Pain Type: Chronic pain  Pain Location: Back  Pain Orientation: Mid, Left  Pain Radiating Towards: bilat hips  Home Living:    Lives with: Alone  Home type: House  Stairs: Yes - handrail - 1 flight of stairs to enter, then stays on 2nd level  Occupation: full time job doing    -- currently on medical leave   Work tasks: physically demanding - squatting, bending, pulling, lifting   Activities/hobbies: walking the dog, playing with grand kids   Prior Function Per Pt/Caregiver Report:  Prior Function Per Pt/Caregiver Report  Prior Function Comments: active job - no significant limitations prior to hip and back surgeries    Objective   Posture:  Posture Comment: Pt stands with slightly flexed trunk posture  Gait:  Gait Comment: Pt ambulates into the clinic indep without AD. She ambulates with slightly flexed trunk posture and decreased altagracia.  Other:       Lumbar AROM Range   Flexion Min limitation (pt instructed to avoid flexion >90deg at the hip d/t precautions)   Extension Min limitation      Hip MMT L R   Hip  Flexion 4-/5 4-/5   External Rotation 4-/5 4/5   Internal Rotation 4/5 4/5      Knee MMT L R   Knee Flexion 4-/5 4-/5   Knee Extension 4+/5 4+/5      Ankle MMT L R   Dorsiflexion 5/5 5/5   Plantarflexion 5/5 5/5       Outcome Measures:  Other Measures  30 Second Sit to Stand: 5 -- pt notes low back pain starts to limit ability at 4th rep  Oswestry Disablity Index (CUCO): 31/50     Assessment:  PT Assessment Results: Decreased strength, Decreased range of motion, Decreased endurance, Pain  Rehab Prognosis: Fair  Evaluation/Treatment Tolerance: Patient limited by pain, Patient limited by fatigue    Pt is a 48 y.o. female who presents with the above impairments. The pts current impairments have led to functional limitations that include: ability to perform work tasks, bending, lifting, prolonged walking (incl walking her dog), and playing with her grandchildren. Pt would benefit from skilled physical therapy intervention to improve impairments and facilitate return to prior function.    Plan:  Treatment/Interventions: Aquatic therapy, Cryotherapy, Education/ Instruction, Gait training, Manual therapy, Neuromuscular re-education, Therapeutic activities, Therapeutic exercises  PT Plan: Skilled PT  PT Frequency: Other (Comment) (1-2x/wk)  Duration: 20v  Onset Date: 07/18/24  Certification Period Start Date: 07/18/24  Certification Period End Date: 10/16/24  Number of Treatments Authorized: Auth required - see auth for details  Rehab Potential: Fair    Plan for next visit: Initiate aquatic PT    OP EDUCATION:  Outpatient Education  Individual(s) Educated: Patient  Education Provided: Anatomy, Home Exercise Program, POC, Post-Op Precautions  Risk and Benefits Discussed with Patient/Caregiver/Other: yes  Patient/Caregiver Demonstrated Understanding: yes  Plan of Care Discussed and Agreed Upon: yes  Patient Response to Education: Patient/Caregiver Verbalized Understanding of Information, Patient/Caregiver Asked Appropriate  "Questions    Today's Treatment:  No treatment billed today as pt requires prior authorization  HEP to be completed daily:  Repeated sit to stand - slowly increase reps as tolerated.     Goals:  Active       PT Problem       PT STG       Start:  07/18/24    Expected End:  08/17/24       - Pt will complete the HEP with <3 verbal cues for correction,   - Pt will demonstrate 2pt improvement on the NPRS, allowing for improved tolerance of functional activities,   - Pt will improve 30\" sit to stand by 2 reps in order to demonstrate decreased risk of falls,   - Pt will demonstrate ability to perform 5 step ups to 8\" step leading with each LE and with 1 UE support in order to demonstrate improved ability to navigate stairs,   - Pt will demonstrate at least 4/5 MMT grading throughout LE musculature, allowing for appropriate muscle recruitment during daily activities.          PT LTG       Start:  07/18/24    Expected End:  10/16/24       - Pt will be independent in HEP & symptom management,   - Pt will demonstrate 4pt improvement on the NPRS pain scale, allowing for improved tolerance of functional activities.,   - Pt will improve 30\" sit to stand by 4 reps in order to demonstrate decreased risk of falls. ,   - Pt will demonstrate ability to ambulate at least 350' with least restrictive/no device without breaks or increases in pain in order to demonstrate improved tolerance to prolonged ambulation.  - Pt will demonstrate ability to perform 10 step ups to 8\" step leading with each LE and with 1 UE support in order to demonstrate improved ability to navigate stairs,   - Pt will demonstrate improved OSWESTRY score by 13 points (MCID) in order to demonstrate improved functional mobility.                       "

## 2024-08-06 ENCOUNTER — TREATMENT (OUTPATIENT)
Dept: PHYSICAL THERAPY | Facility: CLINIC | Age: 49
End: 2024-08-06
Payer: COMMERCIAL

## 2024-08-06 DIAGNOSIS — M54.16 LUMBAR RADICULOPATHY: ICD-10-CM

## 2024-08-06 PROCEDURE — 97113 AQUATIC THERAPY/EXERCISES: CPT | Mod: GP

## 2024-08-06 ASSESSMENT — PAIN SCALES - GENERAL: PAINLEVEL_OUTOF10: 6

## 2024-08-06 NOTE — PROGRESS NOTES
Physical Therapy Treatment    Patient Name: Kat Tineo  MRN: [id number]  Today's Date: 8/6/2024  Time Calculation  Start Time: 1300  Stop Time: 1340  Time Calculation (min): 40 min     PT Therapeutic Procedures Time Entry  Aquatic Therapy Time Entry: 40    Visit Number:  2 (including evaluation)  Planned total visits: 20  Visit Authorized/ Insurance Considerations:  EVAL ONLY, AUTH REQUIRED, 100% COVERAGE, [employer]   Auth Rcvd 30 visits 7/25-12/31 CPTs [id number] [phone]    Current Problem  Problem List Items Addressed This Visit             ICD-10-CM    Lumbar radiculopathy M54.16     Precautions  Precautions  Medical Precautions: Fall precautions  Post-Surgical Precautions: Left hip precautions (Posterior precautions until she see's hip surgeon next - pt has history of 3 dislocation after hip replacements.)    Pain  0-10 (Numeric) Pain Score: 6  Pain Location: Back (and hips)    Subjective/General  Reason for Referral: lumbar radiculopathy  Referred By: Dagoberto Patel   The pt returns stating that she has to do a physical fitness test for her work this weekend and she is worried about the sit ups    Objective  Good core control and balance in shallow end    Treatment - Aquatic Therapy:    4' depth - walk across pool forward/backward/side step x 3 laps each    4' depth - at HR with  UE support PRN and cues for TrA bracing:  - Heel raise x 15 reps  - Toe raise x 15 reps  - Hip ABD toe tap /EXT toe tap /SLR - SMALL ROM ONLY x 15 reps  - HS curls x 15 reps  - Mini-squats x 15 reps    4' depth - with TrA brace + noodle:  - Push/pull x 15 reps  - Straight arm push downs x 15 reps    4' depth - March across pool with noodle x 2 laps -- cues to avoid hip flexion >90deg    7' Deep end for decompression with noodle under arms:  - Hang x 2'  - Bicycle x '1  - Hang x 2'  - Hip ABD/ADD x 1' - small ROM  - Hang x 2'  - XC ski x 1' - small ROM  - Hang x 2'  - SKTC x 2'  - Hang x 2'    Current  "HEP:  Aquatics only at this time    OP Education:    Advised pt to contact her ortho surgeon about clearance for performance of sit ups d/t it breaking her hip precaution of no flexion >90deg    Assessment:     Pt's response to treatment:  good - pt noted relief in the water and was able to perform exercise without pain.   Areas of improvements:  initiated aquatics  Limitations/deficits:  hip precautions    Pain end of session:  2-4/10    Plan:  OP PT Plan  Treatment/Interventions: Aquatic therapy, Cryotherapy, Education/ Instruction, Gait training, Manual therapy, Neuromuscular re-education, Therapeutic activities, Therapeutic exercises  PT Plan: Skilled PT  PT Frequency: Other (Comment) (1-2x/wk)  Duration: 20v  Onset Date: 07/18/24  Certification Period Start Date: 07/18/24  Certification Period End Date: 10/16/24  Number of Treatments Authorized: Auth required - see auth for details  Rehab Potential: Fair  Continue with current POC/no changes    Assessment of current progress against goals:  Insufficient treatment time to assess progress  Goals:  Active       PT Problem       PT STG       Start:  07/18/24    Expected End:  08/17/24       - Pt will complete the HEP with <3 verbal cues for correction,   - Pt will demonstrate 2pt improvement on the NPRS, allowing for improved tolerance of functional activities,   - Pt will improve 30\" sit to stand by 2 reps in order to demonstrate decreased risk of falls,   - Pt will demonstrate ability to perform 5 step ups to 8\" step leading with each LE and with 1 UE support in order to demonstrate improved ability to navigate stairs,   - Pt will demonstrate at least 4/5 MMT grading throughout LE musculature, allowing for appropriate muscle recruitment during daily activities.          PT LTG       Start:  07/18/24    Expected End:  10/16/24       - Pt will be independent in HEP & symptom management,   - Pt will demonstrate 4pt improvement on the NPRS pain scale, allowing for " "improved tolerance of functional activities.,   - Pt will improve 30\" sit to stand by 4 reps in order to demonstrate decreased risk of falls. ,   - Pt will demonstrate ability to ambulate at least 350' with least restrictive/no device without breaks or increases in pain in order to demonstrate improved tolerance to prolonged ambulation.  - Pt will demonstrate ability to perform 10 step ups to 8\" step leading with each LE and with 1 UE support in order to demonstrate improved ability to navigate stairs,   - Pt will demonstrate improved OSWESTRY score by 13 points (MCID) in order to demonstrate improved functional mobility.                      "

## 2024-08-08 ENCOUNTER — APPOINTMENT (OUTPATIENT)
Dept: PHYSICAL THERAPY | Facility: CLINIC | Age: 49
End: 2024-08-08
Payer: COMMERCIAL

## 2024-08-13 ENCOUNTER — TREATMENT (OUTPATIENT)
Dept: PHYSICAL THERAPY | Facility: CLINIC | Age: 49
End: 2024-08-13
Payer: COMMERCIAL

## 2024-08-13 DIAGNOSIS — M54.16 LUMBAR RADICULOPATHY: ICD-10-CM

## 2024-08-13 PROCEDURE — 97113 AQUATIC THERAPY/EXERCISES: CPT | Mod: GP

## 2024-08-13 ASSESSMENT — PAIN SCALES - GENERAL: PAINLEVEL_OUTOF10: 0 - NO PAIN

## 2024-08-13 NOTE — PROGRESS NOTES
Subjective   Patient ID: Kat Tineo is a 48 y.o. female who presents for No chief complaint on file..  [facility]  MW 2/6  Review of Systems  CONSTITUTIONAL:          Chills No.  Fatigue No.  Fever No.         CARDIOLOGY:          Negative for dizziness, chest pain, palpitations, shortness of breath.         RESPIRATORY:          Sleep Apnea No.  Negative for chest congestion, cough, wheezing.         GASTROENTEROLOGY:          Food Intolerance No.  Abdominal pain No.  Acid reflux No.  Black stools No.  Constipation No.  Diarrhea No.  Loss of appetite no.  Nausea No.  Vomiting No.         UROLOGY:          Negative for dysuria, urinary urgency, kidney stones.         PSYCHOLOGY:          Negative for depression, anxiety, high stress level.    Objective   Physical Exam    Assessment/Plan   {Assess/PlanSmartLinks:02907}         Iram Villa LPN 08/13/24 1:24 PM

## 2024-08-13 NOTE — PROGRESS NOTES
Physical Therapy Treatment    Patient Name: Kat Tineo  MRN: [id number]  Today's Date: 8/13/2024  Time Calculation  Start Time: 1215  Stop Time: 1255  Time Calculation (min): 40 min     PT Therapeutic Procedures Time Entry  Aquatic Therapy Time Entry: 40    Visit Number:  3 (including evaluation)  Planned total visits: 20  Visit Authorized/ Insurance Considerations:  EVAL ONLY, AUTH REQUIRED, 100% COVERAGE, [employer]   Auth Rcvd 30 visits 7/25-12/31 CPTs [phone] [id number]    Current Problem  Problem List Items Addressed This Visit             ICD-10-CM    Lumbar radiculopathy M54.16       Precautions  Precautions  Medical Precautions: Fall precautions  Post-Surgical Precautions: Left hip precautions (Posterior precautions lifelong - pt has history of 3 dislocation after hip replacements.)    Pain  0-10 (Numeric) Pain Score: 0 - No pain    Subjective/General  Reason for Referral: lumbar radiculopathy  Referred By: Dagoberto Patel   The pt states that she felt good after last session. She does not have pain today. She notes that she did well on her physical fitness test for work, but d/t her hip precautions was unable to participate in the sit up portion and got a note from her surgeon stating that she has lifelong hip precautions.    Objective  Good core control and balance in shallow end    Treatment - Aquatic Therapy:    4' depth - walk across pool forward/backward/side step x 3 laps each    4' depth - walk across pool with kickboard push/pull x 2 laps    4' depth - at HR with  UE support PRN and cues for TrA bracing:  - Heel raise x 15 reps  - Toe raise x 15 reps  - Hip ABD toe tap /EXT toe tap /SLR - SMALL ROM ONLY x 15 reps  - HS curls x 15 reps  - Mini-squats x 15 reps    4' depth - with TrA brace + noodle:  - Push/pull x 15 reps  - Straight arm push downs x 15 reps    7' Deep end for decompression with noodle under arms:  - Hang x 2'  - Bicycle x '1  - Hang x 2'  - Hip ABD/ADD x 1' -  "small ROM  - Hang x 2'  - XC ski x 1' - small ROM  - Hang x 2'  - SKTC x 2' -<90deg hip flexion  - Hang x 2'    Current HEP:  Aquatics only at this time    OP Education:        Assessment:     Pt's response to treatment:  good - The pts pain has significantly decreased from last session and the pt appeared to move move smoothly in the water as a result.    Areas of improvements:  pain  Limitations/deficits:  hip precautions    Pain end of session:  0/10    Plan:  OP PT Plan  Treatment/Interventions: Aquatic therapy, Cryotherapy, Education/ Instruction, Gait training, Manual therapy, Neuromuscular re-education, Therapeutic activities, Therapeutic exercises  PT Plan: Skilled PT  PT Frequency: Other (Comment) (1-2x/wk)  Duration: 20v  Onset Date: 07/18/24  Certification Period Start Date: 07/18/24  Certification Period End Date: 10/16/24  Number of Treatments Authorized: Auth required - see auth for details  Rehab Potential: Fair  Continue with current POC/no changes    Assessment of current progress against goals:  Insufficient treatment time to assess progress  Goals:  Active       PT Problem       PT STG       Start:  07/18/24    Expected End:  08/17/24       - Pt will complete the HEP with <3 verbal cues for correction,   - Pt will demonstrate 2pt improvement on the NPRS, allowing for improved tolerance of functional activities,   - Pt will improve 30\" sit to stand by 2 reps in order to demonstrate decreased risk of falls,   - Pt will demonstrate ability to perform 5 step ups to 8\" step leading with each LE and with 1 UE support in order to demonstrate improved ability to navigate stairs,   - Pt will demonstrate at least 4/5 MMT grading throughout LE musculature, allowing for appropriate muscle recruitment during daily activities.          PT LTG       Start:  07/18/24    Expected End:  10/16/24       - Pt will be independent in HEP & symptom management,   - Pt will demonstrate 4pt improvement on the NPRS pain " "scale, allowing for improved tolerance of functional activities.,   - Pt will improve 30\" sit to stand by 4 reps in order to demonstrate decreased risk of falls. ,   - Pt will demonstrate ability to ambulate at least 350' with least restrictive/no device without breaks or increases in pain in order to demonstrate improved tolerance to prolonged ambulation.  - Pt will demonstrate ability to perform 10 step ups to 8\" step leading with each LE and with 1 UE support in order to demonstrate improved ability to navigate stairs,   - Pt will demonstrate improved OSWESTRY score by 13 points (MCID) in order to demonstrate improved functional mobility.                        "

## 2024-08-15 ENCOUNTER — APPOINTMENT (OUTPATIENT)
Dept: SURGERY | Facility: CLINIC | Age: 49
End: 2024-08-15
Payer: COMMERCIAL

## 2024-08-15 ENCOUNTER — APPOINTMENT (OUTPATIENT)
Dept: PHYSICAL THERAPY | Facility: CLINIC | Age: 49
End: 2024-08-15
Payer: COMMERCIAL

## 2024-08-20 ENCOUNTER — APPOINTMENT (OUTPATIENT)
Dept: PHYSICAL THERAPY | Facility: CLINIC | Age: 49
End: 2024-08-20
Payer: COMMERCIAL

## 2024-08-20 NOTE — PROGRESS NOTES
Physical Therapy Treatment    Patient Name: Kat Tineo  MRN: [id number]  Today's Date: 8/20/2024             Visit Number:  Visit count could not be calculated. Make sure you are using a visit which is associated with an episode. (including evaluation)  Planned total visits: 20  Visit Authorized/ Insurance Considerations:  EVAL ONLY, AUTH REQUIRED, 100% COVERAGE, [employer]   Auth Rcvd 30 visits 7/25-12/31 CPTs [id number] [id number]    Current Problem  Problem List Items Addressed This Visit    None        Precautions       Pain       Subjective/General      ***    Objective  Good core control and balance in shallow end    Treatment - Aquatic Therapy:    4' depth - walk across pool forward/backward/side step x 3 laps each    4' depth - walk across pool with kickboard push/pull x 2 laps    4' depth - at HR with  UE support PRN and cues for TrA bracing:  - Heel raise x 15 reps  - Toe raise x 15 reps  - Hip ABD toe tap /EXT toe tap /SLR - SMALL ROM ONLY x 15 reps  - HS curls x 15 reps  - Mini-squats x 15 reps    4' depth - with TrA brace + noodle:  - Push/pull x 15 reps  - Straight arm push downs x 15 reps    7' Deep end for decompression with noodle under arms:  - Hang x 2'  - Bicycle x '1  - Hang x 2'  - Hip ABD/ADD x 1' - small ROM  - Hang x 2'  - XC ski x 1' - small ROM  - Hang x 2'  - SKTC x 2' -<90deg hip flexion  - Hang x 2'    Current HEP:  Aquatics only at this time    OP Education:        Assessment:     Pt's response to treatment:  good - ***  Areas of improvements:  pain  Limitations/deficits:  hip precautions    Pain end of session:  ***/10    Plan:     Continue with current POC/no changes    Assessment of current progress against goals:  Insufficient treatment time to assess progress  Goals:

## 2024-08-21 NOTE — PROGRESS NOTES
Physical Therapy Treatment    Patient Name: Kat Tineo  MRN: [id number]  Today's Date: 8/22/2024  Time Calculation  Start Time: 1219  Stop Time: 1301  Time Calculation (min): 42 min     PT Therapeutic Procedures Time Entry  Aquatic Therapy Time Entry: 42    Visit Number:  4 (including evaluation)  Planned total visits: 20  Visit Authorized/ Insurance Considerations:  EVAL ONLY, AUTH REQUIRED, 100% COVERAGE, [employer]   Auth Rcvd 30 visits 7/25-12/31 CPTs [id number] [phone] [id number]    Current Problem  Problem List Items Addressed This Visit             ICD-10-CM    Lumbar radiculopathy M54.16         Precautions   Medical Precautions: Fall precautions  Post-Surgical Precautions: Left hip precautions (Posterior precautions lifelong - pt has history of 3 dislocation after hip replacements.)    Pain   8/10    Subjective/General   Pt reports she is sore all over today for some reason.  She feels her RA is flared up.  She sees Dr Jones today for hip injections.       Objective  Good core control and balance in shallow end    Treatment - Aquatic Therapy:    Pt enters and exits pool via steps with B rails.   4' depth - walk across pool forward/backward/side step x 3 laps each    4' depth - walk across pool with kickboard push/pull x 2 laps    4' depth - at HR with  UE support PRN and cues for TrA bracing:  - Heel raise x 15 reps  - Toe raise x 15 reps  - Hip ABD toe tap /EXT toe tap /SLR - SMALL ROM ONLY x 15 reps  - HS curls x 15 reps  - Mini-squats x 15 reps    4' depth - with TrA brace + noodle:  - Push/pull x 15 reps  - Straight arm push downs x 15 reps    7' Deep end for decompression with noodle under arms:  - Hang x 2'  - Bicycle x '1  - Hang x 2'  - Hip ABD/ADD x 1' - small ROM  - Hang x 2'  - XC ski x 1' - small ROM  - Hang x 2'  - SKTC x 2' -< 90 deg hip flexion  - Hang x 2'    Current HEP:  Aquatics only at this time    OP Education:    Rationale for aquatic exs.     Assessment:     Pt's response to  "treatment:  good - Pt had min pain with sidestepping but shorter steps made it more tolerable.   Pt notes L hip discomfort persists upon exiting pool.   Areas of improvements:  pain  Limitations/deficits:  hip precautions, pain with ADLS    Pain end of session:  7/10    Plan:     Continue with current POC/no changes    Assessment of current progress against goals:  Symptomatic relief with treatment  Goals:  Active       PT Problem       PT STG       Start:  07/18/24    Expected End:  08/17/24       - Pt will complete the HEP with <3 verbal cues for correction,   - Pt will demonstrate 2pt improvement on the NPRS, allowing for improved tolerance of functional activities,   - Pt will improve 30\" sit to stand by 2 reps in order to demonstrate decreased risk of falls,   - Pt will demonstrate ability to perform 5 step ups to 8\" step leading with each LE and with 1 UE support in order to demonstrate improved ability to navigate stairs,   - Pt will demonstrate at least 4/5 MMT grading throughout LE musculature, allowing for appropriate muscle recruitment during daily activities.          PT LTG       Start:  07/18/24    Expected End:  10/16/24       - Pt will be independent in HEP & symptom management,   - Pt will demonstrate 4pt improvement on the NPRS pain scale, allowing for improved tolerance of functional activities.,   - Pt will improve 30\" sit to stand by 4 reps in order to demonstrate decreased risk of falls. ,   - Pt will demonstrate ability to ambulate at least 350' with least restrictive/no device without breaks or increases in pain in order to demonstrate improved tolerance to prolonged ambulation.  - Pt will demonstrate ability to perform 10 step ups to 8\" step leading with each LE and with 1 UE support in order to demonstrate improved ability to navigate stairs,   - Pt will demonstrate improved OSWESTRY score by 13 points (MCID) in order to demonstrate improved functional mobility.                          "

## 2024-08-22 ENCOUNTER — TREATMENT (OUTPATIENT)
Dept: PHYSICAL THERAPY | Facility: CLINIC | Age: 49
End: 2024-08-22
Payer: COMMERCIAL

## 2024-08-22 ENCOUNTER — HOSPITAL ENCOUNTER (OUTPATIENT)
Dept: RADIOLOGY | Facility: EXTERNAL LOCATION | Age: 49
Discharge: HOME | End: 2024-08-22

## 2024-08-22 ENCOUNTER — OFFICE VISIT (OUTPATIENT)
Dept: ORTHOPEDIC SURGERY | Facility: HOSPITAL | Age: 49
End: 2024-08-22
Payer: COMMERCIAL

## 2024-08-22 VITALS — HEIGHT: 63 IN | BODY MASS INDEX: 38.09 KG/M2 | WEIGHT: 215 LBS

## 2024-08-22 DIAGNOSIS — M16.11 PRIMARY OSTEOARTHRITIS OF RIGHT HIP: Primary | ICD-10-CM

## 2024-08-22 DIAGNOSIS — M76.02 GLUTEAL TENDONITIS OF LEFT BUTTOCK: ICD-10-CM

## 2024-08-22 DIAGNOSIS — M54.16 LUMBAR RADICULOPATHY: ICD-10-CM

## 2024-08-22 DIAGNOSIS — M25.552 LEFT HIP PAIN: ICD-10-CM

## 2024-08-22 PROCEDURE — 99214 OFFICE O/P EST MOD 30 MIN: CPT | Performed by: FAMILY MEDICINE

## 2024-08-22 PROCEDURE — 2500000005 HC RX 250 GENERAL PHARMACY W/O HCPCS: Performed by: FAMILY MEDICINE

## 2024-08-22 PROCEDURE — 1036F TOBACCO NON-USER: CPT | Performed by: FAMILY MEDICINE

## 2024-08-22 PROCEDURE — 20611 DRAIN/INJ JOINT/BURSA W/US: CPT | Mod: RT | Performed by: FAMILY MEDICINE

## 2024-08-22 PROCEDURE — 97113 AQUATIC THERAPY/EXERCISES: CPT | Mod: GP

## 2024-08-22 PROCEDURE — 3008F BODY MASS INDEX DOCD: CPT | Performed by: FAMILY MEDICINE

## 2024-08-22 PROCEDURE — 2500000004 HC RX 250 GENERAL PHARMACY W/ HCPCS (ALT 636 FOR OP/ED): Performed by: FAMILY MEDICINE

## 2024-08-22 PROCEDURE — 3044F HG A1C LEVEL LT 7.0%: CPT | Performed by: FAMILY MEDICINE

## 2024-08-22 PROCEDURE — 20611 DRAIN/INJ JOINT/BURSA W/US: CPT | Mod: LT | Performed by: FAMILY MEDICINE

## 2024-08-22 RX ORDER — TRIAMCINOLONE ACETONIDE 40 MG/ML
80 INJECTION, SUSPENSION INTRA-ARTICULAR; INTRAMUSCULAR
Status: COMPLETED | OUTPATIENT
Start: 2024-08-22 | End: 2024-08-22

## 2024-08-22 RX ORDER — LIDOCAINE HYDROCHLORIDE 10 MG/ML
4 INJECTION INFILTRATION; PERINEURAL
Status: COMPLETED | OUTPATIENT
Start: 2024-08-22 | End: 2024-08-22

## 2024-08-22 RX ORDER — LIDOCAINE HYDROCHLORIDE 10 MG/ML
2 INJECTION INFILTRATION; PERINEURAL
Status: COMPLETED | OUTPATIENT
Start: 2024-08-22 | End: 2024-08-22

## 2024-08-22 RX ORDER — TRIAMCINOLONE ACETONIDE 40 MG/ML
40 INJECTION, SUSPENSION INTRA-ARTICULAR; INTRAMUSCULAR
Status: COMPLETED | OUTPATIENT
Start: 2024-08-22 | End: 2024-08-22

## 2024-08-22 ASSESSMENT — PAIN SCALES - GENERAL: PAINLEVEL_OUTOF10: 8

## 2024-08-22 ASSESSMENT — PAIN - FUNCTIONAL ASSESSMENT: PAIN_FUNCTIONAL_ASSESSMENT: 0-10

## 2024-08-22 NOTE — PROGRESS NOTES
** Please excuse any errors in grammar or translation related to this dictation. Voice recognition software was utilized to prepare this document. **    Assessment & Plan:  Dx: 1. Right hip osteoarthritis       2. Left gluteal tendonitis    Patient following up for ongoing bilateral hip pain.  Hip pain is most likely multifactorial to include right hip arthritis, left gluteal tendinitis, SI joint dysfunction, and lumbar pain.  Patient did receive transient improvement in her pain following steroid injections completed of right hip joint and left greater trochanter/gluteal tendon sheath in May.  She was offered to have these repeated today which she agreed.  Stressed the patient that she needs to optimize the strength of her hip musculature and core to better support her joints.  Also need to focus on weight loss as every pound that she weighs is 5 pounds carried throughout her lower extremities.  She states she is pending conversion of her gastric sleeve to Jaci-en-Y bypass surgery.  Return precautions given.  Injections can be repeated again in 3 or more months as symptoms dictate.      Chief complaint:  Bilateral hip OA    HPI:  8/22/24: Patient returns requesting bilateral hip injections after having 4-6 weeks of relief with her last injections. She complains of right groin pain and left lateral hip pain. Pain increases when laying on the left hip.  She was recently prescribed a Rinvoq from her rheumatologist which has helped with her back pain but has not helped with her hip pain.  She had SI joint injections completed at pain management in July.    5/9/24: Patient returns today for completion of left hip aspiration.  She states that her rheumatologist wanted to ensure that she had no current hip joint infection prior to starting on Xeljanz.  The steroid injection from 4/25 in the right hip provided her good relief.      4/25/24: 49y/o patient, hx RA and left KYRIE with revision x2 most recently in August 2023  with Dr. Elio Lew, presents with bilateral hip pain.  Right hip pain starts in her groin and radiates down her anterior thigh.  It has been ongoing for several months but was exacerbated in the recent ground-level fall 2 weeks ago.  Reports she has dealt with left hip pain since revision surgery.  Pain is prominently over her lateral hip.  It was also recently exacerbated from a fall.  She reports that she was supposed to be started on a new medication for rheumatoid arthritis.  However given her left hip periprosthetic infection history she was advised to have testing done to ensure no active infection was present within her left hip.  She reports that she saw Dr. Elio Lew recently and he advised aspirations so testing could be done. Currently using gabapentin, and tizanidine, for symptom control.      Patient Active Problem List   Diagnosis    Arthritis    Chronic back pain    Cough    Diabetes (Multi)    Foot injury    Insomnia    Left hip pain    Onychomycosis    Vitamin D deficiency    Chest pain at rest    Familial heart disease    Hypertension    Lumbar radiculopathy    Migraine headache    Obesity    Muscle weakness    Numbness    Pain of left upper extremity    Pain of right upper extremity    Bronchitis    Gastroesophageal reflux disease    Hypokalemia    Lumbar degenerative disc disease    Mixed anxiety depressive disorder    Primary osteoarthritis of left hip    Vitamin B12 deficiency (non anemic)    Chest pain    Acute otitis externa of right ear    Acute urinary tract infection    Amenorrhea    Cardiomegaly    Cellulitis of leg, except foot    Chronic osteoarthritis    Complication of surgical procedure    Disease due to severe acute respiratory syndrome coronavirus 2 (SARS-CoV-2)    Disorder of hip region    Edema of both lower extremities    Electrocardiogram abnormal    Fall    Female infertility    Gallstone    Fracture of bone of hip (Multi)    Hip dislocation, left (Multi)    History of  revision of total replacement of left hip joint    History of total left hip arthroplasty    Influenza with gastrointestinal tract involvement    Instability of left hip joint    Lightheadedness    Lumbar spondylosis    Lumbosacral radiculitis    Intestinal malabsorption (HHS-HCC)    Malabsorption syndrome (HHS-HCC)    Headache    Musculoskeletal pain    Myofascial pain    Pain at surgical incision    Nausea    Pain in both lower extremities    Pain in femur    Postoperative pain    S/P lumbar fusion    Sleep disturbance    Strain of trapezius muscle    Trochanteric bursitis of left hip    Umbilical hernia    Upper respiratory tract infection    Laryngitis    Rheumatoid arthritis (Multi)    S/P laparoscopic sleeve gastrectomy    Polyneuropathy, unspecified    Disease of spinal cord, unspecified (Multi)    Swelling of hip joint    Pain of lower extremity    Paresthesia of upper extremity    Abdominal pain    Stenosis, cervical spine    Severe anemia    Malabsorption (HHS-HCC)    Zinc deficiency    Iron deficiency    Thiamine deficiency    Complications of gastric bypass surgery    Iron deficiency    Morbid obesity with BMI of 40.0-44.9, adult (Multi)    Hiatal hernia     Past Surgical History:   Procedure Laterality Date    CT GUIDED SOFT TISSUE FLUID DRAIN  06/02/2022    CT GUIDED SOFT TISSUE FLUID DRAIN [facility]    HYSTERECTOMY  2012    LAPAROSCOPY GASTRECTOMY PARTIAL / TOTAL  2017    vertical sleeve    NECK SURGERY      OTHER SURGICAL HISTORY  11/22/2017    laparoscopic cholecystectomy wiht intraoperative cholangiogram, lysis of adhesions, mesh repair of incisional henia using 6 in round ventralight st mesh    OTHER SURGICAL HISTORY Left     revision x 2 hip    SPINE SURGERY  02/14/2023    L4-L5 SPINAL SURGERY ([facility])    US GUIDED SOFT TISSUE FLUID DRAIN  09/30/2021    US GUIDED SOFT TISSUE FLUID DRAIN [facility]     Current Outpatient Medications on File Prior to Visit   Medication  Sig Dispense Refill    amLODIPine (Norvasc) 10 mg tablet TAKE 1 TABLET BY MOUTH EVERY DAY 90 tablet 1    DULoxetine (Cymbalta) 60 mg DR capsule TAKE 1 CAPSULE BY MOUTH ONCE DAILY 90 capsule 1    losartan-hydrochlorothiazide (Hyzaar) 100-25 mg tablet Take 1 tablet by mouth once daily. 90 tablet 3    multivitamin tablet Take 1 tablet by mouth once daily.      omeprazole (PriLOSEC) 40 mg DR capsule Take 1 capsule (40 mg) by mouth once daily in the morning. Take before meals. Do not crush or chew. 30 capsule 11    omeprazole (PriLOSEC) 40 mg DR capsule Take 1 capsule (40 mg) by mouth 2 times a day. Open capsule. Do not crush or chew beads. 60 capsule 2    [employer] At-Home COVID-19 Test kit       rizatriptan (Maxalt) 10 mg tablet TAKE 1 TABLET AT ONSET OF HEADACHE. MAY REPEAT EVERY 2 HOURS AS NEEDED. MAXIMUM 3 TABLETS/24 HOURS. 9 tablet 4    tiZANidine (Zanaflex) 4 mg tablet TAKE 1 TABLET BY MOUTH EVERYDAY AT BEDTIME 90 tablet 1    upadacitinib ER (Rinvoq) 15 mg tablet extended release 24 hr Take 1 tablet (15 mg) by mouth once daily. Do not crush, chew or split. Swallow whole.      zolpidem (Ambien) 10 mg tablet Take 1 tablet (10 mg) by mouth as needed at bedtime for sleep. 90 tablet 1     No current facility-administered medications on file prior to visit.     Exam:  RIGHT Hip Exam:  Antalgic gait  No warmth, erythema or ecchymosis overlying.  Active flexion >90 degrees.  Limited IR and ER.  Mild TTP over greater trochanter  [5]/5 strength of hip flexion, abduction, & adduction  SILT  [ - ]Log roll pain, [+ ]FADIR pain, [ + ]VAUGHN pain, [ + ]Stinchfield    LEFT Hip Exam:  Antalgic gait  No warmth, erythema or ecchymosis overlying.  Active flexion >90 degrees.   Moderate TTP over greater trochanter  [5]/5 strength of hip flexion, abduction, & adduction  SILT  [ - ]Log roll pain, [- ]FADIR pain, [ + ]VAUGHN pain, [ - ]Stinchfield    General Exam:  Constitutional - NAD, AAO x 3, conversing appropriately.  HEENT-  Normocephalic and atraumatic. EOMI, PERRLA, No scleral icterus. No facial deformities. Hearing grossly normal.  Lungs - Breathing non-labored with normal rate. No accessory muscle use.  CV - Extremities warm and well-perfused, brisk capillary refill present.   Neuro - CN II-XII grossly intact.    Results:  X-rays of hips obtained 4/16/2024: moderate degenerative changes of the right hip joint.  Normal-appearing total left hip arthroplasty without evidence of periprosthetic fracture.    Lab Results   Component Value Date    HGBA1C 6.4 (H) 04/09/2024    HGBA1C 6.1 07/25/2022    CREATININE 0.68 06/18/2024    EGFR >90 06/18/2024      Procedure:   Patient ID: Kat Tineo is a 48 y.o. female.    L Inj/Asp: R hip joint on 8/22/2024 3:21 PM  Indications: pain  Details: 22 G (spinal) needle, ultrasound-guided anterior approach  Medications: 80 mg triamcinolone acetonide 40 mg/mL; 4 mL lidocaine 10 mg/mL (1 %)  Outcome: tolerated well, no immediate complications    Procedure risk factors to include increased pain, bleeding, infection, neurovascular injury, soft tissue injury, transient elevation of blood glucose and blood pressure, and adverse reaction to medication were discussed with the patient. Patient understands there is a moderate risk of morbidity from undergoing the procedure.  Procedure, treatment alternatives, risks and benefits explained, specific risks discussed. Consent was given by the patient. Immediately prior to procedure a time out was called to verify the correct patient, procedure, equipment, support staff and site/side marked as required. Patient was prepped and draped in the usual sterile fashion.       L Inj/Asp: L greater trochanteric bursa on 8/22/2024 3:21 PM  Indications: pain  Details: 22 G (spinal) needle, ultrasound-guided lateral approach  Medications: 40 mg triamcinolone acetonide 40 mg/mL; 2 mL lidocaine 10 mg/mL (1 %)  Outcome: tolerated well, no immediate complications    Procedure risk  factors to include increased pain, bleeding, infection, neurovascular injury, soft tissue injury, transient elevation of blood glucose and blood pressure, and adverse reaction to medication were discussed with the patient. Patient understands there is a moderate risk of morbidity from undergoing the procedure.  Procedure, treatment alternatives, risks and benefits explained, specific risks discussed. Consent was given by the patient. Immediately prior to procedure a time out was called to verify the correct patient, procedure, equipment, support staff and site/side marked as required. Patient was prepped and draped in the usual sterile fashion.

## 2024-08-25 DIAGNOSIS — M19.90 UNSPECIFIED OSTEOARTHRITIS, UNSPECIFIED SITE: ICD-10-CM

## 2024-08-26 RX ORDER — DULOXETIN HYDROCHLORIDE 60 MG/1
60 CAPSULE, DELAYED RELEASE ORAL DAILY
Qty: 90 CAPSULE | Refills: 1 | Status: SHIPPED | OUTPATIENT
Start: 2024-08-26

## 2024-09-03 ENCOUNTER — TELEPHONE (OUTPATIENT)
Dept: SURGERY | Facility: CLINIC | Age: 49
End: 2024-09-03
Payer: COMMERCIAL

## 2024-09-05 ENCOUNTER — APPOINTMENT (OUTPATIENT)
Dept: PHYSICAL THERAPY | Facility: CLINIC | Age: 49
End: 2024-09-05
Payer: COMMERCIAL

## 2024-09-09 NOTE — PROGRESS NOTES
Physical Therapy Treatment    Patient Name: Kat Tineo  MRN: [id number]  Today's Date: 9/9/2024  Start Time:   Stop Time:   Time Calculation (min): 42 min  PT Therapeutic Procedures Time Entry  Aquatic Therapy Time Entry: 42     Visit Number:  5 (including evaluation)  Planned total visits: 20  Visit Authorized/ Insurance Considerations:  EVAL ONLY, AUTH REQUIRED, 100% COVERAGE, [employer]   Auth Rcvd 30 visits 7/25-12/31 CPTs [phone]    Current Problem  Problem List Items Addressed This Visit    None          Precautions   Medical Precautions: Fall precautions  Post-Surgical Precautions: Left hip precautions (Posterior precautions lifelong - pt has history of 3 dislocation after hip replacements.)    Pain   8/10    Subjective/General   Pt reports she is sore all over today for some reason.  She feels her RA is flared up.  She sees Dr Jones today for hip injections.       Objective  Good core control and balance in shallow end    Treatment - Aquatic Therapy:    Pt enters and exits pool via steps with B rails.   4' depth - walk across pool forward/backward/side step x 3 laps each    4' depth - walk across pool with kickboard push/pull x 2 laps    4' depth - at HR with  UE support PRN and cues for TrA bracing:  - Heel raise x 15 reps  - Toe raise x 15 reps  - Hip ABD toe tap /EXT toe tap /SLR - SMALL ROM ONLY x 15 reps  - HS curls x 15 reps  - Mini-squats x 15 reps    4' depth - with TrA brace + noodle:  - Push/pull x 15 reps  - Straight arm push downs x 15 reps    7' Deep end for decompression with noodle under arms:  - Hang x 2'  - Bicycle x '1  - Hang x 2'  - Hip ABD/ADD x 1' - small ROM  - Hang x 2'  - XC ski x 1' - small ROM  - Hang x 2'  - SKTC x 2' -< 90 deg hip flexion  - Hang x 2'    Current HEP:  Aquatics only at this time    OP Education:    Rationale for aquatic exs.     Assessment:     Pt's response to treatment:  good - Pt had min pain with sidestepping but shorter steps  "made it more tolerable.   Pt notes L hip discomfort persists upon exiting pool.   Areas of improvements:  pain  Limitations/deficits:  hip precautions, pain with ADLS    Pain end of session:  7/10    Plan:     Continue with current POC/no changes    Assessment of current progress against goals:  Symptomatic relief with treatment  Goals:    Active         PT Problem         PT STG         Start:  07/18/24    Expected End:  08/17/24        - Pt will complete the HEP with <3 verbal cues for correction,   - Pt will demonstrate 2pt improvement on the NPRS, allowing for improved tolerance of functional activities,   - Pt will improve 30\" sit to stand by 2 reps in order to demonstrate decreased risk of falls,   - Pt will demonstrate ability to perform 5 step ups to 8\" step leading with each LE and with 1 UE support in order to demonstrate improved ability to navigate stairs,   - Pt will demonstrate at least 4/5 MMT grading throughout LE musculature, allowing for appropriate muscle recruitment during daily activities.            PT LTG         Start:  07/18/24    Expected End:  10/16/24        - Pt will be independent in HEP & symptom management,   - Pt will demonstrate 4pt improvement on the NPRS pain scale, allowing for improved tolerance of functional activities.,   - Pt will improve 30\" sit to stand by 4 reps in order to demonstrate decreased risk of falls. ,   - Pt will demonstrate ability to ambulate at least 350' with least restrictive/no device without breaks or increases in pain in order to demonstrate improved tolerance to prolonged ambulation.  - Pt will demonstrate ability to perform 10 step ups to 8\" step leading with each LE and with 1 UE support in order to demonstrate improved ability to navigate stairs,   - Pt will demonstrate improved OSWESTRY score by 13 points (MCID) in order to demonstrate improved functional mobility.                "

## 2024-09-11 ENCOUNTER — APPOINTMENT (OUTPATIENT)
Dept: PHYSICAL THERAPY | Facility: CLINIC | Age: 49
End: 2024-09-11
Payer: COMMERCIAL

## 2024-09-11 ENCOUNTER — APPOINTMENT (OUTPATIENT)
Dept: SURGERY | Facility: CLINIC | Age: 49
End: 2024-09-11
Payer: COMMERCIAL

## 2024-09-11 ENCOUNTER — DOCUMENTATION (OUTPATIENT)
Dept: PHYSICAL THERAPY | Facility: CLINIC | Age: 49
End: 2024-09-11

## 2024-09-11 NOTE — PROGRESS NOTES
Physical Therapy                 Therapy Communication Note    Patient Name: Kat Tineo  MRN: [id number]  Department:   Room: Room/bed info not found  Today's Date: 9/11/2024     Discipline: Physical Therapy    Missed Visit Reason: Patient cancelled through MyChart    Missed Time: Cancel    Comment: Cancellation #[id number]

## 2024-09-17 ENCOUNTER — DOCUMENTATION (OUTPATIENT)
Dept: PHYSICAL THERAPY | Facility: CLINIC | Age: 49
End: 2024-09-17
Payer: COMMERCIAL

## 2024-09-17 ENCOUNTER — APPOINTMENT (OUTPATIENT)
Dept: PHYSICAL THERAPY | Facility: CLINIC | Age: 49
End: 2024-09-17
Payer: COMMERCIAL

## 2024-09-17 NOTE — PROGRESS NOTES
Physical Therapy Treatment    Patient Name: Kat Tineo  MRN: [id number]  Today's Date: 9/16/2024             Visit Number:  Visit count could not be calculated. Make sure you are using a visit which is associated with an episode. (including evaluation)  Planned total visits: 20  Visit Authorized/ Insurance Considerations:  EVAL ONLY, AUTH REQUIRED, 100% COVERAGE, [employer]   Auth Rcvd 30 visits 7/25-12/31 CPTs [id number] [id number]    Current Problem  Problem List Items Addressed This Visit    None    Precautions   Medical Precautions: Fall precautions  Post-Surgical Precautions: Left hip precautions (Posterior precautions lifelong - pt has history of 3 dislocation after hip replacements.)    Pain   8/10    Subjective/General   Pt reports she is sore all over today for some reason.  She feels her RA is flared up.  She sees Dr Jones today for hip injections.      Objective  Good core control and balance in shallow end    Treatment - Aquatic Therapy:    Pt enters and exits pool via steps with B rails.   4' depth - walk across pool forward/backward/side step x 3 laps each    4' depth - walk across pool with kickboard push/pull x 2 laps    4' depth - at HR with  UE support PRN and cues for TrA bracing:  - Heel raise x 15 reps  - Toe raise x 15 reps  - Hip ABD toe tap /EXT toe tap /SLR - SMALL ROM ONLY x 15 reps  - HS curls x 15 reps  - Mini-squats x 15 reps    4' depth - with TrA brace + noodle:  - Push/pull x 15 reps  - Straight arm push downs x 15 reps    7' Deep end for decompression with noodle under arms:  - Hang x 2'  - Bicycle x '1  - Hang x 2'  - Hip ABD/ADD x 1' - small ROM  - Hang x 2'  - XC ski x 1' - small ROM  - Hang x 2'  - SKTC x 2' -< 90 deg hip flexion  - Hang x 2'    Current HEP:  Aquatics only at this time    OP Education:    Rationale for aquatic exs.     Assessment:     Pt's response to treatment:  good - Pt had min pain with sidestepping but shorter steps made it more tolerable.   " Pt notes L hip discomfort persists upon exiting pool.   Areas of improvements:  pain  Limitations/deficits:  hip precautions, pain with ADLS    Pain end of session:  7/10    Plan:   .  Continue with current POC/no changes    Assessment of current progress against goals:  Symptomatic relief with treatment    Goals:  Active         PT Problem         PT STG         Start:  07/18/24    Expected End:  08/17/24        - Pt will complete the HEP with <3 verbal cues for correction,   - Pt will demonstrate 2pt improvement on the NPRS, allowing for improved tolerance of functional activities,   - Pt will improve 30\" sit to stand by 2 reps in order to demonstrate decreased risk of falls,   - Pt will demonstrate ability to perform 5 step ups to 8\" step leading with each LE and with 1 UE support in order to demonstrate improved ability to navigate stairs,   - Pt will demonstrate at least 4/5 MMT grading throughout LE musculature, allowing for appropriate muscle recruitment during daily activities.            PT LTG         Start:  07/18/24    Expected End:  10/16/24        - Pt will be independent in HEP & symptom management,   - Pt will demonstrate 4pt improvement on the NPRS pain scale, allowing for improved tolerance of functional activities.,   - Pt will improve 30\" sit to stand by 4 reps in order to demonstrate decreased risk of falls. ,   - Pt will demonstrate ability to ambulate at least 350' with least restrictive/no device without breaks or increases in pain in order to demonstrate improved tolerance to prolonged ambulation.  - Pt will demonstrate ability to perform 10 step ups to 8\" step leading with each LE and with 1 UE support in order to demonstrate improved ability to navigate stairs,   - Pt will demonstrate improved OSWESTRY score by 13 points (MCID) in order to demonstrate improved functional mobility.            "

## 2024-09-17 NOTE — PROGRESS NOTES
Physical Therapy                 Therapy Communication Note    Patient Name: Kat Tineo  MRN: [id number]  Department:   Room: Room/bed info not found  Today's Date: 9/17/2024     Discipline: Physical Therapy    Missed Visit Reason: Patient called to cancel her appointment because of a flat tire.     Missed Time: Cancel    Comment: Cancellation #[id number]

## 2024-09-19 ENCOUNTER — APPOINTMENT (OUTPATIENT)
Dept: PHYSICAL THERAPY | Facility: CLINIC | Age: 49
End: 2024-09-19
Payer: COMMERCIAL

## 2024-09-19 NOTE — PROGRESS NOTES
Physical Therapy Treatment    Patient Name: Kat Tineo  MRN: [id number]  Today's Date: 9/19/2024             Visit Number:  Visit count could not be calculated. Make sure you are using a visit which is associated with an episode. (including evaluation)  Planned total visits: 20  Visit Authorized/ Insurance Considerations:  EVAL ONLY, AUTH REQUIRED, 100% COVERAGE, [employer]   Auth Rcvd 30 visits 7/25-12/31 CPTs [id number] [phone]    Current Problem  Problem List Items Addressed This Visit    None          Precautions   Medical Precautions: Fall precautions  Post-Surgical Precautions: Left hip precautions (Posterior precautions lifelong - pt has history of 3 dislocation after hip replacements.)    Pain       Subjective/General   ***       Objective  Good core control and balance in shallow end    Treatment - Aquatic Therapy:    Pt enters and exits pool via steps with B rails.   4' depth - walk across pool forward/backward/side step x 3 laps each    4' depth - walk across pool with kickboard push/pull x 2 laps    4' depth - at HR with  UE support PRN and cues for TrA bracing:  - Heel raise x 15 reps  - Toe raise x 15 reps  - Hip ABD toe tap /EXT toe tap /SLR - SMALL ROM ONLY x 15 reps  - HS curls x 15 reps  - Mini-squats x 15 reps    4' depth - with TrA brace + noodle:  - Push/pull x 15 reps  - Straight arm push downs x 15 reps    7' Deep end for decompression with noodle under arms:  - Hang x 2'  - Bicycle x '1  - Hang x 2'  - Hip ABD/ADD x 1' - small ROM  - Hang x 2'  - XC ski x 1' - small ROM  - Hang x 2'  - SKTC x 2' -< 90 deg hip flexion  - Hang x 2'    Current HEP:  Aquatics only at this time    OP Education:    Rationale for aquatic exs.     Assessment:     Pt's response to treatment:  good - ***  Areas of improvements:  pain  Limitations/deficits:  hip precautions, pain with ADLS    Pain end of session:  ***/10    Plan:     Continue with current POC/no changes    Assessment of current  progress against goals:  Symptomatic relief with treatment  Goals:

## 2024-09-22 ENCOUNTER — APPOINTMENT (OUTPATIENT)
Dept: RADIOLOGY | Facility: HOSPITAL | Age: 49
End: 2024-09-22
Payer: COMMERCIAL

## 2024-09-22 ENCOUNTER — HOSPITAL ENCOUNTER (EMERGENCY)
Facility: HOSPITAL | Age: 49
Discharge: HOME | End: 2024-09-22
Attending: EMERGENCY MEDICINE
Payer: COMMERCIAL

## 2024-09-22 ENCOUNTER — APPOINTMENT (OUTPATIENT)
Dept: URGENT CARE | Age: 49
End: 2024-09-22
Payer: COMMERCIAL

## 2024-09-22 ENCOUNTER — APPOINTMENT (OUTPATIENT)
Dept: CARDIOLOGY | Facility: HOSPITAL | Age: 49
End: 2024-09-22
Payer: COMMERCIAL

## 2024-09-22 VITALS
HEIGHT: 62 IN | WEIGHT: 220.68 LBS | RESPIRATION RATE: 17 BRPM | OXYGEN SATURATION: 99 % | HEART RATE: 63 BPM | TEMPERATURE: 98.4 F | BODY MASS INDEX: 40.61 KG/M2 | SYSTOLIC BLOOD PRESSURE: 126 MMHG | DIASTOLIC BLOOD PRESSURE: 86 MMHG

## 2024-09-22 DIAGNOSIS — M25.511 ACUTE PAIN OF RIGHT SHOULDER: Primary | ICD-10-CM

## 2024-09-22 DIAGNOSIS — R07.9 CHEST PAIN, UNSPECIFIED TYPE: ICD-10-CM

## 2024-09-22 LAB
ALBUMIN SERPL BCP-MCNC: 4.3 G/DL (ref 3.4–5)
ALP SERPL-CCNC: 76 U/L (ref 33–110)
ALT SERPL W P-5'-P-CCNC: 16 U/L (ref 7–45)
ANION GAP SERPL CALCULATED.3IONS-SCNC: 13 MMOL/L (ref 10–20)
AST SERPL W P-5'-P-CCNC: 15 U/L (ref 9–39)
BASOPHILS # BLD AUTO: 0.04 X10*3/UL (ref 0–0.1)
BASOPHILS NFR BLD AUTO: 0.4 %
BILIRUB SERPL-MCNC: 0.7 MG/DL (ref 0–1.2)
BUN SERPL-MCNC: 15 MG/DL (ref 6–23)
CALCIUM SERPL-MCNC: 9.5 MG/DL (ref 8.6–10.3)
CARDIAC TROPONIN I PNL SERPL HS: 4 NG/L (ref 0–13)
CARDIAC TROPONIN I PNL SERPL HS: 5 NG/L (ref 0–13)
CHLORIDE SERPL-SCNC: 103 MMOL/L (ref 98–107)
CO2 SERPL-SCNC: 26 MMOL/L (ref 21–32)
CREAT SERPL-MCNC: 0.78 MG/DL (ref 0.5–1.05)
EGFRCR SERPLBLD CKD-EPI 2021: >90 ML/MIN/1.73M*2
EOSINOPHIL # BLD AUTO: 0.04 X10*3/UL (ref 0–0.7)
EOSINOPHIL NFR BLD AUTO: 0.4 %
ERYTHROCYTE [DISTWIDTH] IN BLOOD BY AUTOMATED COUNT: 13.8 % (ref 11.5–14.5)
GLUCOSE SERPL-MCNC: 105 MG/DL (ref 74–99)
HCT VFR BLD AUTO: 43.6 % (ref 36–46)
HGB BLD-MCNC: 14.9 G/DL (ref 12–16)
IMM GRANULOCYTES # BLD AUTO: 0.07 X10*3/UL (ref 0–0.7)
IMM GRANULOCYTES NFR BLD AUTO: 0.6 % (ref 0–0.9)
LYMPHOCYTES # BLD AUTO: 2.07 X10*3/UL (ref 1.2–4.8)
LYMPHOCYTES NFR BLD AUTO: 18.4 %
MCH RBC QN AUTO: 30.9 PG (ref 26–34)
MCHC RBC AUTO-ENTMCNC: 34.2 G/DL (ref 32–36)
MCV RBC AUTO: 91 FL (ref 80–100)
MONOCYTES # BLD AUTO: 0.62 X10*3/UL (ref 0.1–1)
MONOCYTES NFR BLD AUTO: 5.5 %
NEUTROPHILS # BLD AUTO: 8.4 X10*3/UL (ref 1.2–7.7)
NEUTROPHILS NFR BLD AUTO: 74.7 %
NRBC BLD-RTO: 0 /100 WBCS (ref 0–0)
PLATELET # BLD AUTO: 384 X10*3/UL (ref 150–450)
POTASSIUM SERPL-SCNC: 3.2 MMOL/L (ref 3.5–5.3)
PROT SERPL-MCNC: 7.6 G/DL (ref 6.4–8.2)
RBC # BLD AUTO: 4.82 X10*6/UL (ref 4–5.2)
SODIUM SERPL-SCNC: 139 MMOL/L (ref 136–145)
WBC # BLD AUTO: 11.2 X10*3/UL (ref 4.4–11.3)

## 2024-09-22 PROCEDURE — 85025 COMPLETE CBC W/AUTO DIFF WBC: CPT

## 2024-09-22 PROCEDURE — 2500000004 HC RX 250 GENERAL PHARMACY W/ HCPCS (ALT 636 FOR OP/ED)

## 2024-09-22 PROCEDURE — 84484 ASSAY OF TROPONIN QUANT: CPT

## 2024-09-22 PROCEDURE — 2550000001 HC RX 255 CONTRASTS: Performed by: EMERGENCY MEDICINE

## 2024-09-22 PROCEDURE — 36415 COLL VENOUS BLD VENIPUNCTURE: CPT

## 2024-09-22 PROCEDURE — 96374 THER/PROPH/DIAG INJ IV PUSH: CPT | Mod: 59

## 2024-09-22 PROCEDURE — 71045 X-RAY EXAM CHEST 1 VIEW: CPT | Performed by: RADIOLOGY

## 2024-09-22 PROCEDURE — 93005 ELECTROCARDIOGRAM TRACING: CPT

## 2024-09-22 PROCEDURE — 2500000002 HC RX 250 W HCPCS SELF ADMINISTERED DRUGS (ALT 637 FOR MEDICARE OP, ALT 636 FOR OP/ED): Performed by: EMERGENCY MEDICINE

## 2024-09-22 PROCEDURE — 99285 EMERGENCY DEPT VISIT HI MDM: CPT | Mod: 25

## 2024-09-22 PROCEDURE — 71275 CT ANGIOGRAPHY CHEST: CPT

## 2024-09-22 PROCEDURE — 71275 CT ANGIOGRAPHY CHEST: CPT | Performed by: STUDENT IN AN ORGANIZED HEALTH CARE EDUCATION/TRAINING PROGRAM

## 2024-09-22 PROCEDURE — 71045 X-RAY EXAM CHEST 1 VIEW: CPT

## 2024-09-22 PROCEDURE — 73010 X-RAY EXAM OF SHOULDER BLADE: CPT | Mod: RT

## 2024-09-22 PROCEDURE — 2500000005 HC RX 250 GENERAL PHARMACY W/O HCPCS

## 2024-09-22 PROCEDURE — 96375 TX/PRO/DX INJ NEW DRUG ADDON: CPT | Mod: 59

## 2024-09-22 PROCEDURE — 96372 THER/PROPH/DIAG INJ SC/IM: CPT

## 2024-09-22 PROCEDURE — 80053 COMPREHEN METABOLIC PANEL: CPT

## 2024-09-22 PROCEDURE — 74174 CTA ABD&PLVS W/CONTRAST: CPT | Performed by: STUDENT IN AN ORGANIZED HEALTH CARE EDUCATION/TRAINING PROGRAM

## 2024-09-22 PROCEDURE — 73010 X-RAY EXAM OF SHOULDER BLADE: CPT | Mod: RIGHT SIDE | Performed by: RADIOLOGY

## 2024-09-22 RX ORDER — ORPHENADRINE CITRATE 30 MG/ML
60 INJECTION INTRAMUSCULAR; INTRAVENOUS ONCE
Status: COMPLETED | OUTPATIENT
Start: 2024-09-22 | End: 2024-09-22

## 2024-09-22 RX ORDER — METHYLPREDNISOLONE 4 MG/1
TABLET ORAL
Qty: 21 TABLET | Refills: 0 | Status: SHIPPED | OUTPATIENT
Start: 2024-09-22 | End: 2024-09-29

## 2024-09-22 RX ORDER — OXYCODONE AND ACETAMINOPHEN 5; 325 MG/1; MG/1
1 TABLET ORAL EVERY 6 HOURS PRN
Qty: 5 TABLET | Refills: 0 | Status: SHIPPED | OUTPATIENT
Start: 2024-09-22 | End: 2024-09-25

## 2024-09-22 RX ORDER — POTASSIUM CHLORIDE 20 MEQ/1
20 TABLET, EXTENDED RELEASE ORAL DAILY
Status: DISCONTINUED | OUTPATIENT
Start: 2024-09-22 | End: 2024-09-22 | Stop reason: HOSPADM

## 2024-09-22 RX ORDER — KETOROLAC TROMETHAMINE 30 MG/ML
30 INJECTION, SOLUTION INTRAMUSCULAR; INTRAVENOUS ONCE
Status: COMPLETED | OUTPATIENT
Start: 2024-09-22 | End: 2024-09-22

## 2024-09-22 RX ORDER — LIDOCAINE 560 MG/1
1 PATCH PERCUTANEOUS; TOPICAL; TRANSDERMAL ONCE
Status: DISCONTINUED | OUTPATIENT
Start: 2024-09-22 | End: 2024-09-22 | Stop reason: HOSPADM

## 2024-09-22 ASSESSMENT — COLUMBIA-SUICIDE SEVERITY RATING SCALE - C-SSRS
2. HAVE YOU ACTUALLY HAD ANY THOUGHTS OF KILLING YOURSELF?: NO
1. IN THE PAST MONTH, HAVE YOU WISHED YOU WERE DEAD OR WISHED YOU COULD GO TO SLEEP AND NOT WAKE UP?: NO
6. HAVE YOU EVER DONE ANYTHING, STARTED TO DO ANYTHING, OR PREPARED TO DO ANYTHING TO END YOUR LIFE?: NO

## 2024-09-22 ASSESSMENT — PAIN SCALES - GENERAL
PAINLEVEL_OUTOF10: 4
PAINLEVEL_OUTOF10: 6

## 2024-09-22 ASSESSMENT — PAIN - FUNCTIONAL ASSESSMENT: PAIN_FUNCTIONAL_ASSESSMENT: 0-10

## 2024-09-22 ASSESSMENT — HEART SCORE
HISTORY: SLIGHTLY SUSPICIOUS
TROPONIN: LESS THAN OR EQUAL TO NORMAL LIMIT
RISK FACTORS: 1-2 RISK FACTORS
HEART SCORE: 2
ECG: NORMAL
AGE: 45-64

## 2024-09-22 ASSESSMENT — PAIN DESCRIPTION - ORIENTATION: ORIENTATION: RIGHT

## 2024-09-22 ASSESSMENT — PAIN DESCRIPTION - LOCATION: LOCATION: SHOULDER

## 2024-09-22 NOTE — Clinical Note
Kat Tineo was seen and treated in our emergency department on 9/22/2024.  She may return to work on 09/24/2024.       If you have any questions or concerns, please don't hesitate to call.      Kristen Amaya PA-C

## 2024-09-22 NOTE — PROGRESS NOTES
Attestation/Supervisory note for HARINI Amaya      The patient is a 48-year-old female presenting to the emergency department for evaluation of right upper back pain that radiates into her chest and into her neck.  She states that it has been present for the past 2 to 3 days but has been increasingly painful.  She states it is a stabbing sharp pain.  She also has a baseline dull aching pain.  She denies any recent injury or trauma.  She states that the pain is so bad that it takes her breath away.  She denies any headache or visual changes.  No midline neck or back pain.  No palpitations.  No diaphoresis.  No fever or chills.  No cough or congestion.  No weakness or numbness.  No abdominal pain.  No nausea or vomiting.  No diarrhea or constipation.  No urinary complaints.  She states that she does have a history of hypertension but no history of diabetes or hyperlipidemia.  No history of CAD or ACS.  No history of PE or DVT.  She does not smoke.  No recent travel or immobility.  No recent surgery.  All pertinent positives and negatives are recorded above.  All other systems reviewed and otherwise negative.  Vital signs with hypertension but otherwise within normal limits.  Physical exam with a well-nourished well-developed female in no acute distress.  HEENT exam within normal limits.  She has no evidence of airway compromise or respiratory distress.  Abdominal exam is benign.  She does not have any gross motor, neurologic or vascular episodes on exam. her pulses are equal bilaterally.  She has no focal midline neck or back pain with palpation.  She does report pain with palpation of her right upper back but there is no visible or palpable bony deformity.  No joint laxity.        EKG with normal sinus rhythm at 80 bpm, LVH criteria, normal axis, normal ST segment, and a diffuse flattening of the T waves      IV Toradol, Lidoderm patch IV Solu-Medrol and IM Norflex ordered.      Diagnostic labs with mild electrolyte  imbalance but otherwise unremarkable.      Initial troponin 4.  Repeat troponin pending at the time of my departure.      Oral KCl ordered      XR scapula right   Final Result   1. No acute fracture or malalignment of the right scapula        MACRO:   None        Signed by: Tiffany Garner 9/22/2024 4:57 PM   Dictation workstation:   [id number]      XR chest 1 view   Final Result   1. No acute cardiopulmonary process.        MACRO:   None.        Signed by: Tiffany Garner 9/22/2024 4:57 PM   Dictation workstation:   [id number]      CT angio chest abdomen pelvis    (Results Pending)        The patient does not have any evidence of ischemia on EKG or cardiac enzymes.  No events on telemetry.  She does not have any evidence of airway compromise or respiratory distress at this time.  She is hemodynamically stable.  She does not have any neurologic or vascular deficits on exam.  Chest x-ray without acute process.  No evidence of pneumonia or pneumothorax.  No evidence CHF.  No widening of the mediastinum.  Pulses are equal bilaterally.  CT angio chest and repeat troponin results were pending at the time of my departure.      HARINI Amaya will continue to manage the patient primarily.  Anticipate disposition based on the results of the CT angio chest and the repeat troponin T.  If these results do not show any indication for admission and/or transfer, anticipate that the patient will be released to go home with close outpatient follow-up with her primary care physician and a referral to cardiology.      Impression/diagnosis:  Right upper back pain  Hypertension, unspecified  Electrolyte imbalance with mild hypokalemia      Critical care time billing is not warranted at this time      I personally saw the patient and made/approve the management plan and take responsibility for the patient management.      I independently interpreted the following study (S) EKG and diagnostic labs      I personally discussed the  patient's management with the patient      I reviewed the results of the diagnostic labs and diagnostic imaging.  Formal radiology read was completed by the radiologist.      Sita Davidson MD

## 2024-09-22 NOTE — DISCHARGE INSTRUCTIONS
Please take the Medrol Dosepak as prescribed starting tomorrow and use Percocet as needed for severe pain and rotate with Tylenol and Motrin as needed for mild to moderate pain.  Please follow-up with your primary care provider and your rheumatologist for further evaluation of symptoms.  Please also follow-up with the cardiologist provided below for further evaluation of your chest pain.  Present back to ER for any new onset or worsening of symptoms despite treatment.    It is important to remember that your care does not end here and you must continue to monitor your condition closely. Please return to the emergency department for any worsening or concerning signs or symptoms as directed by our conversations and the discharge instructions. If you do not have a doctor please contact the referral number on your discharge instructions. Please contact any physician specialists provided in your discharge notes as it is very important to follow up with them regarding your condition. If you are unable to reach the physicians provided, please come back to the Emergency Department at any time.

## 2024-09-22 NOTE — ED PROVIDER NOTES
HPI   Chief Complaint   Patient presents with    Shoulder Pain     Right shoulder pain radiating to chest. Denies any falls or trauma. Reports chest pain, denies sob. Rheumatologist states that her last lab markers were high per patient        HPI  Patient is a 49-year-old female with history of rheumatoid arthritis presenting for evaluation of right scapular pain radiating to the chest and into the neck that started on Friday.  States that the pain has became unbearable.  States it is a sharp stabbing pain in her right scapular region.  She states she has taken Tylenol and Motrin without symptom relief.  States she did call her rheumatologist on Friday who was not able to see her on a video call.  She denies shortness of breath.  She states she typically does get flareups of pain with her rheumatoid arthritis but states it is typically in her left shoulder and not in her right.  She states it does hurt when she moves her arm and she is not able to get in a comfortable position but denies any injury or trauma to the area.  Denies recent surgery or travel.      Patient History   Past Medical History:   Diagnosis Date    Acid reflux     Diabetes mellitus (Multi)     DM (diabetes mellitus), gestational ([facility])     Hypertension     Rheumatoid arthritis (Multi)      Past Surgical History:   Procedure Laterality Date    CT GUIDED SOFT TISSUE FLUID DRAIN  06/02/2022    CT GUIDED SOFT TISSUE FLUID DRAIN [facility]    HYSTERECTOMY  2012    LAPAROSCOPY GASTRECTOMY PARTIAL / TOTAL  2017    vertical sleeve    NECK SURGERY      OTHER SURGICAL HISTORY  11/22/2017    laparoscopic cholecystectomy wiht intraoperative cholangiogram, lysis of adhesions, mesh repair of incisional henia using 6 in round ventralight st mesh    OTHER SURGICAL HISTORY Left     revision x 2 hip    SPINE SURGERY  02/14/2023    L4-L5 SPINAL SURGERY ([facility])    US GUIDED SOFT TISSUE FLUID DRAIN  09/30/2021    US GUIDED SOFT TISSUE FLUID  [facility]     Family History   Problem Relation Name Age of Onset    Diabetes Mother      Hypertension Mother      Other (morbid obesity) Mother      Other (htn) Father      Arthritis Other      Psoriasis Neg Hx      Irritable bowel syndrome Neg Hx       Social History     Tobacco Use    Smoking status: Never    Smokeless tobacco: Never   Vaping Use    Vaping status: Never Used   Substance Use Topics    Alcohol use: Not Currently    Drug use: Never       Physical Exam   ED Triage Vitals [09/22/24 1602]   Temperature Heart Rate Respirations BP   36.9 °C (98.4 °F) 76 18 (!) 152/103      Pulse Ox Temp Source Heart Rate Source Patient Position   98 % Temporal -- --      BP Location FiO2 (%)     -- --       Physical Exam  Vitals and nursing note reviewed.   Constitutional:       General: She is not in acute distress.     Appearance: She is well-developed.   HENT:      Head: Normocephalic and atraumatic.   Eyes:      Conjunctiva/sclera: Conjunctivae normal.   Cardiovascular:      Rate and Rhythm: Normal rate and regular rhythm.      Heart sounds: No murmur heard.  Pulmonary:      Effort: Pulmonary effort is normal. No respiratory distress.      Breath sounds: Normal breath sounds.   Abdominal:      Palpations: Abdomen is soft.      Tenderness: There is no abdominal tenderness.   Musculoskeletal:         General: No swelling.      Cervical back: Normal range of motion and neck supple. No rigidity or tenderness.      Comments: Tenderness to palpation of the paraspinal musculature between the scapula and the thoracic spine, radial pulses +2 bilaterally, capillary refill less than 2 seconds.  Intact sensation of the right upper extremity.   Skin:     General: Skin is warm and dry.      Capillary Refill: Capillary refill takes less than 2 seconds.   Neurological:      Mental Status: She is alert.      Comments: Intact 5 out of 5 strength of the right upper extremity   Psychiatric:         Mood and Affect: Mood  normal.           ED Course & MDM   Diagnoses as of 09/25/24 0736   Acute pain of right shoulder   Chest pain, unspecified type                 No data recorded     [place] Coma Scale Score: 15 (09/22/24 1623 : Lulú Bernal RN)                           Medical Decision Making  Parts of this chart have been completed using voice recognition software. Please excuse any errors of transcription.  My thought process and reason for plan has been formulated from the time that I saw the patient until the time of disposition and is not specific to one specific moment during their visit and furthermore my MDM encompasses this entire chart and not only this text box.      HPI: Detailed above.    Exam: A medically appropriate exam performed, outlined above, given the known history and presentation.    History obtained from: Patient    EKG: Interpreted by attending physician    Social Determinants of Health considered during this visit: Lives independently    Medications given during visit:  Medications   lidocaine 4 % patch 1 patch (1 patch transdermal Medication Applied 9/22/24 1630)   potassium chloride CR (Klor-Con M20) ER tablet 20 mEq (20 mEq oral Given 9/22/24 1750)   ketorolac (Toradol) injection 30 mg (30 mg intravenous Given 9/22/24 1629)   orphenadrine (Norflex) injection 60 mg (60 mg intramuscular Given 9/22/24 1630)   methylPREDNISolone sod succinate (SOLU-Medrol) injection 125 mg (125 mg intravenous Given 9/22/24 1629)   iohexol (OMNIPaque) 350 mg iodine/mL solution 75 mL (75 mL intravenous Given 9/22/24 1720)        Diagnostic/tests  Labs Reviewed   CBC WITH AUTO DIFFERENTIAL - Abnormal       Result Value    WBC 11.2      nRBC 0.0      RBC 4.82      Hemoglobin 14.9      Hematocrit 43.6      MCV 91      MCH 30.9      MCHC 34.2      RDW 13.8      Platelets 384      Neutrophils % 74.7      Immature Granulocytes %, Automated 0.6      Lymphocytes % 18.4      Monocytes % 5.5      Eosinophils % 0.4      Basophils %  0.4      Neutrophils Absolute 8.40 (*)     Immature Granulocytes Absolute, Automated 0.07      Lymphocytes Absolute 2.07      Monocytes Absolute 0.62      Eosinophils Absolute 0.04      Basophils Absolute 0.04     COMPREHENSIVE METABOLIC PANEL - Abnormal    Glucose 105 (*)     Sodium 139      Potassium 3.2 (*)     Chloride 103      Bicarbonate 26      Anion Gap 13      Urea Nitrogen 15      Creatinine 0.78      eGFR >90      Calcium 9.5      Albumin 4.3      Alkaline Phosphatase 76      Total Protein 7.6      AST 15      Bilirubin, Total 0.7      ALT 16     SERIAL TROPONIN-INITIAL - Normal    Troponin I, High Sensitivity 4      Narrative:     Less than 99th percentile of normal range cutoff-  Female and children under 18 years old <14 ng/L; Male <21 ng/L: Negative  Repeat testing should be performed if clinically indicated.     Female and children under 18 years old 14-50 ng/L; Male 21-50 ng/L:  Consistent with possible cardiac damage and possible increased clinical   risk. Serial measurements may help to assess extent of myocardial damage.     >50 ng/L: Consistent with cardiac damage, increased clinical risk and  myocardial infarction. Serial measurements may help assess extent of   myocardial damage.      NOTE: Children less than 1 year old may have higher baseline troponin   levels and results should be interpreted in conjunction with the overall   clinical context.     NOTE: Troponin I testing is performed using a different   testing methodology at [facility] than at other   [facility]. Direct result comparisons should only   be made within the same method.   TROPONIN SERIES- (INITIAL, 1 HR)    Narrative:     The following orders were created for panel order Troponin I Series, High Sensitivity (0, 1 HR).  Procedure                               Abnormality         Status                     ---------                               -----------         ------                     Troponin I, High  Sensiti...[[id number]]  Normal              Final result               Troponin, High Sensitivi...[[id number]]                      In process                   Please view results for these tests on the individual orders.   SERIAL TROPONIN, 1 HOUR      XR scapula right   Final Result   1. No acute fracture or malalignment of the right scapula        MACRO:   None        Signed by: Tiffany Garner 9/22/2024 4:57 PM   Dictation workstation:   [id number]      XR chest 1 view   Final Result   1. No acute cardiopulmonary process.        MACRO:   None.        Signed by: Tiffany Garner 9/22/2024 4:57 PM   Dictation workstation:   [id number]      CT angio chest abdomen pelvis    (Results Pending)        Considerations/further MDM:  Patient is a 48-year-old female presenting for evaluation of right shoulder pain, chest pain          Procedure  Procedures        Attestation/Supervisory note for HARINI Amaya        The patient is a 48-year-old female presenting to the emergency department for evaluation of right upper back pain that radiates into her chest and into her neck.  She states that it has been present for the past 2 to 3 days but has been increasingly painful.  She states it is a stabbing sharp pain.  She also has a baseline dull aching pain.  She denies any recent injury or trauma.  She states that the pain is so bad that it takes her breath away.  She denies any headache or visual changes.  No midline neck or back pain.  No palpitations.  No diaphoresis.  No fever or chills.  No cough or congestion.  No weakness or numbness.  No abdominal pain.  No nausea or vomiting.  No diarrhea or constipation.  No urinary complaints.  She states that she does have a history of hypertension but no history of diabetes or hyperlipidemia.  No history of CAD or ACS.  No history of PE or DVT.  She does not smoke.  No recent travel or immobility.  No recent surgery.  All pertinent positives and negatives are recorded above.  All  other systems reviewed and otherwise negative.  Vital signs with hypertension but otherwise within normal limits.  Physical exam with a well-nourished well-developed female in no acute distress.  HEENT exam within normal limits.  She has no evidence of airway compromise or respiratory distress.  Abdominal exam is benign.  She does not have any gross motor, neurologic or vascular episodes on exam. her pulses are equal bilaterally.  She has no focal midline neck or back pain with palpation.  She does report pain with palpation of her right upper back but there is no visible or palpable bony deformity.  No joint laxity.          EKG with normal sinus rhythm at 80 bpm, LVH criteria, normal axis, normal ST segment, and a diffuse flattening of the T waves        IV Toradol, Lidoderm patch IV Solu-Medrol and IM Norflex ordered.        Diagnostic labs with mild electrolyte imbalance but otherwise unremarkable.        Initial troponin 4.  Repeat troponin pending at the time of my departure.        Oral KCl ordered        XR scapula right   Final Result   1. No acute fracture or malalignment of the right scapula        MACRO:   None        Signed by: Tiffany Garner 9/22/2024 4:57 PM   Dictation workstation:   [id number]       XR chest 1 view   Final Result   1. No acute cardiopulmonary process.        MACRO:   None.        Signed by: Tiffany Garner 9/22/2024 4:57 PM   Dictation workstation:   [id number]       CT angio chest abdomen pelvis    (Results Pending)         The patient does not have any evidence of ischemia on EKG or cardiac enzymes.  No events on telemetry.  She does not have any evidence of airway compromise or respiratory distress at this time.  She is hemodynamically stable.  She does not have any neurologic or vascular deficits on exam.  Chest x-ray without acute process.  No evidence of pneumonia or pneumothorax.  No evidence CHF.  No widening of the mediastinum.  Pulses are equal bilaterally.  CT angio  chest and repeat troponin results were pending at the time of my departure.        HARINI Amaya will continue to manage the patient primarily.  Anticipate disposition based on the results of the CT angio chest and the repeat troponin T.  If these results do not show any indication for admission and/or transfer, anticipate that the patient will be released to go home with close outpatient follow-up with her primary care physician and a referral to cardiology.        Impression/diagnosis:  Right upper back pain  Hypertension, unspecified  Electrolyte imbalance with mild hypokalemia        Critical care time billing is not warranted at this time        I personally saw the patient and made/approve the management plan and take responsibility for the patient management.        I independently interpreted the following study (S) EKG and diagnostic labs        I personally discussed the patient's management with the patient        I reviewed the results of the diagnostic labs and diagnostic imaging.  Formal radiology read was completed by the radiologist.        MD Sita Chinchilla MD  09/25/24 1806

## 2024-09-23 LAB
ATRIAL RATE: 80 BPM
P AXIS: 43 DEGREES
P OFFSET: 201 MS
P ONSET: 150 MS
PR INTERVAL: 136 MS
Q ONSET: 218 MS
QRS COUNT: 14 BEATS
QRS DURATION: 80 MS
QT INTERVAL: 382 MS
QTC CALCULATION(BAZETT): 440 MS
QTC FREDERICIA: 420 MS
R AXIS: -4 DEGREES
T AXIS: 14 DEGREES
T OFFSET: 409 MS
VENTRICULAR RATE: 80 BPM

## 2024-09-24 ENCOUNTER — APPOINTMENT (OUTPATIENT)
Dept: PHYSICAL THERAPY | Facility: CLINIC | Age: 49
End: 2024-09-24
Payer: COMMERCIAL

## 2024-09-24 ENCOUNTER — HOSPITAL ENCOUNTER (EMERGENCY)
Facility: HOSPITAL | Age: 49
Discharge: HOME | End: 2024-09-24
Attending: STUDENT IN AN ORGANIZED HEALTH CARE EDUCATION/TRAINING PROGRAM
Payer: COMMERCIAL

## 2024-09-24 ENCOUNTER — APPOINTMENT (OUTPATIENT)
Dept: CARDIOLOGY | Facility: HOSPITAL | Age: 49
End: 2024-09-24
Payer: COMMERCIAL

## 2024-09-24 ENCOUNTER — APPOINTMENT (OUTPATIENT)
Dept: CARDIOLOGY | Facility: CLINIC | Age: 49
End: 2024-09-24
Payer: COMMERCIAL

## 2024-09-24 VITALS
WEIGHT: 223.99 LBS | TEMPERATURE: 97.9 F | RESPIRATION RATE: 18 BRPM | BODY MASS INDEX: 41.22 KG/M2 | HEART RATE: 67 BPM | HEIGHT: 62 IN | SYSTOLIC BLOOD PRESSURE: 144 MMHG | DIASTOLIC BLOOD PRESSURE: 97 MMHG | OXYGEN SATURATION: 99 %

## 2024-09-24 DIAGNOSIS — M79.10 GENERALIZED MUSCLE ACHE: ICD-10-CM

## 2024-09-24 DIAGNOSIS — E87.6 HYPOKALEMIA: Primary | ICD-10-CM

## 2024-09-24 LAB
ALBUMIN SERPL BCP-MCNC: 4.2 G/DL (ref 3.4–5)
ALP SERPL-CCNC: 66 U/L (ref 33–110)
ALT SERPL W P-5'-P-CCNC: 15 U/L (ref 7–45)
ANION GAP SERPL CALCULATED.3IONS-SCNC: 10 MMOL/L (ref 10–20)
AST SERPL W P-5'-P-CCNC: 16 U/L (ref 9–39)
B-HCG SERPL-ACNC: <2 MIU/ML
BASOPHILS # BLD AUTO: 0.02 X10*3/UL (ref 0–0.1)
BASOPHILS NFR BLD AUTO: 0.2 %
BILIRUB SERPL-MCNC: 0.4 MG/DL (ref 0–1.2)
BUN SERPL-MCNC: 19 MG/DL (ref 6–23)
CALCIUM SERPL-MCNC: 9.3 MG/DL (ref 8.6–10.3)
CARDIAC TROPONIN I PNL SERPL HS: 5 NG/L (ref 0–13)
CARDIAC TROPONIN I PNL SERPL HS: 5 NG/L (ref 0–13)
CHLORIDE SERPL-SCNC: 102 MMOL/L (ref 98–107)
CO2 SERPL-SCNC: 31 MMOL/L (ref 21–32)
CREAT SERPL-MCNC: 0.72 MG/DL (ref 0.5–1.05)
EGFRCR SERPLBLD CKD-EPI 2021: >90 ML/MIN/1.73M*2
EOSINOPHIL # BLD AUTO: 0.01 X10*3/UL (ref 0–0.7)
EOSINOPHIL NFR BLD AUTO: 0.1 %
ERYTHROCYTE [DISTWIDTH] IN BLOOD BY AUTOMATED COUNT: 13.9 % (ref 11.5–14.5)
FLUAV RNA RESP QL NAA+PROBE: NOT DETECTED
FLUBV RNA RESP QL NAA+PROBE: NOT DETECTED
GLUCOSE SERPL-MCNC: 111 MG/DL (ref 74–99)
HCT VFR BLD AUTO: 41.2 % (ref 36–46)
HGB BLD-MCNC: 13.9 G/DL (ref 12–16)
IMM GRANULOCYTES # BLD AUTO: 0.08 X10*3/UL (ref 0–0.7)
IMM GRANULOCYTES NFR BLD AUTO: 0.6 % (ref 0–0.9)
LYMPHOCYTES # BLD AUTO: 1.86 X10*3/UL (ref 1.2–4.8)
LYMPHOCYTES NFR BLD AUTO: 14.2 %
MCH RBC QN AUTO: 30.9 PG (ref 26–34)
MCHC RBC AUTO-ENTMCNC: 33.7 G/DL (ref 32–36)
MCV RBC AUTO: 92 FL (ref 80–100)
MONOCYTES # BLD AUTO: 0.71 X10*3/UL (ref 0.1–1)
MONOCYTES NFR BLD AUTO: 5.4 %
NEUTROPHILS # BLD AUTO: 10.39 X10*3/UL (ref 1.2–7.7)
NEUTROPHILS NFR BLD AUTO: 79.5 %
NRBC BLD-RTO: 0 /100 WBCS (ref 0–0)
PLATELET # BLD AUTO: 376 X10*3/UL (ref 150–450)
POTASSIUM SERPL-SCNC: 3.1 MMOL/L (ref 3.5–5.3)
PROT SERPL-MCNC: 7.4 G/DL (ref 6.4–8.2)
RBC # BLD AUTO: 4.5 X10*6/UL (ref 4–5.2)
SARS-COV-2 RNA RESP QL NAA+PROBE: NOT DETECTED
SODIUM SERPL-SCNC: 140 MMOL/L (ref 136–145)
WBC # BLD AUTO: 13.1 X10*3/UL (ref 4.4–11.3)

## 2024-09-24 PROCEDURE — 80053 COMPREHEN METABOLIC PANEL: CPT | Performed by: CLINICAL NURSE SPECIALIST

## 2024-09-24 PROCEDURE — 36415 COLL VENOUS BLD VENIPUNCTURE: CPT | Performed by: CLINICAL NURSE SPECIALIST

## 2024-09-24 PROCEDURE — 2500000004 HC RX 250 GENERAL PHARMACY W/ HCPCS (ALT 636 FOR OP/ED): Performed by: STUDENT IN AN ORGANIZED HEALTH CARE EDUCATION/TRAINING PROGRAM

## 2024-09-24 PROCEDURE — 2500000001 HC RX 250 WO HCPCS SELF ADMINISTERED DRUGS (ALT 637 FOR MEDICARE OP): Performed by: CLINICAL NURSE SPECIALIST

## 2024-09-24 PROCEDURE — 84484 ASSAY OF TROPONIN QUANT: CPT | Performed by: CLINICAL NURSE SPECIALIST

## 2024-09-24 PROCEDURE — 93005 ELECTROCARDIOGRAM TRACING: CPT | Mod: 59

## 2024-09-24 PROCEDURE — 84702 CHORIONIC GONADOTROPIN TEST: CPT | Performed by: CLINICAL NURSE SPECIALIST

## 2024-09-24 PROCEDURE — 99284 EMERGENCY DEPT VISIT MOD MDM: CPT | Mod: 25

## 2024-09-24 PROCEDURE — 87635 SARS-COV-2 COVID-19 AMP PRB: CPT | Performed by: CLINICAL NURSE SPECIALIST

## 2024-09-24 PROCEDURE — 93005 ELECTROCARDIOGRAM TRACING: CPT

## 2024-09-24 PROCEDURE — 85025 COMPLETE CBC W/AUTO DIFF WBC: CPT | Performed by: CLINICAL NURSE SPECIALIST

## 2024-09-24 PROCEDURE — 96374 THER/PROPH/DIAG INJ IV PUSH: CPT

## 2024-09-24 PROCEDURE — 2500000001 HC RX 250 WO HCPCS SELF ADMINISTERED DRUGS (ALT 637 FOR MEDICARE OP): Performed by: STUDENT IN AN ORGANIZED HEALTH CARE EDUCATION/TRAINING PROGRAM

## 2024-09-24 RX ORDER — POTASSIUM CHLORIDE 1.5 G/1.58G
40 POWDER, FOR SOLUTION ORAL ONCE
Status: COMPLETED | OUTPATIENT
Start: 2024-09-24 | End: 2024-09-24

## 2024-09-24 RX ORDER — METHOCARBAMOL 750 MG/1
750 TABLET, FILM COATED ORAL ONCE
Status: COMPLETED | OUTPATIENT
Start: 2024-09-24 | End: 2024-09-24

## 2024-09-24 RX ORDER — KETOROLAC TROMETHAMINE 30 MG/ML
15 INJECTION, SOLUTION INTRAMUSCULAR; INTRAVENOUS ONCE
Status: COMPLETED | OUTPATIENT
Start: 2024-09-24 | End: 2024-09-24

## 2024-09-24 ASSESSMENT — HEART SCORE
HEART SCORE: 3
ECG: NORMAL
RISK FACTORS: >2 RISK FACTORS OR HX OF ATHEROSCLEROTIC DISEASE
AGE: 45-64
HISTORY: SLIGHTLY SUSPICIOUS
TROPONIN: LESS THAN OR EQUAL TO NORMAL LIMIT

## 2024-09-24 ASSESSMENT — PAIN - FUNCTIONAL ASSESSMENT
PAIN_FUNCTIONAL_ASSESSMENT: 0-10
PAIN_FUNCTIONAL_ASSESSMENT: 0-10

## 2024-09-24 ASSESSMENT — PAIN SCALES - GENERAL
PAINLEVEL_OUTOF10: 5 - MODERATE PAIN
PAINLEVEL_OUTOF10: 10 - WORST POSSIBLE PAIN
PAINLEVEL_OUTOF10: 10 - WORST POSSIBLE PAIN

## 2024-09-24 ASSESSMENT — PAIN DESCRIPTION - PROGRESSION: CLINICAL_PROGRESSION: NOT CHANGED

## 2024-09-24 NOTE — ED PROVIDER NOTES
Department of Emergency Medicine   ED  Provider Note  Admit Date/RoomTime: 9/24/2024  9:58 AM  ED Room: [facility]        History of Present Illness:  Chief Complaint   Patient presents with    Chest Pain     Pt complains of intermittent chest pain, back pain, bilat shoulder pain and left arm pain starting Sunday.  Has had n/v.  Was seen at the ED on Sunday.          Kat Tineo is a 48 y.o. female   History of diabetes, reflux, hypertension, rheumatoid arthritis presented emergency department continued chest pain since Sunday.  Radiating into her right shoulder left axilla and right side of her neck.  Reports it hurts to turn her head to the right.  She complains of headaches body aches nausea no vomiting no diarrhea no pressure burning or frequency with urination.  She has had cough congestion runny nose.  Was seen evaluated in the emergency department 2 days ago for an extensive workup including laboratory data CT angio chest abdomen pelvis was discharged home with referral to cardiology Percocet Medrol Dosepak.  Patient reports last night she took her Percocet Medrol Dosepak woke up this morning with continued pain body aches nausea took 2 Tylenol and presents now to emergency department because she is worried about what is going on with her chest pain.  She denies any rashes lesions or sores no fall or injury  Review of Systems:   Pertinent positives and negatives are stated within HPI, all other systems reviewed and are negative.        --------------------------------------------- PAST HISTORY ---------------------------------------------  Past Medical History:  has a past medical history of Acid reflux, Diabetes mellitus (Multi), DM (diabetes mellitus), gestational (HHS-HCC), Hypertension, and Rheumatoid arthritis (Multi).  Past Surgical History:  has a past surgical history that includes US guided soft tissue fluid drain (09/30/2021); CT guided soft tissue fluid drainage (06/02/2022); Other surgical  history (11/22/2017); Neck surgery; Other surgical history (Left); Laparoscopy gastrectomy partial / total (2017); Spine surgery (02/14/2023); and Hysterectomy (2012).  Social History:  reports that she has never smoked. She has never used smokeless tobacco. She reports that she does not currently use alcohol. She reports that she does not use drugs.  Family History: family history includes Arthritis in an other family member; Diabetes in her mother; Hypertension in her mother; htn in her father; morbid obesity in her mother.. Unless otherwise noted, family history is non contributory  The patient’s home medications have been reviewed.  Allergies: Ace inhibitors, Adhesive tape-silicones, and Amoxicillin        ---------------------------------------------------PHYSICAL EXAM--------------------------------------    GENERAL APPEARANCE: Awake and alert.   VITAL SIGNS: As per the nurses' triage record.  Hypertension  HEENT: Normocephalic, atraumatic. Extraocular muscles are intact. Pupils equal round and reactive to light. Conjunctiva are pink. Negative scleral icterus. Mucous membranes are moist. Tongue in the midline. Pharynx was without erythema or exudates, uvula midline  NECK: Soft Nontender and supple, full gross ROM, no meningeal signs..  Tenderness to palpation range of motion of the right trapezius.  No nuchal rigidity stridor or trismus noted.  Trachea midline  CHEST: Nontender to palpation. Clear to auscultation bilaterally. No rales, rhonchi, or wheezing.   HEART: S1, S2. Regular rate and rhythm. No murmurs, gallops or rubs.  Strong and equal pulses in the extremities.   ABDOMEN: Soft, nontender, nondistended, positive bowel sounds, no palpable masses.  MUSCULCSKELETAL: The calves are nontender to palpation. Full gross active range of motion.  Peripheral pulses intact.  Generalized edema bilateral lower extremities nonpitting pain with palpation over the trapezius muscles.  Back: No pain palpation of the  "cervical thoracic or lumbar spine no step-offs crepitus or bruising noted.  NEUROLOGICAL: Awake, alert and oriented x 3. Power intact in the upper and lower extremities. Sensation is intact to light touch in the upper and lower extremities.   IMMUNOLOGICAL: No lymphatic streaking noted   DERM: No petechiae, rashes, or ecchymoses.          ------------------------- NURSING NOTES AND VITALS REVIEWED ---------------------------  The nursing notes within the ED encounter and vital signs as below have been reviewed by myself  BP (!) 144/97 (BP Location: Left arm, Patient Position: Sitting)   Pulse 67   Temp 36.6 °C (97.9 °F) (Temporal)   Resp 18   Ht 1.575 m (5' 2\")   Wt 102 kg (223 lb 15.8 oz)   SpO2 99%   BMI 40.97 kg/m²     Oxygen Saturation Interpretation: 98% room air    The cardiac monitor revealed sinus rhythm with a heart rate in the 60s as interpreted by me. The cardiac monitor was ordered secondary to the patient's heart rate and to monitor the patient for dysrhythmia.       The patient’s available past medical records and past encounters were reviewed.          -----------------------DIAGNOSTIC RESULTS------------------------  LABS:    Labs Reviewed   CBC WITH AUTO DIFFERENTIAL - Abnormal       Result Value    WBC 13.1 (*)     nRBC 0.0      RBC 4.50      Hemoglobin 13.9      Hematocrit 41.2      MCV 92      MCH 30.9      MCHC 33.7      RDW 13.9      Platelets 376      Neutrophils % 79.5      Immature Granulocytes %, Automated 0.6      Lymphocytes % 14.2      Monocytes % 5.4      Eosinophils % 0.1      Basophils % 0.2      Neutrophils Absolute 10.39 (*)     Immature Granulocytes Absolute, Automated 0.08      Lymphocytes Absolute 1.86      Monocytes Absolute 0.71      Eosinophils Absolute 0.01      Basophils Absolute 0.02     COMPREHENSIVE METABOLIC PANEL - Abnormal    Glucose 111 (*)     Sodium 140      Potassium 3.1 (*)     Chloride 102      Bicarbonate 31      Anion Gap 10      Urea Nitrogen 19      " Creatinine 0.72      eGFR >90      Calcium 9.3      Albumin 4.2      Alkaline Phosphatase 66      Total Protein 7.4      AST 16      Bilirubin, Total 0.4      ALT 15     HUMAN CHORIONIC GONADOTROPIN, SERUM QUANTITATIVE - Normal    HCG, Beta-Quantitative <2      Narrative:      Total HCG measurement is performed using the [employer] Access   Immunoassay which detects intact HCG and free beta HCG subunit.    This test is not indicated for use as a tumor marker.   HCG testing is performed using a different test methodology at [facility] ([facility] than other [facility] hospitals. Direct result comparison   should only be made within the same method.       SARS-COV-2 PCR - Normal    Coronavirus 2019, PCR Not Detected      Narrative:     This assay has received FDA Emergency Use Authorization (EUA) and is only authorized for the duration of time that circumstances exist to justify the authorization of the emergency use of in vitro diagnostic tests for the detection of SARS-CoV-2 virus and/or diagnosis of COVID-19 infection under section 564(b)(1) of the Act, 21 U.S.C. 360bbb-3(b)(1). This assay is an in vitro diagnostic nucleic acid amplification test for the qualitative detection of SARS-CoV-2 from nasopharyngeal specimens and has been validated for use at [facility]. Negative results do not preclude COVID-19 infections and should not be used as the sole basis for diagnosis, treatment, or other management decisions.     SERIAL TROPONIN-INITIAL - Normal    Troponin I, High Sensitivity 5      Narrative:     Less than 99th percentile of normal range cutoff-  Female and children under 18 years old <14 ng/L; Male <21 ng/L: Negative  Repeat testing should be performed if clinically indicated.     Female and children under 18 years old 14-50 ng/L; Male 21-50 ng/L:  Consistent with possible cardiac damage and possible increased clinical   risk. Serial measurements may help to assess extent of  myocardial damage.     >50 ng/L: Consistent with cardiac damage, increased clinical risk and  myocardial infarction. Serial measurements may help assess extent of   myocardial damage.      NOTE: Children less than 1 year old may have higher baseline troponin   levels and results should be interpreted in conjunction with the overall   clinical context.     NOTE: Troponin I testing is performed using a different   testing methodology at [facility] than at other   [facility]. Direct result comparisons should only   be made within the same method.   SERIAL TROPONIN, 1 HOUR - Normal    Troponin I, High Sensitivity 5      Narrative:     Less than 99th percentile of normal range cutoff-  Female and children under 18 years old <14 ng/L; Male <21 ng/L: Negative  Repeat testing should be performed if clinically indicated.     Female and children under 18 years old 14-50 ng/L; Male 21-50 ng/L:  Consistent with possible cardiac damage and possible increased clinical   risk. Serial measurements may help to assess extent of myocardial damage.     >50 ng/L: Consistent with cardiac damage, increased clinical risk and  myocardial infarction. Serial measurements may help assess extent of   myocardial damage.      NOTE: Children less than 1 year old may have higher baseline troponin   levels and results should be interpreted in conjunction with the overall   clinical context.     NOTE: Troponin I testing is performed using a different   testing methodology at [facility] than at other   [facility]. Direct result comparisons should only   be made within the same method.   INFLUENZA A AND B PCR - Normal    Flu A Result Not Detected      Flu B Result Not Detected      Narrative:     This assay is an in vitro diagnostic multiplex nucleic acid amplification test for the detection and discrimination of Influenza A & B from nasopharyngeal specimens, and has been validated for use at [facility]. Negative results do not preclude Influenza A/B infections, and should not be used as the sole basis for diagnosis, treatment, or other management decisions. If Influenza A/B and RSV PCR results are negative, testing for Parainfluenza virus, Adenovirus and Metapneumovirus is routinely performed for [facility] pediatric oncology and intensive care inpatients, and is available on other patients by placing an add-on request.   TROPONIN SERIES- (INITIAL, 1 HR)    Narrative:     The following orders were created for panel order Troponin I Series, High Sensitivity (0, 1 HR).  Procedure                               Abnormality         Status                     ---------                               -----------         ------                     Troponin I, High Sensiti...[[id number]]  Normal              Final result               Troponin, High Sensitivi...[[id number]]  Normal              Final result                 Please view results for these tests on the individual orders.       As interpreted by me, the above displayed labs are abnormal. All other labs obtained during this visit were within normal range or not returned as of this dictation.      EKG Interpretation  1011 EKG presented to me at 10:11 AM     EKG as interpreted by me shows normal sinus rhythm at a ventricular rate of 67 bpm, normal axis, normal intervals, no STEMI.   [DH]           No orders to display           No orders to display           ------------------------------ ED COURSE/MEDICAL DECISION MAKING----------------------  Medical Decision Making:   Exam: A medically appropriate exam performed, outlined above, given the known history and presentation.    History obtained from: Review of medical record nursing notes patient      Social Determinants of Health considered during this visit: Takes care of her self at home      PAST MEDICAL HISTORY/Chronic Conditions Affecting Care     has a past medical history of Acid reflux, Diabetes  mellitus (Multi), DM (diabetes mellitus), gestational (HHS-HCC), Hypertension, and Rheumatoid arthritis (Multi).       CC/HPI Summary, Social Determinants of health, Records Reviewed, DDx, testing done/not done, ED Course, Reassessment, disposition considerations/shared decision making with patient, consults, disposition:   Presents with back pain chest pain body aches cough congestion headache  Plan  Review of medical record 2 days ago patient had a workup for chest pain including CT chest abdomen pelvis no aneurysm noted.  EKG  CBC  CMP  Pregnancy  Troponin  Cardiac monitoring  Toradol  Robaxin  Potassium  COVID flu  Heart score 3    Medical Decision Making/Differential Diagnosis:  Differentials include not limited to musculoskeletal versus viral illness versus pneumonia versus electrolyte abnormality versus dehydration versus acute coronary syndrome.  No aneurysm noted on previous CT 2 days ago.  Patient denies any change in her pain.  Review  Troponin 5 repeat troponin 5  White blood cell count 13.1 currently on steroids  Hemoglobin 13.9  COVID-negative  Flu negative  Pregnancy negative  Glucose 111  Potassium 3.1 otherwise electrolytes unremarkable  Normal renal function  Normal LFTs  Patient was medicated in the emergency department for improvement of headache chest pain.  Heart score is a 3.  Patient has follow-up with cardiology  first week of October.  EKG per attending note no ST elevation or arrhythmia no STEMI.  Troponins negative.  COVID flu negative.  Pregnancy negative.  Mild leukocytosis likely secondary to recent steroid use.  No pneumonia noted on CT previously obtained.  COVID flu are negative.  She is not anemic.  Glucose mildly elevated no signs of DKA.  Potassium low replaced in the emergency department.  Normal LFTs normal renal function.  Patient blood pressure elevated has history of hypertension did not take her blood pressure medication today.  Based on her clinical presentation history  and symptoms consistent with myalgias, hypokalemia, chest pain.  Hypertension history of the same patient follow-up with primary care physician for reevaluation to her blood pressure daily.  Will follow-up with her cardiology referral.  She is return with any worsening symptoms or concerns patient seen evaluated with attending physician Dr. Hawkins discharge per his instructions.  CMT for discharge utilized for discharge planning  Upon reevaluation patient does report improvement of symptoms.  Headache improved.  PROCEDURES  Unless otherwise noted below, none      CONSULTS:   None      ED Course as of 09/24/24 1717   Tue Sep 24, 2024   1011 EKG presented to me at 10:11 AM     EKG as interpreted by me shows normal sinus rhythm at a ventricular rate of 67 bpm, normal axis, normal intervals, no STEMI.   [DH]   1209 Patient states she is not feeling much better other than her headache has improved.  She still has chest pain with generalized aching all over. [TB]      ED Course User Index  [DH] Roberto Hawkins MD  [TB] Joyce Carvajal, APRN-CNP         Diagnoses as of 09/24/24 1717   Hypokalemia   Generalized muscle ache         This patient has remained hemodynamically stable during their ED course.      Critical Care: none       Counseling:  The emergency provider has spoken with the patient and discussed today’s results, in addition to providing specific details for the plan of care and counseling regarding the diagnosis and prognosis.  Questions are answered at this time and they are agreeable with the plan.         --------------------------------- IMPRESSION AND DISPOSITION ---------------------------------    IMPRESSION  1. Hypokalemia    2. Generalized muscle ache        DISPOSITION  Disposition: Discharge home  Patient condition is stable improved        NOTE: This report was transcribed using voice recognition software. Every effort was made to ensure accuracy; however, inadvertent computerized transcription errors may  be present      Joyce Carvajal, PRACHI-CNP  09/24/24 [id number]

## 2024-09-24 NOTE — ED PROVIDER NOTES
ED Supervising Attending Note & Attestation   I was the Supervising Physician. I have seen face to face and examined the patient; reviewed history and physical, performed a substantive portion of the encounter, and discussed the medical decision making with the Medical Student, Resident, Fellow, PA and/or NP.     I personally saw the patient and made/approve the management plan and take responsibility for the patient management:     48-year-old female with past medical history of hypertension who presents emergency room with generalized body aches.  Patient notes pain over her bilateral upper shoulders, notes that this has been going on for several days now.  Patient was seen in the emergency room 2 days prior and had extensive workup which was negative.  Patient also complains of chest pain as well as cough but no significant shortness of breath, patient took Percocet at home with some improvement but notes no alleviation of her pain or significant change in character.  She otherwise denies any fevers, chills, vomiting but does endorse nausea.    ED Triage Vitals   Temperature Heart Rate Respirations BP   09/24/24 0957 09/24/24 0957 09/24/24 0957 09/24/24 0957   36.6 °C (97.9 °F) 68 16 (!) 153/99      Pulse Ox Temp src Heart Rate Source Patient Position   09/24/24 0957 -- 09/24/24 1300 --   98 %  Monitor       BP Location FiO2 (%)     -- --             Vital signs reviewed: Afebrile, nontachycardic, normotensive, 98% on room air    Exam     Constitutional: No acute distress. Resting comfortably.   Head: Normocephalic, atraumatic.   Eyes: Pupils equal bilaterally, EOM grossly intact, conjunctiva normal.  Mouth/Throat: Oropharynx is clear, moist mucus membranes.   Neck: Supple. No lymphadenopathy.  Cardiovascular: Regular rate and regular rhythm. Extremities are well-perfused.   Pulmonary/Chest: No respiratory distress, breathing comfortably on room air.    Abdominal: Soft, non-tender, non-distended. No rebound or  guarding.   Musculoskeletal: No lower extremity edema.  Pain to palpation over bilateral trapezoids.      Skin: Warm, dry, and intact.   Neurological: Patient is oriented to person, place, time, and situation. Face symmetric, hearing intact to voice, speech normal. Moves all extremities.       Differential includes but is not limited to:  Viral illness versus ACS versus pneumothorax versus pneumonia      Labs: ordered.  Labs Reviewed   CBC WITH AUTO DIFFERENTIAL - Abnormal       Result Value    WBC 13.1 (*)     nRBC 0.0      RBC 4.50      Hemoglobin 13.9      Hematocrit 41.2      MCV 92      MCH 30.9      MCHC 33.7      RDW 13.9      Platelets 376      Neutrophils % 79.5      Immature Granulocytes %, Automated 0.6      Lymphocytes % 14.2      Monocytes % 5.4      Eosinophils % 0.1      Basophils % 0.2      Neutrophils Absolute 10.39 (*)     Immature Granulocytes Absolute, Automated 0.08      Lymphocytes Absolute 1.86      Monocytes Absolute 0.71      Eosinophils Absolute 0.01      Basophils Absolute 0.02     COMPREHENSIVE METABOLIC PANEL - Abnormal    Glucose 111 (*)     Sodium 140      Potassium 3.1 (*)     Chloride 102      Bicarbonate 31      Anion Gap 10      Urea Nitrogen 19      Creatinine 0.72      eGFR >90      Calcium 9.3      Albumin 4.2      Alkaline Phosphatase 66      Total Protein 7.4      AST 16      Bilirubin, Total 0.4      ALT 15     HUMAN CHORIONIC GONADOTROPIN, SERUM QUANTITATIVE - Normal    HCG, Beta-Quantitative <2      Narrative:      Total HCG measurement is performed using the [employer] Access   Immunoassay which detects intact HCG and free beta HCG subunit.    This test is not indicated for use as a tumor marker.   HCG testing is performed using a different test methodology at [facility] than other [facility]. Direct result comparison   should only be made within the same method.       SARS-COV-2 PCR - Normal    Coronavirus 2019, PCR Not Detected      Narrative:      This assay has received FDA Emergency Use Authorization (EUA) and is only authorized for the duration of time that circumstances exist to justify the authorization of the emergency use of in vitro diagnostic tests for the detection of SARS-CoV-2 virus and/or diagnosis of COVID-19 infection under section 564(b)(1) of the Act, 21 U.S.C. 360bbb-3(b)(1). This assay is an in vitro diagnostic nucleic acid amplification test for the qualitative detection of SARS-CoV-2 from nasopharyngeal specimens and has been validated for use at [facility]. Negative results do not preclude COVID-19 infections and should not be used as the sole basis for diagnosis, treatment, or other management decisions.     SERIAL TROPONIN-INITIAL - Normal    Troponin I, High Sensitivity 5      Narrative:     Less than 99th percentile of normal range cutoff-  Female and children under 18 years old <14 ng/L; Male <21 ng/L: Negative  Repeat testing should be performed if clinically indicated.     Female and children under 18 years old 14-50 ng/L; Male 21-50 ng/L:  Consistent with possible cardiac damage and possible increased clinical   risk. Serial measurements may help to assess extent of myocardial damage.     >50 ng/L: Consistent with cardiac damage, increased clinical risk and  myocardial infarction. Serial measurements may help assess extent of   myocardial damage.      NOTE: Children less than 1 year old may have higher baseline troponin   levels and results should be interpreted in conjunction with the overall   clinical context.     NOTE: Troponin I testing is performed using a different   testing methodology at [facility] than at other   [facility]. Direct result comparisons should only   be made within the same method.   SERIAL TROPONIN, 1 HOUR - Normal    Troponin I, High Sensitivity 5      Narrative:     Less than 99th percentile of normal range cutoff-  Female and children under 18 years old  <14 ng/L; Male <21 ng/L: Negative  Repeat testing should be performed if clinically indicated.     Female and children under 18 years old 14-50 ng/L; Male 21-50 ng/L:  Consistent with possible cardiac damage and possible increased clinical   risk. Serial measurements may help to assess extent of myocardial damage.     >50 ng/L: Consistent with cardiac damage, increased clinical risk and  myocardial infarction. Serial measurements may help assess extent of   myocardial damage.      NOTE: Children less than 1 year old may have higher baseline troponin   levels and results should be interpreted in conjunction with the overall   clinical context.     NOTE: Troponin I testing is performed using a different   testing methodology at [facility] than at [facility]. Direct result comparisons should only   be made within the same method.   INFLUENZA A AND B PCR - Normal    Flu A Result Not Detected      Flu B Result Not Detected      Narrative:     This assay is an in vitro diagnostic multiplex nucleic acid amplification test for the detection and discrimination of Influenza A & B from nasopharyngeal specimens, and has been validated for use at [facility]. Negative results do not preclude Influenza A/B infections, and should not be used as the sole basis for diagnosis, treatment, or other management decisions. If Influenza A/B and RSV PCR results are negative, testing for Parainfluenza virus, Adenovirus and Metapneumovirus is routinely performed for [facility] pediatric oncology and intensive care inpatients, and is available on other patients by placing an add-on request.   TROPONIN SERIES- (INITIAL, 1 HR)    Narrative:     The following orders were created for panel order Troponin I Series, High Sensitivity (0, 1 HR).  Procedure                               Abnormality         Status                     ---------                               -----------         ------                      Troponin I, High Sensiti...[[id number]]  Normal              Final result               Troponin, High Sensitivi...[[id number]]  Normal              Final result                 Please view results for these tests on the individual orders.       Radiology: ordered and independent interpretation performed.  Xray(s) chest x-ray as interpreted by me shows no large pneumothorax at this time    ECG/medicine tests: ordered and independent interpretation performed.  ED Course as of 09/24/24 1313   Tue Sep 24, 2024   1011 EKG presented to me at 10:11 AM     EKG as interpreted by me shows normal sinus rhythm at a ventricular rate of 67 bpm, normal axis, normal intervals, no STEMI.   [DH]   1209 Patient states she is not feeling much better other than her headache has improved.  She still has chest pain with generalized aching all over. [TB]      ED Course User Index  [DH] Roberto Hawkins MD  [TB] Joyce Carvajal, APRN-CNP         Diagnoses as of 09/24/24 1313   Hypokalemia   Generalized muscle ache       Lab work as interpreted by me shows mild leukocytosis, no anemia, CMP within normal limits.  Patient does have mild hypokalemia, will replete at this time.  Troponin x 2 is negative and viral panel is negative, patient not pregnant.  Patient given Toradol and Robaxin with notable improvement of her symptoms, suspect her symptoms to be related to muscle aches/viral illness.  She otherwise is well-appearing, does not have significant past medical history, has had multiple workups recently both of which have been negative from a cardiac perspective and will follow-up otherwise outpatient with her primary care provider.    PLAN AND FOLLOW-UP: Patient counseled on all findings, diagnosis and treatment plan. Patient's questions and concerns addressed. Patient stable, discharged with instructions to follow up with PMD, and to return to ED at any time for worsening symptoms or any other concerns. Patient demonstrates understanding  of the findings and the importance of appropriate follow up care.    ED Medications managed:    Medications   ketorolac (Toradol) injection 15 mg (15 mg intravenous Given 9/24/24 1018)   methocarbamol (Robaxin) tablet 750 mg (750 mg oral Given 9/24/24 1029)   potassium chloride (Klor-Con) packet 40 mEq (40 mEq oral Given 9/24/24 1117)       PATIENT REFERRED TO:  Dagoberto Garza DO  [address] [place], [place] [address]  [phone]            DISCHARGE MEDICATIONS:  New Prescriptions    No medications on file       Roberto Hawkins MD  1:13 PM    Attending Emergency Physician  [facility] EMERGENCY MEDICINE           Roberto Hawkins MD  09/28/24 9929

## 2024-09-26 ENCOUNTER — APPOINTMENT (OUTPATIENT)
Dept: PHYSICAL THERAPY | Facility: CLINIC | Age: 49
End: 2024-09-26
Payer: COMMERCIAL

## 2024-09-28 LAB
ATRIAL RATE: 67 BPM
P AXIS: 51 DEGREES
P OFFSET: 206 MS
P ONSET: 150 MS
PR INTERVAL: 138 MS
Q ONSET: 219 MS
QRS COUNT: 11 BEATS
QRS DURATION: 92 MS
QT INTERVAL: 414 MS
QTC CALCULATION(BAZETT): 437 MS
QTC FREDERICIA: 429 MS
R AXIS: 8 DEGREES
T AXIS: 15 DEGREES
T OFFSET: 426 MS
VENTRICULAR RATE: 67 BPM

## 2024-10-01 ENCOUNTER — APPOINTMENT (OUTPATIENT)
Dept: PHYSICAL THERAPY | Facility: CLINIC | Age: 49
End: 2024-10-01
Payer: COMMERCIAL

## 2024-10-03 ENCOUNTER — OFFICE VISIT (OUTPATIENT)
Dept: CARDIOLOGY | Facility: CLINIC | Age: 49
End: 2024-10-03
Payer: COMMERCIAL

## 2024-10-03 ENCOUNTER — APPOINTMENT (OUTPATIENT)
Dept: PHYSICAL THERAPY | Facility: CLINIC | Age: 49
End: 2024-10-03
Payer: COMMERCIAL

## 2024-10-03 ENCOUNTER — PATIENT MESSAGE (OUTPATIENT)
Dept: PRIMARY CARE | Facility: CLINIC | Age: 49
End: 2024-10-03

## 2024-10-03 VITALS
WEIGHT: 223 LBS | SYSTOLIC BLOOD PRESSURE: 126 MMHG | OXYGEN SATURATION: 97 % | HEIGHT: 63 IN | HEART RATE: 86 BPM | DIASTOLIC BLOOD PRESSURE: 91 MMHG | BODY MASS INDEX: 39.51 KG/M2

## 2024-10-03 DIAGNOSIS — K04.7 DENTAL INFECTION: Primary | ICD-10-CM

## 2024-10-03 DIAGNOSIS — I10 HYPERTENSION, UNSPECIFIED TYPE: Primary | ICD-10-CM

## 2024-10-03 DIAGNOSIS — K04.7 DENTAL INFECTION: ICD-10-CM

## 2024-10-03 DIAGNOSIS — Z98.890 HISTORY OF ARTHROPLASTY: ICD-10-CM

## 2024-10-03 PROCEDURE — 3044F HG A1C LEVEL LT 7.0%: CPT | Performed by: NURSE PRACTITIONER

## 2024-10-03 PROCEDURE — 99214 OFFICE O/P EST MOD 30 MIN: CPT | Performed by: NURSE PRACTITIONER

## 2024-10-03 PROCEDURE — 3008F BODY MASS INDEX DOCD: CPT | Performed by: NURSE PRACTITIONER

## 2024-10-03 PROCEDURE — 3080F DIAST BP >= 90 MM HG: CPT | Performed by: NURSE PRACTITIONER

## 2024-10-03 PROCEDURE — 3074F SYST BP LT 130 MM HG: CPT | Performed by: NURSE PRACTITIONER

## 2024-10-03 PROCEDURE — 1036F TOBACCO NON-USER: CPT | Performed by: NURSE PRACTITIONER

## 2024-10-03 RX ORDER — CLINDAMYCIN HYDROCHLORIDE 300 MG/1
CAPSULE ORAL
Qty: 2 CAPSULE | Refills: 6 | Status: SHIPPED | OUTPATIENT
Start: 2024-10-03 | End: 2024-10-03 | Stop reason: WASHOUT

## 2024-10-03 RX ORDER — METOPROLOL SUCCINATE 50 MG/1
50 TABLET, EXTENDED RELEASE ORAL DAILY
Qty: 90 TABLET | Refills: 3 | Status: SHIPPED | OUTPATIENT
Start: 2024-10-03 | End: 2025-10-03

## 2024-10-03 RX ORDER — CEPHALEXIN 500 MG/1
500 CAPSULE ORAL 2 TIMES DAILY
Qty: 6 CAPSULE | Refills: 0 | Status: SHIPPED | OUTPATIENT
Start: 2024-10-03 | End: 2024-10-03 | Stop reason: WASHOUT

## 2024-10-03 ASSESSMENT — ENCOUNTER SYMPTOMS
CONSTITUTIONAL NEGATIVE: 1
DEPRESSION: 0
GASTROINTESTINAL NEGATIVE: 1
RESPIRATORY NEGATIVE: 1
HEADACHES: 1

## 2024-10-03 NOTE — PROGRESS NOTES
Subjective   Reason for Visit: Kat Tineo is an 48 y.o. female here for a Medicare Wellness visit.               HPI    Patient Care Team:  Dagoberto Garza DO as PCP - General  Pb Jones DO as PCP - [facility] PCP  Charles Brasher PA-C as PCP - [facility] PCP  Ryan Quigley MD as Referring Physician (Hematology and Oncology)     Review of Systems    Objective   Vitals:  There were no vitals taken for this visit.      Physical Exam    Assessment & Plan

## 2024-10-03 NOTE — PROGRESS NOTES
"Chief Complaint:   Hospital Follow-up    History Of Present Illness:    Belkis Tineo is a 48 y.o. female for hospital follow up. Denies chest pain, sob, palpitations or pedal edema. Has chronic likely non cardiac chest pain and goes to the ED.  Sees rheumatology for RA pain.  Reports headaches helped with tylenol and not migraine in nature.             Last Recorded Vitals:  Blood pressure (!) 126/91, pulse 86, height 1.588 m (5' 2.5\"), weight 101 kg (223 lb), SpO2 97%.     Past Medical History:  Past Medical History:   Diagnosis Date    Acid reflux     Diabetes mellitus (Multi)     DM (diabetes mellitus), gestational     Hypertension     Rheumatoid arthritis         Past Surgical History:  Past Surgical History:   Procedure Laterality Date    CT GUIDED SOFT TISSUE FLUID DRAIN  06/02/2022    CT GUIDED SOFT TISSUE FLUID DRAIN [facility]    HYSTERECTOMY  2012    LAPAROSCOPY GASTRECTOMY PARTIAL / TOTAL  2017    vertical sleeve    NECK SURGERY      OTHER SURGICAL HISTORY  11/22/2017    laparoscopic cholecystectomy wiht intraoperative cholangiogram, lysis of adhesions, mesh repair of incisional henia using 6 in round ventralight st mesh    OTHER SURGICAL HISTORY Left     revision x 2 hip    SPINE SURGERY  02/14/2023    L4-L5 SPINAL SURGERY ([facility])    US GUIDED SOFT TISSUE FLUID DRAIN  09/30/2021    US GUIDED SOFT TISSUE FLUID DRAIN [facility]       Social History:  Social History     Socioeconomic History    Marital status:    Tobacco Use    Smoking status: Never    Smokeless tobacco: Never   Vaping Use    Vaping status: Never Used   Substance and Sexual Activity    Alcohol use: Not Currently    Drug use: Never    Sexual activity: Yes     Social Determinants of Health     Financial Resource Strain: Low Risk  (3/27/2024)    Overall Financial Resource Strain (CARDIA)     Difficulty of Paying Living Expenses: Not hard at all   Transportation Needs: No Transportation Needs (3/27/2024)    " PRAPARE - Transportation     Lack of Transportation (Medical): No     Lack of Transportation (Non-Medical): No   Physical Activity: Insufficiently Active (3/27/2024)    Exercise Vital Sign     Days of Exercise per Week: 3 days     Minutes of Exercise per Session: 30 min   Housing Stability: Low Risk  (3/27/2024)    Housing Stability Vital Sign     Unable to Pay for Housing in the Last Year: No     Number of Places Lived in the Last Year: 1     Unstable Housing in the Last Year: No       Family History:  Family History   Problem Relation Name Age of Onset    Diabetes Mother      Hypertension Mother      Other (morbid obesity) Mother      Other (htn) Father      Arthritis Other      Psoriasis Neg Hx      Irritable bowel syndrome Neg Hx           Allergies:  Ace inhibitors, Adhesive tape-silicones, and Amoxicillin    Outpatient Medications:  Current Outpatient Medications   Medication Sig Dispense Refill    amLODIPine (Norvasc) 10 mg tablet TAKE 1 TABLET BY MOUTH EVERY DAY 90 tablet 1    DULoxetine (Cymbalta) 60 mg DR capsule TAKE 1 CAPSULE BY MOUTH EVERY DAY 90 capsule 1    losartan-hydrochlorothiazide (Hyzaar) 100-25 mg tablet Take 1 tablet by mouth once daily. 90 tablet 3    multivitamin tablet Take 1 tablet by mouth once daily.      omeprazole (PriLOSEC) 40 mg DR capsule Take 1 capsule (40 mg) by mouth 2 times a day. Open capsule. Do not crush or chew beads. 60 capsule 2    rizatriptan (Maxalt) 10 mg tablet TAKE 1 TABLET AT ONSET OF HEADACHE. MAY REPEAT EVERY 2 HOURS AS NEEDED. MAXIMUM 3 TABLETS/24 HOURS. 9 tablet 4    tiZANidine (Zanaflex) 4 mg tablet TAKE 1 TABLET BY MOUTH EVERYDAY AT BEDTIME 90 tablet 1    upadacitinib ER (Rinvoq) 15 mg tablet extended release 24 hr Take 1 tablet (15 mg) by mouth once daily. Do not crush, chew or split. Swallow whole.      zolpidem (Ambien) 10 mg tablet Take 1 tablet (10 mg) by mouth as needed at bedtime for sleep. 90 tablet 1     No current facility-administered medications for  this visit.        Physical Exam:  Cardiovascular:      PMI at left midclavicular line. Normal rate. Regular rhythm. Normal S1. Normal S2.       Murmurs: There is no murmur.      No gallop.  No click. No rub.   Pulses:     Intact distal pulses.   Edema:     Peripheral edema absent.       ROS:  Review of Systems   Constitutional: Negative.   Cardiovascular:  Positive for chest pain.   Respiratory: Negative.     Skin: Negative.    Musculoskeletal:  Positive for joint pain.   Gastrointestinal: Negative.    Genitourinary: Negative.    Neurological:  Positive for headaches.          Last Labs:  CBC -  Lab Results   Component Value Date    WBC 13.1 (H) 09/24/2024    HGB 13.9 09/24/2024    HCT 41.2 09/24/2024    MCV 92 09/24/2024     09/24/2024       CMP -  Lab Results   Component Value Date    CALCIUM 9.3 09/24/2024    PHOS 2.2 (L) 03/03/2023    PHOS 2.4 (L) 03/03/2023    PROT 7.4 09/24/2024    ALBUMIN 4.2 09/24/2024    AST 16 09/24/2024    ALT 15 09/24/2024    ALKPHOS 66 09/24/2024    BILITOT 0.4 09/24/2024       LIPID PANEL -   Lab Results   Component Value Date    CHOL 167 05/09/2022    TRIG 182 (H) 05/09/2022    HDL 42 (L) 05/09/2022    CHHDL 4.0 05/09/2022       RENAL FUNCTION PANEL -   Lab Results   Component Value Date    GLUCOSE 111 (H) 09/24/2024     09/24/2024    K 3.1 (L) 09/24/2024     09/24/2024    CO2 31 09/24/2024    ANIONGAP 10 09/24/2024    BUN 19 09/24/2024    CREATININE 0.72 09/24/2024    GFRMALE CANCELED 08/23/2023    CALCIUM 9.3 09/24/2024    PHOS 2.2 (L) 03/03/2023    PHOS 2.4 (L) 03/03/2023    ALBUMIN 4.2 09/24/2024        Lab Results   Component Value Date    BNP 24 01/08/2023    HGBA1C 6.4 (H) 04/09/2024    HGBA1C 6.1 07/25/2022         Assessment/Plan   Problem List Items Addressed This Visit    None    1.  Hypertension.  Patient is a middle-aged white female who does have a longstanding history of hypertension currently treated with losartan, HCTZ, and amlodipine.  Blood  pressure values are actually well within normal range.  Will continue amlodipine and combine the losartan 100 mg daily and HCTZ 25 mg daily into a single medication.  The patient did have a echocardiogram performed 2/10/2023 showing a normal LV ejection fraction of 60%.  Patient recently experienced a fall off a chair onto her left hip with some subsequent swelling.  She was seen at the emergency room.  Of note her EKG showed sinus bradycardia left axis deviation left ventricular hypertrophy with repolarization abnormality.  Lower extremity ultrasound negative for DVT.  Will try metoprolol succinate 50 mg daily for bp and may also help recent headaches.  She has discussed with pcp possibility of seeing neuro. Reports tylenol works better than maxalt.     2.  Type 2 diabetes.  The patient's diabetes is currently under dietary management.  Her last glycohemoglobin was 6.4% on 4/9/2024     3.?  Hyperlipidemia.  Diagnosis not confirmed by lipid panel in 5/2022 with cholesterol 167 LDL 89 HDL 42 triglyceride 182.     4.  Non-smoker.     5.  Status post cholecystectomy with hernia repair 2018.     6.  Status post lumbosacral spinal surgery 2/2022.     7.  Status post cervical spine surgery.     8.  Status post gastric sleeve 2017.  Patient is scheduled to follow-up with general surgery and will have an EGD repeated.She currently is experiencing some umbilical pain and was placed on omeprazole for reflux and regurgitation.     9.  Rheumatoid arthritis.  The patient is currently not on any biologic agent.  Lab work 4/9/2024 included sedimentation rate 24 CRP of 0.37     10.  Status post left total hip replacement.  The patient required 3 revisions for dislocation with the last being in 8/2023.  The ball evidently had to be replaced with a ceramic device.  She is following up with [facility] orthopedics.    11.  ? CAD.  Apparently has had a remote cardiac cath sometime in 2012 or so.  Was told had normal  coronaries.  Unable to find.    Orders:  No orders of the defined types were placed in this encounter.         Neris Craig, APRN-CNP

## 2024-10-04 RX ORDER — CEPHALEXIN 500 MG/1
500 CAPSULE ORAL 2 TIMES DAILY
Qty: 6 CAPSULE | Refills: 0 | Status: SHIPPED | OUTPATIENT
Start: 2024-10-04 | End: 2024-10-07

## 2024-10-15 NOTE — PROGRESS NOTES
Subjective   Patient ID: Kat Tineo is a 49 y.o. female who presents for No chief complaint on file..  HPI  REVIEW SURGERY OPTIONS FOR HHR  HX: SLEEVE IN [place]  Review of Systems  CONSTITUTIONAL:          Chills No.  Fatigue No.  Fever No.         CARDIOLOGY:          Negative for dizziness, chest pain, palpitations, shortness of breath.         RESPIRATORY:          Sleep Apnea No.  Negative for chest congestion, cough, wheezing.         GASTROENTEROLOGY:          Food Intolerance No.  Abdominal pain YES.  Acid reflux No.  Black stools No.  Constipation No.  Diarrhea YES.  Loss of appetite no.  Nausea YES.  Vomiting No.         UROLOGY:          Negative for dysuria, ADMITS TO urinary urgency, kidney stones.         PSYCHOLOGY:          ADMITS depression, ADMITS TO anxiety, high stress level.    Objective   Physical Exam    Assessment/Plan            Iram Villa LPN 10/15/24 11:10 AM

## 2024-10-16 ENCOUNTER — DOCUMENTATION (OUTPATIENT)
Dept: PHYSICAL THERAPY | Facility: CLINIC | Age: 49
End: 2024-10-16
Payer: COMMERCIAL

## 2024-10-16 NOTE — PROGRESS NOTES
Physical Therapy    Discharge Summary    Name: Kat Tineo  MRN: [id number]  : 1975  Date: 10/16/24    Discharge Summary: PT    Discharge Information: Date of discharge 10/16/24, Date of last visit 24, Date of evaluation 24, Number of attended visits 4, Referred by Dagoberto Patel, and Referred for Lumbar radiculopathy    Therapy Summary: The pt attended 4 PT sessions and then cancelled all remaining visits and has not been seen since 24.     Rehab Discharge Reason: Failed to schedule and/or keep follow-up appointment(s)

## 2024-10-17 ENCOUNTER — APPOINTMENT (OUTPATIENT)
Dept: SURGERY | Facility: CLINIC | Age: 49
End: 2024-10-17
Payer: COMMERCIAL

## 2024-10-17 VITALS
BODY MASS INDEX: 39.34 KG/M2 | HEART RATE: 62 BPM | WEIGHT: 222 LBS | HEIGHT: 63 IN | SYSTOLIC BLOOD PRESSURE: 128 MMHG | DIASTOLIC BLOOD PRESSURE: 80 MMHG

## 2024-10-17 DIAGNOSIS — Z98.84 S/P LAPAROSCOPIC SLEEVE GASTRECTOMY: ICD-10-CM

## 2024-10-17 DIAGNOSIS — K44.9 HIATAL HERNIA: ICD-10-CM

## 2024-10-17 DIAGNOSIS — E66.01 MORBID OBESITY WITH BMI OF 40.0-44.9, ADULT (MULTI): ICD-10-CM

## 2024-10-17 DIAGNOSIS — K21.9 GASTROESOPHAGEAL REFLUX DISEASE, UNSPECIFIED WHETHER ESOPHAGITIS PRESENT: Primary | ICD-10-CM

## 2024-10-17 DIAGNOSIS — I10 PRIMARY HYPERTENSION: ICD-10-CM

## 2024-10-17 DIAGNOSIS — K90.9 INTESTINAL MALABSORPTION, UNSPECIFIED TYPE (HHS-HCC): ICD-10-CM

## 2024-10-17 PROCEDURE — 3044F HG A1C LEVEL LT 7.0%: CPT | Performed by: SURGERY

## 2024-10-17 PROCEDURE — 99215 OFFICE O/P EST HI 40 MIN: CPT | Performed by: SURGERY

## 2024-10-17 PROCEDURE — 3074F SYST BP LT 130 MM HG: CPT | Performed by: SURGERY

## 2024-10-17 PROCEDURE — 3079F DIAST BP 80-89 MM HG: CPT | Performed by: SURGERY

## 2024-10-17 PROCEDURE — 3008F BODY MASS INDEX DOCD: CPT | Performed by: SURGERY

## 2024-10-17 RX ORDER — OMEPRAZOLE 40 MG/1
40 CAPSULE, DELAYED RELEASE ORAL DAILY
Qty: 30 CAPSULE | Refills: 5 | Status: SHIPPED | OUTPATIENT
Start: 2024-10-17 | End: 2025-04-15

## 2024-10-17 ASSESSMENT — PATIENT HEALTH QUESTIONNAIRE - PHQ9
SUM OF ALL RESPONSES TO PHQ9 QUESTIONS 1 AND 2: 2
2. FEELING DOWN, DEPRESSED OR HOPELESS: SEVERAL DAYS
1. LITTLE INTEREST OR PLEASURE IN DOING THINGS: SEVERAL DAYS
10. IF YOU CHECKED OFF ANY PROBLEMS, HOW DIFFICULT HAVE THESE PROBLEMS MADE IT FOR YOU TO DO YOUR WORK, TAKE CARE OF THINGS AT HOME, OR GET ALONG WITH OTHER PEOPLE: NOT DIFFICULT AT ALL

## 2024-10-17 ASSESSMENT — PAIN SCALES - GENERAL: PAINLEVEL_OUTOF10: 6

## 2024-10-17 NOTE — H&P
History Of Present Illness  Kat Tineo is a 49 y.o. female here for a follow up.  She had a VSG in [place] in 2017.  She has daily reflux and nocturnal regurgitation prompting consultation with me earlier this year.  We performed an EGD which showed a hiatal hernia, a dilated gastric sleeve and bile within the stomach.  We increased her ppi to 40 mg daily with reduction in severity of symptoms but she continues to have both daily and nocturnal symptoms.  She constantly feels bloated and has left sided     Past Medical History  She has a past medical history of Acid reflux, Diabetes mellitus (Multi), DM (diabetes mellitus), gestational, Hypertension, and Rheumatoid arthritis.    Surgical History  She has a past surgical history that includes US guided soft tissue fluid drain (09/30/2021); CT guided soft tissue fluid drainage (06/02/2022); Other surgical history (11/22/2017); Neck surgery; Other surgical history (Left); Laparoscopy gastrectomy partial / total (2017); Spine surgery (02/14/2023); and Hysterectomy (2012).     Social History  She reports that she has never smoked. She has never used smokeless tobacco. She reports that she does not currently use alcohol. She reports that she does not use drugs.    Family History  Family History   Problem Relation Name Age of Onset    Diabetes Mother      Hypertension Mother      Other (morbid obesity) Mother      Other (htn) Father      Arthritis Other      Psoriasis Neg Hx      Irritable bowel syndrome Neg Hx          Allergies  Ace inhibitors, Adhesive tape-silicones, and Amoxicillin    Review of Systems   CONSTITUTIONAL:          Chills No.  Fatigue No.  Fever No.         CARDIOLOGY:          Negative for dizziness, chest pain, palpitations, shortness of breath.         RESPIRATORY:          Sleep Apnea No.  Negative for chest congestion, cough, wheezing.         GASTROENTEROLOGY:          Food Intolerance No.  Abdominal pain YES.  Acid reflux No.  Black stools No.   "Constipation No.  Diarrhea YES.  Loss of appetite no.  Nausea YES.  Vomiting No.         UROLOGY:          Negative for dysuria, ADMITS TO urinary urgency, kidney stones.         PSYCHOLOGY:          ADMITS depression, ADMITS TO anxiety, high stress level.       Physical Exam         General Examination:         GENERAL APPEARANCE: alert and oriented x 3, Pleasant and cooperative, No Acute Distress.          HEENT: PERRLA.          NECK: no lymphadenopathy, no thyromegaly, no JVD, normal flexion, normal extension.          HEART: regular rate and rhythm.          LUNGS: clear to auscultation bilaterally.          CHEST: normal shape and expansion.          ABDOMEN: obese, no hernias present, soft and not tender, no guarding, no CVA tenderness.          EXTREMITIES: no edema, no ulcerations.          SKIN: normal, no rash, tattoos.          NEUROLOGIC EXAM: CN's II-XII grossly intact, gait normal.          BACK: no CVA tenderness.         Last Recorded Vitals  Blood pressure 128/80, pulse 62, height 1.588 m (5' 2.5\"), weight 101 kg (222 lb).    Relevant Results   Esophagogastroduodenoscopy (EGD)  Order# [id number]  Reading physician: De Santana MD Ordering provider: Pancho Frazier MD Study date: 5/30/24     Result Information    Status: Final result (Exam End: 5/30/2024 08:24) Provider Status: Reviewed     Result Text    Result Text   Impression  Small type II hiatal hernia  Irregular Z-line  Performed forceps biopsies in the esophagus to rule out Baron's esophagus  Esophagitis  Healthy previous sleeve gastrectomy        Findings  Small herniation of gastric fundus into thoracic cavity (type II hiatal hernia) without David lesions present - GE junction 38 cm from the incisors, confirmed by retroflexion. Hill grade IV hiatal hernia  Irregular Z-line 38 cm from the incisors  Performed multiple forceps biopsies in the esophagus to rule out Baron's esophagus  Esophagitis  Healthy previous sleeve " gastrectomy; no bleeding was identified. Dilated gastric sleeve- able to retroflex.  Bile within stomach.        Recommendation    Await pathology results     Follow up with me in clinic            Indication  Gastro-esophageal reflux disease without esophagitis     Staff  Staff Role   De Santana MD Proceduralist        Assessment/Plan   Problem List Items Addressed This Visit             ICD-10-CM    Hypertension I10    Gastroesophageal reflux disease - Primary K21.9    Intestinal malabsorption K90.9    S/P laparoscopic sleeve gastrectomy Z98.84    Morbid obesity with BMI of 40.0-44.9, adult (Multi) E66.01, Z68.41    Hiatal hernia K44.9       We reviewed the options available to Kat in light of her symptoms and the fact that she has a hiatal hernia with a dilated gastric sleeve that contained bile at the time of endoscopy.  Her nonsurgical option would be to increase antisecretory medications to twice daily and to add Carafate.  Her surgical options would include hiatal hernia repair alone or hiatal hernia repair with pouch reduction requiring re-sleeve of the last option her gastric sleeve.  The last option would include converting her gastric sleeve anatomy to a Jaci-en-Y gastric bypass with hiatal hernia repair.  I explained that I felt this would be the best surgical option as it would be most likely to achieve her goal of symptom control and additional weight loss.  I explained that re-sleeve would have the highest risk of immediate postoperative complications and I would not perform this procedure as a result.  I also thought that hiatal hernia repair alone as well as hiatal hernia repair with re-sleeve may not provide durable weight loss or symptom control.  We discussed the differences between sleeve gastrectomy and gastric bypass.  We discussed dumping syndrome, alcohol sensitivity, need for lifelong supplementation, and lifelong risk of gastrojejunal ulcer or bowel obstruction.  She would like to  proceed with converting to Jaci-en-Y gastric bypass.  We discussed the operative risks of death, venous thromboembolic event, GI tract leak, bleeding, recurrence, inadequate weight loss, nonresolution of symptoms.  Consent obtained.  She will continue with treatment for the time being.       I spent 45 minutes in the professional and overall care of this patient.      De Santana MD

## 2024-10-21 DIAGNOSIS — K44.9 HIATAL HERNIA: ICD-10-CM

## 2024-10-21 DIAGNOSIS — K21.9 GASTROESOPHAGEAL REFLUX DISEASE, UNSPECIFIED WHETHER ESOPHAGITIS PRESENT: Primary | ICD-10-CM

## 2024-10-21 RX ORDER — OMEPRAZOLE 40 MG/1
40 CAPSULE, DELAYED RELEASE ORAL 2 TIMES DAILY
Qty: 180 CAPSULE | Refills: 1 | Status: SHIPPED | OUTPATIENT
Start: 2024-10-21 | End: 2025-04-19

## 2024-10-21 RX ORDER — SUCRALFATE 1 G/1
1 TABLET ORAL
Qty: 360 TABLET | Refills: 1 | Status: SHIPPED | OUTPATIENT
Start: 2024-10-21 | End: 2025-04-19

## 2024-10-28 ENCOUNTER — APPOINTMENT (OUTPATIENT)
Dept: CARDIOLOGY | Facility: CLINIC | Age: 49
End: 2024-10-28
Payer: COMMERCIAL

## 2024-10-31 ENCOUNTER — LAB (OUTPATIENT)
Dept: LAB | Facility: LAB | Age: 49
End: 2024-10-31
Payer: COMMERCIAL

## 2024-10-31 ENCOUNTER — OFFICE VISIT (OUTPATIENT)
Dept: SURGERY | Facility: CLINIC | Age: 49
End: 2024-10-31
Payer: COMMERCIAL

## 2024-10-31 VITALS
DIASTOLIC BLOOD PRESSURE: 93 MMHG | BODY MASS INDEX: 39.34 KG/M2 | HEIGHT: 63 IN | HEART RATE: 73 BPM | SYSTOLIC BLOOD PRESSURE: 133 MMHG | WEIGHT: 222 LBS

## 2024-10-31 DIAGNOSIS — R10.32 LEFT LOWER QUADRANT ABDOMINAL PAIN: Primary | ICD-10-CM

## 2024-10-31 DIAGNOSIS — R10.32 LEFT LOWER QUADRANT ABDOMINAL PAIN: ICD-10-CM

## 2024-10-31 LAB
ALBUMIN SERPL BCP-MCNC: 4.4 G/DL (ref 3.4–5)
ALP SERPL-CCNC: 69 U/L (ref 33–110)
ALT SERPL W P-5'-P-CCNC: 18 U/L (ref 7–45)
AMYLASE SERPL-CCNC: 68 U/L (ref 29–103)
ANION GAP SERPL CALCULATED.3IONS-SCNC: 11 MMOL/L (ref 10–20)
APPEARANCE UR: CLEAR
AST SERPL W P-5'-P-CCNC: 15 U/L (ref 9–39)
BASOPHILS # BLD AUTO: 0.02 X10*3/UL (ref 0–0.1)
BASOPHILS NFR BLD AUTO: 0.3 %
BILIRUB SERPL-MCNC: 0.5 MG/DL (ref 0–1.2)
BILIRUB UR STRIP.AUTO-MCNC: NEGATIVE MG/DL
BUN SERPL-MCNC: 18 MG/DL (ref 6–23)
CALCIUM SERPL-MCNC: 9.8 MG/DL (ref 8.6–10.3)
CHLORIDE SERPL-SCNC: 104 MMOL/L (ref 98–107)
CO2 SERPL-SCNC: 29 MMOL/L (ref 21–32)
COLOR UR: ABNORMAL
CREAT SERPL-MCNC: 0.87 MG/DL (ref 0.5–1.05)
EGFRCR SERPLBLD CKD-EPI 2021: 81 ML/MIN/1.73M*2
EOSINOPHIL # BLD AUTO: 0.02 X10*3/UL (ref 0–0.7)
EOSINOPHIL NFR BLD AUTO: 0.3 %
ERYTHROCYTE [DISTWIDTH] IN BLOOD BY AUTOMATED COUNT: 13.4 % (ref 11.5–14.5)
GLUCOSE SERPL-MCNC: 106 MG/DL (ref 74–99)
GLUCOSE UR STRIP.AUTO-MCNC: NORMAL MG/DL
HCT VFR BLD AUTO: 40 % (ref 36–46)
HGB BLD-MCNC: 13.6 G/DL (ref 12–16)
IMM GRANULOCYTES # BLD AUTO: 0.03 X10*3/UL (ref 0–0.7)
IMM GRANULOCYTES NFR BLD AUTO: 0.4 % (ref 0–0.9)
KETONES UR STRIP.AUTO-MCNC: NEGATIVE MG/DL
LEUKOCYTE ESTERASE UR QL STRIP.AUTO: ABNORMAL
LYMPHOCYTES # BLD AUTO: 2.09 X10*3/UL (ref 1.2–4.8)
LYMPHOCYTES NFR BLD AUTO: 26.7 %
MCH RBC QN AUTO: 31.8 PG (ref 26–34)
MCHC RBC AUTO-ENTMCNC: 34 G/DL (ref 32–36)
MCV RBC AUTO: 94 FL (ref 80–100)
MONOCYTES # BLD AUTO: 0.48 X10*3/UL (ref 0.1–1)
MONOCYTES NFR BLD AUTO: 6.1 %
MUCOUS THREADS #/AREA URNS AUTO: NORMAL /LPF
NEUTROPHILS # BLD AUTO: 5.2 X10*3/UL (ref 1.2–7.7)
NEUTROPHILS NFR BLD AUTO: 66.2 %
NITRITE UR QL STRIP.AUTO: NEGATIVE
NRBC BLD-RTO: 0 /100 WBCS (ref 0–0)
PH UR STRIP.AUTO: 5.5 [PH]
PLATELET # BLD AUTO: 386 X10*3/UL (ref 150–450)
POTASSIUM SERPL-SCNC: 3.5 MMOL/L (ref 3.5–5.3)
PROT SERPL-MCNC: 7.1 G/DL (ref 6.4–8.2)
PROT UR STRIP.AUTO-MCNC: NEGATIVE MG/DL
RBC # BLD AUTO: 4.28 X10*6/UL (ref 4–5.2)
RBC # UR STRIP.AUTO: NEGATIVE /UL
RBC #/AREA URNS AUTO: NORMAL /HPF
SODIUM SERPL-SCNC: 140 MMOL/L (ref 136–145)
SP GR UR STRIP.AUTO: 1.03
SQUAMOUS #/AREA URNS AUTO: NORMAL /HPF
UROBILINOGEN UR STRIP.AUTO-MCNC: NORMAL MG/DL
WBC # BLD AUTO: 7.8 X10*3/UL (ref 4.4–11.3)
WBC #/AREA URNS AUTO: NORMAL /HPF

## 2024-10-31 PROCEDURE — 3008F BODY MASS INDEX DOCD: CPT | Performed by: SURGERY

## 2024-10-31 PROCEDURE — 85025 COMPLETE CBC W/AUTO DIFF WBC: CPT

## 2024-10-31 PROCEDURE — 87086 URINE CULTURE/COLONY COUNT: CPT

## 2024-10-31 PROCEDURE — 99214 OFFICE O/P EST MOD 30 MIN: CPT | Performed by: SURGERY

## 2024-10-31 PROCEDURE — 3044F HG A1C LEVEL LT 7.0%: CPT | Performed by: SURGERY

## 2024-10-31 PROCEDURE — 81001 URINALYSIS AUTO W/SCOPE: CPT

## 2024-10-31 PROCEDURE — 3080F DIAST BP >= 90 MM HG: CPT | Performed by: SURGERY

## 2024-10-31 PROCEDURE — 36415 COLL VENOUS BLD VENIPUNCTURE: CPT

## 2024-10-31 PROCEDURE — 80053 COMPREHEN METABOLIC PANEL: CPT

## 2024-10-31 PROCEDURE — 3075F SYST BP GE 130 - 139MM HG: CPT | Performed by: SURGERY

## 2024-10-31 PROCEDURE — 82150 ASSAY OF AMYLASE: CPT

## 2024-10-31 ASSESSMENT — PAIN SCALES - GENERAL: PAINLEVEL_OUTOF10: 8

## 2024-10-31 NOTE — H&P
History Of Present Illness  Kat Tineo is a 49 y.o. woman that called today with abdominal pain.  She tells me that she has left sided abdominal pain and five loose stools yesterday.  She woke up at 2-3 AM with left sided pain prompting a loose stool.  She complains of constant nausea.  She was in the ED one month ago for abdominal pain.  She had a CT scan without chest or abdominal pathology.  She did have a leukocytosis.  She feels bloated.     Past Medical History  She has a past medical history of Acid reflux, Diabetes mellitus (Multi), DM (diabetes mellitus), gestational, Hypertension, and Rheumatoid arthritis.    Surgical History  She has a past surgical history that includes US guided soft tissue fluid drain (09/30/2021); CT guided soft tissue fluid drainage (06/02/2022); Other surgical history (11/22/2017); Neck surgery; Other surgical history (Left); Laparoscopy gastrectomy partial / total (2017); Spine surgery (02/14/2023); and Hysterectomy (2012).     Social History  She reports that she has never smoked. She has never used smokeless tobacco. She reports that she does not currently use alcohol. She reports that she does not use drugs.    Family History  Family History   Problem Relation Name Age of Onset    Diabetes Mother      Hypertension Mother      Other (morbid obesity) Mother      Other (htn) Father      Arthritis Other      Psoriasis Neg Hx      Irritable bowel syndrome Neg Hx          Allergies  Ace inhibitors, Adhesive tape-silicones, and Amoxicillin    Review of Systems   CONSTITUTIONAL:          Chills No.  Fatigue yes.  Fever No.   admits to headache      CARDIOLOGY:          Negative for dizziness, chest pain, palpitations, shortness of breath.         RESPIRATORY:          Sleep Apnea No.  Negative for chest congestion, cough, wheezing.         GASTROENTEROLOGY:          Food Intolerance No.  Abdominal pain YES.  Acid reflux No.  Black stools No.  Constipation No.  Diarrhea YES.  Loss of  "appetite no.  Nausea YES.  Vomiting yes          UROLOGY:          Negative for dysuria, ADMITS TO urinary urgency, kidney stones.         PSYCHOLOGY:          ADMITS depression, ADMITS TO anxiety, high stress level.       Physical Exam       General Examination:         GENERAL APPEARANCE: alert and oriented x 3, Pleasant and cooperative, No Acute Distress.          HEENT: PERRLA.          NECK: no lymphadenopathy, no thyromegaly, no JVD, normal flexion, normal extension.          HEART: regular rate and rhythm.          LUNGS: clear to auscultation bilaterally.          CHEST: normal shape and expansion.          ABDOMEN: obese, no hernias present, tender left lower quadrant, no guarding, no CVA tenderness.          EXTREMITIES: pulses 2 plus bilaterally, trace bilateral edema, no ulcerations.          SKIN: normal, no rash, tattoos.          NEUROLOGIC EXAM: CN's II-XII grossly intact, gait normal.          BACK: no CVA tenderness.       Last Recorded Vitals  Blood pressure (!) 133/93, pulse 73, height 1.588 m (5' 2.5\"), weight 101 kg (222 lb).    Relevant Results  CT angio chest abdomen pelvis  Status: Final result     PACS Images     Show images for CT angio chest abdomen pelvis  Signed by    Signed Time Phone Pager   Alexey Brewer MD 9/22/2024 18:42 [phone]     Exam Information    Status Exam Begun Exam Ended   Final 9/22/2024 17:10 9/22/2024 17:23     Study Result    Narrative & Impression   Interpreted By:  Alexey Brewer,   STUDY:  CT ANGIO CHEST ABDOMEN PELVIS;  9/22/2024 5:23 pm      INDICATION:  Signs/Symptoms:Pain radiating from the back into the chest; rule out  dissection.          COMPARISON:  03/27/2024      ACCESSION NUMBER(S):  [id number]      ORDERING CLINICIAN:  LEANNA ROJAS      TECHNIQUE:  Axial non-contrast images of the chest abdomen, and pelvis.      Axial CT images of the chest, abdomen and pelvis were obtained after  the intravenous administration of iodinated " contrast using  angiographic technique with coronal and sagittal reformatted images.  MIP images and 3D reconstructions were created on an independent  workstation and reviewed.      FINDINGS:  VASCULATURE:      PULMONARY ARTERIES:  No acute pulmonary embolism.      THORACIC AORTA:  Non-contrast images show no evidence of acute  intramural hematoma. No thoracic aortic aneurysm or dissection. Mild  thoracic aortic atherosclerosis.      ABDOMINAL AORTA: No abdominal aortic aneurysm or dissection. Mild  abdominal aortic atherosclerosis.      ABDOMINAL AND PELVIC ARTERIES:  No hemodynamically significant  stenosis or occlusion.          CT CHEST:      MEDIASTINUM AND LYMPH NODES:  The esophageal wall appears within  normal limits.  No enlarged intrathoracic or axillary lymph nodes by  imaging criteria. No pneumomediastinum.      HEART: The heart is enlarged due to left ventricular dilatation.  No  coronary artery calcifications. No significant pericardial effusion.      LUNG, PLEURA, LARGE AIRWAYS:  No consolidation, pulmonary edema,  pleural effusion or pneumothorax.      OSSEOUS STRUCTURES: No acute osseous abnormality. No high-grade canal  stenosis. Partially visualized ACD at C5-C7 F.      CHEST WALL SOFT TISSUES: No discernible abnormality.          CT ABDOMEN/PELVIS:      ABDOMINAL WALL: No significant abnormality.      LIVER: No significant parenchymal abnormality.      BILE DUCTS: No significant intrahepatic or extrahepatic dilatation.      GALLBLADDER: Status post cholecystectomy.      PANCREAS: No significant abnormality.      SPLEEN: No significant abnormality.      ADRENALS: No significant abnormality.      KIDNEYS, URETERS, BLADDER: No significant abnormality.      REPRODUCTIVE ORGANS: Status post hysterectomy.      VESSELS: (See above). No additional significant abnormality.      RETROPERITONEUM/LYMPH NODES: No acute retroperitoneal abnormality. No  enlarged lymph nodes.      BOWEL/MESENTERY/PERITONEUM:  Status post gastric sleeve. No  inflammatory bowel wall thickening or dilatation. Normal appendix.      No significant ascites, free air, or fluid collection.          OSSEOUS STRUCTURES: No acute osseous abnormality. Status post L4-L5  posterior fusion. The right L5 screw traverses the endplate into the  L4-5 disc space. There is an interbody disc age. There is L4  laminectomy. There is a left total hip arthroplasty that appears  uncomplicated to the extent included in the study.      IMPRESSION:  1. No thoracic or abdominal aortic aneurysm or acute aortic pathology.      2. No acute abnormality of the chest, abdomen or pelvis.      3. Left ventricular enlargement.      MACRO:  None.      Signed by: Alexey Brewer 9/22/2024 6:42 PM  Dictation workstation:   [id number]       Assessment/Plan   Problem List Items Addressed This Visit             ICD-10-CM    Abdominal pain - Primary R10.9    Relevant Orders    CBC and Auto Differential (Completed)    Comprehensive Metabolic Panel (Completed)    Amylase (Completed)    XR abdomen 2 views supine and erect or decub    C. difficile, PCR    Stool Pathogen Panel, PCR    Urinalysis with Reflex Culture and Microscopic (Completed)       Will work up her abdominal pain.  I reassured Kat that her CT when she went to the ED was normal.  We will obtain labwork, urinalysis and stool studies.           De Santana MD

## 2024-10-31 NOTE — PROGRESS NOTES
Subjective   Patient ID: Kat Tineo is a 49 y.o. female who presents for No chief complaint on file..  HPI  Abdominal pain  Review of Systems   CONSTITUTIONAL:          Chills No.  Fatigue yes.  Fever No.   admits to headache      CARDIOLOGY:          Negative for dizziness, chest pain, palpitations, shortness of breath.         RESPIRATORY:          Sleep Apnea No.  Negative for chest congestion, cough, wheezing.         GASTROENTEROLOGY:          Food Intolerance No.  Abdominal pain YES.  Acid reflux No.  Black stools No.  Constipation No.  Diarrhea YES.  Loss of appetite no.  Nausea YES.  Vomiting yes          UROLOGY:          Negative for dysuria, ADMITS TO urinary urgency, kidney stones.         PSYCHOLOGY:          ADMITS depression, ADMITS TO anxiety, high stress level.    Objective   Physical Exam    Assessment/Plan            Iram Villa LPN 10/31/24 3:42 PM

## 2024-11-01 LAB — HOLD SPECIMEN: NORMAL

## 2024-11-02 ENCOUNTER — HOSPITAL ENCOUNTER (EMERGENCY)
Facility: HOSPITAL | Age: 49
Discharge: HOME | End: 2024-11-02
Attending: STUDENT IN AN ORGANIZED HEALTH CARE EDUCATION/TRAINING PROGRAM
Payer: COMMERCIAL

## 2024-11-02 ENCOUNTER — APPOINTMENT (OUTPATIENT)
Dept: RADIOLOGY | Facility: HOSPITAL | Age: 49
End: 2024-11-02
Payer: COMMERCIAL

## 2024-11-02 ENCOUNTER — APPOINTMENT (OUTPATIENT)
Dept: CARDIOLOGY | Facility: HOSPITAL | Age: 49
End: 2024-11-02
Payer: COMMERCIAL

## 2024-11-02 VITALS
HEART RATE: 69 BPM | DIASTOLIC BLOOD PRESSURE: 88 MMHG | RESPIRATION RATE: 14 BRPM | BODY MASS INDEX: 42.25 KG/M2 | OXYGEN SATURATION: 96 % | TEMPERATURE: 97.1 F | HEIGHT: 62 IN | SYSTOLIC BLOOD PRESSURE: 134 MMHG | WEIGHT: 229.6 LBS

## 2024-11-02 DIAGNOSIS — N39.0 ACUTE UTI: ICD-10-CM

## 2024-11-02 DIAGNOSIS — R10.12 LEFT UPPER QUADRANT ABDOMINAL PAIN: Primary | ICD-10-CM

## 2024-11-02 DIAGNOSIS — N83.9 LESION OF LEFT OVARY: ICD-10-CM

## 2024-11-02 LAB
ALBUMIN SERPL BCP-MCNC: 4 G/DL (ref 3.4–5)
ALP SERPL-CCNC: 55 U/L (ref 33–110)
ALT SERPL W P-5'-P-CCNC: 16 U/L (ref 7–45)
ANION GAP SERPL CALCULATED.3IONS-SCNC: 10 MMOL/L (ref 10–20)
APPEARANCE UR: CLEAR
AST SERPL W P-5'-P-CCNC: 20 U/L (ref 9–39)
BACTERIA #/AREA URNS AUTO: ABNORMAL /HPF
BACTERIA UR CULT: NORMAL
BASOPHILS # BLD AUTO: 0.02 X10*3/UL (ref 0–0.1)
BASOPHILS NFR BLD AUTO: 0.2 %
BILIRUB SERPL-MCNC: 0.4 MG/DL (ref 0–1.2)
BILIRUB UR STRIP.AUTO-MCNC: NEGATIVE MG/DL
BNP SERPL-MCNC: 40 PG/ML (ref 0–99)
BUN SERPL-MCNC: 19 MG/DL (ref 6–23)
CALCIUM SERPL-MCNC: 9.1 MG/DL (ref 8.6–10.3)
CARDIAC TROPONIN I PNL SERPL HS: 5 NG/L (ref 0–13)
CARDIAC TROPONIN I PNL SERPL HS: 5 NG/L (ref 0–13)
CHLORIDE SERPL-SCNC: 107 MMOL/L (ref 98–107)
CO2 SERPL-SCNC: 29 MMOL/L (ref 21–32)
COLOR UR: ABNORMAL
CREAT SERPL-MCNC: 0.74 MG/DL (ref 0.5–1.05)
D DIMER PPP FEU-MCNC: 0.43 MG/L FEU (ref 0.19–0.5)
EGFRCR SERPLBLD CKD-EPI 2021: >90 ML/MIN/1.73M*2
EOSINOPHIL # BLD AUTO: 0.02 X10*3/UL (ref 0–0.7)
EOSINOPHIL NFR BLD AUTO: 0.2 %
ERYTHROCYTE [DISTWIDTH] IN BLOOD BY AUTOMATED COUNT: 13.3 % (ref 11.5–14.5)
GLUCOSE SERPL-MCNC: 96 MG/DL (ref 74–99)
GLUCOSE UR STRIP.AUTO-MCNC: NORMAL MG/DL
HCT VFR BLD AUTO: 39.4 % (ref 36–46)
HGB BLD-MCNC: 13.2 G/DL (ref 12–16)
HOLD SPECIMEN: NORMAL
IMM GRANULOCYTES # BLD AUTO: 0.06 X10*3/UL (ref 0–0.7)
IMM GRANULOCYTES NFR BLD AUTO: 0.7 % (ref 0–0.9)
KETONES UR STRIP.AUTO-MCNC: NEGATIVE MG/DL
LACTATE SERPL-SCNC: 0.8 MMOL/L (ref 0.4–2)
LEUKOCYTE ESTERASE UR QL STRIP.AUTO: ABNORMAL
LYMPHOCYTES # BLD AUTO: 1.99 X10*3/UL (ref 1.2–4.8)
LYMPHOCYTES NFR BLD AUTO: 21.7 %
MAGNESIUM SERPL-MCNC: 2.05 MG/DL (ref 1.6–2.4)
MCH RBC QN AUTO: 31.6 PG (ref 26–34)
MCHC RBC AUTO-ENTMCNC: 33.5 G/DL (ref 32–36)
MCV RBC AUTO: 94 FL (ref 80–100)
MONOCYTES # BLD AUTO: 0.6 X10*3/UL (ref 0.1–1)
MONOCYTES NFR BLD AUTO: 6.6 %
MUCOUS THREADS #/AREA URNS AUTO: ABNORMAL /LPF
NEUTROPHILS # BLD AUTO: 6.47 X10*3/UL (ref 1.2–7.7)
NEUTROPHILS NFR BLD AUTO: 70.6 %
NITRITE UR QL STRIP.AUTO: NEGATIVE
NRBC BLD-RTO: 0 /100 WBCS (ref 0–0)
PH UR STRIP.AUTO: 5.5 [PH]
PLATELET # BLD AUTO: 318 X10*3/UL (ref 150–450)
POTASSIUM SERPL-SCNC: 3.8 MMOL/L (ref 3.5–5.3)
PROT SERPL-MCNC: 6.8 G/DL (ref 6.4–8.2)
PROT UR STRIP.AUTO-MCNC: ABNORMAL MG/DL
RBC # BLD AUTO: 4.18 X10*6/UL (ref 4–5.2)
RBC # UR STRIP.AUTO: NEGATIVE /UL
RBC #/AREA URNS AUTO: ABNORMAL /HPF
SODIUM SERPL-SCNC: 142 MMOL/L (ref 136–145)
SP GR UR STRIP.AUTO: 1.03
SQUAMOUS #/AREA URNS AUTO: ABNORMAL /HPF
UROBILINOGEN UR STRIP.AUTO-MCNC: NORMAL MG/DL
WBC # BLD AUTO: 9.2 X10*3/UL (ref 4.4–11.3)
WBC #/AREA URNS AUTO: ABNORMAL /HPF

## 2024-11-02 PROCEDURE — 93005 ELECTROCARDIOGRAM TRACING: CPT

## 2024-11-02 PROCEDURE — 36415 COLL VENOUS BLD VENIPUNCTURE: CPT | Performed by: STUDENT IN AN ORGANIZED HEALTH CARE EDUCATION/TRAINING PROGRAM

## 2024-11-02 PROCEDURE — 87086 URINE CULTURE/COLONY COUNT: CPT | Mod: TRILAB,WESLAB | Performed by: STUDENT IN AN ORGANIZED HEALTH CARE EDUCATION/TRAINING PROGRAM

## 2024-11-02 PROCEDURE — 83605 ASSAY OF LACTIC ACID: CPT | Performed by: STUDENT IN AN ORGANIZED HEALTH CARE EDUCATION/TRAINING PROGRAM

## 2024-11-02 PROCEDURE — 99285 EMERGENCY DEPT VISIT HI MDM: CPT | Mod: 25

## 2024-11-02 PROCEDURE — 80053 COMPREHEN METABOLIC PANEL: CPT | Performed by: STUDENT IN AN ORGANIZED HEALTH CARE EDUCATION/TRAINING PROGRAM

## 2024-11-02 PROCEDURE — 2550000001 HC RX 255 CONTRASTS: Performed by: STUDENT IN AN ORGANIZED HEALTH CARE EDUCATION/TRAINING PROGRAM

## 2024-11-02 PROCEDURE — 74177 CT ABD & PELVIS W/CONTRAST: CPT

## 2024-11-02 PROCEDURE — 84484 ASSAY OF TROPONIN QUANT: CPT | Performed by: STUDENT IN AN ORGANIZED HEALTH CARE EDUCATION/TRAINING PROGRAM

## 2024-11-02 PROCEDURE — 96365 THER/PROPH/DIAG IV INF INIT: CPT | Mod: 59

## 2024-11-02 PROCEDURE — 83735 ASSAY OF MAGNESIUM: CPT | Performed by: STUDENT IN AN ORGANIZED HEALTH CARE EDUCATION/TRAINING PROGRAM

## 2024-11-02 PROCEDURE — 81001 URINALYSIS AUTO W/SCOPE: CPT | Performed by: STUDENT IN AN ORGANIZED HEALTH CARE EDUCATION/TRAINING PROGRAM

## 2024-11-02 PROCEDURE — 85300 ANTITHROMBIN III ACTIVITY: CPT | Performed by: STUDENT IN AN ORGANIZED HEALTH CARE EDUCATION/TRAINING PROGRAM

## 2024-11-02 PROCEDURE — 2500000002 HC RX 250 W HCPCS SELF ADMINISTERED DRUGS (ALT 637 FOR MEDICARE OP, ALT 636 FOR OP/ED): Performed by: STUDENT IN AN ORGANIZED HEALTH CARE EDUCATION/TRAINING PROGRAM

## 2024-11-02 PROCEDURE — 85025 COMPLETE CBC W/AUTO DIFF WBC: CPT | Performed by: STUDENT IN AN ORGANIZED HEALTH CARE EDUCATION/TRAINING PROGRAM

## 2024-11-02 PROCEDURE — 2500000001 HC RX 250 WO HCPCS SELF ADMINISTERED DRUGS (ALT 637 FOR MEDICARE OP): Performed by: STUDENT IN AN ORGANIZED HEALTH CARE EDUCATION/TRAINING PROGRAM

## 2024-11-02 PROCEDURE — 83880 ASSAY OF NATRIURETIC PEPTIDE: CPT | Performed by: STUDENT IN AN ORGANIZED HEALTH CARE EDUCATION/TRAINING PROGRAM

## 2024-11-02 PROCEDURE — 2500000004 HC RX 250 GENERAL PHARMACY W/ HCPCS (ALT 636 FOR OP/ED): Performed by: STUDENT IN AN ORGANIZED HEALTH CARE EDUCATION/TRAINING PROGRAM

## 2024-11-02 PROCEDURE — 74177 CT ABD & PELVIS W/CONTRAST: CPT | Performed by: RADIOLOGY

## 2024-11-02 PROCEDURE — 96375 TX/PRO/DX INJ NEW DRUG ADDON: CPT

## 2024-11-02 RX ORDER — MORPHINE SULFATE 4 MG/ML
4 INJECTION, SOLUTION INTRAMUSCULAR; INTRAVENOUS ONCE
Status: COMPLETED | OUTPATIENT
Start: 2024-11-02 | End: 2024-11-02

## 2024-11-02 RX ORDER — SUCRALFATE 1 G/10ML
1 SUSPENSION ORAL EVERY 6 HOURS SCHEDULED
Status: DISCONTINUED | OUTPATIENT
Start: 2024-11-02 | End: 2024-11-02 | Stop reason: HOSPADM

## 2024-11-02 RX ORDER — KETOROLAC TROMETHAMINE 30 MG/ML
15 INJECTION, SOLUTION INTRAMUSCULAR; INTRAVENOUS ONCE
Status: COMPLETED | OUTPATIENT
Start: 2024-11-02 | End: 2024-11-02

## 2024-11-02 RX ORDER — FAMOTIDINE 20 MG/1
20 TABLET, FILM COATED ORAL ONCE
Status: COMPLETED | OUTPATIENT
Start: 2024-11-02 | End: 2024-11-02

## 2024-11-02 RX ORDER — ONDANSETRON HYDROCHLORIDE 2 MG/ML
4 INJECTION, SOLUTION INTRAVENOUS ONCE
Status: COMPLETED | OUTPATIENT
Start: 2024-11-02 | End: 2024-11-02

## 2024-11-02 RX ORDER — CEPHALEXIN 500 MG/1
500 CAPSULE ORAL 2 TIMES DAILY
Qty: 10 CAPSULE | Refills: 0 | Status: SHIPPED | OUTPATIENT
Start: 2024-11-02 | End: 2024-11-07

## 2024-11-02 RX ORDER — DICYCLOMINE HYDROCHLORIDE 20 MG/1
20 TABLET ORAL 2 TIMES DAILY
Qty: 20 TABLET | Refills: 0 | Status: SHIPPED | OUTPATIENT
Start: 2024-11-02 | End: 2024-11-12

## 2024-11-02 RX ORDER — CEFTRIAXONE 2 G/50ML
2 INJECTION, SOLUTION INTRAVENOUS ONCE
Status: COMPLETED | OUTPATIENT
Start: 2024-11-02 | End: 2024-11-02

## 2024-11-02 RX ADMIN — ONDANSETRON 4 MG: 2 INJECTION INTRAMUSCULAR; INTRAVENOUS at 05:23

## 2024-11-02 RX ADMIN — MORPHINE SULFATE 4 MG: 4 INJECTION, SOLUTION INTRAMUSCULAR; INTRAVENOUS at 05:23

## 2024-11-02 RX ADMIN — FAMOTIDINE 20 MG: 20 TABLET, FILM COATED ORAL at 05:23

## 2024-11-02 RX ADMIN — SUCRALFATE 1 G: 1 SUSPENSION ORAL at 05:23

## 2024-11-02 RX ADMIN — KETOROLAC TROMETHAMINE 15 MG: 30 INJECTION, SOLUTION INTRAMUSCULAR at 07:19

## 2024-11-02 RX ADMIN — CEFTRIAXONE SODIUM 2 G: 2 INJECTION, SOLUTION INTRAVENOUS at 06:06

## 2024-11-02 RX ADMIN — IOHEXOL 75 ML: 350 INJECTION, SOLUTION INTRAVENOUS at 05:55

## 2024-11-02 ASSESSMENT — PAIN - FUNCTIONAL ASSESSMENT
PAIN_FUNCTIONAL_ASSESSMENT: 0-10
PAIN_FUNCTIONAL_ASSESSMENT: 0-10

## 2024-11-02 ASSESSMENT — PAIN DESCRIPTION - FREQUENCY: FREQUENCY: CONSTANT/CONTINUOUS

## 2024-11-02 ASSESSMENT — PAIN DESCRIPTION - PROGRESSION: CLINICAL_PROGRESSION: GRADUALLY WORSENING

## 2024-11-02 ASSESSMENT — PAIN DESCRIPTION - DESCRIPTORS
DESCRIPTORS: SHARP
DESCRIPTORS: SHARP

## 2024-11-02 ASSESSMENT — PAIN DESCRIPTION - ONSET: ONSET: ONGOING

## 2024-11-02 ASSESSMENT — PAIN SCALES - GENERAL
PAINLEVEL_OUTOF10: 9
PAINLEVEL_OUTOF10: 9

## 2024-11-02 ASSESSMENT — PAIN DESCRIPTION - LOCATION: LOCATION: ABDOMEN

## 2024-11-02 ASSESSMENT — PAIN DESCRIPTION - PAIN TYPE: TYPE: ACUTE PAIN

## 2024-11-02 ASSESSMENT — PAIN DESCRIPTION - ORIENTATION: ORIENTATION: LEFT;UPPER

## 2024-11-03 LAB — BACTERIA UR CULT: NORMAL

## 2024-11-04 LAB
ATRIAL RATE: 63 BPM
P AXIS: 47 DEGREES
P OFFSET: 193 MS
P ONSET: 146 MS
PR INTERVAL: 144 MS
Q ONSET: 218 MS
QRS COUNT: 10 BEATS
QRS DURATION: 82 MS
QT INTERVAL: 430 MS
QTC CALCULATION(BAZETT): 440 MS
QTC FREDERICIA: 437 MS
R AXIS: 12 DEGREES
T AXIS: 14 DEGREES
T OFFSET: 433 MS
VENTRICULAR RATE: 63 BPM

## 2024-11-07 ENCOUNTER — TELEPHONE (OUTPATIENT)
Dept: SURGERY | Facility: CLINIC | Age: 49
End: 2024-11-07
Payer: COMMERCIAL

## 2024-11-07 DIAGNOSIS — G43.909 MIGRAINE, UNSPECIFIED, NOT INTRACTABLE, WITHOUT STATUS MIGRAINOSUS: ICD-10-CM

## 2024-11-07 RX ORDER — RIZATRIPTAN BENZOATE 10 MG/1
TABLET ORAL
Qty: 8 TABLET | Refills: 6 | Status: SHIPPED | OUTPATIENT
Start: 2024-11-07

## 2024-11-13 ENCOUNTER — OFFICE VISIT (OUTPATIENT)
Dept: SURGERY | Facility: CLINIC | Age: 49
End: 2024-11-13
Payer: COMMERCIAL

## 2024-11-13 ENCOUNTER — CLINICAL SUPPORT (OUTPATIENT)
Dept: SURGERY | Facility: CLINIC | Age: 49
End: 2024-11-13
Payer: COMMERCIAL

## 2024-11-13 ENCOUNTER — APPOINTMENT (OUTPATIENT)
Dept: ORTHOPEDIC SURGERY | Facility: CLINIC | Age: 49
End: 2024-11-13
Payer: COMMERCIAL

## 2024-11-13 ENCOUNTER — HOSPITAL ENCOUNTER (OUTPATIENT)
Dept: RADIOLOGY | Facility: CLINIC | Age: 49
Discharge: HOME | End: 2024-11-13
Payer: COMMERCIAL

## 2024-11-13 VITALS
WEIGHT: 223 LBS | HEART RATE: 72 BPM | RESPIRATION RATE: 16 BRPM | HEIGHT: 63 IN | SYSTOLIC BLOOD PRESSURE: 129 MMHG | DIASTOLIC BLOOD PRESSURE: 80 MMHG | BODY MASS INDEX: 39.51 KG/M2

## 2024-11-13 VITALS — WEIGHT: 223 LBS | HEIGHT: 63 IN | BODY MASS INDEX: 39.51 KG/M2

## 2024-11-13 DIAGNOSIS — Z71.89 ENCOUNTER FOR PRE-BARIATRIC SURGERY COUNSELING AND EDUCATION: Primary | ICD-10-CM

## 2024-11-13 DIAGNOSIS — K21.00 GASTROESOPHAGEAL REFLUX DISEASE WITH ESOPHAGITIS WITHOUT HEMORRHAGE: ICD-10-CM

## 2024-11-13 DIAGNOSIS — Z96.642 S/P TOTAL LEFT HIP ARTHROPLASTY: ICD-10-CM

## 2024-11-13 DIAGNOSIS — I10 PRIMARY HYPERTENSION: ICD-10-CM

## 2024-11-13 DIAGNOSIS — M16.11 PRIMARY LOCALIZED OSTEOARTHRITIS OF RIGHT HIP: Primary | ICD-10-CM

## 2024-11-13 DIAGNOSIS — Z01.818 PRE-OP EVALUATION: ICD-10-CM

## 2024-11-13 DIAGNOSIS — K44.9 HIATAL HERNIA: Primary | ICD-10-CM

## 2024-11-13 PROCEDURE — 99214 OFFICE O/P EST MOD 30 MIN: CPT | Performed by: INTERNAL MEDICINE

## 2024-11-13 PROCEDURE — 3074F SYST BP LT 130 MM HG: CPT | Performed by: INTERNAL MEDICINE

## 2024-11-13 PROCEDURE — 3008F BODY MASS INDEX DOCD: CPT | Performed by: INTERNAL MEDICINE

## 2024-11-13 PROCEDURE — 3044F HG A1C LEVEL LT 7.0%: CPT | Performed by: STUDENT IN AN ORGANIZED HEALTH CARE EDUCATION/TRAINING PROGRAM

## 2024-11-13 PROCEDURE — 99213 OFFICE O/P EST LOW 20 MIN: CPT | Performed by: STUDENT IN AN ORGANIZED HEALTH CARE EDUCATION/TRAINING PROGRAM

## 2024-11-13 PROCEDURE — 3079F DIAST BP 80-89 MM HG: CPT | Performed by: INTERNAL MEDICINE

## 2024-11-13 PROCEDURE — 3044F HG A1C LEVEL LT 7.0%: CPT

## 2024-11-13 PROCEDURE — 73502 X-RAY EXAM HIP UNI 2-3 VIEWS: CPT | Mod: LEFT SIDE | Performed by: RADIOLOGY

## 2024-11-13 PROCEDURE — 3044F HG A1C LEVEL LT 7.0%: CPT | Performed by: INTERNAL MEDICINE

## 2024-11-13 PROCEDURE — 99024 POSTOP FOLLOW-UP VISIT: CPT

## 2024-11-13 PROCEDURE — 1036F TOBACCO NON-USER: CPT

## 2024-11-13 PROCEDURE — 73502 X-RAY EXAM HIP UNI 2-3 VIEWS: CPT | Mod: LT

## 2024-11-13 ASSESSMENT — ENCOUNTER SYMPTOMS
COUGH: 0
SHORTNESS OF BREATH: 1
CHILLS: 0
DIFFICULTY URINATING: 0
CONSTIPATION: 0
LOSS OF SENSATION IN FEET: 0
NAUSEA: 0
HEADACHES: 0
BACK PAIN: 1
DIARRHEA: 0
WEAKNESS: 0
FEVER: 0
ABDOMINAL PAIN: 0
DEPRESSION: 0
FREQUENCY: 0
OCCASIONAL FEELINGS OF UNSTEADINESS: 0
ROS GI COMMENTS: HEARTBURN
ARTHRALGIAS: 1
DYSURIA: 0

## 2024-11-13 ASSESSMENT — PATIENT HEALTH QUESTIONNAIRE - PHQ9
2. FEELING DOWN, DEPRESSED OR HOPELESS: NOT AT ALL
SUM OF ALL RESPONSES TO PHQ9 QUESTIONS 1 AND 2: 0
1. LITTLE INTEREST OR PLEASURE IN DOING THINGS: NOT AT ALL

## 2024-11-13 ASSESSMENT — PAIN SCALES - GENERAL: PAINLEVEL_OUTOF10: 8

## 2024-11-13 NOTE — PROGRESS NOTES
"Subjective   Patient ID: Kat Tineo is a 49 y.o. female who presents for Pre-Op Clearance.    Mentioned having a hiatal hernia and an enlarged heart. Pshx of gastric sleeve and hysterectomy. Is going to be seeing her cardiologist soon to discuss her current issues. When she was pregnant, had gestational diabetes.     Diagnostics Reviewed:   5/30/2024 EGD: Revealed Hiatal Hernia. EKG NSR 11/2/24  Labs Reviewed: 11/2/2024 CBC, CMP WNL       Review of Systems   Constitutional:  Negative for chills and fever.        Snoring, insomnia, poor sleep, and excessive daytime somnolence.     HENT:  Negative for dental problem.    Respiratory:  Positive for shortness of breath. Negative for cough.    Gastrointestinal:  Negative for abdominal pain, constipation, diarrhea and nausea.        Heartburn   Genitourinary:  Negative for difficulty urinating, dysuria and frequency.   Musculoskeletal:  Positive for arthralgias and back pain.   Neurological:  Negative for weakness and headaches.       Objective   /80   Pulse 72   Resp 16   Ht 1.588 m (5' 2.5\")   Wt 101 kg (223 lb)   BMI 40.14 kg/m²     Physical Exam  Constitutional:       General: She is not in acute distress.     Appearance: She is obese.   HENT:      Mouth/Throat:      Comments: Dentition WNL  Cardiovascular:      Rate and Rhythm: Normal rate and regular rhythm.      Heart sounds: Normal heart sounds.   Pulmonary:      Breath sounds: Normal breath sounds.   Abdominal:      Palpations: Abdomen is soft.      Tenderness: There is abdominal tenderness.   Musculoskeletal:      Cervical back: No tenderness.      Right lower leg: No edema.      Left lower leg: No edema.   Lymphadenopathy:      Cervical: No cervical adenopathy.   Skin:     General: Skin is warm.      Findings: No erythema.   Neurological:      Mental Status: She is alert and oriented to person, place, and time.      Gait: Gait is intact. Gait normal.   Psychiatric:         Mood and Affect: Mood " normal.         Behavior: Behavior normal.         Assessment/Plan   Diagnoses and all orders for this visit:  Hiatal hernia  Pre-op evaluation  Primary hypertension  Gastroesophageal reflux disease with esophagitis without hemorrhage    She is medically clear for surgery.  Cardiac clearance per Dr. Craig her cardiologist  Scribe Attestation  By signing my name below, I, Todd Daigle, Yajaira   attest that this documentation has been prepared under the direction and in the presence of Kristine Hale MD.

## 2024-11-13 NOTE — PATIENT INSTRUCTIONS
The day before surgery, take all medications as normal. On the day of surgery, do not take any of your current medication. Stop Rinvoq.

## 2024-11-13 NOTE — PROGRESS NOTES
Pre-Operative Dietary Education     Current Weight: 223 lbs   Current BMI: 40.11    In class settings, reviewed thin liquids diet that patient will be required to follow for 2 weeks after surgery. Reviewed low calorie fluids along with protein shakes and products that can be consumed. Emphasized the importance of following diet guidelines and recommendations after surgery to prevent complications. Addressed liquids and items to avoid at this time. Patients are educated to drink at least 50 oz of fluid per day, and gradually take in 50g protein per day. Briefly reviewed the diet progression for the next 3-4 months, which will also be discussed at each follow up appointment after surgery. Also reviewed supplementation after bariatric surgery. Patient was instructed to take chewable MVI with iron once daily for the first 6 weeks after surgery. Other supplementation will be introduced at 6 week follow up appointment.       Jalyn Cleary RD, LDN  Registered Dietitian, Licensed Dietitian Nutritionist

## 2024-11-13 NOTE — PROGRESS NOTES
Diagnosis: L KYRIE instability  Procedure Performed: L total hip arthroplasty revision  Date of Surgery: August 17, 2023    Subjective:  Kat Tineo is here for unscheduled follow-up after the above procedure.  The patient continues to complain of pain over the lateral aspect of her hip.  She denies any recent falls or trauma.  She had done walk with therapy for the hip in the past however it was not very helpful.    In regards to her right hip, she has had 2 corticosteroid injections with Dr. Jones.  These have provided her with minimal relief.    She is scheduled to see her bariatric surgeon soon who is going to change her gastric sleeve into a gastric bypass.    There has been no interval change in this patient's past medical, surgical, medications, allergies, family history or social history since the most recent visit to a provider within our department.  14 point review of systems was performed, reviewed, and negative except for pertinent positives documented in the history of present illness.    Physical Examination:   General: Patient is alert and oriented x 3 and appears comfortable.  Gait: Patient ambulates with cane slight antalgic gait.  Wound: Incision is well healed. There is no erythema, incisional drainage, fluctuance, or suture abscess.   Hip Exam: No pain with gentle range of motion of the hip.  Knee: Painless ROM of the knee.   Calf: No swelling or tenderness  Able to dorsi-flex and plantar-flex the ankle with appropriate strength. Able to wiggle all toes.   The foot is warm and well perfused with palpable pulses.   Sensation is intact to light touch.     Radiographs: AP Pelvis, shoot through lateral and frog lateral: Left total hip arthroplasty in place. There is no evidence of subsidence, fracture or dislocation. The joint is located. Compared to immediate post-operative x-rays, no change in appearance.     Assessment and Plan: The patient is progressing well approximately 15 months s/p revision  left total hip arthroplasty.  Patient is complaining of lateral hip pain.  Given the fact that I increased her offset in order to achieve stability of her hip, I am not surprised that she has some lateral thigh pain.  It is likely due to gluteal tendinitis or trochanteric bursitis.  Dr. Jones did attempt to aspirate the left hip but was unable to get any fluid.  She was provided with a home exercise program for trochanteric pain syndrome.    1. A thorough discussion was had with the patient concerning the postoperative course and the patient is in agreement with the plan.    2. We reviewed posterior hip precautions. They will remain in place for life.  Patient can advance to full weightbearing at this juncture.    3. Tylenol as needed/tolerated for pain and stiffness.    4. Reviewed the need for prophylactic antibiotics prior to any dental or other invasive procedures.    5.  Regards to her right hip, she will continue to work on weight loss.  She is hopeful that if she is able to lose weight, she will be able to delay a right total hip arthroplasty.    I will see her back in 6 months with repeat pelvis and left hip radiographs.    *This office note was dictated using Dragon voice to text software and was not proofread for spelling or grammatical errors *

## 2024-11-14 ENCOUNTER — APPOINTMENT (OUTPATIENT)
Dept: SURGERY | Facility: CLINIC | Age: 49
End: 2024-11-14
Payer: COMMERCIAL

## 2024-11-19 DIAGNOSIS — K21.9 GASTROESOPHAGEAL REFLUX DISEASE WITH HIATAL HERNIA: ICD-10-CM

## 2024-11-19 DIAGNOSIS — K44.9 GASTROESOPHAGEAL REFLUX DISEASE WITH HIATAL HERNIA: ICD-10-CM

## 2024-11-20 ENCOUNTER — LAB (OUTPATIENT)
Dept: LAB | Facility: LAB | Age: 49
End: 2024-11-20
Payer: COMMERCIAL

## 2024-11-20 DIAGNOSIS — E66.01 MORBID OBESITY WITH BMI OF 50.0-59.9, ADULT (MULTI): ICD-10-CM

## 2024-11-20 DIAGNOSIS — Z01.818 PRE-OP EVALUATION: ICD-10-CM

## 2024-11-20 LAB
AMPHETAMINES UR QL SCN: NORMAL
BARBITURATES UR QL SCN: NORMAL
BENZODIAZ UR QL SCN: NORMAL
BZE UR QL SCN: NORMAL
CANNABINOIDS UR QL SCN: NORMAL
FENTANYL+NORFENTANYL UR QL SCN: NORMAL
METHADONE UR QL SCN: NORMAL
OPIATES UR QL SCN: NORMAL
OXYCODONE+OXYMORPHONE UR QL SCN: NORMAL
PCP UR QL SCN: NORMAL

## 2024-11-20 PROCEDURE — 80307 DRUG TEST PRSMV CHEM ANLYZR: CPT

## 2024-11-21 ENCOUNTER — APPOINTMENT (OUTPATIENT)
Dept: SURGERY | Facility: CLINIC | Age: 49
End: 2024-11-21
Payer: COMMERCIAL

## 2024-11-22 ENCOUNTER — APPOINTMENT (OUTPATIENT)
Dept: ORTHOPEDIC SURGERY | Facility: CLINIC | Age: 49
End: 2024-11-22
Payer: COMMERCIAL

## 2024-11-26 ENCOUNTER — ANESTHESIA EVENT (OUTPATIENT)
Dept: OPERATING ROOM | Facility: HOSPITAL | Age: 49
End: 2024-11-26
Payer: COMMERCIAL

## 2024-11-26 ENCOUNTER — ANESTHESIA (OUTPATIENT)
Dept: OPERATING ROOM | Facility: HOSPITAL | Age: 49
End: 2024-11-26
Payer: COMMERCIAL

## 2024-11-26 ENCOUNTER — HOSPITAL ENCOUNTER (INPATIENT)
Facility: HOSPITAL | Age: 49
LOS: 1 days | Discharge: HOME | End: 2024-11-27
Attending: SURGERY | Admitting: SURGERY
Payer: COMMERCIAL

## 2024-11-26 DIAGNOSIS — I10 PRIMARY HYPERTENSION: ICD-10-CM

## 2024-11-26 DIAGNOSIS — K21.9 HIATAL HERNIA WITH GASTROESOPHAGEAL REFLUX: Primary | ICD-10-CM

## 2024-11-26 DIAGNOSIS — E66.01 MORBID OBESITY WITH BMI OF 40.0-44.9, ADULT (MULTI): ICD-10-CM

## 2024-11-26 DIAGNOSIS — K21.9 GASTROESOPHAGEAL REFLUX DISEASE, UNSPECIFIED WHETHER ESOPHAGITIS PRESENT: ICD-10-CM

## 2024-11-26 DIAGNOSIS — K44.9 GASTROESOPHAGEAL REFLUX DISEASE WITH HIATAL HERNIA: ICD-10-CM

## 2024-11-26 DIAGNOSIS — K21.9 GASTROESOPHAGEAL REFLUX DISEASE WITH HIATAL HERNIA: ICD-10-CM

## 2024-11-26 DIAGNOSIS — K66.0 ABDOMINAL ADHESIONS: ICD-10-CM

## 2024-11-26 DIAGNOSIS — K44.9 HIATAL HERNIA: ICD-10-CM

## 2024-11-26 DIAGNOSIS — Z98.84 S/P BARIATRIC SURGERY: ICD-10-CM

## 2024-11-26 DIAGNOSIS — K44.9 HIATAL HERNIA WITH GASTROESOPHAGEAL REFLUX: Primary | ICD-10-CM

## 2024-11-26 LAB
GLUCOSE BLD MANUAL STRIP-MCNC: 175 MG/DL (ref 74–99)
GLUCOSE BLD MANUAL STRIP-MCNC: 198 MG/DL (ref 74–99)

## 2024-11-26 PROCEDURE — 96365 THER/PROPH/DIAG IV INF INIT: CPT | Mod: 59

## 2024-11-26 PROCEDURE — 0DNU4ZZ RELEASE OMENTUM, PERCUTANEOUS ENDOSCOPIC APPROACH: ICD-10-PCS | Performed by: SURGERY

## 2024-11-26 PROCEDURE — 3700000001 HC GENERAL ANESTHESIA TIME - INITIAL BASE CHARGE: Performed by: SURGERY

## 2024-11-26 PROCEDURE — 2500000004 HC RX 250 GENERAL PHARMACY W/ HCPCS (ALT 636 FOR OP/ED): Performed by: SURGERY

## 2024-11-26 PROCEDURE — 96372 THER/PROPH/DIAG INJ SC/IM: CPT

## 2024-11-26 PROCEDURE — 2720000007 HC OR 272 NO HCPCS: Performed by: SURGERY

## 2024-11-26 PROCEDURE — 3600000009 HC OR TIME - EACH INCREMENTAL 1 MINUTE - PROCEDURE LEVEL FOUR: Performed by: SURGERY

## 2024-11-26 PROCEDURE — 0D164ZA BYPASS STOMACH TO JEJUNUM, PERCUTANEOUS ENDOSCOPIC APPROACH: ICD-10-PCS | Performed by: SURGERY

## 2024-11-26 PROCEDURE — 2500000004 HC RX 250 GENERAL PHARMACY W/ HCPCS (ALT 636 FOR OP/ED): Performed by: ANESTHESIOLOGY

## 2024-11-26 PROCEDURE — 2500000004 HC RX 250 GENERAL PHARMACY W/ HCPCS (ALT 636 FOR OP/ED): Performed by: NURSE ANESTHETIST, CERTIFIED REGISTERED

## 2024-11-26 PROCEDURE — 3600000004 HC OR TIME - INITIAL BASE CHARGE - PROCEDURE LEVEL FOUR: Performed by: SURGERY

## 2024-11-26 PROCEDURE — 43281 LAP PARAESOPHAG HERN REPAIR: CPT | Performed by: SURGERY

## 2024-11-26 PROCEDURE — 7100000002 HC RECOVERY ROOM TIME - EACH INCREMENTAL 1 MINUTE: Performed by: SURGERY

## 2024-11-26 PROCEDURE — C1781 MESH (IMPLANTABLE): HCPCS | Performed by: SURGERY

## 2024-11-26 PROCEDURE — 3700000002 HC GENERAL ANESTHESIA TIME - EACH INCREMENTAL 1 MINUTE: Performed by: SURGERY

## 2024-11-26 PROCEDURE — 88307 TISSUE EXAM BY PATHOLOGIST: CPT | Mod: TC

## 2024-11-26 PROCEDURE — 96372 THER/PROPH/DIAG INJ SC/IM: CPT | Performed by: SURGERY

## 2024-11-26 PROCEDURE — A43644 PR LAP GASTRIC BYPASS/ROUX-EN-Y: Performed by: ANESTHESIOLOGY

## 2024-11-26 PROCEDURE — 7100000011 HC EXTENDED STAY RECOVERY HOURLY - NURSING UNIT

## 2024-11-26 PROCEDURE — 7100000001 HC RECOVERY ROOM TIME - INITIAL BASE CHARGE: Performed by: SURGERY

## 2024-11-26 PROCEDURE — 88307 TISSUE EXAM BY PATHOLOGIST: CPT | Performed by: PATHOLOGY

## 2024-11-26 PROCEDURE — 43644 LAP GASTRIC BYPASS/ROUX-EN-Y: CPT | Performed by: SURGERY

## 2024-11-26 PROCEDURE — 0BQT4ZZ REPAIR DIAPHRAGM, PERCUTANEOUS ENDOSCOPIC APPROACH: ICD-10-PCS | Performed by: SURGERY

## 2024-11-26 PROCEDURE — 96375 TX/PRO/DX INJ NEW DRUG ADDON: CPT | Mod: 59

## 2024-11-26 PROCEDURE — 2500000004 HC RX 250 GENERAL PHARMACY W/ HCPCS (ALT 636 FOR OP/ED)

## 2024-11-26 PROCEDURE — 82947 ASSAY GLUCOSE BLOOD QUANT: CPT

## 2024-11-26 PROCEDURE — A43644 PR LAP GASTRIC BYPASS/ROUX-EN-Y: Performed by: NURSE ANESTHETIST, CERTIFIED REGISTERED

## 2024-11-26 PROCEDURE — 43644 LAP GASTRIC BYPASS/ROUX-EN-Y: CPT | Performed by: PHYSICIAN ASSISTANT

## 2024-11-26 PROCEDURE — RXMED WILLOW AMBULATORY MEDICATION CHARGE

## 2024-11-26 PROCEDURE — 96361 HYDRATE IV INFUSION ADD-ON: CPT | Mod: 59

## 2024-11-26 PROCEDURE — 2780000003 HC OR 278 NO HCPCS: Performed by: SURGERY

## 2024-11-26 PROCEDURE — 2500000005 HC RX 250 GENERAL PHARMACY W/O HCPCS: Performed by: SURGERY

## 2024-11-26 DEVICE — IMPLANTABLE DEVICE: Type: IMPLANTABLE DEVICE | Site: ABDOMEN | Status: FUNCTIONAL

## 2024-11-26 RX ORDER — ROCURONIUM BROMIDE 10 MG/ML
INJECTION, SOLUTION INTRAVENOUS AS NEEDED
Status: DISCONTINUED | OUTPATIENT
Start: 2024-11-26 | End: 2024-11-26

## 2024-11-26 RX ORDER — MIDAZOLAM HYDROCHLORIDE 1 MG/ML
INJECTION, SOLUTION INTRAMUSCULAR; INTRAVENOUS AS NEEDED
Status: DISCONTINUED | OUTPATIENT
Start: 2024-11-26 | End: 2024-11-26

## 2024-11-26 RX ORDER — METOCLOPRAMIDE HYDROCHLORIDE 5 MG/ML
10 INJECTION INTRAMUSCULAR; INTRAVENOUS EVERY 8 HOURS
Status: DISCONTINUED | OUTPATIENT
Start: 2024-11-26 | End: 2024-11-27 | Stop reason: HOSPADM

## 2024-11-26 RX ORDER — METOPROLOL TARTRATE 1 MG/ML
5 INJECTION, SOLUTION INTRAVENOUS EVERY 6 HOURS
Status: DISCONTINUED | OUTPATIENT
Start: 2024-11-26 | End: 2024-11-26

## 2024-11-26 RX ORDER — ONDANSETRON HYDROCHLORIDE 2 MG/ML
4 INJECTION, SOLUTION INTRAVENOUS ONCE AS NEEDED
Status: COMPLETED | OUTPATIENT
Start: 2024-11-26 | End: 2024-11-26

## 2024-11-26 RX ORDER — FENTANYL CITRATE 50 UG/ML
INJECTION, SOLUTION INTRAMUSCULAR; INTRAVENOUS AS NEEDED
Status: DISCONTINUED | OUTPATIENT
Start: 2024-11-26 | End: 2024-11-26

## 2024-11-26 RX ORDER — PANTOPRAZOLE SODIUM 40 MG/10ML
40 INJECTION, POWDER, LYOPHILIZED, FOR SOLUTION INTRAVENOUS
Status: DISCONTINUED | OUTPATIENT
Start: 2024-11-27 | End: 2024-11-27 | Stop reason: HOSPADM

## 2024-11-26 RX ORDER — MEPERIDINE HYDROCHLORIDE 25 MG/ML
12.5 INJECTION INTRAMUSCULAR; INTRAVENOUS; SUBCUTANEOUS EVERY 10 MIN PRN
Status: DISCONTINUED | OUTPATIENT
Start: 2024-11-26 | End: 2024-11-26 | Stop reason: HOSPADM

## 2024-11-26 RX ORDER — HYDROMORPHONE HYDROCHLORIDE 2 MG/ML
INJECTION, SOLUTION INTRAMUSCULAR; INTRAVENOUS; SUBCUTANEOUS AS NEEDED
Status: DISCONTINUED | OUTPATIENT
Start: 2024-11-26 | End: 2024-11-26

## 2024-11-26 RX ORDER — METOPROLOL TARTRATE 25 MG/1
50 TABLET, FILM COATED ORAL 2 TIMES DAILY
Status: DISCONTINUED | OUTPATIENT
Start: 2024-11-27 | End: 2024-11-27 | Stop reason: HOSPADM

## 2024-11-26 RX ORDER — ACETAMINOPHEN 10 MG/ML
1000 INJECTION, SOLUTION INTRAVENOUS EVERY 6 HOURS
Status: DISCONTINUED | OUTPATIENT
Start: 2024-11-26 | End: 2024-11-27

## 2024-11-26 RX ORDER — METOCLOPRAMIDE HYDROCHLORIDE 5 MG/ML
10 INJECTION INTRAMUSCULAR; INTRAVENOUS ONCE
Status: COMPLETED | OUTPATIENT
Start: 2024-11-26 | End: 2024-11-26

## 2024-11-26 RX ORDER — HYDRALAZINE HYDROCHLORIDE 20 MG/ML
10 INJECTION INTRAMUSCULAR; INTRAVENOUS EVERY 4 HOURS PRN
Status: DISCONTINUED | OUTPATIENT
Start: 2024-11-26 | End: 2024-11-27 | Stop reason: HOSPADM

## 2024-11-26 RX ORDER — DIPHENHYDRAMINE HYDROCHLORIDE 50 MG/ML
12.5 INJECTION INTRAMUSCULAR; INTRAVENOUS ONCE AS NEEDED
Status: DISCONTINUED | OUTPATIENT
Start: 2024-11-26 | End: 2024-11-26 | Stop reason: HOSPADM

## 2024-11-26 RX ORDER — METOPROLOL TARTRATE 50 MG/1
50 TABLET ORAL 2 TIMES DAILY
Qty: 60 TABLET | Refills: 1 | Status: SHIPPED | OUTPATIENT
Start: 2024-11-27

## 2024-11-26 RX ORDER — ONDANSETRON HYDROCHLORIDE 2 MG/ML
4 INJECTION, SOLUTION INTRAVENOUS EVERY 8 HOURS PRN
Status: DISCONTINUED | OUTPATIENT
Start: 2024-11-26 | End: 2024-11-27 | Stop reason: HOSPADM

## 2024-11-26 RX ORDER — HYDROMORPHONE HYDROCHLORIDE 0.2 MG/ML
0.2 INJECTION INTRAMUSCULAR; INTRAVENOUS; SUBCUTANEOUS EVERY 5 MIN PRN
Status: DISCONTINUED | OUTPATIENT
Start: 2024-11-26 | End: 2024-11-26 | Stop reason: HOSPADM

## 2024-11-26 RX ORDER — PROPOFOL 10 MG/ML
INJECTION, EMULSION INTRAVENOUS AS NEEDED
Status: DISCONTINUED | OUTPATIENT
Start: 2024-11-26 | End: 2024-11-26

## 2024-11-26 RX ORDER — SODIUM CHLORIDE, SODIUM LACTATE, POTASSIUM CHLORIDE, CALCIUM CHLORIDE 600; 310; 30; 20 MG/100ML; MG/100ML; MG/100ML; MG/100ML
125 INJECTION, SOLUTION INTRAVENOUS CONTINUOUS
Status: DISCONTINUED | OUTPATIENT
Start: 2024-11-26 | End: 2024-11-27

## 2024-11-26 RX ORDER — VANCOMYCIN 1.5 G/300ML
1500 INJECTION, SOLUTION INTRAVENOUS ONCE
Status: COMPLETED | OUTPATIENT
Start: 2024-11-26 | End: 2024-11-26

## 2024-11-26 RX ORDER — LOSARTAN POTASSIUM 100 MG/1
100 TABLET ORAL DAILY
Status: DISCONTINUED | OUTPATIENT
Start: 2024-11-27 | End: 2024-11-27 | Stop reason: HOSPADM

## 2024-11-26 RX ORDER — AMLODIPINE BESYLATE 5 MG/1
5 TABLET ORAL DAILY
Status: DISCONTINUED | OUTPATIENT
Start: 2024-11-27 | End: 2024-11-26

## 2024-11-26 RX ORDER — LIDOCAINE HYDROCHLORIDE 20 MG/ML
INJECTION, SOLUTION INFILTRATION; PERINEURAL AS NEEDED
Status: DISCONTINUED | OUTPATIENT
Start: 2024-11-26 | End: 2024-11-26

## 2024-11-26 RX ORDER — ONDANSETRON HYDROCHLORIDE 2 MG/ML
INJECTION, SOLUTION INTRAVENOUS AS NEEDED
Status: DISCONTINUED | OUTPATIENT
Start: 2024-11-26 | End: 2024-11-26

## 2024-11-26 RX ORDER — BUPRENORPHINE HYDROCHLORIDE 0.32 MG/ML
0.1 INJECTION INTRAMUSCULAR; INTRAVENOUS EVERY 6 HOURS PRN
Status: DISCONTINUED | OUTPATIENT
Start: 2024-11-26 | End: 2024-11-27

## 2024-11-26 RX ORDER — SCOLOPAMINE TRANSDERMAL SYSTEM 1 MG/1
1 PATCH, EXTENDED RELEASE TRANSDERMAL ONCE
Status: DISCONTINUED | OUTPATIENT
Start: 2024-11-26 | End: 2024-11-26

## 2024-11-26 RX ORDER — ACETAMINOPHEN 10 MG/ML
1000 INJECTION, SOLUTION INTRAVENOUS ONCE
Status: COMPLETED | OUTPATIENT
Start: 2024-11-26 | End: 2024-11-26

## 2024-11-26 RX ORDER — HEPARIN SODIUM 5000 [USP'U]/ML
5000 INJECTION, SOLUTION INTRAVENOUS; SUBCUTANEOUS ONCE
Status: COMPLETED | OUTPATIENT
Start: 2024-11-26 | End: 2024-11-26

## 2024-11-26 RX ORDER — LOSARTAN POTASSIUM 100 MG/1
100 TABLET ORAL DAILY
Qty: 30 TABLET | Refills: 1 | Status: SHIPPED | OUTPATIENT
Start: 2024-11-27

## 2024-11-26 RX ORDER — METOPROLOL TARTRATE 1 MG/ML
5 INJECTION, SOLUTION INTRAVENOUS EVERY 6 HOURS
Status: COMPLETED | OUTPATIENT
Start: 2024-11-26 | End: 2024-11-27

## 2024-11-26 RX ORDER — ALBUTEROL SULFATE 0.83 MG/ML
2.5 SOLUTION RESPIRATORY (INHALATION) ONCE AS NEEDED
Status: DISCONTINUED | OUTPATIENT
Start: 2024-11-26 | End: 2024-11-26 | Stop reason: HOSPADM

## 2024-11-26 RX ORDER — DULOXETIN HYDROCHLORIDE 60 MG/1
60 CAPSULE, DELAYED RELEASE ORAL DAILY
Status: DISCONTINUED | OUTPATIENT
Start: 2024-11-27 | End: 2024-11-27 | Stop reason: HOSPADM

## 2024-11-26 RX ORDER — SODIUM CHLORIDE, SODIUM LACTATE, POTASSIUM CHLORIDE, CALCIUM CHLORIDE 600; 310; 30; 20 MG/100ML; MG/100ML; MG/100ML; MG/100ML
125 INJECTION, SOLUTION INTRAVENOUS CONTINUOUS
Status: DISCONTINUED | OUTPATIENT
Start: 2024-11-26 | End: 2024-11-26

## 2024-11-26 RX ORDER — HEPARIN SODIUM 5000 [USP'U]/ML
5000 INJECTION, SOLUTION INTRAVENOUS; SUBCUTANEOUS EVERY 8 HOURS
Status: DISCONTINUED | OUTPATIENT
Start: 2024-11-26 | End: 2024-11-27 | Stop reason: HOSPADM

## 2024-11-26 RX ORDER — HYDRALAZINE HYDROCHLORIDE 20 MG/ML
5 INJECTION INTRAMUSCULAR; INTRAVENOUS EVERY 30 MIN PRN
Status: DISCONTINUED | OUTPATIENT
Start: 2024-11-26 | End: 2024-11-26

## 2024-11-26 RX ORDER — AMLODIPINE BESYLATE 10 MG/1
10 TABLET ORAL DAILY
Status: DISCONTINUED | OUTPATIENT
Start: 2024-11-27 | End: 2024-11-27 | Stop reason: HOSPADM

## 2024-11-26 RX ORDER — PANTOPRAZOLE SODIUM 40 MG/10ML
40 INJECTION, POWDER, LYOPHILIZED, FOR SOLUTION INTRAVENOUS ONCE
Status: COMPLETED | OUTPATIENT
Start: 2024-11-26 | End: 2024-11-26

## 2024-11-26 RX ORDER — IPRATROPIUM BROMIDE 0.5 MG/2.5ML
500 SOLUTION RESPIRATORY (INHALATION) ONCE
Status: DISCONTINUED | OUTPATIENT
Start: 2024-11-26 | End: 2024-11-26 | Stop reason: HOSPADM

## 2024-11-26 RX ORDER — PHENYLEPHRINE HCL IN 0.9% NACL 0.4MG/10ML
SYRINGE (ML) INTRAVENOUS AS NEEDED
Status: DISCONTINUED | OUTPATIENT
Start: 2024-11-26 | End: 2024-11-26

## 2024-11-26 RX ORDER — OXYCODONE HYDROCHLORIDE 5 MG/1
5 TABLET ORAL EVERY 4 HOURS PRN
Status: DISCONTINUED | OUTPATIENT
Start: 2024-11-26 | End: 2024-11-26 | Stop reason: HOSPADM

## 2024-11-26 RX ORDER — NALOXONE HYDROCHLORIDE 0.4 MG/ML
0.2 INJECTION, SOLUTION INTRAMUSCULAR; INTRAVENOUS; SUBCUTANEOUS EVERY 5 MIN PRN
Status: DISCONTINUED | OUTPATIENT
Start: 2024-11-26 | End: 2024-11-27 | Stop reason: HOSPADM

## 2024-11-26 RX ORDER — LABETALOL HYDROCHLORIDE 5 MG/ML
INJECTION, SOLUTION INTRAVENOUS AS NEEDED
Status: DISCONTINUED | OUTPATIENT
Start: 2024-11-26 | End: 2024-11-26

## 2024-11-26 RX ADMIN — METOCLOPRAMIDE HYDROCHLORIDE 10 MG: 5 INJECTION INTRAMUSCULAR; INTRAVENOUS at 17:39

## 2024-11-26 RX ADMIN — VANCOMYCIN 1.5 G: 1.5 INJECTION, SOLUTION INTRAVENOUS at 08:59

## 2024-11-26 RX ADMIN — HEPARIN SODIUM 5000 UNITS: 5000 INJECTION, SOLUTION INTRAVENOUS; SUBCUTANEOUS at 19:53

## 2024-11-26 RX ADMIN — ONDANSETRON 4 MG: 2 INJECTION INTRAMUSCULAR; INTRAVENOUS at 16:13

## 2024-11-26 RX ADMIN — METOPROLOL TARTRATE 5 MG: 5 INJECTION INTRAVENOUS at 17:38

## 2024-11-26 RX ADMIN — HEPARIN SODIUM 5000 UNITS: 5000 INJECTION, SOLUTION INTRAVENOUS; SUBCUTANEOUS at 08:43

## 2024-11-26 RX ADMIN — HYDROMORPHONE HYDROCHLORIDE 0.2 MG: 0.2 INJECTION, SOLUTION INTRAMUSCULAR; INTRAVENOUS; SUBCUTANEOUS at 16:06

## 2024-11-26 RX ADMIN — ACETAMINOPHEN 1000 MG: 10 INJECTION INTRAVENOUS at 08:41

## 2024-11-26 RX ADMIN — HYDROMORPHONE HYDROCHLORIDE 0.2 MG: 0.2 INJECTION, SOLUTION INTRAMUSCULAR; INTRAVENOUS; SUBCUTANEOUS at 16:19

## 2024-11-26 RX ADMIN — HYDROMORPHONE HYDROCHLORIDE 0.5 MG: 1 INJECTION, SOLUTION INTRAMUSCULAR; INTRAVENOUS; SUBCUTANEOUS at 15:52

## 2024-11-26 RX ADMIN — SODIUM CHLORIDE, SODIUM LACTATE, POTASSIUM CHLORIDE, AND CALCIUM CHLORIDE 125 ML/HR: 600; 310; 30; 20 INJECTION, SOLUTION INTRAVENOUS at 17:39

## 2024-11-26 RX ADMIN — PANTOPRAZOLE SODIUM 40 MG: 40 INJECTION, POWDER, FOR SOLUTION INTRAVENOUS at 08:42

## 2024-11-26 RX ADMIN — SCOPOLAMINE 1 PATCH: 1.5 PATCH, EXTENDED RELEASE TRANSDERMAL at 08:43

## 2024-11-26 RX ADMIN — Medication 2 L/MIN: at 17:39

## 2024-11-26 RX ADMIN — BUPRENORPHINE HYDROCHLORIDE 0.1 MG: 0.32 INJECTION INTRAMUSCULAR; INTRAVENOUS at 22:42

## 2024-11-26 RX ADMIN — SODIUM CHLORIDE, SODIUM LACTATE, POTASSIUM CHLORIDE, AND CALCIUM CHLORIDE 125 ML/HR: 600; 310; 30; 20 INJECTION, SOLUTION INTRAVENOUS at 08:41

## 2024-11-26 RX ADMIN — SODIUM CHLORIDE, SODIUM LACTATE, POTASSIUM CHLORIDE, AND CALCIUM CHLORIDE 125 ML/HR: 600; 310; 30; 20 INJECTION, SOLUTION INTRAVENOUS at 22:42

## 2024-11-26 RX ADMIN — METOCLOPRAMIDE HYDROCHLORIDE 10 MG: 5 INJECTION INTRAMUSCULAR; INTRAVENOUS at 08:42

## 2024-11-26 RX ADMIN — ACETAMINOPHEN 1000 MG: 10 INJECTION INTRAVENOUS at 17:38

## 2024-11-26 SDOH — ECONOMIC STABILITY: FOOD INSECURITY: HOW HARD IS IT FOR YOU TO PAY FOR THE VERY BASICS LIKE FOOD, HOUSING, MEDICAL CARE, AND HEATING?: NOT HARD AT ALL

## 2024-11-26 SDOH — SOCIAL STABILITY: SOCIAL INSECURITY
WITHIN THE LAST YEAR, HAVE YOU BEEN KICKED, HIT, SLAPPED, OR OTHERWISE PHYSICALLY HURT BY YOUR PARTNER OR EX-PARTNER?: NO

## 2024-11-26 SDOH — SOCIAL STABILITY: SOCIAL INSECURITY: WITHIN THE LAST YEAR, HAVE YOU BEEN HUMILIATED OR EMOTIONALLY ABUSED IN OTHER WAYS BY YOUR PARTNER OR EX-PARTNER?: NO

## 2024-11-26 SDOH — ECONOMIC STABILITY: HOUSING INSECURITY: IN THE PAST 12 MONTHS, HOW MANY TIMES HAVE YOU MOVED WHERE YOU WERE LIVING?: 1

## 2024-11-26 SDOH — ECONOMIC STABILITY: HOUSING INSECURITY: IN THE LAST 12 MONTHS, WAS THERE A TIME WHEN YOU WERE NOT ABLE TO PAY THE MORTGAGE OR RENT ON TIME?: NO

## 2024-11-26 SDOH — ECONOMIC STABILITY: INCOME INSECURITY: IN THE PAST 12 MONTHS HAS THE ELECTRIC, GAS, OIL, OR WATER COMPANY THREATENED TO SHUT OFF SERVICES IN YOUR HOME?: NO

## 2024-11-26 SDOH — ECONOMIC STABILITY: FOOD INSECURITY: WITHIN THE PAST 12 MONTHS, YOU WORRIED THAT YOUR FOOD WOULD RUN OUT BEFORE YOU GOT THE MONEY TO BUY MORE.: NEVER TRUE

## 2024-11-26 SDOH — SOCIAL STABILITY: SOCIAL INSECURITY
WITHIN THE LAST YEAR, HAVE YOU BEEN RAPED OR FORCED TO HAVE ANY KIND OF SEXUAL ACTIVITY BY YOUR PARTNER OR EX-PARTNER?: NO

## 2024-11-26 SDOH — SOCIAL STABILITY: SOCIAL INSECURITY: HAS ANYONE EVER THREATENED TO HURT YOUR FAMILY OR YOUR PETS?: NO

## 2024-11-26 SDOH — SOCIAL STABILITY: SOCIAL INSECURITY: ABUSE: ADULT

## 2024-11-26 SDOH — SOCIAL STABILITY: SOCIAL INSECURITY: WITHIN THE LAST YEAR, HAVE YOU BEEN AFRAID OF YOUR PARTNER OR EX-PARTNER?: NO

## 2024-11-26 SDOH — HEALTH STABILITY: MENTAL HEALTH: CURRENT SMOKER: 0

## 2024-11-26 SDOH — ECONOMIC STABILITY: FOOD INSECURITY: WITHIN THE PAST 12 MONTHS, THE FOOD YOU BOUGHT JUST DIDN'T LAST AND YOU DIDN'T HAVE MONEY TO GET MORE.: NEVER TRUE

## 2024-11-26 SDOH — ECONOMIC STABILITY: TRANSPORTATION INSECURITY: IN THE PAST 12 MONTHS, HAS LACK OF TRANSPORTATION KEPT YOU FROM MEDICAL APPOINTMENTS OR FROM GETTING MEDICATIONS?: NO

## 2024-11-26 SDOH — SOCIAL STABILITY: SOCIAL INSECURITY: HAVE YOU HAD ANY THOUGHTS OF HARMING ANYONE ELSE?: NO

## 2024-11-26 SDOH — ECONOMIC STABILITY: HOUSING INSECURITY: AT ANY TIME IN THE PAST 12 MONTHS, WERE YOU HOMELESS OR LIVING IN A SHELTER (INCLUDING NOW)?: NO

## 2024-11-26 SDOH — SOCIAL STABILITY: SOCIAL INSECURITY: DO YOU FEEL UNSAFE GOING BACK TO THE PLACE WHERE YOU ARE LIVING?: NO

## 2024-11-26 SDOH — SOCIAL STABILITY: SOCIAL INSECURITY: DO YOU FEEL ANYONE HAS EXPLOITED OR TAKEN ADVANTAGE OF YOU FINANCIALLY OR OF YOUR PERSONAL PROPERTY?: NO

## 2024-11-26 SDOH — SOCIAL STABILITY: SOCIAL INSECURITY: HAVE YOU HAD THOUGHTS OF HARMING ANYONE ELSE?: NO

## 2024-11-26 SDOH — SOCIAL STABILITY: SOCIAL INSECURITY: ARE YOU OR HAVE YOU BEEN THREATENED OR ABUSED PHYSICALLY, EMOTIONALLY, OR SEXUALLY BY ANYONE?: NO

## 2024-11-26 SDOH — SOCIAL STABILITY: SOCIAL INSECURITY: ARE THERE ANY APPARENT SIGNS OF INJURIES/BEHAVIORS THAT COULD BE RELATED TO ABUSE/NEGLECT?: NO

## 2024-11-26 SDOH — SOCIAL STABILITY: SOCIAL INSECURITY: DOES ANYONE TRY TO KEEP YOU FROM HAVING/CONTACTING OTHER FRIENDS OR DOING THINGS OUTSIDE YOUR HOME?: NO

## 2024-11-26 SDOH — SOCIAL STABILITY: SOCIAL INSECURITY: WERE YOU ABLE TO COMPLETE ALL THE BEHAVIORAL HEALTH SCREENINGS?: YES

## 2024-11-26 ASSESSMENT — COGNITIVE AND FUNCTIONAL STATUS - GENERAL
DAILY ACTIVITIY SCORE: 24
DAILY ACTIVITIY SCORE: 24
PATIENT BASELINE BEDBOUND: NO
MOBILITY SCORE: 24
MOBILITY SCORE: 24

## 2024-11-26 ASSESSMENT — PAIN SCALES - GENERAL
PAINLEVEL_OUTOF10: 8
PAINLEVEL_OUTOF10: 0 - NO PAIN
PAINLEVEL_OUTOF10: 4
PAINLEVEL_OUTOF10: 7
PAINLEVEL_OUTOF10: 5 - MODERATE PAIN
PAINLEVEL_OUTOF10: 5 - MODERATE PAIN
PAINLEVEL_OUTOF10: 3
PAINLEVEL_OUTOF10: 8
PAINLEVEL_OUTOF10: 2
PAINLEVEL_OUTOF10: 7
PAIN_LEVEL: 0
PAINLEVEL_OUTOF10: 7

## 2024-11-26 ASSESSMENT — PAIN DESCRIPTION - LOCATION
LOCATION: ABDOMEN

## 2024-11-26 ASSESSMENT — PAIN DESCRIPTION - DESCRIPTORS
DESCRIPTORS: DULL
DESCRIPTORS: DULL
DESCRIPTORS: DISCOMFORT
DESCRIPTORS: NAGGING;PRESSURE

## 2024-11-26 ASSESSMENT — ACTIVITIES OF DAILY LIVING (ADL)
HEARING - RIGHT EAR: FUNCTIONAL
HEARING - LEFT EAR: FUNCTIONAL
LACK_OF_TRANSPORTATION: NO
ADEQUATE_TO_COMPLETE_ADL: YES
GROOMING: INDEPENDENT
BATHING: INDEPENDENT
FEEDING YOURSELF: INDEPENDENT
TOILETING: INDEPENDENT
WALKS IN HOME: INDEPENDENT
PATIENT'S MEMORY ADEQUATE TO SAFELY COMPLETE DAILY ACTIVITIES?: YES
JUDGMENT_ADEQUATE_SAFELY_COMPLETE_DAILY_ACTIVITIES: YES
DRESSING YOURSELF: INDEPENDENT
LACK_OF_TRANSPORTATION: NO

## 2024-11-26 ASSESSMENT — PAIN SCALES - PAIN ASSESSMENT IN ADVANCED DEMENTIA (PAINAD)
TOTALSCORE: MEDICATION (SEE MAR)
TOTALSCORE: MEDICATION (SEE MAR);COLD APPLIED
TOTALSCORE: MEDICATION (SEE MAR)

## 2024-11-26 ASSESSMENT — PAIN DESCRIPTION - ORIENTATION: ORIENTATION: LEFT

## 2024-11-26 ASSESSMENT — PATIENT HEALTH QUESTIONNAIRE - PHQ9
SUM OF ALL RESPONSES TO PHQ9 QUESTIONS 1 & 2: 0
1. LITTLE INTEREST OR PLEASURE IN DOING THINGS: NOT AT ALL
2. FEELING DOWN, DEPRESSED OR HOPELESS: NOT AT ALL

## 2024-11-26 ASSESSMENT — LIFESTYLE VARIABLES
AUDIT-C TOTAL SCORE: 0
AUDIT-C TOTAL SCORE: 0
SKIP TO QUESTIONS 9-10: 1
HOW OFTEN DO YOU HAVE A DRINK CONTAINING ALCOHOL: NEVER
HOW OFTEN DO YOU HAVE 6 OR MORE DRINKS ON ONE OCCASION: NEVER
HOW MANY STANDARD DRINKS CONTAINING ALCOHOL DO YOU HAVE ON A TYPICAL DAY: PATIENT DOES NOT DRINK

## 2024-11-26 ASSESSMENT — PAIN - FUNCTIONAL ASSESSMENT
PAIN_FUNCTIONAL_ASSESSMENT: 0-10

## 2024-11-26 NOTE — ANESTHESIA PREPROCEDURE EVALUATION
Patient: Kat Colon    Procedure Information       Date/Time: 11/26/24 1000    Procedures:       Gastric Bypass Laparoscopic      Repair Diaphragmatic Hernia Laparoscopy **PAT ON ADMIT**    Location: [facility] OR  / [facility]    Surgeons: De Santana MD            Relevant Problems   Anesthesia (within normal limits)      Cardiac   (+) Chest pain   (+) Chest pain at rest   (+) Electrocardiogram abnormal   (+) Hypertension      Pulmonary (within normal limits)      Neuro   (+) Lumbar radiculopathy   (+) Lumbosacral radiculitis   (+) Mixed anxiety depressive disorder   (+) Polyneuropathy, unspecified      GI   (+) Gastroesophageal reflux disease   (+) Gastroesophageal reflux disease with hiatal hernia   (+) Hiatal hernia      /Renal   (+) Acute urinary tract infection      Liver   (+) Gallstone      Endocrine   (+) Obesity      Hematology   (+) Severe anemia      Musculoskeletal   (+) Chronic osteoarthritis   (+) Lumbar degenerative disc disease   (+) Lumbar spondylosis   (+) Primary osteoarthritis of left hip   (+) Rheumatoid arthritis   (+) Stenosis, cervical spine      HEENT (within normal limits)      ID   (+) Acute urinary tract infection   (+) Dental infection   (+) Disease due to severe acute respiratory syndrome coronavirus 2 (SARS-CoV-2)   (+) Influenza with gastrointestinal tract involvement   (+) Onychomycosis   (+) Upper respiratory tract infection      Skin (within normal limits)      GYN (within normal limits)       Clinical information reviewed:   Tobacco  Allergies  Meds   Med Hx  Surg Hx  OB Status  Fam Hx  Soc   Hx      Allergies   Allergen Reactions    Ace Inhibitors Cough, Shortness of breath and Unknown    Adhesive Tape-Silicones Rash and Unknown    Amoxicillin Unknown     Urinary tract infection     Prior to Admission medications    Medication Sig Start Date End Date Taking? Authorizing Provider   amLODIPine (Norvasc) 10 mg tablet TAKE 1 TABLET BY MOUTH EVERY DAY 5/1/24  Yes Dagoberto  Ice, DO   DULoxetine (Cymbalta) 60 mg DR capsule TAKE 1 CAPSULE BY MOUTH EVERY DAY 8/26/24  Yes Dagoberto Garza, DO   losartan-hydrochlorothiazide (Hyzaar) 100-25 mg tablet Take 1 tablet by mouth once daily. 4/15/24 4/15/25 Yes Gabe Lorenzo MD   metoprolol succinate XL (Toprol-XL) 50 mg 24 hr tablet Take 1 tablet (50 mg) by mouth once daily. Do not crush or chew. 10/3/24 10/3/25 Yes DEWEY Monk   multivitamin tablet Take 1 tablet by mouth once daily.   Yes Historical Provider, MD   omeprazole (PriLOSEC) 40 mg DR capsule Take 1 capsule (40 mg) by mouth 2 times a day. 10/21/24 4/19/25 Yes DEWEY Moore   rizatriptan (Maxalt) 10 mg tablet TAKE 1 TABLET AT ONSET OF HEADACHE. MAY REPEAT EVERY 2 HOURS AS NEEDED. MAXIMUM 3 TABLETS/24 HOURS. 11/7/24  Yes Dagoberto Garza, DO   sucralfate (Carafate) 1 gram tablet Take 1 tablet (1 g) by mouth 4 times a day before meals. Dissolve tablet in liquid. Drink solution. 10/21/24 4/19/25 Yes DEWEY Moore   tiZANidine (Zanaflex) 4 mg tablet TAKE 1 TABLET BY MOUTH EVERYDAY AT BEDTIME 6/17/24  Yes Amisha Maguire PA-C   upadacitinib ER (Rinvoq) 15 mg tablet extended release 24 hr Take 1 tablet (15 mg) by mouth once daily. Do not crush, chew or split. Swallow whole.   Yes Historical Provider, MD   zolpidem (Ambien) 10 mg tablet Take 1 tablet (10 mg) by mouth as needed at bedtime for sleep. 6/10/24  Yes Dagoberto Garza, DO   omeprazole (PriLOSEC) 40 mg DR capsule Take 1 capsule (40 mg) by mouth 2 times a day. Open capsule. Do not crush or chew beads. 7/3/24 11/13/24  DEWEY Moore     Past Medical History:   Diagnosis Date    Acid reflux     Diabetes mellitus (Multi)     DM (diabetes mellitus), gestational     Hypertension     Rheumatoid arthritis      Past Surgical History:   Procedure Laterality Date    CT GUIDED SOFT TISSUE FLUID DRAIN  06/02/2022    CT GUIDED SOFT TISSUE FLUID DRAIN [facility]    HYSTERECTOMY  2012     LAPAROSCOPY GASTRECTOMY PARTIAL / TOTAL  2017    vertical sleeve    NECK SURGERY      OTHER SURGICAL HISTORY  11/22/2017    laparoscopic cholecystectomy wiht intraoperative cholangiogram, lysis of adhesions, mesh repair of incisional henia using 6 in round ventralight st mesh    OTHER SURGICAL HISTORY Left     revision x 2 hip    SPINE SURGERY  02/14/2023    L4-L5 SPINAL SURGERY ( [facility] [place])    US GUIDED SOFT TISSUE FLUID DRAIN  09/30/2021    US GUIDED SOFT TISSUE FLUID DRAIN [facility]         NPO Detail:  NPO/Void Status  Carbohydrate Drink Given Prior to Surgery? : N  Date of Last Liquid: 11/25/24  Time of Last Liquid: 2200  Date of Last Solid: 11/24/24  Time of Last Solid: 1800  Last Intake Type: GI prep  Time of Last Void: 0813         Physical Exam    Airway  Mallampati: II  TM distance: >3 FB  Neck ROM: full     Cardiovascular    Dental - normal exam     Pulmonary    Abdominal            Anesthesia Plan    History of general anesthesia?: yes  History of complications of general anesthesia?: no    ASA 3     general     The patient is not a current smoker.  Patient was not previously instructed to abstain from smoking on day of procedure.  Patient did not smoke on day of procedure.  Education provided regarding risk of obstructive sleep apnea.  intravenous induction   Anesthetic plan and risks discussed with patient.    Plan discussed with CRNA and CAA.

## 2024-11-26 NOTE — PERIOPERATIVE NURSING NOTE
VSS, abd. Lap inc site remain stable, c/d/I, abd soft, medicated for pain with good response, enc. D/B/B, SOTELO, A&Ox 4, remain npo sign at bed. Dr. Hale saw patient at bedside PACU. SBAR to floor nurse. Awaiting transport

## 2024-11-26 NOTE — ANESTHESIA PROCEDURE NOTES
Airway  Date/Time: 11/26/2024 10:24 AM  Urgency: elective    Airway not difficult    Staffing  Performed: CRNA   Authorized by: Chris Ivy MD    Performed by: PRACHI Reyes-LIZ  Patient location during procedure: OR    Indications and Patient Condition  Indications for airway management: anesthesia  Spontaneous Ventilation: absent  Sedation level: deep  Preoxygenated: yes  Patient position: sniffing  Mask difficulty assessment: 1 - vent by mask    Final Airway Details  Final airway type: endotracheal airway      Successful airway: ETT  Cuffed: yes   Successful intubation technique: video laryngoscopy  Facilitating devices/methods: intubating stylet  Endotracheal tube insertion site: oral  Blade: Shayan  Blade size: #3  ETT size (mm): 7.0  Cormack-Lehane Classification: grade I - full view of glottis  Placement verified by: chest auscultation and capnometry   Cuff volume (mL): 8  Measured from: teeth  ETT to teeth (cm): 21  Number of attempts at approach: 1

## 2024-11-26 NOTE — ANESTHESIA PROCEDURE NOTES
Peripheral IV  Date/Time: 11/26/2024 10:29 AM  Inserted by: PRACHI Reyes-CRNA    Placement  Needle size: 20 G  Laterality: left  Location: wrist  Local anesthetic: none  Site prep: alcohol  Technique: anatomical landmarks  Attempts: 1

## 2024-11-26 NOTE — OP NOTE
Gastric Bypass Laparoscopic, Repair Diaphragmatic Hernia Laparoscopy **PAT ON ADMIT**, Lysis Adhesions Laparoscopy Abdominal Cavity Operative Note     Date: 2024  OR Location: [facility]    Name: Kat Tineo : 1975, Age: 49 y.o., MRN: [id number], Sex: female    Diagnosis  Pre-op Diagnosis      * Gastroesophageal reflux disease with hiatal hernia [K21.9, K44.9]     * Hiatal hernia with gastroesophageal reflux [K44.9, K21.9]     * Primary hypertension [I10]     * Morbid obesity with BMI of 40.0-44.9, adult (Multi) [E66.01, Z68.41] Post-op Diagnosis     * Gastroesophageal reflux disease with hiatal hernia [K21.9, K44.9]     * Hiatal hernia with gastroesophageal reflux [K44.9, K21.9]     * Primary hypertension [I10]     * Morbid obesity with BMI of 40.0-44.9, adult (Multi) [E66.01, Z68.41]     * Abdominal adhesions [K66.0]     Procedures  Gastric Bypass Laparoscopic  48080 - MD LAPS GSTR RSTCV PX W/BYP YUNIOR-EN-Y LIMB <150 CM    Repair Diaphragmatic Hernia Laparoscopy **PAT ON ADMIT**  02465 - MD LAPS RPR PARAESPHGL HRNA INCL FUNDPLSTY W/O MESH    Lysis Adhesions Laparoscopy Abdominal Cavity  95207 - MD LAPAROSCOPY ENTEROLYSIS SEPARATE PROCEDURE      Surgeons      * De Santana - Primary    Resident/Fellow/Other Assistant:  Surgeons and Role:     * Grabiel Chinchilla PA-C - JASON First Assist    Staff:   Circulator: Paulina Huddleston Person: Jaxon  Surgical Assistant: Kya  Surgical Assistant: Delphine Payne Circulator: Nima Payne Scrub: Frank Payne Circulator: Karena    Anesthesia Staff: Anesthesiologist: Chris Ivy MD  CRNA: PRACHI Reyes-CRNA    Procedure Summary  Anesthesia: General  ASA: III  Estimated Blood Loss: <20mL  Intra-op Medications:   Administrations occurring from 1000 to 1445 on 24:   Medication Name Total Dose   BUPivacaine HCl (Marcaine) 0.5 % (5 mg/mL) 30 mL, lidocaine (Xylocaine) 30 mL in sodium chloride 0.9% 50 mL syringe 110 mL   dexAMETHasone (Decadron) 4 mg/mL 4 mg    fentaNYL PF 0.05 mg/mL 200 mcg   HYDROmorphone (Dilaudid) 2 mg/mL vial 2 mg   labetalol 5 mg/mL 5 mg   lactated Ringer's infusion 429.17 mL   lidocaine (Xylocaine) injection 2 % 60 mg   midazolam (Versed) 1 mg/1 mL 2 mg   phenylephrine 40 mcg/mL syringe 10 mL 400 mcg   propofol (Diprivan) infusion 10 mg/mL 200 mg   rocuronium (ZeMuron) 50 mg/5 mL injection 160 mg              Anesthesia Record               Intraprocedure I/O Totals          Intake    Propofol Drip 0.00 mL    The total shown is the total volume documented since Anesthesia Start was filed.    lactated Ringer's infusion 2000.00 mL    Total Intake 2000 mL          Specimen:   ID Type Source Tests Collected by Time   1 : STOMACH Tissue STOMACH GASTRECTOMY SURGICAL PATHOLOGY EXAM De Santana MD 11/26/2024 1522                  Implants:  Implants       Type Name Action Serial No.      Surgical Mesh Sling Implant MESH, BIO-A, 8 X 8CM - SNA - [id number] Implanted NA            Procedure: Laparoscopic Jaci-en-Y gastric bypass utilizing 100 cm retrocolic retrogastric Jaci limb, laparoscopic hiatal hernia repair, laparoscopic enterolysis  Findings: Extensive intra-abdominal adhesions, unusual shaped gastric sleeve with retained fundus tethered to the hemidiaphragm, no leak on intraoperative air leak test    Indications: Kat Tineo is an 49 y.o. female who is having surgery for Gastroesophageal reflux disease with hiatal hernia [K21.9, K44.9].  Kat is a 49-year-old woman referred to me for intractable reflux.  She had a vertical sleeve gastrectomy in [place] in 2017 for morbid obesity.  She moved to [place] she was on twice daily omeprazole as well as Carafate with daily reflux and nocturnal regurgitation.  Upper endoscopy showed a hiatal hernia, dilated gastric sleeve and bile within her stomach.  Despite being on twice daily PPI as well as Carafate she continues to have nocturnal symptoms, bloating and left-sided pain.  I offered her conversion to  Jaci-en-Y gastric bypass as well as hiatal hernia repair.  We discussed operative risks of death, venous thromboembolic event, GI tract injury or leak, bleeding adhesions, bowel obstruction, need for reoperation, nutritional deficiencies, lifelong risk of gastrojejunal ulcer.  Consent was obtained.  Kat and her mother were seen in the preoperative area. The risks, benefits, complications, treatment options, non-operative alternatives, expected recovery and outcomes were discussed with the patient. The possibilities of reaction to medication, pulmonary aspiration, injury to surrounding structures, bleeding, recurrent infection, the need for additional procedures, failure to diagnose a condition, and creating a complication requiring transfusion or operation were discussed with the patient. The patient concurred with the proposed plan, giving informed consent.  The site of surgery was properly noted/marked if necessary per policy. The patient has been actively warmed in preoperative area. Preoperative antibiotics have been ordered and given within 1 hours of incision. Venous thrombosis prophylaxis have been ordered including bilateral sequential compression devices and chemical prophylaxis    Procedure Details: Kat was given antibiotics and subcutaneous heparin in the preoperative holding area.  She was brought to the operating room.  SCDs were placed.  Preoperative huddle was completed.  She was given IV sedation and intubated.  She was placed in splenic position.  Her abdomen is prepped and draped in a sterile fashion.  I entered the abdomen in the left midclavicular line with a 0 degree scope and a 12 mm nonbladed trocar.  I insufflated the abdomen and switched an angled scope.  She had extensive intra-abdominal adhesions from prior hernia repair.  I placed a 5 mm trocar in the left flank and took adhesions down sharply.  This allowed me to place additional trocars.  I then took down the omentum off the  abdominal wall and her mesh with a combination of sharp dissection with scissors and the harmonic scalpel.  I placed the remainder my trocars and a subxiphoid Chauncey liver retractor.  I freed up the greater curvature of her gastric sleeve from the omentum working my way up to the left brittny.  She had an adhesion of fundus that looked almost like a contained leak tethered up to the left hemidiaphragm.  I defined the left brittny leaving this in place.  I then opened the peritoneum along the avascular plane at top of the caudate.  There was an accessory hepatic artery that I clipped proximally and distally and transected with the harmonic scalpel.  I incised the peritoneum along the right brittny.  I swept the esophagus and posterior vagus off of the right brittny and the aorta.  Identified where the crura met posteriorly.  I brought a Penrose through this window.  I used the Penrose for traction and I did a mediastinal dissection freeing up the esophagus from the aorta posteriorly, the pericardium anteriorly, and the pleura bilaterally.  I then flatten the patient paddled up the omentum and grabbed the mesocolon.  I made a window in the mesocolon below the pancreas to the left of midline.  I measured on from the ligament of Treitz 50 cm on the jejunum.  I divided the jejunum with a blue load Endo RICK.  I marked my Jaci limb.  I measured to 100 cm on the jejunum.  I made enterotomies opposite each other and fired a 60 mm white load Endo RICK crating and enteroenterostomy.  I closed the common enterotomy with a 60 mm blue load Endo RICK fired perpendicular taking care not to narrow the Jaci limb nor the enteroenterostomy.  I placed a crotch stitch with 2-0 Vicryl.  I then closed the mesenteric defect with a running locked 2 Ethibond.  I brought small bowel through the mesocolic window.  I put the patient in steep reverse Trendelenburg.  I made a window along the lesser curvature the stomach 4 cm below the angle of Hiss.  I  fired a black load Endo RICK twice to divide the gastric sleeve.  I had anesthesia pass a 34 Eslick tube.  I used 2 firings of a black load Endo RICK to the divide the stomach to create a small gastric pouch and excised the fundus.  I then put a clip on the attachment to the diaphragm and transected with the harmonic scalpel.  I placed this gastric fundus in an Endobag and removed it.  I oversewed staple line of the pouch with a 2-0 Vicryl.  I put clips along the staple line of the bypass stomach.  I repaired the crura posterior to the esophagus with 0 Ethibond to repair her hiatal hernia.  I brought my Jaci limb retrocolic retrogastric and ran a Bakkar of 2-0 Vicryl from the antimesenteric border of the Jaci limb to the posterior aspect of the pouch.  I made a gastrotomy and an enterotomy.  I fired a blue load Endo RICK for distance of 2 cm.  I closed the common enterotomy with a 2-0 Vicryl starting at either end meeting in the middle.  I had anesthesia pass an 18 Sudanese orogastric tube.  I then ran an outer row of 2-0 Vicryl completely imbricating the anastomosis.  I put a bowel clamp on the Jaci limb distal to the orogastric tube.  I flatten the patient and submerged the anastomosis.  I had anesthesia perform an air leak test.  There was no air leak.  I had them remove the air and then the tube.  I took off the clamp.  There was no trauma from the clamp.  I sucked out saline.  I flatten the patient paddled up the omentum and grasped mesocolon.  I pulled on the excess small bowel.  I closed the Petersons defect with a running locked 2-0 Ethibond.  I placed multiple interrupted 2-0 Ethibond sutures between the Jaci limb and the mesocolic window.  And then ran the small bowel from the mesocolic window to the enteroenterostomy.  There was no tension and no kinks.  I put the patient in reverse Trendelenburg and paddled the omentum down.  I put fibrin sealant on the gastrojejunostomy.  I let that cure.  I then recreated  the greater curvature of the stomach by suturing of the gastric sleeve remnant to the cut edge of the omentum.  I then removed my subxiphoid Chauncey liver retractor.  I closed the 2 inferior fascial defect with 0 Vicryl.  I performed bilateral rectus tap block with dilute Marcaine.  I removed all the trocars under direct vision.  I irrigated the subcutaneous tissue.  I closed the skin with 4-0 undyed Monocryl.  I placed Dermabond.  The patient's anesthetic was reversed, she was extubated and taken to the recovery in stable condition.  All counts were correct.  She tolerated the procedure well.  Complications:  None; patient tolerated the procedure well.    Disposition: PACU - hemodynamically stable.  Condition: stable         Task Performed by RNFA or Surgical Assistant:  No qualified resident was available to assist.     An assistant was used throughout the case and assisted in retraction, visualization, and improved the flow of the case.    This was a laparoscopic case and the assistant was used for maneuvering the camera and visualization.      De Santana  Phone Number: [phone]

## 2024-11-26 NOTE — ANESTHESIA POSTPROCEDURE EVALUATION
Patient: Kat Colon    Procedure Summary       Date: 11/26/24 Room / Location: [facility] OR 10 / [facility]    Anesthesia Start: 1016 Anesthesia Stop: 1546    Procedures:       Gastric Bypass Laparoscopic (Abdomen)      Repair Diaphragmatic Hernia Laparoscopy **PAT ON ADMIT** (Abdomen)      Lysis Adhesions Laparoscopy Abdominal Cavity (Abdomen) Diagnosis:       Gastroesophageal reflux disease with hiatal hernia      Hiatal hernia with gastroesophageal reflux      Primary hypertension      Morbid obesity with BMI of 40.0-44.9, adult (Multi)      Abdominal adhesions      (Gastroesophageal reflux disease with hiatal hernia [K21.9, K44.9])    Surgeons: De Santana MD Responsible Provider: Chris Ivy MD    Anesthesia Type: general ASA Status: 3            Anesthesia Type: general    Vitals Value Taken Time   /77 11/26/24 1631   Temp 36.2 °C (97.2 °F) 11/26/24 1615   Pulse 97 11/26/24 1632   Resp 15 11/26/24 1632   SpO2 91 % 11/26/24 1632   Vitals shown include unfiled device data.    Anesthesia Post Evaluation    Patient location during evaluation: PACU  Patient participation: complete - patient participated  Level of consciousness: awake  Pain score: 0  Pain management: adequate  Multimodal analgesia pain management approach  Airway patency: patent  Two or more strategies used to mitigate risk of obstructive sleep apnea  Cardiovascular status: acceptable  Respiratory status: acceptable  Hydration status: acceptable  Postoperative Nausea and Vomiting: none        There were no known notable events for this encounter.

## 2024-11-27 ENCOUNTER — PHARMACY VISIT (OUTPATIENT)
Dept: PHARMACY | Facility: CLINIC | Age: 49
End: 2024-11-27
Payer: MEDICAID

## 2024-11-27 ENCOUNTER — APPOINTMENT (OUTPATIENT)
Dept: RADIOLOGY | Facility: HOSPITAL | Age: 49
End: 2024-11-27
Payer: COMMERCIAL

## 2024-11-27 VITALS
SYSTOLIC BLOOD PRESSURE: 135 MMHG | WEIGHT: 238.1 LBS | OXYGEN SATURATION: 94 % | DIASTOLIC BLOOD PRESSURE: 77 MMHG | HEART RATE: 83 BPM | BODY MASS INDEX: 43.82 KG/M2 | HEIGHT: 62 IN | RESPIRATION RATE: 17 BRPM | TEMPERATURE: 98.4 F

## 2024-11-27 PROBLEM — K44.9 HIATAL HERNIA WITH GASTROESOPHAGEAL REFLUX: Status: ACTIVE | Noted: 2024-11-27

## 2024-11-27 PROBLEM — K21.9 HIATAL HERNIA WITH GASTROESOPHAGEAL REFLUX: Status: ACTIVE | Noted: 2024-11-27

## 2024-11-27 LAB
ALBUMIN SERPL BCP-MCNC: 3.9 G/DL (ref 3.4–5)
ALP SERPL-CCNC: 66 U/L (ref 33–110)
ALT SERPL W P-5'-P-CCNC: 78 U/L (ref 7–45)
ANION GAP SERPL CALCULATED.3IONS-SCNC: 13 MMOL/L (ref 10–20)
AST SERPL W P-5'-P-CCNC: 71 U/L (ref 9–39)
BASOPHILS # BLD AUTO: 0.01 X10*3/UL (ref 0–0.1)
BASOPHILS NFR BLD AUTO: 0.1 %
BILIRUB SERPL-MCNC: 0.7 MG/DL (ref 0–1.2)
BUN SERPL-MCNC: 9 MG/DL (ref 6–23)
CALCIUM SERPL-MCNC: 8.7 MG/DL (ref 8.6–10.3)
CHLORIDE SERPL-SCNC: 105 MMOL/L (ref 98–107)
CO2 SERPL-SCNC: 24 MMOL/L (ref 21–32)
CREAT SERPL-MCNC: 0.67 MG/DL (ref 0.5–1.05)
EGFRCR SERPLBLD CKD-EPI 2021: >90 ML/MIN/1.73M*2
EOSINOPHIL # BLD AUTO: 0 X10*3/UL (ref 0–0.7)
EOSINOPHIL NFR BLD AUTO: 0 %
ERYTHROCYTE [DISTWIDTH] IN BLOOD BY AUTOMATED COUNT: 13 % (ref 11.5–14.5)
GLUCOSE SERPL-MCNC: 166 MG/DL (ref 74–99)
HCT VFR BLD AUTO: 38.1 % (ref 36–46)
HGB BLD-MCNC: 13.3 G/DL (ref 12–16)
IMM GRANULOCYTES # BLD AUTO: 0.07 X10*3/UL (ref 0–0.7)
IMM GRANULOCYTES NFR BLD AUTO: 0.5 % (ref 0–0.9)
LYMPHOCYTES # BLD AUTO: 1.02 X10*3/UL (ref 1.2–4.8)
LYMPHOCYTES NFR BLD AUTO: 7.9 %
MCH RBC QN AUTO: 31.9 PG (ref 26–34)
MCHC RBC AUTO-ENTMCNC: 34.9 G/DL (ref 32–36)
MCV RBC AUTO: 91 FL (ref 80–100)
MONOCYTES # BLD AUTO: 0.84 X10*3/UL (ref 0.1–1)
MONOCYTES NFR BLD AUTO: 6.5 %
NEUTROPHILS # BLD AUTO: 11.01 X10*3/UL (ref 1.2–7.7)
NEUTROPHILS NFR BLD AUTO: 85 %
NRBC BLD-RTO: 0 /100 WBCS (ref 0–0)
PLATELET # BLD AUTO: 315 X10*3/UL (ref 150–450)
POTASSIUM SERPL-SCNC: 3.2 MMOL/L (ref 3.5–5.3)
PROT SERPL-MCNC: 6.8 G/DL (ref 6.4–8.2)
RBC # BLD AUTO: 4.17 X10*6/UL (ref 4–5.2)
SODIUM SERPL-SCNC: 139 MMOL/L (ref 136–145)
WBC # BLD AUTO: 13 X10*3/UL (ref 4.4–11.3)

## 2024-11-27 PROCEDURE — 36415 COLL VENOUS BLD VENIPUNCTURE: CPT | Performed by: SURGERY

## 2024-11-27 PROCEDURE — 74240 X-RAY XM UPR GI TRC 1CNTRST: CPT

## 2024-11-27 PROCEDURE — 96361 HYDRATE IV INFUSION ADD-ON: CPT

## 2024-11-27 PROCEDURE — 85025 COMPLETE CBC W/AUTO DIFF WBC: CPT | Performed by: SURGERY

## 2024-11-27 PROCEDURE — 96372 THER/PROPH/DIAG INJ SC/IM: CPT | Performed by: SURGERY

## 2024-11-27 PROCEDURE — 2500000001 HC RX 250 WO HCPCS SELF ADMINISTERED DRUGS (ALT 637 FOR MEDICARE OP)

## 2024-11-27 PROCEDURE — 84075 ASSAY ALKALINE PHOSPHATASE: CPT | Performed by: SURGERY

## 2024-11-27 PROCEDURE — 99232 SBSQ HOSP IP/OBS MODERATE 35: CPT | Performed by: INTERNAL MEDICINE

## 2024-11-27 PROCEDURE — 96366 THER/PROPH/DIAG IV INF ADDON: CPT

## 2024-11-27 PROCEDURE — 7100000011 HC EXTENDED STAY RECOVERY HOURLY - NURSING UNIT

## 2024-11-27 PROCEDURE — G0378 HOSPITAL OBSERVATION PER HR: HCPCS

## 2024-11-27 PROCEDURE — RXMED WILLOW AMBULATORY MEDICATION CHARGE

## 2024-11-27 PROCEDURE — 99254 IP/OBS CNSLTJ NEW/EST MOD 60: CPT | Performed by: INTERNAL MEDICINE

## 2024-11-27 PROCEDURE — 1200000002 HC GENERAL ROOM WITH TELEMETRY DAILY

## 2024-11-27 PROCEDURE — 2500000005 HC RX 250 GENERAL PHARMACY W/O HCPCS: Performed by: SURGERY

## 2024-11-27 PROCEDURE — 2500000004 HC RX 250 GENERAL PHARMACY W/ HCPCS (ALT 636 FOR OP/ED): Performed by: SURGERY

## 2024-11-27 PROCEDURE — 96376 TX/PRO/DX INJ SAME DRUG ADON: CPT

## 2024-11-27 PROCEDURE — 74240 X-RAY XM UPR GI TRC 1CNTRST: CPT | Performed by: RADIOLOGY

## 2024-11-27 PROCEDURE — 2550000001 HC RX 255 CONTRASTS: Performed by: SURGERY

## 2024-11-27 PROCEDURE — 96375 TX/PRO/DX INJ NEW DRUG ADDON: CPT

## 2024-11-27 RX ORDER — BUPRENORPHINE HYDROCHLORIDE 0.32 MG/ML
0.1 INJECTION INTRAMUSCULAR; INTRAVENOUS EVERY 6 HOURS PRN
Status: DISCONTINUED | OUTPATIENT
Start: 2024-11-27 | End: 2024-11-27 | Stop reason: HOSPADM

## 2024-11-27 RX ORDER — ACETAMINOPHEN 160 MG/5ML
650 SOLUTION ORAL EVERY 4 HOURS PRN
Start: 2024-11-27

## 2024-11-27 RX ORDER — OXYCODONE HCL 5 MG/5 ML
5 SOLUTION, ORAL ORAL EVERY 6 HOURS PRN
Qty: 90 ML | Refills: 0 | Status: SHIPPED | OUTPATIENT
Start: 2024-11-27 | End: 2024-12-02

## 2024-11-27 RX ORDER — ACETAMINOPHEN 160 MG/5ML
650 SOLUTION ORAL EVERY 4 HOURS PRN
Status: DISCONTINUED | OUTPATIENT
Start: 2024-11-27 | End: 2024-11-27 | Stop reason: HOSPADM

## 2024-11-27 RX ORDER — OMEPRAZOLE 40 MG/1
40 CAPSULE, DELAYED RELEASE ORAL DAILY
Qty: 90 CAPSULE | Refills: 0 | Status: SHIPPED | OUTPATIENT
Start: 2024-11-27 | End: 2025-02-25

## 2024-11-27 RX ORDER — OXYCODONE HCL 5 MG/5 ML
5 SOLUTION, ORAL ORAL EVERY 4 HOURS PRN
Status: DISCONTINUED | OUTPATIENT
Start: 2024-11-27 | End: 2024-11-27 | Stop reason: HOSPADM

## 2024-11-27 RX ORDER — OXYCODONE HCL 5 MG/5 ML
10 SOLUTION, ORAL ORAL EVERY 4 HOURS PRN
Status: DISCONTINUED | OUTPATIENT
Start: 2024-11-27 | End: 2024-11-27 | Stop reason: HOSPADM

## 2024-11-27 RX ADMIN — METOCLOPRAMIDE HYDROCHLORIDE 10 MG: 5 INJECTION INTRAMUSCULAR; INTRAVENOUS at 10:29

## 2024-11-27 RX ADMIN — ACETAMINOPHEN 1000 MG: 10 INJECTION INTRAVENOUS at 00:03

## 2024-11-27 RX ADMIN — HEPARIN SODIUM 5000 UNITS: 5000 INJECTION, SOLUTION INTRAVENOUS; SUBCUTANEOUS at 12:34

## 2024-11-27 RX ADMIN — IOHEXOL 22.5 ML: 350 INJECTION, SOLUTION INTRAVENOUS at 08:15

## 2024-11-27 RX ADMIN — SODIUM CHLORIDE, SODIUM LACTATE, POTASSIUM CHLORIDE, AND CALCIUM CHLORIDE 125 ML/HR: 600; 310; 30; 20 INJECTION, SOLUTION INTRAVENOUS at 07:28

## 2024-11-27 RX ADMIN — METOPROLOL TARTRATE 5 MG: 5 INJECTION INTRAVENOUS at 00:03

## 2024-11-27 RX ADMIN — ONDANSETRON 4 MG: 2 INJECTION INTRAMUSCULAR; INTRAVENOUS at 05:58

## 2024-11-27 RX ADMIN — METOPROLOL TARTRATE 5 MG: 5 INJECTION INTRAVENOUS at 05:58

## 2024-11-27 RX ADMIN — ACETAMINOPHEN 650 MG: 160 SOLUTION ORAL at 14:10

## 2024-11-27 RX ADMIN — Medication 2 L/MIN: at 07:41

## 2024-11-27 RX ADMIN — METOCLOPRAMIDE HYDROCHLORIDE 10 MG: 5 INJECTION INTRAMUSCULAR; INTRAVENOUS at 02:04

## 2024-11-27 RX ADMIN — PANTOPRAZOLE SODIUM 40 MG: 40 INJECTION, POWDER, FOR SOLUTION INTRAVENOUS at 06:00

## 2024-11-27 RX ADMIN — ACETAMINOPHEN 1000 MG: 10 INJECTION INTRAVENOUS at 05:57

## 2024-11-27 RX ADMIN — HEPARIN SODIUM 5000 UNITS: 5000 INJECTION, SOLUTION INTRAVENOUS; SUBCUTANEOUS at 03:08

## 2024-11-27 ASSESSMENT — COGNITIVE AND FUNCTIONAL STATUS - GENERAL
MOBILITY SCORE: 24
DAILY ACTIVITIY SCORE: 24

## 2024-11-27 ASSESSMENT — PAIN SCALES - GENERAL
PAINLEVEL_OUTOF10: 3
PAINLEVEL_OUTOF10: 1
PAINLEVEL_OUTOF10: 0 - NO PAIN
PAINLEVEL_OUTOF10: 0 - NO PAIN

## 2024-11-27 ASSESSMENT — ENCOUNTER SYMPTOMS
ABDOMINAL PAIN: 0
SHORTNESS OF BREATH: 0
CONSTIPATION: 0
BACK PAIN: 1
NAUSEA: 0
FREQUENCY: 0
WEAKNESS: 0
DIFFICULTY URINATING: 0
CHILLS: 0
ARTHRALGIAS: 1
DIARRHEA: 0
DYSURIA: 0
COUGH: 0
HEADACHES: 0
FEVER: 0

## 2024-11-27 ASSESSMENT — PAIN - FUNCTIONAL ASSESSMENT
PAIN_FUNCTIONAL_ASSESSMENT: 0-10

## 2024-11-27 ASSESSMENT — PAIN DESCRIPTION - DESCRIPTORS: DESCRIPTORS: ACHING;DULL

## 2024-11-27 NOTE — PROGRESS NOTES
Spiritual Care Visit    Clinical Encounter Type  Visited With: Patient  Routine Visit: Introduction  Continue Visiting: Yes         Values/Beliefs  Spiritual Requests During Hospitalization: Kat was relaxing in her easy chair, when I saw her today. She received the Anointing today. She is NPO.                                  Taxonomy  Intended Effects: Build relationship of care and support, Demonstrate caring and concern    David Orellana

## 2024-11-27 NOTE — DISCHARGE INSTRUCTIONS
No bending, pushing, pulling, lifting more than 10lbs, twisting for 6 weeks. Walk at least every hour during the day.    Face away from the water when showering, pat incision dry. Do not submerge incision until okayed by office. Do not rub any lotions or moisturizers on incisions.    Take sips of noncarbonated, low calorie, low to no sugar liquids every 3 to 5 minutes for a goal of 50- 60 oz per day (6-8 cups). Add 1 protein shake per day beginning in the evening 1-2 days after discharge. Increase to 2 protein shakes per day in the second week for a goal of 50-60 grams of protein per day. Make it the last liquid of the day. NO straws, gum, jello, milkshakes, or icecream.    If no CPAP at home, sleep upright and do not take any narcotics for several hours prior to napping or sleeping    Crush all medications and open all capsules for 6 weeks. Mix the beads for a capsule in milk or a protein shake, not water, to prevent clumping. Do not drive while taking pain medication. You can take the patch off behind your ear on Friday 11/29. Please make sure you wash your hands right after. Medication residue left on your hands can cause blurred vision.     Follow instructions: Sips of noncarbonated low to no calorie, low to no sugar liquids every three minutes for a goal of 6-8 glasses of fluid per day. Walk for 2-3 minutes every hour. Use the incentive spirometer 10 times per hour while awake. Add one protein shake per day start Thursday or Friday with a goal of 2 shakes per day starting the second week.       Home-going Instructions    #1 Fluids             drink 50 - 65 oz of non-caffeinated fluids daily  #2 Walk               walk 3 - 5 minutes every hour during the day  #3 Spirometer   use the incentive spirometer 10 times and hour during the day  #4 Protein          after drinking 50oz of fluids begin drinking protein    Wound Care  Do not submerge incisions in pool, bath, or hot tub  Do not peel off Dermabond -it will  gradually loosen and fall off  You may shower and pat incisions dry  Do not apply any lotions, creams, or ointments to your incisions  Activity  Do not push, pull, or lift.  Avoid excessive bending and twisting at the waist.  You may walk stairs.  You may sleep in any position that feels comfortable.  You must get up and walk every hour during the day.  If you plan to take a nap during the day, set an alarm for one hour so you will get up and walk around.  Potential Complications   Wound Infection - redness, increased warmth, pain, thick drainage, fever  Blood Clot/Pulmonary Embolism - calf pain or swelling, chest pain, shortness of breath, racing heart, fast breathing  Leak - abdominal pain radiating down the left side (after eating/drinking), fever, fast breathing, or rapid heart rate.   CALL 9-1-1 WITH ANY CHEST PAIN OR SHORTNESS OF BREATH, otherwise call the office with any concerns. [phone]  Medications  All medications need to be crushable, chewable, in liquid form or open capsules.   CANNOT crush extended release meds.  You must begin taking your stomach acid reducing medication the morning after you are discharged from the hospital.  Start your chewable or liquid multivitamin tomorrow.  Diet  Full liquid.  NO CARBONATION.  Must be thin enough to go up a small stirrer-type straw. BUT DO NOT USE STRAWS.  Remember first goal is to drink at least 50 oz. fluid every day.  Increase your protein as tolerated for a goal of 50 grams of protein daily.  Refer to your manual for explicit instructions.  Do not drink any fluid that has more than 25 grams of carbohydrate per 8 oz.  This will prevent dumping.  Follow-up     2 weeks

## 2024-11-27 NOTE — PROGRESS NOTES
Care transitions not consulted, pt anticipated to dc home without skilled needs, please consult if needed.      11/27/24 3893   Discharge Planning   Expected Discharge Disposition Home

## 2024-11-27 NOTE — CARE PLAN
The patient's goals for the shift include      The clinical goals for the shift include Pain control, walks    Over the shift, the patient did not make progress toward the following goals. Barriers to progression include dc tonight. Recommendations to address these barriers include dc tonight.

## 2024-11-27 NOTE — DISCHARGE SUMMARY
Discharge Diagnosis  Gastroesophageal reflux disease with hiatal hernia    Issues Requiring Follow-Up  Follow up in the office in 2 weeks    Test Results Pending At Discharge  Pending Labs       Order Current Status    Surgical Pathology Exam In process            Hospital Course   Kat had Laparoscopic Jaci-en-Y Gastric Bypass on 11/26. She had an UGI on 11/27 and was started on a diet. She was discharged to home.     Pertinent Physical Exam At Time of Discharge  Physical Exam  Constitutional:       Appearance: Normal appearance.   Cardiovascular:      Rate and Rhythm: Normal rate.   Pulmonary:      Effort: Pulmonary effort is normal.   Abdominal:      Palpations: Abdomen is soft.      Tenderness: There is no abdominal tenderness.      Comments: Incisions intact.    Neurological:      Mental Status: She is alert and oriented to person, place, and time.         Home Medications     Medication List      START taking these medications     acetaminophen 325 mg/10.15 mL oral liquid; Commonly known as: Tylenol;   Take 20.3 mL (650 mg) by mouth every 4 hours if needed for pain or fever   (temp greater than 38.0 C). Take over the counter liquid tylenol as   needed. Dilute with equal parts water to prevent dumping syndrome.   losartan 100 mg tablet; Commonly known as: Cozaar; Take 1 tablet (100   mg) by mouth once daily. Do not start before November 27, 2024.   metoprolol tartrate 50 mg tablet; Commonly known as: Lopressor; Take 1   tablet (50 mg) by mouth 2 times a day. Do not start before November 27, 2024.   oxyCODONE 5 mg/5 mL solution; Commonly known as: Roxicodone; Take 5 mL   (5 mg) by mouth every 6 hours if needed for severe pain (7 - 10) for up to   3 days. Dilute with equal parts water to prevent dumping syndrome.     CHANGE how you take these medications     omeprazole 40 mg DR capsule; Commonly known as: PriLOSEC; Take 1 capsule   (40 mg) by mouth once daily. Open capsule. Do not crush, chew, or dissolve    beads.; What changed: when to take this, additional instructions, Another   medication with the same name was removed. Continue taking this   medication, and follow the directions you see here.     CONTINUE taking these medications     amLODIPine 10 mg tablet; Commonly known as: Norvasc; TAKE 1 TABLET BY   MOUTH EVERY DAY   DULoxetine 60 mg DR capsule; Commonly known as: Cymbalta; TAKE 1 CAPSULE   BY MOUTH EVERY DAY   tiZANidine 4 mg tablet; Commonly known as: Zanaflex; TAKE 1 TABLET BY   MOUTH EVERYDAY AT BEDTIME   zolpidem 10 mg tablet; Commonly known as: Ambien; Take 1 tablet (10 mg)   by mouth as needed at bedtime for sleep.     STOP taking these medications     losartan-hydrochlorothiazide 100-25 mg tablet; Commonly known as: Hyzaar   metoprolol succinate XL 50 mg 24 hr tablet; Commonly known as: Toprol-XL   multivitamin tablet   Rinvoq 15 mg tablet extended release 24 hr; Generic drug: upadacitinib   ER   sucralfate 1 gram tablet; Commonly known as: Carafate     ASK your doctor about these medications     rizatriptan 10 mg tablet; Commonly known as: Maxalt; TAKE 1 TABLET AT   ONSET OF HEADACHE. MAY REPEAT EVERY 2 HOURS AS NEEDED. MAXIMUM 3   TABLETS/24 HOURS.       Outpatient Follow-Up  Future Appointments   Date Time Provider Department Center   12/5/2024  1:00 PM DEWEY Monk [id number] [facility]   12/9/2024 11:00 AM DEWEY Moore [id number] [facility]   12/9/2024 11:30 AM Jalyn Cleary RDN, LD [id number] [facility]   12/23/2024  9:00 AM DEWEY Moore [id number] [facility]   12/23/2024  9:30 AM Jalyn Cleary RDN, LD [id number] [facility]   1/6/2025  3:30 PM EDWEY Moore [id number] [facility]   1/6/2025  4:00 PM Jalyn Cleary RDN, LD [id number] [facility]   4/22/2025  1:00 PM Jose Terry MD [id number] [facility]       DEWEY Moore

## 2024-11-27 NOTE — PROGRESS NOTES
"Kat Tineo is a 49 y.o. female on day 0 of admission presenting with Gastroesophageal reflux disease with hiatal hernia.    Subjective   Kat is up in the chair. She denies chest pain or SOB. She has some incisional discomfort, but is tolerable right now. She is tolerating her diet. She has had about 10oz of fluid.        Objective     Physical Exam  Constitutional:       Appearance: Normal appearance.   Cardiovascular:      Rate and Rhythm: Normal rate.   Pulmonary:      Effort: Pulmonary effort is normal.   Abdominal:      Palpations: Abdomen is soft.      Tenderness: There is no abdominal tenderness.      Comments: Incisions intact.    Neurological:      Mental Status: She is alert and oriented to person, place, and time.         Last Recorded Vitals  Blood pressure 137/84, pulse 80, temperature 36.6 °C (97.9 °F), temperature source Oral, resp. rate 16, height 1.58 m (5' 2.21\"), weight 108 kg (238 lb 1.6 oz), SpO2 92%.  Intake/Output last 3 Shifts:  I/O last 3 completed shifts:  In: 2420.8 (22.4 mL/kg) [I.V.:2120.8 (19.6 mL/kg); IV Piggyback:300]  Out: 1400 (13 mL/kg) [Urine:1400 (0.4 mL/kg/hr)]  Weight: 108 kg     Relevant Results  UGI WNL      Assessment/Plan   Assessment & Plan  Gastroesophageal reflux disease with hiatal hernia    Morbid obesity with BMI of 40.0-44.9, adult (Multi)    Kat and I reviewed the rate at which to drink her fluids and her fluid goal for the day. I encouraged her to continue ambulating and use her IS every hour.        I spent 15 minutes in the professional and overall care of this patient.      Keke Sutton, APRN-CNP      "

## 2024-11-27 NOTE — PROGRESS NOTES
Dietary Rounds and Nutrition Education    Kat was leaving her room to take a walk upon arrival. We walked a lap around the floor together. She consumed ~15 oz so far and reports tolerating all fluids well. Pt has no questions/concerns for me.     Thin liquid diet education was provided and packet was given to patient. Reviewed the importance of consuming at least 50 oz of fluid per day once discharged. Patient was instructed to start protein shake tomorrow evening once 40-50 oz of fluid was consumed. Informed patient to gradually work way up to 50g protein per day. Also reviewed things to avoid at this time including carbonation, alcohol, sugar, jello, straws, and gum. Patient was instructed to follow liquid diet for a full 2 weeks, and to not progress diet until instructed to do so. Patient shows good understanding of education provided.        Jalyn Cleary RD, LDN  Registered Dietitian, Licensed Dietitian Nutritionist

## 2024-11-27 NOTE — CONSULTS
ConsultsReason For Consult  HTN, GERD    History Of Present Illness  Kat Tineo is a 49 y.o. female presenting for bariatric surgery, hiatal hernia repair. Denies SOB, nausea, epigastric pain tolerable  Past Medical History  She has a past medical history of Acid reflux, Diabetes mellitus (Multi), DM (diabetes mellitus), gestational, Hypertension, and Rheumatoid arthritis.    Surgical History  She has a past surgical history that includes US guided soft tissue fluid drain (09/30/2021); CT guided soft tissue fluid drainage (06/02/2022); Other surgical history (11/22/2017); Neck surgery; Other surgical history (Left); Laparoscopy gastrectomy partial / total (2017); Spine surgery (02/14/2023); and Hysterectomy (2012).     Social History  She reports that she has never smoked. She has never been exposed to tobacco smoke. She has never used smokeless tobacco. She reports that she does not currently use alcohol. She reports that she does not use drugs.    Family History  Family History   Problem Relation Name Age of Onset    Diabetes Mother      Hypertension Mother      Other (morbid obesity) Mother      Other (htn) Father      Arthritis Other      Psoriasis Neg Hx      Irritable bowel syndrome Neg Hx          Allergies  Ace inhibitors, Adhesive tape-silicones, and Amoxicillin    Review of Systems    Review of Systems   Constitutional:  Negative for chills and fever.             HENT:  Negative for dental problem.    Respiratory:  Negative for cough and shortness of breath.    Gastrointestinal:  Negative for abdominal pain, constipation, diarrhea and nausea.   Genitourinary:  Negative for difficulty urinating, dysuria and frequency.   Musculoskeletal:  Positive for arthralgias and back pain.   Neurological:  Negative for weakness and headaches.         Physical Exam  Physical Exam  Constitutional:       General: She is not in acute distress.  Cardiovascular:      Rate and Rhythm: Normal rate and regular rhythm.       Heart sounds: Normal heart sounds.   Pulmonary:      Breath sounds: Normal breath sounds.   Abdominal:      Palpations: Abdomen is soft.      Tenderness: There is no abdominal tenderness.   Musculoskeletal:      Cervical back: No tenderness.      Right lower leg: No edema.      Left lower leg: No edema.   Lymphadenopathy:      Cervical: No cervical adenopathy.   Skin:     General: Skin is warm.      Findings: No erythema.   Neurological:      Mental Status: She is alert and oriented to person, place, and time.      Gait: Gait is intact. Gait normal.   Psychiatric:         Mood and Affect: Mood normal.         Behavior: Behavior normal.              Assessment and plan  HTN- will monitor BP  GERD- continue PPI

## 2024-11-27 NOTE — CARE PLAN
The patient's goals for the shift include      The clinical goals for the shift include Pain control, walks

## 2024-11-28 NOTE — PROGRESS NOTES
"Kat Tineo is a 49 y.o. female on day 1 of admission presenting with Gastroesophageal reflux disease with hiatal hernia.    Subjective   Alert, denies SOB, abdominal pain or nausea, she ambulates, uses IS       Objective     Physical Exam  Cardiovascular:      Rate and Rhythm: Normal rate and regular rhythm.   Pulmonary:      Breath sounds: Normal breath sounds. No wheezing.   Abdominal:      General: Bowel sounds are normal.   Musculoskeletal:         General: No swelling.         Last Recorded Vitals  Blood pressure 135/77, pulse 83, temperature 36.9 °C (98.4 °F), temperature source Oral, resp. rate 17, height 1.58 m (5' 2.21\"), weight 108 kg (238 lb 1.6 oz), SpO2 94%.  Intake/Output last 3 Shifts:  I/O last 3 completed shifts:  In: 200 (1.9 mL/kg) [IV Piggyback:200]  Out: 1400 (13 mL/kg) [Urine:1400 (0.4 mL/kg/hr)]  Weight: 108 kg     Relevant Results                              Assessment/Plan   Assessment & Plan  Gastroesophageal reflux disease with hiatal hernia    Morbid obesity with BMI of 40.0-44.9, adult (Multi)    Hiatal hernia with gastroesophageal reflux    HTN- will monitor BP       I spent 15 minutes in the professional and overall care of this patient.      Kristine Hale MD      "

## 2024-12-02 ENCOUNTER — DOCUMENTATION (OUTPATIENT)
Dept: SURGERY | Facility: CLINIC | Age: 49
End: 2024-12-02
Payer: COMMERCIAL

## 2024-12-02 NOTE — PROGRESS NOTES
Patient called the office requesting a return to work note. She would like to go back to work today. Note sent via [employer].

## 2024-12-02 NOTE — LETTER
December 2, 2024     Patient: Kat Tineo   YOB: 1975   Date of Visit: 12/2/2024       To Whom It May Concern:    It is my medical opinion that Kat Tineo is able to return to work on 12/2/2024.  She has restrictions of no lifting, pushing, or pulling more than 10 pounds until 1/7/2025, 6 weeks post-surgery. From 1/7/2025- 2/18/2025, 3 months post surgery, she will have lifting restrictions of 25 pounds.     Please feel free to contact me if you need additional information, [phone].    If you have any questions or concerns, please don't hesitate to call.         Sincerely,         Keke Sutton, PRACHI-CNP        CC: No Recipients

## 2024-12-05 ENCOUNTER — APPOINTMENT (OUTPATIENT)
Dept: CARDIOLOGY | Facility: CLINIC | Age: 49
End: 2024-12-05
Payer: COMMERCIAL

## 2024-12-08 DIAGNOSIS — G47.00 INSOMNIA, UNSPECIFIED: ICD-10-CM

## 2024-12-09 ENCOUNTER — APPOINTMENT (OUTPATIENT)
Dept: SURGERY | Facility: CLINIC | Age: 49
End: 2024-12-09
Payer: COMMERCIAL

## 2024-12-09 VITALS
BODY MASS INDEX: 39.38 KG/M2 | DIASTOLIC BLOOD PRESSURE: 93 MMHG | SYSTOLIC BLOOD PRESSURE: 143 MMHG | HEART RATE: 83 BPM | WEIGHT: 214 LBS | HEIGHT: 62 IN

## 2024-12-09 DIAGNOSIS — Z09 SURGERY FOLLOW-UP EXAMINATION: Primary | ICD-10-CM

## 2024-12-09 PROCEDURE — 3044F HG A1C LEVEL LT 7.0%: CPT

## 2024-12-09 PROCEDURE — 3080F DIAST BP >= 90 MM HG: CPT

## 2024-12-09 PROCEDURE — 3077F SYST BP >= 140 MM HG: CPT

## 2024-12-09 PROCEDURE — 99024 POSTOP FOLLOW-UP VISIT: CPT

## 2024-12-09 PROCEDURE — 1036F TOBACCO NON-USER: CPT

## 2024-12-09 PROCEDURE — 4010F ACE/ARB THERAPY RXD/TAKEN: CPT

## 2024-12-09 PROCEDURE — 3008F BODY MASS INDEX DOCD: CPT

## 2024-12-09 RX ORDER — ZOLPIDEM TARTRATE 10 MG/1
10 TABLET ORAL NIGHTLY PRN
Qty: 30 TABLET | Refills: 5 | Status: SHIPPED | OUTPATIENT
Start: 2024-12-09

## 2024-12-09 ASSESSMENT — PATIENT HEALTH QUESTIONNAIRE - PHQ9
SUM OF ALL RESPONSES TO PHQ9 QUESTIONS 1 AND 2: 0
2. FEELING DOWN, DEPRESSED OR HOPELESS: NOT AT ALL
1. LITTLE INTEREST OR PLEASURE IN DOING THINGS: NOT AT ALL

## 2024-12-09 ASSESSMENT — ENCOUNTER SYMPTOMS
DEPRESSION: 0
LOSS OF SENSATION IN FEET: 0
OCCASIONAL FEELINGS OF UNSTEADINESS: 0

## 2024-12-09 ASSESSMENT — PAIN SCALES - GENERAL: PAINLEVEL_OUTOF10: 5

## 2024-12-09 NOTE — LETTER
December 9, 2024     Patient: Kat Tineo   YOB: 1975   Date of Visit: 12/9/2024       To Whom It May Concern:    It is my medical opinion that Kat Tineo is able to return to work on 12/10/2024.  She has restrictions of no lifting, pushing, or pulling more than 10 pounds until 1/7/2025, 6 weeks post-surgery. From 1/7/2025- 2/18/2025, 3 months post surgery, she will have lifting restrictions of 25 pounds.     Kat was seen in the office 12/9/2024. She is meeting her goals and feels good. I would have no concern with her returning to work earlier than 6 weeks. We typically request the maximum amount of time recommended for the surgery (i.e. 6 weeks) and allow the patient to go back to work early if the restrictions are accommodated and they are meeting goals.     Please feel free to contact me if you need additional information, [phone].         Sincerely,        Keke Sutton, PRACHI-CNP    CC: No Recipients

## 2024-12-09 NOTE — PROGRESS NOTES
2 wk post op rygb  START WT:  238  IDEAL WT: 135START EXCESS:   103  TOTAL WT LOSS:  24  EWL: 23 % HT:  62 IN.   HPI:     General Comments:          Do you have any difficulties with:    swallowing?  No,   nausea?  No,   vomiting?  No,   pain?  yes   heartburn?  No,   discomfort?  No,   intolerances?  No.   Fluid and Protein Intake:    Fluid intake (ounces):    64  Sources:,   Protein intake (grams):  52-54 Sources:.   Chewable MVI:    Taking as directed?  Yes.   PPI/omeprazole:  Taking as directed/prescribed?  Yes.   Walking:  Every hour?  Yes.   Gallbladder:  Removed?  yes   Primary Care Physician:  Have you seen?  No, Have appointment scheduled?  instructed to schedule.      -Bariatric Follow-up:          Receive IV fluids  No. Seen in ER with admission No. Seen ER without admission No. Hospital readmission No.          Medical History:          Objective:        Physical Examination:       Incisions closed. No erythema. No drainage.         Assessment:        Assessment:    1. Surgery follow-up examination - Z09 (Primary)      Plan:        1. Others    Notes:  Advance diet per protocol  Continue PPI until 3 months  Continue MVI with Fe  May increase walking as tolerated, can start cardio if able to compensate increased fluid loss.              Preventive Medicine:      Counseling:  Medication education:  Education:  All new and/or current medications discussed and reviewed including side effects with patient/caregiver, Understanding:  Caregiver/Patient expressed understanding., Adherence:  Barriers to adherence identified and discussed if present. BMI Care goal follow up plan:  Above normal BMI management provided  Dietary management education, guidance, and counseling.           Follow Up: 2 Weeks                   Billing Information:         Visit Code:        Procedure Codes:

## 2024-12-09 NOTE — PROGRESS NOTES
2 week Post-op Appointment - Dietary Follow Up     Current Weight: 214 lbs   Current BMI: 39.14    Reviewed pureed diet progression. Patient was instructed to start purees on Wednesday, 12/11. I reviewed how to blend foods appropriately to one consistency using a . Pureed food examples and recipes were provided in packet, reviewed foods to avoid at this time. Informed patient to measure out portion sizes and track volume size. Informed patient to aim for at least 60g of protein per day and to add protein shake as needed. Encouraged continuing to prioritize fluids and ensure getting in at least 50-60 oz fluid daily. Reviewed the importance of slowly eating and stop when starting to feel full. Patient was instructed to follow pureed diet for a full 2 weeks, and to not progress diet until instructed to do so. Pureed education packet was given, patient shows good understanding.       Jalyn Cleary RD, LDN  Registered Dietitian, Licensed Dietitian Nutritionist

## 2024-12-09 NOTE — PROGRESS NOTES
"Subjective   Patient ID: Kat Tineo is a 49 y.o. female who presents for Post-op (2 wk post op rygb).  AVINASH Stephens is here for her 2 week follow up. She denies reflux. She is drinking about 64oz of fluid per day. She is getting 52-54g of protein per day. She is walking as recommended. She is currently taking 2 tablets of the flintstones MVI and omeprazole. She does not have a gallbladder. She would like to go back to work tomorrow.      Objective   Physical Exam  Constitutional:       General: She is not in acute distress.     Appearance: Normal appearance. She is obese.   HENT:      Head: Normocephalic.   Eyes:      Pupils: Pupils are equal, round, and reactive to light.   Cardiovascular:      Rate and Rhythm: Normal rate and regular rhythm.   Pulmonary:      Effort: Pulmonary effort is normal.   Abdominal:      General: There is no distension.      Palpations: Abdomen is soft.      Comments: Appropriately TTP around incisions, incisions CDI and closed with glue   Musculoskeletal:         General: Normal range of motion.   Skin:     General: Skin is warm and dry.   Neurological:      Mental Status: She is alert and oriented to person, place, and time.   Psychiatric:         Mood and Affect: Mood normal.         Assessment/Plan   Problem List Items Addressed This Visit             ICD-10-CM    Surgery follow-up examination - Primary Z09     Continue activity restrictions. Continue to crush pills. May face shower. Advance diet per protocol. Continue MVI with Fe, PPI. Reviewed the \"rules\" for long term weight loss success. Continue to increase walking for exercise.    PRACHI Moore-CNP 12/09/24 11:07 AM   "

## 2024-12-18 NOTE — PROGRESS NOTES
"4 WK POST OP SLEEVE  START WT:  238  IDEAL WT: 135START EXCESS:103 HT:  62 IN.     HPI:     General Comments:          Do you have any difficulties with:    swallowing?  No,   nausea?  No,   vomiting?  No,   pain?  Rheumatoid arthritis pain   heartburn?  No,   discomfort?  No,   intolerances?  No.   Fluid and Protein Intake:    Reviewed fluid and protein log:  Yes,   Fluid intake (ounces): 54     Sources:,   Protein intake (grams): 62   Sources:.   Daily Diet Recall: ----.   Volume of food at a time: pureed.   Chewable MVI:  Taking as directed?  Yes.   PPI/omeprazole:  Taking as directed/prescribed?  Yes.   Walking:  Every hour?  Yes.   Ursodiol:  Taking as directed/prescribed?  Yes.      -Bariatric Follow-up:          Receive IV fluids  No. Seen in ER with admission No. Seen ER without admission No. Hospital readmission No.          Medical History:          Objective:        Physical Examination:       Incisions healed.         Assessment:        Assessment:    1. Surgery follow-up examination - Z09      Plan:        1. Others    Notes: Advance diet per protocol  Continue MVI with Fe, PPI  Reviewed the \"rules\" for long term weight loss success.  Continue to increase walking for exercise..              Preventive Medicine:      Counseling:  Medication education:  Education:  All new and/or current medications discussed and reviewed including side effects with patient/caregiver, Understanding:  Caregiver/Patient expressed understanding., Adherence:  Barriers to adherence identified and discussed if present. BMI Care goal follow up plan:  Above normal BMI management provided  Dietary management education, guidance, and counseling.           Follow Up: 2 Weeks                   Billing Information:         Visit Code:        Procedure Codes:        "

## 2024-12-23 ENCOUNTER — APPOINTMENT (OUTPATIENT)
Dept: SURGERY | Facility: CLINIC | Age: 49
End: 2024-12-23
Payer: COMMERCIAL

## 2024-12-23 ENCOUNTER — OFFICE VISIT (OUTPATIENT)
Dept: PRIMARY CARE | Facility: CLINIC | Age: 49
End: 2024-12-23
Payer: COMMERCIAL

## 2024-12-23 VITALS
SYSTOLIC BLOOD PRESSURE: 140 MMHG | BODY MASS INDEX: 38.96 KG/M2 | DIASTOLIC BLOOD PRESSURE: 83 MMHG | WEIGHT: 213 LBS | HEART RATE: 62 BPM

## 2024-12-23 VITALS
DIASTOLIC BLOOD PRESSURE: 78 MMHG | WEIGHT: 213 LBS | OXYGEN SATURATION: 96 % | HEIGHT: 62 IN | HEART RATE: 84 BPM | SYSTOLIC BLOOD PRESSURE: 128 MMHG | BODY MASS INDEX: 39.2 KG/M2

## 2024-12-23 DIAGNOSIS — I10 PRIMARY HYPERTENSION: ICD-10-CM

## 2024-12-23 DIAGNOSIS — Z12.39 BREAST SCREENING: Primary | ICD-10-CM

## 2024-12-23 DIAGNOSIS — Z09 SURGERY FOLLOW-UP EXAMINATION: Primary | ICD-10-CM

## 2024-12-23 DIAGNOSIS — I10 HYPERTENSION, UNSPECIFIED TYPE: ICD-10-CM

## 2024-12-23 PROCEDURE — 1036F TOBACCO NON-USER: CPT

## 2024-12-23 PROCEDURE — 3074F SYST BP LT 130 MM HG: CPT | Performed by: STUDENT IN AN ORGANIZED HEALTH CARE EDUCATION/TRAINING PROGRAM

## 2024-12-23 PROCEDURE — 99213 OFFICE O/P EST LOW 20 MIN: CPT | Performed by: STUDENT IN AN ORGANIZED HEALTH CARE EDUCATION/TRAINING PROGRAM

## 2024-12-23 PROCEDURE — 1036F TOBACCO NON-USER: CPT | Performed by: STUDENT IN AN ORGANIZED HEALTH CARE EDUCATION/TRAINING PROGRAM

## 2024-12-23 PROCEDURE — 3079F DIAST BP 80-89 MM HG: CPT

## 2024-12-23 PROCEDURE — 3077F SYST BP >= 140 MM HG: CPT

## 2024-12-23 PROCEDURE — 99024 POSTOP FOLLOW-UP VISIT: CPT

## 2024-12-23 PROCEDURE — 4010F ACE/ARB THERAPY RXD/TAKEN: CPT

## 2024-12-23 PROCEDURE — 3078F DIAST BP <80 MM HG: CPT | Performed by: STUDENT IN AN ORGANIZED HEALTH CARE EDUCATION/TRAINING PROGRAM

## 2024-12-23 PROCEDURE — 3008F BODY MASS INDEX DOCD: CPT | Performed by: STUDENT IN AN ORGANIZED HEALTH CARE EDUCATION/TRAINING PROGRAM

## 2024-12-23 PROCEDURE — 3044F HG A1C LEVEL LT 7.0%: CPT | Performed by: STUDENT IN AN ORGANIZED HEALTH CARE EDUCATION/TRAINING PROGRAM

## 2024-12-23 PROCEDURE — 4010F ACE/ARB THERAPY RXD/TAKEN: CPT | Performed by: STUDENT IN AN ORGANIZED HEALTH CARE EDUCATION/TRAINING PROGRAM

## 2024-12-23 PROCEDURE — 3044F HG A1C LEVEL LT 7.0%: CPT

## 2024-12-23 RX ORDER — AMLODIPINE BESYLATE 10 MG/1
10 TABLET ORAL DAILY
Qty: 90 TABLET | Refills: 1 | Status: SHIPPED | OUTPATIENT
Start: 2024-12-23

## 2024-12-23 RX ORDER — LOSARTAN POTASSIUM 100 MG/1
100 TABLET ORAL DAILY
Qty: 90 TABLET | Refills: 1 | Status: SHIPPED | OUTPATIENT
Start: 2024-12-23

## 2024-12-23 ASSESSMENT — PATIENT HEALTH QUESTIONNAIRE - PHQ9
SUM OF ALL RESPONSES TO PHQ9 QUESTIONS 1 AND 2: 0
1. LITTLE INTEREST OR PLEASURE IN DOING THINGS: NOT AT ALL
2. FEELING DOWN, DEPRESSED OR HOPELESS: NOT AT ALL
SUM OF ALL RESPONSES TO PHQ9 QUESTIONS 1 AND 2: 0
1. LITTLE INTEREST OR PLEASURE IN DOING THINGS: NOT AT ALL
2. FEELING DOWN, DEPRESSED OR HOPELESS: NOT AT ALL

## 2024-12-23 ASSESSMENT — ENCOUNTER SYMPTOMS
DEPRESSION: 0
DEPRESSION: 0
LOSS OF SENSATION IN FEET: 0
OCCASIONAL FEELINGS OF UNSTEADINESS: 0

## 2024-12-23 ASSESSMENT — PAIN SCALES - GENERAL: PAINLEVEL_OUTOF10: 0-NO PAIN

## 2024-12-23 NOTE — PROGRESS NOTES
"Subjective   Patient ID: Kat Tineo is a 49 y.o. female who presents for Follow-up (Bp).    HPI     Patient went in for elective surgery from [facility] for hiatal hernia and gastric bypass patient is doing well postop  Cleared for surgery.  No pain losing weight.  She states that they want her off her hydrochlorothiazide but dehydration otherwise she is doing very well.    Review of Systems   All other systems reviewed and are negative.      Objective   /78 (BP Location: Left arm, Patient Position: Sitting, BP Cuff Size: Large adult)   Pulse 84   Ht 1.575 m (5' 2\")   Wt 96.6 kg (213 lb)   SpO2 96%   BMI 38.96 kg/m²     Physical Exam  Constitutional:       Appearance: Normal appearance.   HENT:      Head: Normocephalic and atraumatic.      Right Ear: Tympanic membrane and ear canal normal.      Left Ear: Tympanic membrane and ear canal normal.      Mouth/Throat:      Mouth: Mucous membranes are moist.      Pharynx: Oropharynx is clear.   Eyes:      Extraocular Movements: Extraocular movements intact.      Conjunctiva/sclera: Conjunctivae normal.      Pupils: Pupils are equal, round, and reactive to light.   Cardiovascular:      Rate and Rhythm: Normal rate and regular rhythm.      Pulses: Normal pulses.      Heart sounds: Normal heart sounds.   Pulmonary:      Effort: Pulmonary effort is normal.      Breath sounds: Normal breath sounds.   Abdominal:      General: Abdomen is flat. Bowel sounds are normal.      Palpations: Abdomen is soft.   Musculoskeletal:         General: Normal range of motion.      Cervical back: Normal range of motion and neck supple.   Skin:     General: Skin is warm and dry.      Capillary Refill: Capillary refill takes 2 to 3 seconds.   Neurological:      General: No focal deficit present.      Mental Status: She is alert and oriented to person, place, and time. Mental status is at baseline.   Psychiatric:         Mood and Affect: Mood normal.         Behavior: Behavior " normal.         Thought Content: Thought content normal.         Judgment: Judgment normal.         Assessment/Plan   1. Breast screening (Primary)    - BI mammo bilateral screening tomosynthesis; Future    2. Hypertension, unspecified type    - amLODIPine (Norvasc) 10 mg tablet; Take 1 tablet (10 mg) by mouth once daily.  Dispense: 90 tablet; Refill: 1    3. Primary hypertension  - losartan (Cozaar) 100 mg tablet; Take 1 tablet (100 mg) by mouth once daily. Do not start before November 27, 2024.  Dispense: 90 tablet; Refill: 1

## 2024-12-23 NOTE — PROGRESS NOTES
4 week Post-op Appointment - Dietary Follow Up     Current Weight: 213 lbs  Current BMI: 38.96    Reviewed soft food diet progression. Patient was instructed to start soft food stage 1 on Wednesday, 12/25. I reviewed which foods patient can start to incorporate and which foods to avoid. Instructed to start soft food stage 2 on Wednesday, 1/1. Informed patient to measure out portion sizes and track volume size. Informed patient to aim for at least 60g of protein per day and to add protein shake as needed. Encouraged continuing to prioritize fluids and ensure getting in at least 50-60 oz fluid daily. Reviewed the importance of slowly eating and stop when starting to feel full. Patient was instructed to follow the soft diet for a full 2 weeks, and to not progress diet until instructed to do so. Soft food education packet was given, patient shows good understanding.         Jalyn Cleary RD, LDN  Registered Dietitian, Licensed Dietitian Nutritionist

## 2024-12-23 NOTE — PROGRESS NOTES
"Subjective   Patient ID: Kat Tineo is a 49 y.o. female who presents for Post-op (4 WK POST OP SLEEVE).  HPI  Kat is here for her 4 week follow up. She denies reflux. She is drinking over 50oz of fluid per day. She is getting 62g of protein per day. She is walking as recommended. She is currently taking 2 tablets of the flintstones MVI and omeprazole. Kat is asking when she can start her Rinvoq.      Objective   Physical Exam  Constitutional:       General: She is not in acute distress.     Appearance: Normal appearance. She is obese.   HENT:      Head: Normocephalic.   Eyes:      Pupils: Pupils are equal, round, and reactive to light.   Cardiovascular:      Rate and Rhythm: Normal rate and regular rhythm.   Pulmonary:      Effort: Pulmonary effort is normal.   Abdominal:      General: There is no distension.      Palpations: Abdomen is soft.      Comments: Appropriately TTP around incisions, incisions CDI and closed with glue   Musculoskeletal:         General: Normal range of motion.   Skin:     General: Skin is warm and dry.   Neurological:      Mental Status: She is alert and oriented to person, place, and time.   Psychiatric:         Mood and Affect: Mood normal.         Assessment/Plan   Problem List Items Addressed This Visit             ICD-10-CM    Surgery follow-up examination - Primary Z09     Continue activity restrictions. Continue to crush pills. Advance diet per protocol. Continue MVI with Fe, PPI. Reviewed the \"rules\" for long term weight loss success. Continue to increase walking for exercise.    I sent Kat a message through [employer] that she may resume her Rinvoq at 6 weeks.     PRACHI Moore-CNP 12/23/24 12:10 PM   "

## 2024-12-26 NOTE — PROGRESS NOTES
"6 WK POST OP RYGB  START WT:  238  IDEAL WT: 135 START EXCESS:103 HT:  62 IN.  Chief Complaints:         1. PBS:6 wk f/u.          HPI:     General Comments:          Do you have any difficulties with:    swallowing?  No,   nausea?  No, vomiting?  No,   pain?  yes, joint pain, headache   heartburn?  No,   discomfort?  No,   intolerances?  No.   Fluid and Protein Intake:    Reviewed fluid and protein log:  Yes,   Fluid intake (ounces): 80 +  Sources:,   Protein intake (grams): 60  Sources:.   Daily Diet Recall: ----.   Volume of food at a time: soft.   Chewable MVI:  Taking as directed?  Yes.   PPI/omeprazole:  Taking as directed/prescribed?  Yes.   Walking:  Every hour?  Yes.        -Bariatric Follow-up:          Receive IV fluids  No. Seen in ER with admission No. Seen ER without admission No. Hospital readmission No.           Medical History:          Objective:       Assessment:        Assessment:    1. Surgery follow-up examination - Z09   2. H/O bariatric surgery - Z98.84   3. Abnormal intestinal absorption - K90.9   4. Vitamin D deficiency, unspecified - E55.9   5. B12 deficiency - E53.8   6. Hyperparathyroidism - E21.3      Plan:        1. Others    Notes:  Advance diet per protocol  May swallow whole pills  Reviewed the \"rules\" for long-term weight loss success  Continue PPI, MVI with Fe  Begin micronutrient supplement protocol/handout given  Activity restrictions lifted  Discussed the importance of regular exercise for continued long-term weight loss success  .              Preventive Medicine:      Counseling:  Medication education:  Education:  All new and/or current medications discussed and reviewed including side effects with patient/caregiver, Understanding:  Caregiver/Patient expressed understanding., Adherence:  Barriers to adherence identified and discussed if present. BMI Care goal follow up plan:  Above normal BMI management provided  Dietary management education, guidance, and counseling.      "      Follow Up: 6 Weeks

## 2025-01-02 ENCOUNTER — PATIENT MESSAGE (OUTPATIENT)
Dept: PRIMARY CARE | Facility: CLINIC | Age: 50
End: 2025-01-02
Payer: COMMERCIAL

## 2025-01-02 NOTE — LETTER
January 2, 2025     Patient: Kat Tineo   YOB: 1975   Date of Visit:        To Whom It May Concern:     Please excuse Kat for her absence from work on this day due to having a migraine.     If you have any questions or concerns, please don't hesitate to call.         Sincerely,         Dagoberto Garza, DO

## 2025-01-02 NOTE — PROGRESS NOTES
6 week Post-op Appointment - Dietary Follow Up    Current Weight: 204 lbs   Current BMI: 37.31    Reviewed 6 week diet progression. I reviewed which foods patient can start to incorporate and which foods to avoid. I reviewed timeline with patient over the next 6 weeks on which foods can be reintroduced back into the diet. Informed patient to measure out portion sizes and track volume size. Informed patient to aim for at least 60g of protein per day and continue to prioritize fluids and ensure getting in at least 50-60 oz fluid daily. Reviewed the importance of slowly eating and stop when starting to feel full. 6 week diet education packet was given. Reviewed Vitamin and Mineral supplementation to start taking at 6 weeks post-op in addition to daily MVI. Patient shows good understanding.         Jalyn Cleary RD, LDN  Registered Dietitian, Licensed Dietitian Nutritionist

## 2025-01-06 ENCOUNTER — APPOINTMENT (OUTPATIENT)
Dept: SURGERY | Facility: CLINIC | Age: 50
End: 2025-01-06
Payer: COMMERCIAL

## 2025-01-06 VITALS
DIASTOLIC BLOOD PRESSURE: 78 MMHG | HEIGHT: 62 IN | WEIGHT: 204 LBS | BODY MASS INDEX: 37.54 KG/M2 | SYSTOLIC BLOOD PRESSURE: 128 MMHG | HEART RATE: 60 BPM

## 2025-01-06 DIAGNOSIS — E53.8 VITAMIN B12 DEFICIENCY: ICD-10-CM

## 2025-01-06 DIAGNOSIS — E21.3 HYPERPARATHYROIDISM (MULTI): ICD-10-CM

## 2025-01-06 DIAGNOSIS — K90.9 INTESTINAL MALABSORPTION, UNSPECIFIED TYPE (HHS-HCC): ICD-10-CM

## 2025-01-06 DIAGNOSIS — Z98.84 S/P BARIATRIC SURGERY: ICD-10-CM

## 2025-01-06 DIAGNOSIS — E55.9 VITAMIN D DEFICIENCY: ICD-10-CM

## 2025-01-06 DIAGNOSIS — Z09 SURGERY FOLLOW-UP EXAMINATION: Primary | ICD-10-CM

## 2025-01-06 PROCEDURE — 99024 POSTOP FOLLOW-UP VISIT: CPT

## 2025-01-06 PROCEDURE — 3008F BODY MASS INDEX DOCD: CPT

## 2025-01-06 PROCEDURE — 3074F SYST BP LT 130 MM HG: CPT

## 2025-01-06 PROCEDURE — 4010F ACE/ARB THERAPY RXD/TAKEN: CPT

## 2025-01-06 PROCEDURE — 3078F DIAST BP <80 MM HG: CPT

## 2025-01-06 ASSESSMENT — PAIN SCALES - GENERAL: PAINLEVEL_OUTOF10: 0-NO PAIN

## 2025-01-06 NOTE — PROGRESS NOTES
Subjective   Patient ID: Kat Tineo is a 49 y.o. female who presents for Post-op (6 wk post op rygb).  AVINASH Stephens is here for her 6 week follow up. She denies reflux. She is drinking over 70oz of fluid per day. She is unsure how much protein she is getting through her meals. She stopped drinking shakes. They are upsetting her stomach. She is walking as recommended. She is currently taking 2 tablets of the flintstones MVI and omeprazole.     Objective   Physical Exam  Constitutional:       Appearance: Normal appearance.   Eyes:      Pupils: Pupils are equal, round, and reactive to light.   Cardiovascular:      Rate and Rhythm: Normal rate.   Pulmonary:      Effort: Pulmonary effort is normal.   Abdominal:      Palpations: Abdomen is soft.   Musculoskeletal:         General: Normal range of motion.   Skin:     General: Skin is warm and dry.   Neurological:      General: No focal deficit present.      Mental Status: She is alert and oriented to person, place, and time.   Psychiatric:         Mood and Affect: Mood normal.         Assessment/Plan   Problem List Items Addressed This Visit             ICD-10-CM    Vitamin D deficiency E55.9    Relevant Orders    Vitamin D 25-Hydroxy,Total (for eval of Vitamin D levels)    Vitamin B12 deficiency E53.8    Relevant Orders    Vitamin B12    Intestinal malabsorption K90.9    Relevant Orders    CBC and Auto Differential    Comprehensive Metabolic Panel    Copper, Blood    Ferritin    Iron and TIBC    Folate    Parathyroid Hormone, Intact    Vitamin B1, Whole Blood    Vitamin B12    Vitamin D 25-Hydroxy,Total (for eval of Vitamin D levels)    Zinc, Serum or Plasma    S/P bariatric surgery Z98.84    Surgery follow-up examination - Primary Z09    Hyperparathyroidism (Multi) E21.3    Relevant Orders    Parathyroid Hormone, Intact     Start calcium citrate BID,  from multivitamin by at least 2 hours. Continue lifting restrictions. Other activity restrictions lifted.  "Advance diet per protocol. Continue MVI with Fe, PPI. Reviewed the \"rules\" for long term weight loss success. Continue to increase walking for exercise.    Keke Sutton, PRACHI-CNP 01/06/25 3:32 PM   "

## 2025-01-07 PROBLEM — E21.3 HYPERPARATHYROIDISM (MULTI): Status: ACTIVE | Noted: 2025-01-07

## 2025-01-14 ENCOUNTER — CLINICAL SUPPORT (OUTPATIENT)
Dept: PRIMARY CARE | Facility: CLINIC | Age: 50
End: 2025-01-14
Payer: COMMERCIAL

## 2025-01-14 ENCOUNTER — TELEPHONE (OUTPATIENT)
Dept: PRIMARY CARE | Facility: CLINIC | Age: 50
End: 2025-01-14

## 2025-01-14 DIAGNOSIS — R68.89 FLU-LIKE SYMPTOMS: ICD-10-CM

## 2025-01-14 DIAGNOSIS — J11.1 FLU: Primary | ICD-10-CM

## 2025-01-14 LAB
POC RAPID INFLUENZA A: POSITIVE
POC RAPID INFLUENZA B: NEGATIVE

## 2025-01-14 PROCEDURE — 87804 INFLUENZA ASSAY W/OPTIC: CPT | Performed by: STUDENT IN AN ORGANIZED HEALTH CARE EDUCATION/TRAINING PROGRAM

## 2025-01-14 RX ORDER — OSELTAMIVIR PHOSPHATE 75 MG/1
75 CAPSULE ORAL 2 TIMES DAILY
Qty: 10 CAPSULE | Refills: 0 | Status: SHIPPED | OUTPATIENT
Start: 2025-01-14 | End: 2025-01-19

## 2025-01-14 NOTE — PROGRESS NOTES
Subjective   Reason for Visit: Kat Tineo is an 49 y.o. female here for a Medicare Wellness visit.               HPI    Patient Care Team:  Dagoberto Garza DO as PCP - General  Pb Jones DO as PCP - [facility] PCP  Pb Jones DO as PCP - [facility] Medicaid PCP  Ryan Quigley MD as Referring Physician (Hematology and Oncology)     Review of Systems    Objective   Vitals:  There were no vitals taken for this visit.      Physical Exam    Assessment & Plan

## 2025-01-14 NOTE — LETTER
January 14, 2025     Patient: Kat Tineo   YOB: 1975   Date of Visit: 1/14/2025       To Whom It May Concern:    Kat Tineo was seen in my clinic and tested positive for Influenza A. Please excuse patient from work 1/13/2025 through 1/17/2025 and can return back 1/20/2025.    If you have any questions or concerns, please don't hesitate to call.      Sincerely,     Dagoberto Garza, DO

## 2025-01-29 ENCOUNTER — APPOINTMENT (OUTPATIENT)
Dept: CARDIOLOGY | Facility: CLINIC | Age: 50
End: 2025-01-29
Payer: COMMERCIAL

## 2025-02-05 DIAGNOSIS — M54.16 RADICULOPATHY, LUMBAR REGION: ICD-10-CM

## 2025-02-05 RX ORDER — TIZANIDINE 4 MG/1
4 TABLET ORAL NIGHTLY
Qty: 30 TABLET | Refills: 2 | Status: SHIPPED | OUTPATIENT
Start: 2025-02-05

## 2025-02-11 ENCOUNTER — APPOINTMENT (OUTPATIENT)
Dept: RADIOLOGY | Facility: HOSPITAL | Age: 50
End: 2025-02-11
Payer: COMMERCIAL

## 2025-02-20 ENCOUNTER — OFFICE VISIT (OUTPATIENT)
Dept: ORTHOPEDIC SURGERY | Facility: HOSPITAL | Age: 50
End: 2025-02-20
Payer: COMMERCIAL

## 2025-02-20 ENCOUNTER — HOSPITAL ENCOUNTER (OUTPATIENT)
Dept: RADIOLOGY | Facility: EXTERNAL LOCATION | Age: 50
Discharge: HOME | End: 2025-02-20

## 2025-02-20 DIAGNOSIS — M16.11 PRIMARY OSTEOARTHRITIS OF RIGHT HIP: ICD-10-CM

## 2025-02-20 DIAGNOSIS — M76.02 GLUTEAL TENDONITIS OF LEFT BUTTOCK: ICD-10-CM

## 2025-02-20 PROCEDURE — 20611 DRAIN/INJ JOINT/BURSA W/US: CPT | Mod: LT,59 | Performed by: FAMILY MEDICINE

## 2025-02-20 PROCEDURE — 20611 DRAIN/INJ JOINT/BURSA W/US: CPT | Mod: RT | Performed by: FAMILY MEDICINE

## 2025-02-20 PROCEDURE — 4010F ACE/ARB THERAPY RXD/TAKEN: CPT | Performed by: FAMILY MEDICINE

## 2025-02-20 PROCEDURE — 1036F TOBACCO NON-USER: CPT | Performed by: FAMILY MEDICINE

## 2025-02-20 PROCEDURE — 99214 OFFICE O/P EST MOD 30 MIN: CPT | Mod: 25 | Performed by: FAMILY MEDICINE

## 2025-02-20 PROCEDURE — 99214 OFFICE O/P EST MOD 30 MIN: CPT | Performed by: FAMILY MEDICINE

## 2025-02-20 PROCEDURE — 2500000004 HC RX 250 GENERAL PHARMACY W/ HCPCS (ALT 636 FOR OP/ED): Performed by: FAMILY MEDICINE

## 2025-02-20 RX ORDER — TRIAMCINOLONE ACETONIDE 40 MG/ML
80 INJECTION, SUSPENSION INTRA-ARTICULAR; INTRAMUSCULAR
Status: COMPLETED | OUTPATIENT
Start: 2025-02-20 | End: 2025-02-20

## 2025-02-20 RX ORDER — TRIAMCINOLONE ACETONIDE 40 MG/ML
40 INJECTION, SUSPENSION INTRA-ARTICULAR; INTRAMUSCULAR
Status: COMPLETED | OUTPATIENT
Start: 2025-02-20 | End: 2025-02-20

## 2025-02-20 RX ORDER — LIDOCAINE HYDROCHLORIDE 10 MG/ML
2 INJECTION, SOLUTION INFILTRATION; PERINEURAL
Status: COMPLETED | OUTPATIENT
Start: 2025-02-20 | End: 2025-02-20

## 2025-02-20 RX ORDER — LIDOCAINE HYDROCHLORIDE 10 MG/ML
4 INJECTION, SOLUTION INFILTRATION; PERINEURAL
Status: COMPLETED | OUTPATIENT
Start: 2025-02-20 | End: 2025-02-20

## 2025-02-20 RX ADMIN — TRIAMCINOLONE ACETONIDE 80 MG: 200 INJECTION, SUSPENSION INTRA-ARTICULAR; INTRAMUSCULAR at 10:49

## 2025-02-20 RX ADMIN — TRIAMCINOLONE ACETONIDE 40 MG: 200 INJECTION, SUSPENSION INTRA-ARTICULAR; INTRAMUSCULAR at 10:50

## 2025-02-20 RX ADMIN — LIDOCAINE HYDROCHLORIDE 4 ML: 10 INJECTION, SOLUTION INFILTRATION; PERINEURAL at 10:49

## 2025-02-20 RX ADMIN — LIDOCAINE HYDROCHLORIDE 2 ML: 10 INJECTION, SOLUTION INFILTRATION; PERINEURAL at 10:50

## 2025-02-20 NOTE — PROGRESS NOTES
** Please excuse any errors in grammar or translation related to this dictation. Voice recognition software was utilized to prepare this document. **    Assessment & Plan:  Patient following up for right hip arthritis as well as injuries left gluteal tendinitis and greater trochanteric pain syndrome.  Steroid injections were last completed in August which helped for approximately 1 months.  She has lost substantial weight over the last few months and now BMI is less than 40%.  For this reason she plans to discuss right hip arthroplasty with Dr. Elio Lew.  Intra-articular steroid injection was offered completion today to mitigate her symptoms which she agreed to have completed.  She is aware that this would necessitate waiting at least 90 days prior to hip arthroplasty.  Left lower hip pain remains consistent with gluteal tendinitis.  Discussed with patient that repetitive steroid injections as location can further deteriorate the tendon and overall make symptoms worse.  For this reason should consider completion of PRP injection or tenotomy procedure in the future; informational handouts were provided on all these.  Do recommend obtaining MRI of the left hip to assess for gluteal tendon injury as well as degree of gluteal atrophy to determine next steps moving forward.  All questions answered and patient agrees to plan.      Chief complaint:  Bilateral hip OA    HPI:  2/20/25: Patient reports the injections from 8/22 provided about 4 weeks relief for both.  Denies any new injuries.  She has appointment with Dr. Elio Lew in April and plans to discuss right hip arthroplasty.  Left hip pain continues to be focal overlying greater trochanter.  Right hip is more diffuse but primarily in the groin and to lesser extent in the lateral hip.    8/22/24: Patient returns requesting bilateral hip injections after having 4-6 weeks of relief with her last injections. She complains of right groin pain and left lateral hip pain.  Pain increases when laying on the left hip.  She was recently prescribed a Rinvoq from her rheumatologist which has helped with her back pain but has not helped with her hip pain.  She had SI joint injections completed at pain management in July.    5/9/24: Patient returns today for completion of left hip aspiration.  She states that her rheumatologist wanted to ensure that she had no current hip joint infection prior to starting on Xeljanz.  The steroid injection from 4/25 in the right hip provided her good relief.      4/25/24: 47y/o patient, hx RA and left KYRIE with revision x2 most recently in August 2023 with Dr. Elio Lew, presents with bilateral hip pain.  Right hip pain starts in her groin and radiates down her anterior thigh.  It has been ongoing for several months but was exacerbated in the recent ground-level fall 2 weeks ago.  Reports she has dealt with left hip pain since revision surgery.  Pain is prominently over her lateral hip.  It was also recently exacerbated from a fall.  She reports that she was supposed to be started on a new medication for rheumatoid arthritis.  However given her left hip periprosthetic infection history she was advised to have testing done to ensure no active infection was present within her left hip.  She reports that she saw Dr. Elio Lew recently and he advised aspirations so testing could be done. Currently using gabapentin, and tizanidine, for symptom control.      Patient Active Problem List   Diagnosis    Arthritis    Chronic back pain    Cough    Diabetes (Multi)    Foot injury    Insomnia    Left hip pain    Onychomycosis    Vitamin D deficiency    Chest pain at rest    Familial heart disease    Hypertension    Lumbar radiculopathy    Migraine headache    Obesity    Muscle weakness    Numbness    Pain of left upper extremity    Pain of right upper extremity    Bronchitis    Gastroesophageal reflux disease    Hypokalemia    Lumbar degenerative disc disease    Mixed  anxiety depressive disorder    Primary osteoarthritis of left hip    Vitamin B12 deficiency    Chest pain    Acute otitis externa of right ear    Acute urinary tract infection    Amenorrhea    Cardiomegaly    Cellulitis of leg, except foot    Chronic osteoarthritis    Complication of surgical procedure    Disease due to severe acute respiratory syndrome coronavirus 2 (SARS-CoV-2)    Disorder of hip region    Edema of both lower extremities    Electrocardiogram abnormal    Fall    Female infertility    Gallstone    Fracture of bone of hip (Multi)    Hip dislocation, left (Multi)    History of revision of total replacement of left hip joint    History of total left hip arthroplasty    Influenza with gastrointestinal tract involvement    Instability of left hip joint    Lightheadedness    Lumbar spondylosis    Lumbosacral radiculitis    Intestinal malabsorption    Malabsorption syndrome (HHS-HCC)    Headache    Musculoskeletal pain    Myofascial pain    Pain at surgical incision    Nausea    Pain in both lower extremities    Pain in femur    Postoperative pain    S/P lumbar fusion    Sleep disturbance    Strain of trapezius muscle    Trochanteric bursitis of left hip    Umbilical hernia    Upper respiratory tract infection    Laryngitis    Rheumatoid arthritis    S/P bariatric surgery    Polyneuropathy, unspecified    Disease of spinal cord, unspecified    Swelling of hip joint    Pain of lower extremity    Paresthesia of upper extremity    Abdominal pain    Stenosis, cervical spine    Severe anemia    Malabsorption (HHS-HCC)    Zinc deficiency    Iron deficiency    Thiamine deficiency    Complications of gastric bypass surgery    Iron deficiency    Morbid obesity with BMI of 40.0-44.9, adult (Multi)    Hiatal hernia    Dental infection    Encounter for pre-bariatric surgery counseling and education    Gastroesophageal reflux disease with hiatal hernia    Hiatal hernia with gastroesophageal reflux    Surgery follow-up  examination    Hyperparathyroidism (Multi)     Past Surgical History:   Procedure Laterality Date    CHOLECYSTECTOMY  2017    CT GUIDED SOFT TISSUE FLUID DRAIN  06/02/2022    CT GUIDED SOFT TISSUE FLUID DRAIN [facility]    GASTRIC BYPASS  11/26/2024    Gastric Bypass Laparoscopic  Repair Diaphragmatic Hernia Laparoscopy General Lysis Adhesions Laparoscopy Abdominal Cavity ([facility])    HYSTERECTOMY  2012    LAPAROSCOPY GASTRECTOMY PARTIAL / TOTAL  2017    vertical sleeve    NECK SURGERY      OTHER SURGICAL HISTORY  11/22/2017    laparoscopic cholecystectomy wiht intraoperative cholangiogram, lysis of adhesions, mesh repair of incisional henia using 6 in round ventralight st mesh    OTHER SURGICAL HISTORY Left     revision x 2 hip    SPINE SURGERY  02/14/2023    L4-L5 SPINAL SURGERY ([facility])    US GUIDED SOFT TISSUE FLUID DRAIN  09/30/2021    US GUIDED SOFT TISSUE FLUID DRAIN [facility]     Current Outpatient Medications on File Prior to Visit   Medication Sig Dispense Refill    acetaminophen (Tylenol) 325 mg/10.15 mL oral liquid Take 20.3 mL (650 mg) by mouth every 4 hours if needed for pain or fever (temp greater than 38.0 C). Take over the counter liquid tylenol as needed. Dilute with equal parts water to prevent dumping syndrome.      amLODIPine (Norvasc) 10 mg tablet Take 1 tablet (10 mg) by mouth once daily. 90 tablet 1    DULoxetine (Cymbalta) 60 mg DR capsule TAKE 1 CAPSULE BY MOUTH EVERY DAY 90 capsule 1    losartan (Cozaar) 100 mg tablet Take 1 tablet (100 mg) by mouth once daily. Do not start before November 27, 2024. 90 tablet 1    metoprolol tartrate (Lopressor) 50 mg tablet Take 1 tablet (50 mg) by mouth 2 times a day. Do not start before November 27, 2024. 60 tablet 1    multivitamin with iron - children's (Cerovite, Jr) chewable tablet Chew 1 tablet once daily.      omeprazole (PriLOSEC) 40 mg DR capsule Take 1 capsule (40 mg) by mouth once daily. Open capsule. Do not crush,  chew, or dissolve beads. 90 capsule 0    rizatriptan (Maxalt) 10 mg tablet TAKE 1 TABLET AT ONSET OF HEADACHE. MAY REPEAT EVERY 2 HOURS AS NEEDED. MAXIMUM 3 TABLETS/24 HOURS. 8 tablet 6    tiZANidine (Zanaflex) 4 mg tablet TAKE 1 TABLET BY MOUTH EVERYDAY AT BEDTIME 30 tablet 2    zolpidem (Ambien) 10 mg tablet TAKE 1 TABLET (10 MG) BY MOUTH AS NEEDED AT BEDTIME FOR SLEEP. 30 tablet 5     No current facility-administered medications on file prior to visit.     Exam:  RIGHT Hip Exam:  Antalgic gait  No warmth, erythema or ecchymosis overlying.  Active flexion >90 degrees.  Limited IR and ER.  Mild TTP over greater trochanter  [5]/5 strength of hip flexion, abduction, & adduction  SILT  [ - ]Log roll pain, [+ ]FADIR pain, [ + ]VAUGHN pain, [ + ]Stinchfield    LEFT Hip Exam:  Antalgic gait  No warmth, erythema or ecchymosis overlying.  Active flexion >90 degrees.   Moderate TTP over greater trochanter  [5]/5 strength of hip flexion, abduction, & adduction  SILT  [ - ]Log roll pain, [- ]FADIR pain, [ + ]VAUGHN pain, [ - ]Stinchfield    General Exam:  Constitutional - NAD, AAO x 3, conversing appropriately.  HEENT- Normocephalic and atraumatic. EOMI, PERRLA, No scleral icterus. No facial deformities. Hearing grossly normal.  Lungs - Breathing non-labored with normal rate. No accessory muscle use.  CV - Extremities warm and well-perfused, brisk capillary refill present.   Neuro - CN II-XII grossly intact.    Results:  X-rays of hips obtained 4/16/2024: moderate degenerative changes of the right hip joint.  Normal-appearing total left hip arthroplasty without evidence of periprosthetic fracture.    Lab Results   Component Value Date    HGBA1C 6.4 (H) 04/09/2024    HGBA1C 6.1 07/25/2022    CREATININE 0.67 11/27/2024    EGFR >90 11/27/2024      Procedure:   Patient ID: Kat Tineo is a 49 y.o. female.    L Inj/Asp: R hip joint on 2/20/2025 10:49 AM  Indications: pain  Details: 22 G needle, ultrasound-guided anterior  approach  Medications: 80 mg triamcinolone acetonide 40 mg/mL; 4 mL lidocaine 10 mg/mL (1 %)  Outcome: tolerated well, no immediate complications    Procedure risk factors to include increased pain, bleeding, infection, neurovascular injury, soft tissue injury, progressive cartilage loss, transient elevation of blood glucose and blood pressure, and adverse reaction to medication were discussed with the patient. Patient understands there is a moderate risk of morbidity from undergoing the procedure.  Procedure, treatment alternatives, risks and benefits explained, specific risks discussed. Consent was given by the patient. Immediately prior to procedure a time out was called to verify the correct patient, procedure, equipment, support staff and site/side marked as required. Patient was prepped and draped in the usual sterile fashion.       L Inj/Asp: L greater trochanteric bursa on 2/20/2025 10:50 AM  Indications: pain  Details: 25 G needle, ultrasound-guided lateral approach  Medications: 40 mg triamcinolone acetonide 40 mg/mL; 2 mL lidocaine 10 mg/mL (1 %)  Outcome: tolerated well, no immediate complications    Procedure risk factors to include increased pain, bleeding, infection, neurovascular injury, soft tissue injury, progressive cartilage loss, transient elevation of blood glucose and blood pressure, and adverse reaction to medication were discussed with the patient. Patient understands there is a moderate risk of morbidity from undergoing the procedure.  Procedure, treatment alternatives, risks and benefits explained, specific risks discussed. Consent was given by the patient. Immediately prior to procedure a time out was called to verify the correct patient, procedure, equipment, support staff and site/side marked as required. Patient was prepped and draped in the usual sterile fashion.

## 2025-02-27 ENCOUNTER — APPOINTMENT (OUTPATIENT)
Dept: SURGERY | Facility: CLINIC | Age: 50
End: 2025-02-27
Payer: COMMERCIAL

## 2025-03-02 LAB
25(OH)D3+25(OH)D2 SERPL-MCNC: 33 NG/ML (ref 30–100)
ALBUMIN SERPL-MCNC: 4.4 G/DL (ref 3.6–5.1)
ALP SERPL-CCNC: 71 U/L (ref 37–153)
ALT SERPL-CCNC: 66 U/L (ref 6–29)
ANION GAP SERPL CALCULATED.4IONS-SCNC: 8 MMOL/L (CALC) (ref 7–17)
AST SERPL-CCNC: 40 U/L (ref 10–35)
BASOPHILS # BLD AUTO: 0 CELLS/UL (ref 0–200)
BASOPHILS NFR BLD AUTO: 0 %
BILIRUB SERPL-MCNC: 0.5 MG/DL (ref 0.2–1.2)
BUN SERPL-MCNC: 11 MG/DL (ref 7–25)
CALCIUM SERPL-MCNC: 9.3 MG/DL (ref 8.6–10.4)
CHLORIDE SERPL-SCNC: 109 MMOL/L (ref 98–110)
CO2 SERPL-SCNC: 27 MMOL/L (ref 20–32)
COPPER BLD-MCNC: NORMAL UG/DL
CREAT SERPL-MCNC: 0.65 MG/DL (ref 0.5–1.03)
EGFRCR SERPLBLD CKD-EPI 2021: 107 ML/MIN/1.73M2
EOSINOPHIL # BLD AUTO: 9 CELLS/UL (ref 15–500)
EOSINOPHIL NFR BLD AUTO: 0.1 %
ERYTHROCYTE [DISTWIDTH] IN BLOOD BY AUTOMATED COUNT: 13.5 % (ref 11–15)
FERRITIN SERPL-MCNC: 59 NG/ML (ref 16–232)
FOLATE SERPL-MCNC: 10.7 NG/ML
GLUCOSE SERPL-MCNC: 104 MG/DL (ref 65–139)
HCT VFR BLD AUTO: 42 % (ref 35–45)
HGB BLD-MCNC: 13.5 G/DL (ref 11.7–15.5)
IRON SATN MFR SERPL: 26 % (CALC) (ref 16–45)
IRON SERPL-MCNC: 80 MCG/DL (ref 45–160)
LYMPHOCYTES # BLD AUTO: 1665 CELLS/UL (ref 850–3900)
LYMPHOCYTES NFR BLD AUTO: 18.1 %
MCH RBC QN AUTO: 29 PG (ref 27–33)
MCHC RBC AUTO-ENTMCNC: 32.1 G/DL (ref 32–36)
MCV RBC AUTO: 90.1 FL (ref 80–100)
MONOCYTES # BLD AUTO: 635 CELLS/UL (ref 200–950)
MONOCYTES NFR BLD AUTO: 6.9 %
NEUTROPHILS # BLD AUTO: 6891 CELLS/UL (ref 1500–7800)
NEUTROPHILS NFR BLD AUTO: 74.9 %
PLATELET # BLD AUTO: 358 THOUSAND/UL (ref 140–400)
PMV BLD REES-ECKER: 11.1 FL (ref 7.5–12.5)
POTASSIUM SERPL-SCNC: 3.1 MMOL/L (ref 3.5–5.3)
PROT SERPL-MCNC: 6.9 G/DL (ref 6.1–8.1)
PTH-INTACT SERPL-MCNC: 73 PG/ML (ref 16–77)
RBC # BLD AUTO: 4.66 MILLION/UL (ref 3.8–5.1)
SODIUM SERPL-SCNC: 144 MMOL/L (ref 135–146)
TIBC SERPL-MCNC: 310 MCG/DL (CALC) (ref 250–450)
VIT B1 BLD-SCNC: 121 NMOL/L (ref 78–185)
VIT B12 SERPL-MCNC: 453 PG/ML (ref 200–1100)
WBC # BLD AUTO: 9.2 THOUSAND/UL (ref 3.8–10.8)
ZINC SERPL-MCNC: 61 MCG/DL (ref 60–130)

## 2025-03-04 LAB
25(OH)D3+25(OH)D2 SERPL-MCNC: 33 NG/ML (ref 30–100)
ALBUMIN SERPL-MCNC: 4.4 G/DL (ref 3.6–5.1)
ALP SERPL-CCNC: 71 U/L (ref 37–153)
ALT SERPL-CCNC: 66 U/L (ref 6–29)
ANION GAP SERPL CALCULATED.4IONS-SCNC: 8 MMOL/L (CALC) (ref 7–17)
AST SERPL-CCNC: 40 U/L (ref 10–35)
BASOPHILS # BLD AUTO: 0 CELLS/UL (ref 0–200)
BASOPHILS NFR BLD AUTO: 0 %
BILIRUB SERPL-MCNC: 0.5 MG/DL (ref 0.2–1.2)
BUN SERPL-MCNC: 11 MG/DL (ref 7–25)
CALCIUM SERPL-MCNC: 9.3 MG/DL (ref 8.6–10.4)
CHLORIDE SERPL-SCNC: 109 MMOL/L (ref 98–110)
CO2 SERPL-SCNC: 27 MMOL/L (ref 20–32)
COPPER BLD-MCNC: 53 MCG/DL
CREAT SERPL-MCNC: 0.65 MG/DL (ref 0.5–1.03)
EGFRCR SERPLBLD CKD-EPI 2021: 107 ML/MIN/1.73M2
EOSINOPHIL # BLD AUTO: 9 CELLS/UL (ref 15–500)
EOSINOPHIL NFR BLD AUTO: 0.1 %
ERYTHROCYTE [DISTWIDTH] IN BLOOD BY AUTOMATED COUNT: 13.5 % (ref 11–15)
FERRITIN SERPL-MCNC: 59 NG/ML (ref 16–232)
FOLATE SERPL-MCNC: 10.7 NG/ML
GLUCOSE SERPL-MCNC: 104 MG/DL (ref 65–139)
HCT VFR BLD AUTO: 42 % (ref 35–45)
HGB BLD-MCNC: 13.5 G/DL (ref 11.7–15.5)
IRON SATN MFR SERPL: 26 % (CALC) (ref 16–45)
IRON SERPL-MCNC: 80 MCG/DL (ref 45–160)
LYMPHOCYTES # BLD AUTO: 1665 CELLS/UL (ref 850–3900)
LYMPHOCYTES NFR BLD AUTO: 18.1 %
MCH RBC QN AUTO: 29 PG (ref 27–33)
MCHC RBC AUTO-ENTMCNC: 32.1 G/DL (ref 32–36)
MCV RBC AUTO: 90.1 FL (ref 80–100)
MONOCYTES # BLD AUTO: 635 CELLS/UL (ref 200–950)
MONOCYTES NFR BLD AUTO: 6.9 %
NEUTROPHILS # BLD AUTO: 6891 CELLS/UL (ref 1500–7800)
NEUTROPHILS NFR BLD AUTO: 74.9 %
PLATELET # BLD AUTO: 358 THOUSAND/UL (ref 140–400)
PMV BLD REES-ECKER: 11.1 FL (ref 7.5–12.5)
POTASSIUM SERPL-SCNC: 3.1 MMOL/L (ref 3.5–5.3)
PROT SERPL-MCNC: 6.9 G/DL (ref 6.1–8.1)
PTH-INTACT SERPL-MCNC: 73 PG/ML (ref 16–77)
RBC # BLD AUTO: 4.66 MILLION/UL (ref 3.8–5.1)
SODIUM SERPL-SCNC: 144 MMOL/L (ref 135–146)
TIBC SERPL-MCNC: 310 MCG/DL (CALC) (ref 250–450)
VIT B1 BLD-SCNC: 121 NMOL/L (ref 78–185)
VIT B12 SERPL-MCNC: 453 PG/ML (ref 200–1100)
WBC # BLD AUTO: 9.2 THOUSAND/UL (ref 3.8–10.8)
ZINC SERPL-MCNC: 61 MCG/DL (ref 60–130)

## 2025-03-11 ENCOUNTER — APPOINTMENT (OUTPATIENT)
Dept: RADIOLOGY | Facility: CLINIC | Age: 50
End: 2025-03-11
Payer: COMMERCIAL

## 2025-03-11 ENCOUNTER — OFFICE VISIT (OUTPATIENT)
Dept: SURGERY | Facility: CLINIC | Age: 50
End: 2025-03-11
Payer: COMMERCIAL

## 2025-03-11 VITALS
BODY MASS INDEX: 35.7 KG/M2 | SYSTOLIC BLOOD PRESSURE: 145 MMHG | HEIGHT: 62 IN | WEIGHT: 194 LBS | HEART RATE: 83 BPM | DIASTOLIC BLOOD PRESSURE: 95 MMHG

## 2025-03-11 DIAGNOSIS — E55.9 VITAMIN D DEFICIENCY: ICD-10-CM

## 2025-03-11 DIAGNOSIS — E21.3 HYPERPARATHYROIDISM (MULTI): ICD-10-CM

## 2025-03-11 DIAGNOSIS — E53.8 VITAMIN B12 DEFICIENCY: ICD-10-CM

## 2025-03-11 DIAGNOSIS — Z98.84 S/P BARIATRIC SURGERY: ICD-10-CM

## 2025-03-11 DIAGNOSIS — E61.1 IRON DEFICIENCY: ICD-10-CM

## 2025-03-11 DIAGNOSIS — K90.9 INTESTINAL MALABSORPTION, UNSPECIFIED TYPE (HHS-HCC): Primary | ICD-10-CM

## 2025-03-11 DIAGNOSIS — E60 ZINC DEFICIENCY: ICD-10-CM

## 2025-03-11 DIAGNOSIS — E51.9 THIAMINE DEFICIENCY: ICD-10-CM

## 2025-03-11 PROCEDURE — 1036F TOBACCO NON-USER: CPT

## 2025-03-11 PROCEDURE — 99214 OFFICE O/P EST MOD 30 MIN: CPT

## 2025-03-11 PROCEDURE — 3077F SYST BP >= 140 MM HG: CPT

## 2025-03-11 PROCEDURE — 3080F DIAST BP >= 90 MM HG: CPT

## 2025-03-11 PROCEDURE — 4010F ACE/ARB THERAPY RXD/TAKEN: CPT

## 2025-03-11 PROCEDURE — 3008F BODY MASS INDEX DOCD: CPT

## 2025-03-11 RX ORDER — GABAPENTIN 300 MG/1
300 CAPSULE ORAL 3 TIMES DAILY
COMMUNITY
End: 2025-03-12 | Stop reason: SDUPTHER

## 2025-03-11 ASSESSMENT — PAIN SCALES - GENERAL: PAINLEVEL_OUTOF10: 5

## 2025-03-11 NOTE — PROGRESS NOTES
Subjective   Patient ID: Kat Tineo is a 49 y.o. female who presents for No chief complaint on file..  AVINASH Stephens is here for her 3 month follow up after a conversion to a RYGB for reflux. She has lost 43% of her excess weight. She denies reflux. She feels hunger. She tells me that she feels full with a very small amount. She is walking for exercise. She tells me that she thinks she injured her right hip. She has an appointment with Ortho in April. She is currently taking 2 tablets of the flintstones MVI, calcium citrate once daily, and omeprazole. She had labwork for this appointment, which we reviewed.     Objective   Physical Exam  Constitutional:       Appearance: Normal appearance.   Eyes:      Pupils: Pupils are equal, round, and reactive to light.   Cardiovascular:      Rate and Rhythm: Normal rate.   Pulmonary:      Effort: Pulmonary effort is normal.   Abdominal:      Palpations: Abdomen is soft.   Musculoskeletal:         General: Normal range of motion.   Skin:     General: Skin is warm and dry.   Neurological:      General: No focal deficit present.      Mental Status: She is alert and oriented to person, place, and time.   Psychiatric:         Mood and Affect: Mood normal.          Latest Reference Range & Units 02/25/25 15:05   GLUCOSE 65 - 139 mg/dL 104   SODIUM 135 - 146 mmol/L 144   POTASSIUM 3.5 - 5.3 mmol/L 3.1 (L)   CHLORIDE 98 - 110 mmol/L 109   CARBON DIOXIDE 20 - 32 mmol/L 27   ELECTROLYTE BALANCE 7 - 17 mmol/L (calc) 8   UREA NITROGEN (BUN) 7 - 25 mg/dL 11   CREATININE 0.50 - 1.03 mg/dL 0.65   EGFR > OR = 60 mL/min/1.73m2 107   CALCIUM 8.6 - 10.4 mg/dL 9.3   ALBUMIN 3.6 - 5.1 g/dL 4.4   PROTEIN, TOTAL 6.1 - 8.1 g/dL 6.9   ALKALINE PHOSPHATASE 37 - 153 U/L 71   ALT 6 - 29 U/L 66 (H)   AST 10 - 35 U/L 40 (H)   BILIRUBIN, TOTAL 0.2 - 1.2 mg/dL 0.5   FERRITIN 16 - 232 ng/mL 59   FOLATE, SERUM ng/mL 10.7   IRON, TOTAL 45 - 160 mcg/dL 80   IRON BINDING CAPACITY 250 - 450 mcg/dL (calc) 310   %  SATURATION 16 - 45 % (calc) 26   VITAMIN B12 200 - 1,100 pg/mL 453   COPPER, BLOOD mcg/dL 53   ZINC 60 - 130 mcg/dL 61   VITAMIN B1 (THIAMINE), BLOOD, LC/MS/MS 78 - 185 nmol/L 121   PARATHYROID HORMONE, INTACT 16 - 77 pg/mL 73   VITAMIN D,25-OH,TOTAL,IA 30 - 100 ng/mL 33   WHITE BLOOD CELL COUNT 3.8 - 10.8 Thousand/uL 9.2   RED BLOOD CELL COUNT 3.80 - 5.10 Million/uL 4.66   HEMOGLOBIN 11.7 - 15.5 g/dL 13.5   HEMATOCRIT 35.0 - 45.0 % 42.0   MCV 80.0 - 100.0 fL 90.1   MCH 27.0 - 33.0 pg 29.0   MCHC 32.0 - 36.0 g/dL 32.1   RDW 11.0 - 15.0 % 13.5   PLATELET COUNT 140 - 400 Thousand/uL 358   MPV 7.5 - 12.5 fL 11.1   ABSOLUTE NEUTROPHILS 1,500 - 7,800 cells/uL 6,891   ABSOLUTE LYMPHOCYTES 850 - 3,900 cells/uL 1,665   ABSOLUTE MONOCYTES 200 - 950 cells/uL 635   ABSOLUTE EOSINOPHILS 15 - 500 cells/uL 9 (L)   ABSOLUTE BASOPHILS 0 - 200 cells/uL 0   NEUTROPHILS % 74.9   LYMPHOCYTES % 18.1   MONOCYTES % 6.9   EOSINOPHILS % 0.1   BASOPHILS % 0.0     Assessment/Plan   Problem List Items Addressed This Visit             ICD-10-CM    Vitamin D deficiency E55.9    Relevant Orders    Vitamin D 25-Hydroxy,Total (for eval of Vitamin D levels)    Vitamin B12 deficiency E53.8    Relevant Orders    Vitamin B12    Intestinal malabsorption - Primary K90.9    Relevant Orders    CBC and Auto Differential    Comprehensive Metabolic Panel    Copper, Blood    Ferritin    Folate    Iron and TIBC    Parathyroid Hormone, Intact    Vitamin B1, Whole Blood    Vitamin B12    Vitamin D 25-Hydroxy,Total (for eval of Vitamin D levels)    Zinc, Serum or Plasma    S/P bariatric surgery Z98.84    Zinc deficiency E60    Relevant Orders    Zinc, Serum or Plasma    Iron deficiency E61.1    Relevant Orders    Ferritin    Iron and TIBC    Thiamine deficiency E51.9    Relevant Orders    Vitamin B1, Whole Blood    Hyperparathyroidism (Multi) E21.3    Relevant Orders    Parathyroid Hormone, Intact     Kat is doing well with her weight loss. I encouraged her to  continue to follow the rules. She will stop her omeprazole. She will call the office if she develops reflux. She will continue her current supplementation. I reminded her that a RYGB is not compatible with ASA or NSAID use. She will follow up in 3 months with repeat lab work.     Keke Sutton, PRACHI-CNP 03/11/25 9:39 AM

## 2025-03-11 NOTE — PROGRESS NOTES
Subjective   Patient ID: Kat Tineo is a 49 y.o. female who presents for No chief complaint on file..  HPI  3.5 fuv rygb  START WT:  238  IDEAL WT: 135 START EXCESS:103 HT:  62 IN.   Review of Systems  Bariatric Surgery F/U:          Seen at ER after surgery? No.  Hospital admission? No.  Bariatric reoperation? yes Bariatric intervention? No.  Was anticoagulation initiated for presumed/confirmed Vein Thrombosis/PE? No.  Was an incisional hernia noted on exam? No.  Sleep Apnea? No.  admits to insomnia Still using C-PAP? No.  GERD req. meds? No.  Hyperlipidemia? No.  Still taking Cholesterol med? No.  Hypertension? yes  Still taking HTN med? yes.  Number of Anti-hypertensive Medications: 3  Diabetes? No.  Still taking DM med? No.  Current DM medication type: _____.         CONSTITUTIONAL:          Chills No.  Fatigue yes.  Fever No.    admits to headaches     CARDIOLOGY:          Negative for dizziness,admits to  chest pain, palpitations, shortness of breath.         RESPIRATORY:          Negative for chest congestion, cough, wheezing.         GASTROENTEROLOGY:          Food Intolerance No.  Acid reflux No.  Abdominal pain No.  Black stools No.  Constipation No.  Diarrhea No.  Loss of appetite no.  Nausea No.  Vomiting No.         UROLOGY:          Negative for dysuria, urinary urgency, kidney stones.         PSYCHOLOGY:          Negative for depression, anxiety, high stress level.   Objective   Physical Exam    Assessment/Plan            Iram Villa LPN 03/11/25 9:34 AM

## 2025-03-12 ENCOUNTER — OFFICE VISIT (OUTPATIENT)
Dept: ORTHOPEDIC SURGERY | Facility: CLINIC | Age: 50
End: 2025-03-12
Payer: COMMERCIAL

## 2025-03-12 ENCOUNTER — HOSPITAL ENCOUNTER (OUTPATIENT)
Dept: RADIOLOGY | Facility: CLINIC | Age: 50
Discharge: HOME | End: 2025-03-12
Payer: COMMERCIAL

## 2025-03-12 DIAGNOSIS — M47.816 LUMBAR SPONDYLOSIS: ICD-10-CM

## 2025-03-12 DIAGNOSIS — M47.812 CERVICAL SPONDYLOSIS: ICD-10-CM

## 2025-03-12 DIAGNOSIS — Z98.1 HISTORY OF FUSION OF CERVICAL SPINE: ICD-10-CM

## 2025-03-12 DIAGNOSIS — S16.1XXA STRAIN OF NECK MUSCLE, INITIAL ENCOUNTER: ICD-10-CM

## 2025-03-12 DIAGNOSIS — M47.812 CERVICAL SPONDYLOSIS: Primary | ICD-10-CM

## 2025-03-12 PROCEDURE — 72050 X-RAY EXAM NECK SPINE 4/5VWS: CPT

## 2025-03-12 PROCEDURE — 99213 OFFICE O/P EST LOW 20 MIN: CPT | Performed by: PHYSICIAN ASSISTANT

## 2025-03-12 PROCEDURE — 72050 X-RAY EXAM NECK SPINE 4/5VWS: CPT | Performed by: RADIOLOGY

## 2025-03-12 PROCEDURE — 4010F ACE/ARB THERAPY RXD/TAKEN: CPT | Performed by: PHYSICIAN ASSISTANT

## 2025-03-12 RX ORDER — PREDNISONE 10 MG/1
TABLET ORAL
Qty: 30 TABLET | Refills: 0 | Status: SHIPPED | OUTPATIENT
Start: 2025-03-12 | End: 2025-03-22

## 2025-03-12 RX ORDER — GABAPENTIN 300 MG/1
300 CAPSULE ORAL 3 TIMES DAILY
Qty: 90 CAPSULE | Refills: 3 | Status: SHIPPED | OUTPATIENT
Start: 2025-03-12

## 2025-03-17 NOTE — PROGRESS NOTES
HPI:  Kat Tineo is a 49 y.o. female who presents to the spine clinic today for evaluation of neck and LUE pain.     History of cervical spine fusion with Dr. Ham at [facility] in approx 2018. She has previously seen Dr. Navas for her lumbar spine.   + rheumatoid arthritis - takes Rinvoq & duloxetine.     Reports new neck and LUE radiculopathy x 1 month. No trauma or injury. Pain has progressively worsened.   Reports neck stiffness & decreased ROM. Pain radiates into the left shoulder to the triceps and stops at the elbow. Notes intermittent hand stiffness & swelling. Reports cervicogenic headaches.   No focal motor weakness. Walking/balance/coordination at baseline.     She completed aqua therapy last year. No recent spine injections.     ROS:  Reviewed on EMR and patient intake sheet.    PMH/SH:  Reviewed on EMR and patient intake sheet.    Exam:  Physical Exam  Spine Musculoskeletal Exam    Well appearing, NAD  Pleasant & cooperative  BMI 35.48  Decreased ROM cervical spine with rotation, flexion/extension  + paraspinal muscle spasms  upper extremity sensation intact to light touch  upper extremity motor 5/5 throughout; no focal motor weakness  normal gait & station; no assistive devices   Reflexes: hypo; neg hoffmans     Radiology:     Xrays cervical spine done in the office today 03/12/25 personally reviewed, along with the accompanying report when available.     FINDINGS:  There is anterior interbody fusion changes spanning C3 to C7.  Alignment appears within normal limits. Hardware grossly  uncomplicated. There is some mild degenerative disc disease C2-C3  with partial bridging anterior osteophyte. No significant abnormal  motion.      IMPRESSION:  Intact and grossly uncomplicated appearing cervical fusion C3-C7.      Assessment and Plan:  1. Cervical spondylosis (Primary)  - XR cervical spine complete 4-5 views; Future  - Referral to Physical Therapy; Future  - predniSONE (Deltasone) 10 mg tablet;  Take 5 tablets (50 mg) by mouth once daily for 2 days, THEN 4 tablets (40 mg) once daily for 2 days, THEN 3 tablets (30 mg) once daily for 2 days, THEN 2 tablets (20 mg) once daily for 2 days, THEN 1 tablet (10 mg) once daily for 2 days.  Dispense: 30 tablet; Refill: 0  - gabapentin (Neurontin) 300 mg capsule; Take 1 capsule (300 mg) by mouth 3 times a day.  Dispense: 90 capsule; Refill: 3    2. History of fusion of cervical spine    3. Strain of neck muscle, initial encounter  - predniSONE (Deltasone) 10 mg tablet; Take 5 tablets (50 mg) by mouth once daily for 2 days, THEN 4 tablets (40 mg) once daily for 2 days, THEN 3 tablets (30 mg) once daily for 2 days, THEN 2 tablets (20 mg) once daily for 2 days, THEN 1 tablet (10 mg) once daily for 2 days.  Dispense: 30 tablet; Refill: 0    4. Lumbar spondylosis  - Referral to Physical Therapy; Future  - predniSONE (Deltasone) 10 mg tablet; Take 5 tablets (50 mg) by mouth once daily for 2 days, THEN 4 tablets (40 mg) once daily for 2 days, THEN 3 tablets (30 mg) once daily for 2 days, THEN 2 tablets (20 mg) once daily for 2 days, THEN 1 tablet (10 mg) once daily for 2 days.  Dispense: 30 tablet; Refill: 0  - gabapentin (Neurontin) 300 mg capsule; Take 1 capsule (300 mg) by mouth 3 times a day.  Dispense: 90 capsule; Refill: 3      I reviewed the xray images in detail with Kat today. Prescription for Prednisone taper x 10 days provided today along with refill of her Gabapentin 300mg TID.     Referral placed to outpatient physical therapy. Plan to follow up after PT completed, should her symptoms persist. At that time an updated MRI cervical spine would be appropriate.     Patient in agreement to plan of care. Of course Kat Tineo is welcome to call at anytime with questions or concerns.     Mariam Todd PA-C  Department of Orthopaedic Surgery    [address] [place] [address]    Voicemail: [phone]   Appts: [phone]  Fax: [phone]

## 2025-03-25 ENCOUNTER — HOSPITAL ENCOUNTER (OUTPATIENT)
Dept: RADIOLOGY | Facility: HOSPITAL | Age: 50
Discharge: HOME | End: 2025-03-25
Payer: COMMERCIAL

## 2025-03-25 DIAGNOSIS — M76.02 GLUTEAL TENDONITIS OF LEFT BUTTOCK: ICD-10-CM

## 2025-03-25 PROCEDURE — 73721 MRI JNT OF LWR EXTRE W/O DYE: CPT | Mod: LT

## 2025-04-03 ENCOUNTER — APPOINTMENT (OUTPATIENT)
Dept: ORTHOPEDIC SURGERY | Facility: CLINIC | Age: 50
End: 2025-04-03
Payer: COMMERCIAL

## 2025-04-03 DIAGNOSIS — M54.12 CERVICAL RADICULITIS: Primary | ICD-10-CM

## 2025-04-03 PROCEDURE — 99213 OFFICE O/P EST LOW 20 MIN: CPT | Performed by: ORTHOPAEDIC SURGERY

## 2025-04-03 PROCEDURE — 4010F ACE/ARB THERAPY RXD/TAKEN: CPT | Performed by: ORTHOPAEDIC SURGERY

## 2025-04-03 ASSESSMENT — PAIN SCALES - GENERAL: PAINLEVEL_OUTOF10: 9

## 2025-04-03 ASSESSMENT — PAIN - FUNCTIONAL ASSESSMENT: PAIN_FUNCTIONAL_ASSESSMENT: 0-10

## 2025-04-03 NOTE — PROGRESS NOTES
Patient returns for follow-up today.  She has been having left arm pain.  She has a remote history of anterior cervical spine surgery.    She saw Mariam Todd who ordered PT, but she has not started it yet.  She did prednisone which helped for a couple of weeks.    I explained to her at this point we need her to complete a course of physical therapy before we can get a new MRI.  Her most recent MRI is over 2 years old at this point and does not show any severe spinal cord compression.    She is going to do therapy, if she does not have relief of her symptoms she should contact my office to schedule an MRI of her cervical spine and a follow-up appointment with me after the MRI is complete.    *This note was dictated using speech recognition software and was not corrected for spelling or grammatical errors*

## 2025-04-22 ENCOUNTER — HOSPITAL ENCOUNTER (OUTPATIENT)
Dept: RADIOLOGY | Facility: CLINIC | Age: 50
Discharge: HOME | End: 2025-04-22
Payer: COMMERCIAL

## 2025-04-22 ENCOUNTER — APPOINTMENT (OUTPATIENT)
Dept: ORTHOPEDIC SURGERY | Facility: CLINIC | Age: 50
End: 2025-04-22
Payer: COMMERCIAL

## 2025-04-22 ENCOUNTER — OFFICE VISIT (OUTPATIENT)
Dept: ORTHOPEDIC SURGERY | Facility: CLINIC | Age: 50
End: 2025-04-22
Payer: COMMERCIAL

## 2025-04-22 DIAGNOSIS — Z96.642 S/P TOTAL LEFT HIP ARTHROPLASTY: ICD-10-CM

## 2025-04-22 DIAGNOSIS — M16.11 PRIMARY LOCALIZED OSTEOARTHRITIS OF RIGHT HIP: ICD-10-CM

## 2025-04-22 DIAGNOSIS — Z96.642 S/P TOTAL LEFT HIP ARTHROPLASTY: Primary | ICD-10-CM

## 2025-04-22 PROCEDURE — 4010F ACE/ARB THERAPY RXD/TAKEN: CPT | Performed by: STUDENT IN AN ORGANIZED HEALTH CARE EDUCATION/TRAINING PROGRAM

## 2025-04-22 PROCEDURE — 99214 OFFICE O/P EST MOD 30 MIN: CPT | Performed by: STUDENT IN AN ORGANIZED HEALTH CARE EDUCATION/TRAINING PROGRAM

## 2025-04-22 PROCEDURE — 73523 X-RAY EXAM HIPS BI 5/> VIEWS: CPT

## 2025-04-22 PROCEDURE — 73523 X-RAY EXAM HIPS BI 5/> VIEWS: CPT | Mod: BILATERAL PROCEDURE | Performed by: RADIOLOGY

## 2025-04-22 NOTE — PROGRESS NOTES
Diagnosis: L KYRIE instability, R hip OA  Procedure Performed: L total hip arthroplasty revision  Date of Surgery: August 17, 2023    SUBJECTIVE  CHIEF COMPLAINT:   Chief Complaint   Patient presents with    Right Hip - New Patient Visit, Pain    Left Hip - Follow-up      HPI: Kat Tineo is here for reevaluation of her right hip. She has had several corticosteroid injections with Dr. Jones with minimal relief. She states the injections only last ~2 weeks now. She recently underwent a gastric bypass and has lost a significant amount of weight and her BMI is now 35. She states this has improved the pain but still complains of persistent groin and lateral thigh pain.     In regards to her left hip, she is still endorsing pain over the lateral hip along the greater trochanter. She has been receiving CSI to this hip with Dr. Jones. However, a recent MRI was ordered to evaluate for gluteal tendinitis and the patient will likely switch to PRP moving forward to avoid any further damage to the abductor tendons. She is also taking gabapentin which has helped with pain management.     FUNCTIONAL STATUS: occasionally limited.  AMBULATORY STATUS: Independent community ambulation without devices  PREVIOUS TREATMENTS: physical therapy, over the counter medications, corticosteroid injections, and weight loss   HISTORY OF SURGERY ON AFFECTED HIP(S): Yes   BACK PAIN REPORTED: No   SYMPTOMS INTERFERING WITH SLEEP: Yes   INSTABILITY: Yes   IMPACTING QUALITY OF LIFE: Yes     REVIEW OF SYSTEMS  The patient denies any fever, chills, chest pain, shortness of breath or difficulty breathing.  Patient denies any numbness, tingling, or radicular symptoms.  Adult patient history sheet was filled out by the patient today in clinic and will be scanned into the EMR.  I personally reviewed this form which will be scanned into the EMR.  This includes Past Medical History, Past Surgical History, Medications, Allergies, Social History, Family History  and 12 point review of systems.    Medical History[1]     Allergies[2]     Surgical History[3]     Family History[4]     Social History     Socioeconomic History    Marital status:      Spouse name: Not on file    Number of children: Not on file    Years of education: Not on file    Highest education level: Not on file   Occupational History    Not on file   Tobacco Use    Smoking status: Never     Passive exposure: Never    Smokeless tobacco: Never   Vaping Use    Vaping status: Never Used   Substance and Sexual Activity    Alcohol use: Not Currently    Drug use: Never    Sexual activity: Yes   Other Topics Concern    Not on file   Social History Narrative    Not on file     Social Drivers of Health     Financial Resource Strain: Low Risk  (11/26/2024)    Overall Financial Resource Strain (CARDIA)     Difficulty of Paying Living Expenses: Not hard at all   Food Insecurity: No Food Insecurity (11/26/2024)    Hunger Vital Sign     Worried About Running Out of Food in the Last Year: Never true     Ran Out of Food in the Last Year: Never true   Transportation Needs: No Transportation Needs (11/26/2024)    PRAPARE - Transportation     Lack of Transportation (Medical): No     Lack of Transportation (Non-Medical): No   Physical Activity: Insufficiently Active (3/27/2024)    Exercise Vital Sign     Days of Exercise per Week: 3 days     Minutes of Exercise per Session: 30 min   Stress: Not on file   Social Connections: Not on file   Intimate Partner Violence: Not At Risk (11/26/2024)    Humiliation, Afraid, Rape, and Kick questionnaire     Fear of Current or Ex-Partner: No     Emotionally Abused: No     Physically Abused: No     Sexually Abused: No   Housing Stability: Low Risk  (11/26/2024)    Housing Stability Vital Sign     Unable to Pay for Housing in the Last Year: No     Number of Times Moved in the Last Year: 1     Homeless in the Last Year: No        CURRENT MEDICATIONS:   Current Medications[5]      OBJECTIVE    PHYSICAL EXAM  There is no height or weight on file to calculate BMI.    General: Well-appearing female in no acute distress.  Awake, alert and oriented.  Pleasant and cooperative.  Respiratory: Non-labored breathing  Mood: Euthymic   Gait: Antalgic  Assistive Device: None     Limb Length Discrepancy: Equal    Affected Right Hip  Range of motion:   Flexion: 100  Extension: 0  Internal Rotation: 20  External Rotation: 40  Abduction: 35  Hip Flexor Strength: 5/5  Abductor Strength: 5/5  Adductor Strength: 5/5  Tenderness: Along lateral hip and groin  Sensation: Intact to light touch distally  Motor function: Able to fire TA, EHL, G/S  Pulses: Palpable DP pulse    IMAGING:  AP pelvis, AP hip and false profile views: Independent review of right hip and pelvis x-rays was performed. The findings were reviewed with the patient. There are moderate degenerative changes of the right hip with associated joint space narrowing, subchondral sclerosis, and osteophyte formation. No evidence of fracture, AVN, dislocation, osteomyelitis.    ASSESSMENT & PLAN    IMPRESSION:  Kat Tineo is a 49 y.o. female with severe pain from right hip OA currently receiving CSI and physical therapy. She has lost a significant amount of weight in the last year and is now under the BMI recommendation of a total hip arthroplasty. However, she is also being evaluated by Dr. Navas for her cervical spine and currently awaiting an MRI. We discussed that a hip replacement is a likely option in her future but her spine should be worked up further before we come to a decision.     In regards to her left hip, her lateral hip pain is consistent with greater trochanteric pain syndrome. She will continue her home exercise program and follow up with Dr. Jones for possible PRP injections.     PLAN:  1. A thorough discussion was had with the patient concerning the management of her right hip OA and the patient is in agreement with the plan.      2. We reviewed posterior hip precautions for her left hip. They will remain in place for life.  Patient can advance to full weightbearing at this juncture.     3. Tylenol as needed/tolerated for pain and stiffness. She may continue receiving CSI for her right hip and PRP for her left under the care of Dr. Jones.       4. Continue physical therapy for hip and spine and follow up with Dr. Navas for further evaluation of any spinal pathology. Patient should follow up with us in 6 months for reevaluation.     Nasim Martinez MD  PGY-2, Orthopaedic Surgery       [1]   Past Medical History:  Diagnosis Date    Acid reflux     Arthritis     Depression     Diabetes mellitus (Multi)     DM (diabetes mellitus), gestational     Headache     History of transfusion     Hypertension     Rheumatoid arthritis    [2]   Allergies  Allergen Reactions    Ace Inhibitors Cough, Shortness of breath and Unknown    Adhesive Tape-Silicones Rash and Unknown    Amoxicillin Unknown     Urinary tract infection   [3]   Past Surgical History:  Procedure Laterality Date    BACK SURGERY      CARDIAC CATHETERIZATION       SECTION, LOW TRANSVERSE      CHOLECYSTECTOMY      CT GUIDED SOFT TISSUE FLUID DRAIN  2022    CT GUIDED SOFT TISSUE FLUID DRAIN [facility]    ESOPHAGOGASTRODUODENOSCOPY      GASTRIC BYPASS  2024    Gastric Bypass Laparoscopic  Repair Diaphragmatic Hernia Laparoscopy General Lysis Adhesions Laparoscopy Abdominal Cavity ([facility])    HERNIA REPAIR      HYSTERECTOMY  2012    JOINT REPLACEMENT      LAPAROSCOPY GASTRECTOMY PARTIAL / TOTAL  2017    vertical sleeve    NECK SURGERY      OOPHORECTOMY  Approximately     OTHER SURGICAL HISTORY  2017    laparoscopic cholecystectomy wiht intraoperative cholangiogram, lysis of adhesions, mesh repair of incisional henia using 6 in round ventralight st mesh    OTHER SURGICAL HISTORY Left     revision x 2 hip    SPINE SURGERY  2023    L4-L5  SPINAL SURGERY ( [facility])    TUBAL LIGATION      US GUIDED SOFT TISSUE FLUID DRAIN  09/30/2021    US GUIDED SOFT TISSUE FLUID DRAIN [facility]   [4]   Family History  Problem Relation Name Age of Onset    Diabetes Mother Darline Calzada     Hypertension Mother Darline Calzada     Other (morbid obesity) Mother Darline Calzada     Other (htn) Father Ammon Colon     Hypertension Father Ammon Colon     Arthritis Father Ammon Colon     Arthritis Other      Psoriasis Neg Hx      Irritable bowel syndrome Neg Hx     [5]   Current Outpatient Medications   Medication Sig Dispense Refill    acetaminophen (Tylenol) 325 mg/10.15 mL oral liquid Take 20.3 mL (650 mg) by mouth every 4 hours if needed for pain or fever (temp greater than 38.0 C). Take over the counter liquid tylenol as needed. Dilute with equal parts water to prevent dumping syndrome.      amLODIPine (Norvasc) 10 mg tablet Take 1 tablet (10 mg) by mouth once daily. 90 tablet 1    DULoxetine (Cymbalta) 60 mg DR capsule TAKE 1 CAPSULE BY MOUTH EVERY DAY 90 capsule 1    gabapentin (Neurontin) 300 mg capsule Take 1 capsule (300 mg) by mouth 3 times a day. 90 capsule 3    losartan (Cozaar) 100 mg tablet Take 1 tablet (100 mg) by mouth once daily. Do not start before November 27, 2024. 90 tablet 1    metoprolol tartrate (Lopressor) 50 mg tablet Take 1 tablet (50 mg) by mouth 2 times a day. Do not start before November 27, 2024. 60 tablet 1    multivitamin with iron - children's (Cerovite, Jr) chewable tablet Chew 1 tablet once daily.      omeprazole (PriLOSEC) 40 mg DR capsule Take 1 capsule (40 mg) by mouth once daily. Open capsule. Do not crush, chew, or dissolve beads. 90 capsule 0    rizatriptan (Maxalt) 10 mg tablet TAKE 1 TABLET AT ONSET OF HEADACHE. MAY REPEAT EVERY 2 HOURS AS NEEDED. MAXIMUM 3 TABLETS/24 HOURS. 8 tablet 6    tiZANidine (Zanaflex) 4 mg tablet TAKE 1 TABLET BY MOUTH EVERYDAY AT BEDTIME 30 tablet 2    zolpidem (Ambien) 10 mg tablet TAKE 1 TABLET  (10 MG) BY MOUTH AS NEEDED AT BEDTIME FOR SLEEP. 30 tablet 5     No current facility-administered medications for this visit.

## 2025-04-24 ENCOUNTER — HOSPITAL ENCOUNTER (EMERGENCY)
Facility: HOSPITAL | Age: 50
Discharge: LEFT BEFORE DISCHARGE | End: 2025-04-24
Payer: COMMERCIAL

## 2025-04-24 ENCOUNTER — APPOINTMENT (OUTPATIENT)
Dept: CARDIOLOGY | Facility: HOSPITAL | Age: 50
End: 2025-04-24
Payer: COMMERCIAL

## 2025-04-24 VITALS
HEIGHT: 62 IN | RESPIRATION RATE: 18 BRPM | TEMPERATURE: 97.2 F | HEART RATE: 71 BPM | OXYGEN SATURATION: 96 % | DIASTOLIC BLOOD PRESSURE: 87 MMHG | SYSTOLIC BLOOD PRESSURE: 115 MMHG | BODY MASS INDEX: 35.7 KG/M2 | WEIGHT: 194 LBS

## 2025-04-24 LAB
ALBUMIN SERPL BCP-MCNC: 4.1 G/DL (ref 3.4–5)
ALP SERPL-CCNC: 110 U/L (ref 33–110)
ALT SERPL W P-5'-P-CCNC: 243 U/L (ref 7–45)
ANION GAP SERPL CALC-SCNC: 10 MMOL/L (ref 10–20)
APPEARANCE UR: ABNORMAL
AST SERPL W P-5'-P-CCNC: 278 U/L (ref 9–39)
BACTERIA #/AREA URNS AUTO: ABNORMAL /HPF
BASOPHILS # BLD AUTO: 0.01 X10*3/UL (ref 0–0.1)
BASOPHILS NFR BLD AUTO: 0.1 %
BILIRUB SERPL-MCNC: 0.6 MG/DL (ref 0–1.2)
BILIRUB UR STRIP.AUTO-MCNC: NEGATIVE MG/DL
BUN SERPL-MCNC: 13 MG/DL (ref 6–23)
CALCIUM SERPL-MCNC: 9.4 MG/DL (ref 8.6–10.3)
CARDIAC TROPONIN I PNL SERPL HS: 7 NG/L (ref 0–13)
CHLORIDE SERPL-SCNC: 107 MMOL/L (ref 98–107)
CO2 SERPL-SCNC: 30 MMOL/L (ref 21–32)
COLOR UR: YELLOW
CREAT SERPL-MCNC: 0.84 MG/DL (ref 0.5–1.05)
EGFRCR SERPLBLD CKD-EPI 2021: 84 ML/MIN/1.73M*2
EOSINOPHIL # BLD AUTO: 0.03 X10*3/UL (ref 0–0.7)
EOSINOPHIL NFR BLD AUTO: 0.4 %
ERYTHROCYTE [DISTWIDTH] IN BLOOD BY AUTOMATED COUNT: 14.6 % (ref 11.5–14.5)
GLUCOSE SERPL-MCNC: 102 MG/DL (ref 74–99)
GLUCOSE UR STRIP.AUTO-MCNC: NORMAL MG/DL
HCT VFR BLD AUTO: 40.5 % (ref 36–46)
HGB BLD-MCNC: 13.7 G/DL (ref 12–16)
HYALINE CASTS #/AREA URNS AUTO: ABNORMAL /LPF
IMM GRANULOCYTES # BLD AUTO: 0.03 X10*3/UL (ref 0–0.7)
IMM GRANULOCYTES NFR BLD AUTO: 0.4 % (ref 0–0.9)
KETONES UR STRIP.AUTO-MCNC: NEGATIVE MG/DL
LEUKOCYTE ESTERASE UR QL STRIP.AUTO: ABNORMAL
LYMPHOCYTES # BLD AUTO: 1.87 X10*3/UL (ref 1.2–4.8)
LYMPHOCYTES NFR BLD AUTO: 23 %
MCH RBC QN AUTO: 29.9 PG (ref 26–34)
MCHC RBC AUTO-ENTMCNC: 33.8 G/DL (ref 32–36)
MCV RBC AUTO: 88 FL (ref 80–100)
MONOCYTES # BLD AUTO: 0.51 X10*3/UL (ref 0.1–1)
MONOCYTES NFR BLD AUTO: 6.3 %
MUCOUS THREADS #/AREA URNS AUTO: ABNORMAL /LPF
NEUTROPHILS # BLD AUTO: 5.67 X10*3/UL (ref 1.2–7.7)
NEUTROPHILS NFR BLD AUTO: 69.8 %
NITRITE UR QL STRIP.AUTO: NEGATIVE
NRBC BLD-RTO: 0 /100 WBCS (ref 0–0)
PH UR STRIP.AUTO: 6 [PH]
PLATELET # BLD AUTO: 380 X10*3/UL (ref 150–450)
POTASSIUM SERPL-SCNC: 3.1 MMOL/L (ref 3.5–5.3)
PROT SERPL-MCNC: 7.1 G/DL (ref 6.4–8.2)
PROT UR STRIP.AUTO-MCNC: ABNORMAL MG/DL
RBC # BLD AUTO: 4.58 X10*6/UL (ref 4–5.2)
RBC # UR STRIP.AUTO: NEGATIVE MG/DL
RBC #/AREA URNS AUTO: ABNORMAL /HPF
SODIUM SERPL-SCNC: 144 MMOL/L (ref 136–145)
SP GR UR STRIP.AUTO: 1.03
SQUAMOUS #/AREA URNS AUTO: ABNORMAL /HPF
UROBILINOGEN UR STRIP.AUTO-MCNC: ABNORMAL MG/DL
WBC # BLD AUTO: 8.1 X10*3/UL (ref 4.4–11.3)
WBC #/AREA URNS AUTO: ABNORMAL /HPF

## 2025-04-24 PROCEDURE — 87086 URINE CULTURE/COLONY COUNT: CPT | Mod: AHULAB | Performed by: PHYSICIAN ASSISTANT

## 2025-04-24 PROCEDURE — 81001 URINALYSIS AUTO W/SCOPE: CPT | Performed by: PHYSICIAN ASSISTANT

## 2025-04-24 PROCEDURE — 84484 ASSAY OF TROPONIN QUANT: CPT | Performed by: PHYSICIAN ASSISTANT

## 2025-04-24 PROCEDURE — 36415 COLL VENOUS BLD VENIPUNCTURE: CPT | Performed by: PHYSICIAN ASSISTANT

## 2025-04-24 PROCEDURE — 85025 COMPLETE CBC W/AUTO DIFF WBC: CPT | Performed by: PHYSICIAN ASSISTANT

## 2025-04-24 PROCEDURE — 93005 ELECTROCARDIOGRAM TRACING: CPT

## 2025-04-24 PROCEDURE — 99284 EMERGENCY DEPT VISIT MOD MDM: CPT

## 2025-04-24 PROCEDURE — 80053 COMPREHEN METABOLIC PANEL: CPT | Performed by: PHYSICIAN ASSISTANT

## 2025-04-24 ASSESSMENT — PAIN DESCRIPTION - LOCATION: LOCATION: HEAD

## 2025-04-24 ASSESSMENT — PAIN SCALES - GENERAL: PAINLEVEL_OUTOF10: 9

## 2025-04-24 ASSESSMENT — PAIN - FUNCTIONAL ASSESSMENT: PAIN_FUNCTIONAL_ASSESSMENT: 0-10

## 2025-04-24 ASSESSMENT — PAIN DESCRIPTION - PAIN TYPE: TYPE: ACUTE PAIN

## 2025-04-24 NOTE — ED TRIAGE NOTES
TRIAGE NOTE   I saw the patient as the Clinician in Triage and performed a brief history and physical exam, established acuity, and ordered appropriate tests to develop basic plan of care. Patient will be seen by an [facility]AS[facility], resident and/or physician who will independently evaluate the patient. Please see subsequent provider notes for further details and disposition.     Brief HPI: In brief, Kat Tineo is a 49 y.o. female that presents for multiple complaints.  She has chronic neck and back pain with prior surgery with plate fixation of cervical and lumbar spine.  Has had multiple MRIs imaging studies done.  History of radiculopathy left upper extremity and right lower extremity.  Under the care of sports medicine.  Recently was on a course of steroids for which she had some improvement but then symptoms recurred.  She is now scheduled to go to PT.  No new trauma or injury.  Her neck pain has been ongoing but getting worse past few days.  Back pain and right lower extremity radiculopathy ongoing getting worse the past week also.  No new trauma or injury.  No fever.  No bowel or bladder dysfunction incontinence.  No saddle anesthesia.  No involvement of the left leg.  Patient is ambulatory with a steady gait.  Patient is also complaining of chest heaviness which began after taking Maxalt for migraine headache last night.  No history of coronary artery disease.  No shortness of breath.  No nausea or vomiting..     Focused Physical exam:   Afebrile vital signs are stable.  No tachycardia or tachypnea.  Alert coherent normal mental status.  Ambulating independently with steady gait.  No respiratory difficulty.  No increased work of breathing.  Has equal hand grasp.  Equal strength all major muscle groups bilateral upper and lower extremities.  Abdomen soft nontender tender.    Plan/MDM:   Workup initiated.  EKG reviewed ER physician.  No STEMI.  Stable pending bed availability and further evaluation.  Evaluation for  chest pain.  Also has chronic neck and back pain with no new injury and no new neurological symptoms.    Please see subsequent provider note for further details and disposition

## 2025-04-24 NOTE — ED TRIAGE NOTES
Pt to the ER with neck pain that started Sunday. Pt states the neck pain shoots down her left arm. Pt endorse nausea, chest heaviness, headache and pelvic pain(right sided).

## 2025-04-25 DIAGNOSIS — M54.12 CERVICAL RADICULITIS: ICD-10-CM

## 2025-04-25 DIAGNOSIS — M47.812 CERVICAL SPONDYLOSIS: Primary | ICD-10-CM

## 2025-04-25 LAB — HOLD SPECIMEN: NORMAL

## 2025-04-25 RX ORDER — PREDNISONE 10 MG/1
TABLET ORAL
Qty: 24 TABLET | Refills: 0 | Status: SHIPPED | OUTPATIENT
Start: 2025-04-25 | End: 2025-05-02

## 2025-04-25 RX ORDER — METHOCARBAMOL 500 MG/1
500 TABLET, FILM COATED ORAL 4 TIMES DAILY PRN
Qty: 40 TABLET | Refills: 0 | Status: SHIPPED | OUTPATIENT
Start: 2025-04-25 | End: 2025-05-05

## 2025-04-26 LAB — BACTERIA UR CULT: NORMAL

## 2025-04-28 ENCOUNTER — EVALUATION (OUTPATIENT)
Dept: PHYSICAL THERAPY | Facility: CLINIC | Age: 50
End: 2025-04-28
Payer: COMMERCIAL

## 2025-04-28 DIAGNOSIS — M54.12 LEFT CERVICAL RADICULOPATHY: ICD-10-CM

## 2025-04-28 DIAGNOSIS — M47.816 LUMBAR SPONDYLOSIS: ICD-10-CM

## 2025-04-28 DIAGNOSIS — G44.86 CERVICOGENIC HEADACHE: Primary | ICD-10-CM

## 2025-04-28 PROCEDURE — 97110 THERAPEUTIC EXERCISES: CPT | Mod: GP

## 2025-04-28 PROCEDURE — 97162 PT EVAL MOD COMPLEX 30 MIN: CPT | Mod: GP

## 2025-04-28 NOTE — PROGRESS NOTES
Physical Therapy Evaluation     Patient Name: Kat Tineo  MRN: [id number]  Today's Date: 4/28/2025  Time Calculation  Start Time: 1201  Stop Time: 1249  Time Calculation (min): 48 min    PT Evaluation Time Entry  PT Evaluation (Moderate) Time Entry: 36  PT Therapeutic Procedures Time Entry  Therapeutic Exercise Time Entry: 9       Insurance:  Number of Treatments Authorized: 1/30v (100% COVERAGE 30 V A YR)          Current Problem:   1. Cervicogenic headache  Follow Up In Physical Therapy      2. Lumbar spondylosis  Referral to Physical Therapy    Follow Up In Physical Therapy      3. Left cervical radiculopathy  Follow Up In Physical Therapy          Precautions:  Precautions  Precautions Comment: No Fall risk    Reason for visit: Neck pain, upper arm pain, headaches  Referred by: Dr. Navas     Work, mechanical stresses:  - Disability since L hip replaced/revised 2021 - had infection/sepsis.  Would need to pass a physical to return to work.  Leisure, mechanical stresses: Owns a dog.  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  13 y.o. child every other.  Prior to onset the pt was able to complete all work/leisure/functional activities without limitation.  Functional disability from present episode/Primary goal for PT: Combing hair/reaching overhead while using arms - take breaks or its very painful/ improve or resolve the pain   Present symptoms: Headach and L periscap/neck   Present since: Early March 2025 and worsening - went to the ER on Thursday because of the neck pain. Has not followed up with  primary yet.   Commenced for no apparent reason  Symptoms at onset: B GHJs   Constant symptoms: L side of neck   Intermittent symptoms: L GHJ/upper arm and headaches/jaw L > R    Pain ranges from: 8-10/10  Pt describes pain as: tiring/unrelenting   Symptoms are worse with: looking down - headaches, sitting- Headaches, continuous use of L UE - upper arm  Symptoms are better with: supine with heat or ice  Disturbed sleep:   x 3-4 a night on average   Previous episodes: Yes had fusion, anterior approach, had revision a few months after initial surgery.  .  Previous treatments: ice pack.  Tylenol every 6 hours. Prednisone - helped some but sx returned since coming back off of it.   Imaging: xrays of c/s 3/12/25   - Intact and grossly uncomplicated appearing cervical fusion C3-C7.   Dizziness - no/tinnitus-yes part of why she went to ER/nausea - yes since headaches started/swallowing - no  Red Flags: recent/major surgery - gastric bypass 2024 , night pain - yes, accidents - no, unexplained weight loss - no  Medical History: RA, High blood pressure, Diabetes, Migraine.     Objective Findings:  Outcome Measures:  Other Measures  Neck Disability Index: 28  Oswestry Disablity Index (CUCO): 23  Posture: Fair  Correction: NE    Lateral shift nil   Neuro screen:   Myotomes: C3-T1 - normal B   Dermatomes: C3-T1 - normal B     Cervical Spine movement Loss - Nil/Min/Mod/Max limit or degrees  Retraction: Min  Protraction: nil  Flexion: min/nil  Extension: min  R rot: Nil  L rot: mod - ERP  R SB: Nil  L SB: Mod - ERP     Repeated Movement Testing -  During/after/mechanical response:  Sx in sittin/10 Neck, Headache, L periscap   Pro: NT  Ret: x 10- NE, NE   Ret with 10-20 sec holds x 10 - Decrease headache, better, increased ROM     Treatment:   C/S ret with 20 sec holds x 5  Sitting with roll x 2 min  Educated pt on proper response to exercise, centralization/localization, produce/increase - NW principle, and assessment process.  Issued handout for HEP  HEP/med bridge:   Access Code: [email or web address]  URL: [email or web address]  Date: 2025  Prepared by: David Jimenez  Exercises  - Seated Cervical Retraction  - 5-8 x daily - 7 x weekly - 1 sets - 5-8 reps - 30-60 hold  - Seated Correct Posture  - 1 x daily - 7 x weekly - 2-3 sets - 10-20 reps    Assessment: Pt presents to PT with complaint of Neck/L  arm/GHJ/periscap pain and headaches that began in early March 2025 without [place].  Pt's history and response to repeated movements makes provisional classification c/s pos derangement with DP for sustained retraction.. Pt will continue to be assessed over the next 3-5 sessions to confirm provisional classification and DP. Pt will benefit from skilled PT to address ROM/strength/functional limitations and pain noted during evaluation today.    Plan of Care:  Problem List:Pain, Functional limitations, activity limitations, ROM limitations, Posture, Transfer, Activity Limitations, IADLS/ADLS/Self care  C/S goals:  STG:  Pain = 0-3/10 by week 4  Pt will demo proper position/use of lumbar roll without cues by week 1  Pt will be compliant with HEP by week 1  Pt will be able to complete all self care tasks without lasting increase in sx by week 4  LTG:   C/S ROM = nil limit in all directions by week 8  Pt will achieve a clinically significant improvement in NDI by week 8  Pt will report >/= 80% improvement/progress towards PT goals by week 8  Pt will be able to sleep >/= 3 nights a week with </= 2 interruptions due to cervical/HA sx by week 8  Planned interventions: Ther ex, Neuromuscular re-ed, ther act, Manual, Education, Home program, Estim, Hot therapy, Cold therapy, Vaso  Planned visits: x 1-2 a week for 12-14 weeks for 12-14 visits - POC timeline includes reassessment and treatment for Lumbar spine/R hip.   The POC was created, discussed, and agreed on with the patient.

## 2025-04-30 ENCOUNTER — HOSPITAL ENCOUNTER (EMERGENCY)
Facility: HOSPITAL | Age: 50
Discharge: HOME | End: 2025-04-30
Payer: COMMERCIAL

## 2025-04-30 ENCOUNTER — APPOINTMENT (OUTPATIENT)
Dept: CARDIOLOGY | Facility: HOSPITAL | Age: 50
End: 2025-04-30
Payer: COMMERCIAL

## 2025-04-30 ENCOUNTER — APPOINTMENT (OUTPATIENT)
Dept: RADIOLOGY | Facility: HOSPITAL | Age: 50
End: 2025-04-30
Payer: COMMERCIAL

## 2025-04-30 VITALS
OXYGEN SATURATION: 98 % | HEART RATE: 55 BPM | DIASTOLIC BLOOD PRESSURE: 92 MMHG | RESPIRATION RATE: 16 BRPM | TEMPERATURE: 97.7 F | BODY MASS INDEX: 34.4 KG/M2 | HEIGHT: 62 IN | WEIGHT: 186.95 LBS | SYSTOLIC BLOOD PRESSURE: 131 MMHG

## 2025-04-30 DIAGNOSIS — R10.9 RIGHT FLANK PAIN: Primary | ICD-10-CM

## 2025-04-30 DIAGNOSIS — E87.6 HYPOKALEMIA: ICD-10-CM

## 2025-04-30 LAB
ALBUMIN SERPL BCP-MCNC: 4.4 G/DL (ref 3.4–5)
ALP SERPL-CCNC: 93 U/L (ref 33–110)
ALT SERPL W P-5'-P-CCNC: 64 U/L (ref 7–45)
ANION GAP SERPL CALCULATED.3IONS-SCNC: 15 MMOL/L (ref 10–20)
APPEARANCE UR: ABNORMAL
AST SERPL W P-5'-P-CCNC: 33 U/L (ref 9–39)
BASOPHILS # BLD AUTO: 0.02 X10*3/UL (ref 0–0.1)
BASOPHILS NFR BLD AUTO: 0.2 %
BILIRUB SERPL-MCNC: 0.8 MG/DL (ref 0–1.2)
BILIRUB UR STRIP.AUTO-MCNC: NEGATIVE MG/DL
BUN SERPL-MCNC: 22 MG/DL (ref 6–23)
CALCIUM SERPL-MCNC: 9.6 MG/DL (ref 8.6–10.3)
CHLORIDE SERPL-SCNC: 105 MMOL/L (ref 98–107)
CO2 SERPL-SCNC: 25 MMOL/L (ref 21–32)
COLOR UR: YELLOW
CREAT SERPL-MCNC: 1.03 MG/DL (ref 0.5–1.05)
EGFRCR SERPLBLD CKD-EPI 2021: 66 ML/MIN/1.73M*2
EOSINOPHIL # BLD AUTO: 0 X10*3/UL (ref 0–0.7)
EOSINOPHIL NFR BLD AUTO: 0 %
ERYTHROCYTE [DISTWIDTH] IN BLOOD BY AUTOMATED COUNT: 13.9 % (ref 11.5–14.5)
GLUCOSE SERPL-MCNC: 201 MG/DL (ref 74–99)
GLUCOSE UR STRIP.AUTO-MCNC: ABNORMAL MG/DL
HCT VFR BLD AUTO: 41.8 % (ref 36–46)
HGB BLD-MCNC: 14.2 G/DL (ref 12–16)
HYALINE CASTS #/AREA URNS AUTO: ABNORMAL /LPF
IMM GRANULOCYTES # BLD AUTO: 0.05 X10*3/UL (ref 0–0.7)
IMM GRANULOCYTES NFR BLD AUTO: 0.5 % (ref 0–0.9)
KETONES UR STRIP.AUTO-MCNC: NEGATIVE MG/DL
LEUKOCYTE ESTERASE UR QL STRIP.AUTO: ABNORMAL
LYMPHOCYTES # BLD AUTO: 0.98 X10*3/UL (ref 1.2–4.8)
LYMPHOCYTES NFR BLD AUTO: 10.6 %
MCH RBC QN AUTO: 29.8 PG (ref 26–34)
MCHC RBC AUTO-ENTMCNC: 34 G/DL (ref 32–36)
MCV RBC AUTO: 88 FL (ref 80–100)
MONOCYTES # BLD AUTO: 0.27 X10*3/UL (ref 0.1–1)
MONOCYTES NFR BLD AUTO: 2.9 %
MUCOUS THREADS #/AREA URNS AUTO: ABNORMAL /LPF
NEUTROPHILS # BLD AUTO: 7.89 X10*3/UL (ref 1.2–7.7)
NEUTROPHILS NFR BLD AUTO: 85.8 %
NITRITE UR QL STRIP.AUTO: NEGATIVE
NRBC BLD-RTO: 0 /100 WBCS (ref 0–0)
PH UR STRIP.AUTO: 6 [PH]
PLATELET # BLD AUTO: 379 X10*3/UL (ref 150–450)
POTASSIUM SERPL-SCNC: 2.6 MMOL/L (ref 3.5–5.3)
PROT SERPL-MCNC: 7.3 G/DL (ref 6.4–8.2)
PROT UR STRIP.AUTO-MCNC: ABNORMAL MG/DL
RBC # BLD AUTO: 4.76 X10*6/UL (ref 4–5.2)
RBC # UR STRIP.AUTO: NEGATIVE MG/DL
RBC #/AREA URNS AUTO: ABNORMAL /HPF
SODIUM SERPL-SCNC: 142 MMOL/L (ref 136–145)
SP GR UR STRIP.AUTO: 1.03
SQUAMOUS #/AREA URNS AUTO: ABNORMAL /HPF
UROBILINOGEN UR STRIP.AUTO-MCNC: NORMAL MG/DL
WBC # BLD AUTO: 9.2 X10*3/UL (ref 4.4–11.3)
WBC #/AREA URNS AUTO: ABNORMAL /HPF

## 2025-04-30 PROCEDURE — 96365 THER/PROPH/DIAG IV INF INIT: CPT

## 2025-04-30 PROCEDURE — 81003 URINALYSIS AUTO W/O SCOPE: CPT | Performed by: STUDENT IN AN ORGANIZED HEALTH CARE EDUCATION/TRAINING PROGRAM

## 2025-04-30 PROCEDURE — 74176 CT ABD & PELVIS W/O CONTRAST: CPT

## 2025-04-30 PROCEDURE — 36415 COLL VENOUS BLD VENIPUNCTURE: CPT | Performed by: STUDENT IN AN ORGANIZED HEALTH CARE EDUCATION/TRAINING PROGRAM

## 2025-04-30 PROCEDURE — 96366 THER/PROPH/DIAG IV INF ADDON: CPT

## 2025-04-30 PROCEDURE — 96375 TX/PRO/DX INJ NEW DRUG ADDON: CPT

## 2025-04-30 PROCEDURE — 2500000001 HC RX 250 WO HCPCS SELF ADMINISTERED DRUGS (ALT 637 FOR MEDICARE OP)

## 2025-04-30 PROCEDURE — 85025 COMPLETE CBC W/AUTO DIFF WBC: CPT | Performed by: STUDENT IN AN ORGANIZED HEALTH CARE EDUCATION/TRAINING PROGRAM

## 2025-04-30 PROCEDURE — 87086 URINE CULTURE/COLONY COUNT: CPT | Mod: TRILAB | Performed by: STUDENT IN AN ORGANIZED HEALTH CARE EDUCATION/TRAINING PROGRAM

## 2025-04-30 PROCEDURE — 93005 ELECTROCARDIOGRAM TRACING: CPT

## 2025-04-30 PROCEDURE — 80053 COMPREHEN METABOLIC PANEL: CPT | Performed by: STUDENT IN AN ORGANIZED HEALTH CARE EDUCATION/TRAINING PROGRAM

## 2025-04-30 PROCEDURE — 74176 CT ABD & PELVIS W/O CONTRAST: CPT | Mod: FOREIGN READ | Performed by: RADIOLOGY

## 2025-04-30 PROCEDURE — 99285 EMERGENCY DEPT VISIT HI MDM: CPT | Mod: 25

## 2025-04-30 PROCEDURE — 2500000004 HC RX 250 GENERAL PHARMACY W/ HCPCS (ALT 636 FOR OP/ED)

## 2025-04-30 RX ORDER — POTASSIUM CHLORIDE 1.5 G/1.58G
40 POWDER, FOR SOLUTION ORAL ONCE
Status: COMPLETED | OUTPATIENT
Start: 2025-04-30 | End: 2025-04-30

## 2025-04-30 RX ORDER — KETOROLAC TROMETHAMINE 30 MG/ML
15 INJECTION, SOLUTION INTRAMUSCULAR; INTRAVENOUS ONCE
Status: COMPLETED | OUTPATIENT
Start: 2025-04-30 | End: 2025-04-30

## 2025-04-30 RX ORDER — HYDROCODONE BITARTRATE AND ACETAMINOPHEN 5; 325 MG/1; MG/1
1 TABLET ORAL EVERY 6 HOURS PRN
Qty: 20 TABLET | Refills: 0 | Status: SHIPPED | OUTPATIENT
Start: 2025-04-30 | End: 2025-05-03

## 2025-04-30 RX ORDER — POTASSIUM CHLORIDE 14.9 MG/ML
20 INJECTION INTRAVENOUS ONCE
Status: COMPLETED | OUTPATIENT
Start: 2025-04-30 | End: 2025-04-30

## 2025-04-30 RX ORDER — ONDANSETRON HYDROCHLORIDE 2 MG/ML
4 INJECTION, SOLUTION INTRAVENOUS ONCE
Status: COMPLETED | OUTPATIENT
Start: 2025-04-30 | End: 2025-04-30

## 2025-04-30 RX ORDER — ORPHENADRINE CITRATE 30 MG/ML
60 INJECTION INTRAMUSCULAR; INTRAVENOUS ONCE
Status: COMPLETED | OUTPATIENT
Start: 2025-04-30 | End: 2025-04-30

## 2025-04-30 RX ORDER — ORPHENADRINE CITRATE 100 MG/1
100 TABLET, EXTENDED RELEASE ORAL 2 TIMES DAILY PRN
Qty: 10 TABLET | Refills: 0 | Status: SHIPPED | OUTPATIENT
Start: 2025-04-30 | End: 2025-05-05

## 2025-04-30 RX ORDER — PREDNISONE 20 MG/1
40 TABLET ORAL DAILY
Qty: 10 TABLET | Refills: 0 | Status: SHIPPED | OUTPATIENT
Start: 2025-04-30 | End: 2025-05-05

## 2025-04-30 RX ADMIN — POTASSIUM CHLORIDE 20 MEQ: 14.9 INJECTION, SOLUTION INTRAVENOUS at 19:23

## 2025-04-30 RX ADMIN — POTASSIUM CHLORIDE 40 MEQ: 1.5 POWDER, FOR SOLUTION ORAL at 19:23

## 2025-04-30 RX ADMIN — KETOROLAC TROMETHAMINE 15 MG: 30 INJECTION, SOLUTION INTRAMUSCULAR at 19:42

## 2025-04-30 RX ADMIN — ONDANSETRON 4 MG: 2 INJECTION, SOLUTION INTRAMUSCULAR; INTRAVENOUS at 19:42

## 2025-04-30 RX ADMIN — ORPHENADRINE CITRATE 60 MG: 30 INJECTION, SOLUTION INTRAMUSCULAR; INTRAVENOUS at 20:12

## 2025-04-30 ASSESSMENT — PAIN DESCRIPTION - DESCRIPTORS: DESCRIPTORS: ACHING

## 2025-04-30 ASSESSMENT — PAIN DESCRIPTION - PAIN TYPE: TYPE: ACUTE PAIN

## 2025-04-30 ASSESSMENT — PAIN SCALES - GENERAL
PAINLEVEL_OUTOF10: 6
PAINLEVEL_OUTOF10: 7

## 2025-04-30 ASSESSMENT — PAIN - FUNCTIONAL ASSESSMENT: PAIN_FUNCTIONAL_ASSESSMENT: 0-10

## 2025-04-30 ASSESSMENT — PAIN DESCRIPTION - FREQUENCY: FREQUENCY: CONSTANT/CONTINUOUS

## 2025-04-30 ASSESSMENT — PAIN DESCRIPTION - ORIENTATION: ORIENTATION: LOWER;RIGHT

## 2025-04-30 ASSESSMENT — PAIN DESCRIPTION - LOCATION: LOCATION: BACK

## 2025-04-30 NOTE — Clinical Note
Kat Tineo was seen and treated in our emergency department on 4/30/2025.  She may return to work on 05/02/2025.       If you have any questions or concerns, please don't hesitate to call.      Rocco Carmona PA-C

## 2025-05-01 LAB
ATRIAL RATE: 66 BPM
ATRIAL RATE: 71 BPM
P AXIS: 59 DEGREES
P AXIS: 65 DEGREES
P OFFSET: 205 MS
P OFFSET: 209 MS
P ONSET: 150 MS
P ONSET: 155 MS
PR INTERVAL: 136 MS
PR INTERVAL: 140 MS
Q ONSET: 218 MS
Q ONSET: 225 MS
QRS COUNT: 11 BEATS
QRS COUNT: 11 BEATS
QRS DURATION: 80 MS
QRS DURATION: 94 MS
QT INTERVAL: 398 MS
QT INTERVAL: 408 MS
QTC CALCULATION(BAZETT): 427 MS
QTC CALCULATION(BAZETT): 432 MS
QTC FREDERICIA: 421 MS
QTC FREDERICIA: 421 MS
R AXIS: -6 DEGREES
R AXIS: 50 DEGREES
T AXIS: -17 DEGREES
T AXIS: 25 DEGREES
T OFFSET: 422 MS
T OFFSET: 424 MS
VENTRICULAR RATE: 66 BPM
VENTRICULAR RATE: 71 BPM

## 2025-05-01 NOTE — DISCHARGE INSTRUCTIONS
I recommend over-the-counter medication and prescription medication for pain.  Follow-up with your primary care provider.    Be sure to take all medications, over the counter medications or prescription medications only as directed.    Be sure to follow up as directed in 1-2 days. All of the details of your follow up instructions are detailed in the follow up section of this packet.    If you are being discharged with any pains medications or muscle relaxers (norco, Vicodin, hydrocodone products, Percocet, oxycodone products, flexeril, cyclobenzaprine, robaxin, norflex, brand or generic, or any other pain controlling medications with the exception of Ibuprofen and regular Tylenol, do not drive or operate machinery, climb ladders or participate in any activity that could potentially put yourself or others at risk should you get dizzy, or be/feel impaired at all.    Return to emergency room without delay for ANY new or worsening pains or for any other symptoms or concerns. Return with worsening pains, nausea, vomiting, trouble breathing, palpitations, shortness of breath, inability to pass stool or urine, loss of control of stool or urine, any numbness or tingling (that is not normal for you), uncontrolled fevers, the passing of blood or other material in stool or urine, rashes, pains or for any other symptoms or concerns you may have. You are always welcome to return to the ER at any time for any reason or for any other concerns you may have.

## 2025-05-01 NOTE — ED PROVIDER NOTES
HPI   Chief Complaint   Patient presents with    Flank Pain     Presents to ed with a c/c of right flank pain X 2 weeks. Has had urinary frequency but is unable to urinate and hematuria that had started today.  Further reports a headache x 2 weeks as well.  Denies hx of kidney stones.        Patient is a 49-year-old female with past medical history of cholecystectomy presenting with right flank pain x 2 weeks.  Initially had urinary frequency and then has turned to difficulty urinating.  She has never had a history of kidney stones in the past.  Denies chest pain or shortness of breath.  Has had a history of headaches recently but currently it is not endorsing headache.  Right flank pain is a 7 out of 10, nonradiating pain.  Patient denies fevers, chills, cough, sore throat, runny nose, chest pain, shortness of breath, abdominal pain, nausea, vomiting, diarrhea or urinary complaints.              Patient History   Medical History[1]  Surgical History[2]  Family History[3]  Social History[4]    Physical Exam   ED Triage Vitals [04/30/25 1739]   Temperature Heart Rate Respirations BP   36.5 °C (97.7 °F) 94 16 (!) 129/91      Pulse Ox Temp Source Heart Rate Source Patient Position   96 % Tympanic Monitor Sitting      BP Location FiO2 (%)     Right arm --       Physical Exam  Vitals and nursing note reviewed.   Constitutional:       Appearance: She is well-developed.      Comments: Awake, laying in examination bed   HENT:      Head: Normocephalic and atraumatic.      Nose: Nose normal.      Mouth/Throat:      Mouth: Mucous membranes are moist.      Pharynx: Oropharynx is clear.   Eyes:      Extraocular Movements: Extraocular movements intact.      Conjunctiva/sclera: Conjunctivae normal.      Pupils: Pupils are equal, round, and reactive to light.   Cardiovascular:      Rate and Rhythm: Normal rate and regular rhythm.      Pulses: Normal pulses.      Heart sounds: Normal heart sounds. No murmur heard.  Pulmonary:       Effort: Pulmonary effort is normal. No respiratory distress.      Breath sounds: Normal breath sounds.   Abdominal:      General: Abdomen is flat.      Palpations: Abdomen is soft.      Tenderness: There is no abdominal tenderness. There is right CVA tenderness.   Musculoskeletal:         General: No swelling. Normal range of motion.      Cervical back: Normal range of motion and neck supple.   Skin:     General: Skin is warm and dry.      Capillary Refill: Capillary refill takes less than 2 seconds.   Neurological:      General: No focal deficit present.      Mental Status: She is alert and oriented to person, place, and time.   Psychiatric:         Mood and Affect: Mood normal.         Behavior: Behavior normal.           ED Course & MDM   ED Course as of 04/30/25 2151 Wed Apr 30, 2025 1752 ECG 12 lead  Performed at  1750, HR of 66, NSR, NAD, QTc 427, no sign of STEMI, no Q wave or T wave abnormality noted.    Reviewed and interpreted by me at time performed   [JM]      ED Course User Index  [JM] Rossy Sheth MD         Diagnoses as of 04/30/25 2151   Right flank pain   Hypokalemia                 No data recorded     [place] Coma Scale Score: 15 (04/30/25 1929 : Rossy Cordova, ADRAINE)                           Medical Decision Making  Patient is a 49-year-old female with past medical history of cholecystectomy presenting with right flank pain x 2 weeks.  Lab work, urine, imaging ordered.  Conditions considered include but are not limited to: Kidney stone, UTI, intra-abdominal pathology, MSK origin.    Attending physician was available for consultation on this patient.  CBC is without leukocytosis or anemia.  CMP does show hypokalemia with potassium of 2.6.  Potassium ordered.  CT without acute intra-abdominal pathology.  Patient does endorse improvement after Toradol and muscle relaxer.    I believe this patient is at low risk for complication, and a disposition of discharge is acceptable.  Return to  the Emergency Department if new or worsening symptoms including headache, fever, chills, chest pain, shortness of breath, syncope, near syncope, abdominal pain, nausea, vomiting,  diarrhea, or worsening pain.  Prescription for prednisone, muscle relaxer, pain medication written.  Encourage patient to follow with primary care in the next several days.  We also discussed over-the-counter medication and heating pad.  Patient is agreeable to a disposition of discharge and to follow with respective fields in the next several days.    Portions of this note made with Dragon software, please be mindful of potential grammatical errors.      Medications   potassium chloride 20 mEq in sterile water for injection 100 mL (0 mEq intravenous Stopped 4/30/25 2147)   potassium chloride (Klor-Con) packet 40 mEq (40 mEq oral Given 4/30/25 1923)   ketorolac (Toradol) injection 15 mg (15 mg intravenous Given 4/30/25 1942)   ondansetron (Zofran) injection 4 mg (4 mg intravenous Given 4/30/25 1942)   orphenadrine (Norflex) injection 60 mg (60 mg intravenous Given 4/30/25 2012)     Labs Reviewed   CBC WITH AUTO DIFFERENTIAL - Abnormal       Result Value    WBC 9.2      nRBC 0.0      RBC 4.76      Hemoglobin 14.2      Hematocrit 41.8      MCV 88      MCH 29.8      MCHC 34.0      RDW 13.9      Platelets 379      Neutrophils % 85.8      Immature Granulocytes %, Automated 0.5      Lymphocytes % 10.6      Monocytes % 2.9      Eosinophils % 0.0      Basophils % 0.2      Neutrophils Absolute 7.89 (*)     Immature Granulocytes Absolute, Automated 0.05      Lymphocytes Absolute 0.98 (*)     Monocytes Absolute 0.27      Eosinophils Absolute 0.00      Basophils Absolute 0.02     COMPREHENSIVE METABOLIC PANEL - Abnormal    Glucose 201 (*)     Sodium 142      Potassium 2.6 (*)     Chloride 105      Bicarbonate 25      Anion Gap 15      Urea Nitrogen 22      Creatinine 1.03      eGFR 66      Calcium 9.6      Albumin 4.4      Alkaline Phosphatase 93       Total Protein 7.3      AST 33      Bilirubin, Total 0.8      ALT 64 (*)    URINALYSIS WITH REFLEX CULTURE AND MICROSCOPIC - Abnormal    Color, Urine Yellow      Appearance, Urine Turbid (*)     Specific Gravity, Urine 1.026      pH, Urine 6.0      Protein, Urine 20 (TRACE)      Glucose, Urine 30 (TRACE) (*)     Blood, Urine NEGATIVE      Ketones, Urine NEGATIVE      Bilirubin, Urine NEGATIVE      Urobilinogen, Urine Normal      Nitrite, Urine NEGATIVE      Leukocyte Esterase, Urine 75 Luis/uL (*)    MICROSCOPIC ONLY, URINE - Abnormal    WBC, Urine 11-20 (*)     RBC, Urine 3-5      Squamous Epithelial Cells, Urine 26-50 (1+)      Mucus, Urine FEW      Hyaline Casts, Urine 1+ (*)    URINE CULTURE   URINALYSIS WITH REFLEX CULTURE AND MICROSCOPIC    Narrative:     The following orders were created for panel order Urinalysis with Reflex Culture and Microscopic.  Procedure                               Abnormality         Status                     ---------                               -----------         ------                     Urinalysis with Reflex C...[[id number]]  Abnormal            Final result               Extra Urine Gray Tube[[id number]]                                                         Please view results for these tests on the individual orders.   EXTRA URINE GRAY TUBE     CT abdomen pelvis wo IV contrast   Final Result   1.  No evidence for nephrolithiasis or obstructive uropathy.   2.  Normal appendix   3.  Hepatic steatosis.   4.  Status post cholecystectomy, gastric bypass, hysterectomy, left   hip arthroplasty and PLIF at L4-5.   Signed by Fernando Hussein MD            Procedure  Procedures         [1]   Past Medical History:  Diagnosis Date    Acid reflux     Arthritis 2000    Depression     Diabetes mellitus (Multi)     DM (diabetes mellitus), gestational     Headache     History of transfusion     Hypertension     Rheumatoid arthritis    [2]   Past Surgical History:  Procedure Laterality  Date    BACK SURGERY      CARDIAC CATHETERIZATION       SECTION, LOW TRANSVERSE      CHOLECYSTECTOMY      CT GUIDED SOFT TISSUE FLUID DRAIN  2022    CT GUIDED SOFT TISSUE FLUID DRAIN [facility]    ESOPHAGOGASTRODUODENOSCOPY      GASTRIC BYPASS  2024    Gastric Bypass Laparoscopic  Repair Diaphragmatic Hernia Laparoscopy General Lysis Adhesions Laparoscopy Abdominal Cavity ([facility])    HERNIA REPAIR      HYSTERECTOMY      JOINT REPLACEMENT      LAPAROSCOPY GASTRECTOMY PARTIAL / TOTAL      vertical sleeve    NECK SURGERY      OOPHORECTOMY  Approximately     OTHER SURGICAL HISTORY  2017    laparoscopic cholecystectomy wiht intraoperative cholangiogram, lysis of adhesions, mesh repair of incisional henia using 6 in round ventralight st mesh    OTHER SURGICAL HISTORY Left     revision x 2 hip    SPINE SURGERY  2023    L4-L5 SPINAL SURGERY ( [facility] [place])    TUBAL LIGATION      US GUIDED SOFT TISSUE FLUID DRAIN  2021    US GUIDED SOFT TISSUE FLUID DRAIN [facility]   [3]   Family History  Problem Relation Name Age of Onset    Diabetes Mother Darline Calzada     Hypertension Mother Darline Calzada     Other (morbid obesity) Mother Darline Calzada     Other (htn) Father Ammon Colon     Hypertension Father Ammon Colon     Arthritis Father Ammon Colon     Arthritis Other      Psoriasis Neg Hx      Irritable bowel syndrome Neg Hx     [4]   Social History  Tobacco Use    Smoking status: Never     Passive exposure: Never    Smokeless tobacco: Never   Vaping Use    Vaping status: Never Used   Substance Use Topics    Alcohol use: Not Currently    Drug use: Never        Rocco Carmona PA-C  [phone]

## 2025-05-02 LAB — BACTERIA UR CULT: NO GROWTH

## 2025-05-06 ENCOUNTER — APPOINTMENT (OUTPATIENT)
Dept: PHYSICAL THERAPY | Facility: CLINIC | Age: 50
End: 2025-05-06
Payer: COMMERCIAL

## 2025-05-08 ENCOUNTER — APPOINTMENT (OUTPATIENT)
Dept: PHYSICAL THERAPY | Facility: CLINIC | Age: 50
End: 2025-05-08
Payer: COMMERCIAL

## 2025-05-08 DIAGNOSIS — M54.16 RADICULOPATHY, LUMBAR REGION: ICD-10-CM

## 2025-05-08 RX ORDER — TIZANIDINE 4 MG/1
4 TABLET ORAL NIGHTLY
Qty: 30 TABLET | Refills: 2 | Status: SHIPPED | OUTPATIENT
Start: 2025-05-08

## 2025-05-12 ENCOUNTER — APPOINTMENT (OUTPATIENT)
Dept: ORTHOPEDIC SURGERY | Facility: HOSPITAL | Age: 50
End: 2025-05-12
Payer: COMMERCIAL

## 2025-05-12 DIAGNOSIS — M76.02 GLUTEAL TENDONITIS OF LEFT BUTTOCK: ICD-10-CM

## 2025-05-12 DIAGNOSIS — M16.11 PRIMARY LOCALIZED OSTEOARTHRITIS OF RIGHT HIP: Primary | ICD-10-CM

## 2025-05-12 PROCEDURE — 99213 OFFICE O/P EST LOW 20 MIN: CPT | Performed by: FAMILY MEDICINE

## 2025-05-12 PROCEDURE — 4010F ACE/ARB THERAPY RXD/TAKEN: CPT | Performed by: FAMILY MEDICINE

## 2025-05-12 PROCEDURE — 1036F TOBACCO NON-USER: CPT | Performed by: FAMILY MEDICINE

## 2025-05-12 NOTE — PROGRESS NOTES
** Please excuse any errors in grammar or translation related to this dictation. Voice recognition software was utilized to prepare this document. **    Assessment & Plan:  1. Right hip osteoarthritis: Steroid injections have waned in efficacy in managing symptoms.  She is deferring hip arthroplasty for the time being. She plans to follow with Dr. Elio Lew if wants to go forward with this.  We discussed alternative treatment options for hip arthritis to include PRP injection or hyaluronic acid injection.  She is leaning towards hyaluronic acid injections and will notify office if wants to pursue for purchasing.     2. Left gluteal tendinopathy: Continues have focal tenderness at the greater trochanter and gluteal tendons.  Most recent steroid injection only helped for 5 days.  MRI does not demonstrate any significant tendon tear or muscle atrophy.  We discussed alternative treatments to include PRP injections and a referral for Tenex procedure.  Patient is interested pursuing Tenex and I gave her the contact information for Dr. Ozuna's office.    All questions answered and patient agrees to the plan of care.      Chief complaint:  Bilateral hip OA    HPI:  5/12/25:     2/20/25: Patient reports the injections from 8/22 provided about 4 weeks relief for both.  Denies any new injuries.  She has appointment with Dr. Elio Lew in April and plans to discuss right hip arthroplasty.  Left hip pain continues to be focal overlying greater trochanter.  Right hip is more diffuse but primarily in the groin and to lesser extent in the lateral hip.     8/22/24: Patient returns requesting bilateral hip injections after having 4-6 weeks of relief with her last injections. She complains of right groin pain and left lateral hip pain. Pain increases when laying on the left hip.  She was recently prescribed a Rinvoq from her rheumatologist which has helped with her back pain but has not helped with her hip pain.  She had SI joint  injections completed at pain management in July.    5/9/24: Patient returns today for completion of left hip aspiration.  She states that her rheumatologist wanted to ensure that she had no current hip joint infection prior to starting on Xeljanz.  The steroid injection from 4/25 in the right hip provided her good relief.      4/25/24: 47y/o patient, hx RA and left KYRIE with revision x2 most recently in August 2023 with Dr. Elio Lew, presents with bilateral hip pain.  Right hip pain starts in her groin and radiates down her anterior thigh.  It has been ongoing for several months but was exacerbated in the recent ground-level fall 2 weeks ago.  Reports she has dealt with left hip pain since revision surgery.  Pain is prominently over her lateral hip.  It was also recently exacerbated from a fall.  She reports that she was supposed to be started on a new medication for rheumatoid arthritis.  However given her left hip periprosthetic infection history she was advised to have testing done to ensure no active infection was present within her left hip.  She reports that she saw Dr. Elio Lew recently and he advised aspirations so testing could be done. Currently using gabapentin, and tizanidine, for symptom control.      Patient Active Problem List   Diagnosis    Arthritis    Chronic back pain    Cough    Diabetes (Multi)    Foot injury    Insomnia    Left hip pain    Onychomycosis    Vitamin D deficiency    Chest pain at rest    Familial heart disease    Hypertension    Lumbar radiculopathy    Migraine headache    Obesity    Muscle weakness    Numbness    Pain of left upper extremity    Pain of right upper extremity    Bronchitis    Gastroesophageal reflux disease    Hypokalemia    Lumbar degenerative disc disease    Mixed anxiety depressive disorder    Primary osteoarthritis of left hip    Vitamin B12 deficiency    Chest pain    Acute otitis externa of right ear    Acute urinary tract infection    Amenorrhea     Cardiomegaly    Cellulitis of leg, except foot    Chronic osteoarthritis    Complication of surgical procedure    Disease due to severe acute respiratory syndrome coronavirus 2 (SARS-CoV-2)    Disorder of hip region    Edema of both lower extremities    Electrocardiogram abnormal    Fall    Female infertility    Gallstone    Fracture of bone of hip (Multi)    Hip dislocation, left (Multi)    History of revision of total replacement of left hip joint    History of total left hip arthroplasty    Influenza with gastrointestinal tract involvement    Instability of left hip joint    Lightheadedness    Lumbar spondylosis    Lumbosacral radiculitis    Intestinal malabsorption    Malabsorption syndrome (HHS-HCC)    Headache    Musculoskeletal pain    Myofascial pain    Pain at surgical incision    Nausea    Pain in both lower extremities    Pain in femur    Postoperative pain    S/P lumbar fusion    Sleep disturbance    Strain of trapezius muscle    Trochanteric bursitis of left hip    Umbilical hernia    Upper respiratory tract infection    Laryngitis    Rheumatoid arthritis    S/P bariatric surgery    Polyneuropathy, unspecified    Disease of spinal cord, unspecified    Swelling of hip joint    Pain of lower extremity    Paresthesia of upper extremity    Abdominal pain    Stenosis, cervical spine    Severe anemia    Malabsorption (HHS-HCC)    Zinc deficiency    Iron deficiency    Thiamine deficiency    Complications of gastric bypass surgery    Iron deficiency    Morbid obesity with BMI of 40.0-44.9, adult (Multi)    Hiatal hernia    Dental infection    Encounter for pre-bariatric surgery counseling and education    Gastroesophageal reflux disease with hiatal hernia    Hiatal hernia with gastroesophageal reflux    Surgery follow-up examination    Hyperparathyroidism (Multi)     Past Surgical History:   Procedure Laterality Date    BACK SURGERY      CARDIAC CATHETERIZATION       SECTION, LOW TRANSVERSE       CHOLECYSTECTOMY  2017    CT GUIDED SOFT TISSUE FLUID DRAIN  06/02/2022    CT GUIDED SOFT TISSUE FLUID DRAIN [facility]    ESOPHAGOGASTRODUODENOSCOPY      GASTRIC BYPASS  11/26/2024    Gastric Bypass Laparoscopic  Repair Diaphragmatic Hernia Laparoscopy General Lysis Adhesions Laparoscopy Abdominal Cavity ([facility])    HERNIA REPAIR      HYSTERECTOMY  2012    JOINT REPLACEMENT      LAPAROSCOPY GASTRECTOMY PARTIAL / TOTAL  2017    vertical sleeve    NECK SURGERY      OOPHORECTOMY  Approximately Sunday    OTHER SURGICAL HISTORY  11/22/2017    laparoscopic cholecystectomy wiht intraoperative cholangiogram, lysis of adhesions, mesh repair of incisional henia using 6 in round ventralight st mesh    OTHER SURGICAL HISTORY Left     revision x 2 hip    SPINE SURGERY  02/14/2023    L4-L5 SPINAL SURGERY ([facility])    TUBAL LIGATION      US GUIDED SOFT TISSUE FLUID DRAIN  09/30/2021    US GUIDED SOFT TISSUE FLUID DRAIN [facility]     Current Outpatient Medications on File Prior to Visit   Medication Sig Dispense Refill    acetaminophen (Tylenol) 325 mg/10.15 mL oral liquid Take 20.3 mL (650 mg) by mouth every 4 hours if needed for pain or fever (temp greater than 38.0 C). Take over the counter liquid tylenol as needed. Dilute with equal parts water to prevent dumping syndrome.      amLODIPine (Norvasc) 10 mg tablet Take 1 tablet (10 mg) by mouth once daily. 90 tablet 1    DULoxetine (Cymbalta) 60 mg DR capsule TAKE 1 CAPSULE BY MOUTH EVERY DAY 90 capsule 1    gabapentin (Neurontin) 300 mg capsule Take 1 capsule (300 mg) by mouth 3 times a day. 90 capsule 3    losartan (Cozaar) 100 mg tablet Take 1 tablet (100 mg) by mouth once daily. Do not start before November 27, 2024. 90 tablet 1    methocarbamol (Robaxin) 500 mg tablet Take 1 tablet (500 mg) by mouth 4 times a day as needed for muscle spasms for up to 10 days. 40 tablet 0    metoprolol tartrate (Lopressor) 50 mg tablet Take 1 tablet (50 mg) by  mouth 2 times a day. Do not start before 2024. 60 tablet 1    multivitamin with iron - children's (Cerovite, Jr) chewable tablet Chew 1 tablet once daily.      omeprazole (PriLOSEC) 40 mg DR capsule Take 1 capsule (40 mg) by mouth once daily. Open capsule. Do not crush, chew, or dissolve beads. 90 capsule 0    orphenadrine (Norflex) 100 mg 12 hr tablet Take 1 tablet (100 mg) by mouth 2 times a day as needed for muscle spasms for up to 5 days. Do not crush, chew, or split. 10 tablet 0    [] predniSONE (Deltasone) 20 mg tablet Take 2 tablets (40 mg) by mouth once daily for 5 days. 10 tablet 0    rizatriptan (Maxalt) 10 mg tablet TAKE 1 TABLET AT ONSET OF HEADACHE. MAY REPEAT EVERY 2 HOURS AS NEEDED. MAXIMUM 3 TABLETS/24 HOURS. 8 tablet 6    tiZANidine (Zanaflex) 4 mg tablet TAKE 1 TABLET BY MOUTH EVERYDAY AT BEDTIME 30 tablet 2    zolpidem (Ambien) 10 mg tablet TAKE 1 TABLET (10 MG) BY MOUTH AS NEEDED AT BEDTIME FOR SLEEP. 30 tablet 5    [DISCONTINUED] tiZANidine (Zanaflex) 4 mg tablet TAKE 1 TABLET BY MOUTH EVERYDAY AT BEDTIME 30 tablet 2     No current facility-administered medications on file prior to visit.     Exam:  RIGHT Hip Exam:  Antalgic gait  No warmth, erythema or ecchymosis overlying.  Active flexion >90 degrees.  Limited IR and ER.  Mild TTP over greater trochanter  [5]/5 strength of hip flexion, abduction, & adduction  SILT  [ - ]Log roll pain, [+ ]FADIR pain, [ + ]VAUGHN pain, [ + ]Stinchfield    LEFT Hip Exam:  Antalgic gait  No warmth, erythema or ecchymosis overlying.  Active flexion >90 degrees.   Moderate TTP over greater trochanter  [5]/5 strength of hip flexion, abduction, & adduction  SILT  [ - ]Log roll pain, [- ]FADIR pain, [ + ]VAUGHN pain, [ - ]Stinchfield    General Exam:  Constitutional - NAD, AAO x 3, conversing appropriately.  HEENT- Normocephalic and atraumatic. EOMI, PERRLA, No scleral icterus. No facial deformities. Hearing grossly normal.  Lungs - Breathing  non-labored with normal rate. No accessory muscle use.  CV - Extremities warm and well-perfused, brisk capillary refill present.   Neuro - CN II-XII grossly intact.    Results:  X-rays of hips obtained 4/16/2024: moderate degenerative changes of the right hip joint.  Normal-appearing total left hip arthroplasty without evidence of periprosthetic fracture.    MRI of left hip 3/25/2025 reviewed which demonstrate intact gluteal tendons without gluteal muscle atrophy.  There is tendinosis of the left gluteal tendon.  Intact arthroplasty.    Lab Results   Component Value Date    HGBA1C 6.4 (H) 04/09/2024    HGBA1C 6.1 07/25/2022    CREATININE 1.03 04/30/2025    CREATININE 0.65 02/25/2025    EGFR 66 04/30/2025    EGFR 107 02/25/2025

## 2025-05-15 ENCOUNTER — TREATMENT (OUTPATIENT)
Dept: PHYSICAL THERAPY | Facility: CLINIC | Age: 50
End: 2025-05-15
Payer: COMMERCIAL

## 2025-05-15 DIAGNOSIS — E51.9 THIAMINE DEFICIENCY: ICD-10-CM

## 2025-05-15 DIAGNOSIS — E60 ZINC DEFICIENCY: ICD-10-CM

## 2025-05-15 DIAGNOSIS — E53.8 VITAMIN B12 DEFICIENCY: ICD-10-CM

## 2025-05-15 DIAGNOSIS — I10 PRIMARY HYPERTENSION: Primary | ICD-10-CM

## 2025-05-15 DIAGNOSIS — M47.816 LUMBAR SPONDYLOSIS: ICD-10-CM

## 2025-05-15 DIAGNOSIS — G44.86 CERVICOGENIC HEADACHE: Primary | ICD-10-CM

## 2025-05-15 DIAGNOSIS — K90.9 INTESTINAL MALABSORPTION, UNSPECIFIED TYPE (HHS-HCC): ICD-10-CM

## 2025-05-15 DIAGNOSIS — M54.12 LEFT CERVICAL RADICULOPATHY: ICD-10-CM

## 2025-05-15 DIAGNOSIS — E61.1 IRON DEFICIENCY: ICD-10-CM

## 2025-05-15 DIAGNOSIS — E55.9 VITAMIN D DEFICIENCY: ICD-10-CM

## 2025-05-15 DIAGNOSIS — E21.3 HYPERPARATHYROIDISM (MULTI): ICD-10-CM

## 2025-05-15 PROCEDURE — 97140 MANUAL THERAPY 1/> REGIONS: CPT | Mod: GP,CQ

## 2025-05-15 PROCEDURE — 97110 THERAPEUTIC EXERCISES: CPT | Mod: GP,CQ

## 2025-05-15 ASSESSMENT — PAIN SCALES - GENERAL: PAINLEVEL_OUTOF10: 7

## 2025-05-15 ASSESSMENT — PAIN - FUNCTIONAL ASSESSMENT: PAIN_FUNCTIONAL_ASSESSMENT: 0-10

## 2025-05-15 NOTE — PROGRESS NOTES
"  Physical Therapy Treatment    Patient Name: Kat Tineo  MRN: [id number]  Today's Date: 5/15/2025  Time Calculation  Start Time: 0931  Stop Time: 1018  Time Calculation (min): 47 min  PT Therapeutic Procedures Time Entry  Manual Therapy Time Entry: 27  Therapeutic Exercise Time Entry: 20,      Current Problem  Problem List Items Addressed This Visit           ICD-10-CM    Lumbar spondylosis M47.816    Headache - Primary R51.9     Other Visit Diagnoses         Codes      Left cervical radiculopathy     M54.12            Insurance:  Number of Treatments Authorized: 2/30v (100% COVERAGE 30 V A YR)            Subjective   General  General Comment: Patient reports her is experiencing L cervical and upper arm rad sx. She notes she has developed spurs in her cervical spine since prior cervical fusions (2018). HA sx have returned earlier this year - mostly back of her head/neck.  Patient reports she slipped on water and she fell backwards and hit her head.  She has a bump on her head and has had a constant HA since.  Bump is sensitive to touch and when she brushes her hair. The left cervical upper arm area pain is constant and aches.  Feels limited in her rotation to the left.    Performing HEP?:  yes     Precautions  Precautions  Precautions Comment: No Fall risk  Pain  Pain Assessment: 0-10  0-10 (Numeric) Pain Score: 7  Pain Type: Chronic pain  Pain Location: Neck  Pain Orientation: Left    Objective   Left shoulder elevated    Treatments:      Therapeutic Exercise  Therapeutic Exercise Activity 1: sitting with lumbar roll - review proper use and placement  Therapeutic Exercise Activity 2: cervical retraction 10\" hold x 5  Therapeutic Exercise Activity 3: cervical retraction with less hold, avoiding protraction movement x 10  Therapeutic Exercise Activity 4: seated OTD pulleys FF x 3 min  Therapeutic Exercise Activity 5: scap retract with B ER x 10  Therapeutic Exercise Activity 6: doorway pec stretch 2 x 30\"     "     Manual Therapy  Manual Therapy Activity 1: supine gentle SOR and maanual distraction stretch  Manual Therapy Activity 2: STM B occipitals, SCM, scalenes, UT, Lev, pecs; L ancelmo scap, rhomboids  Manual Therapy Activity 3: gentle L arm pull                     OP EDUCATION:  Outpatient Education  Education Comment: same - posture    Assessment:  PT Assessment  Assessment Comment: Patient presents with increased cervical and UT area tightness L>R with resulting HAs.  Today's session focused on HEP review for technique, lumbar roll use and postural awareness.  She responded well with a reduction in soft tissue tension and L shoulder alignment.    Plan:  Recommend patient follow-up with her physician re: bump on her head  OP PT Plan  Number of Treatments Authorized: 2/30v (100% COVERAGE 30 V A YR)    Goals:       Malena Martin, PTA

## 2025-05-16 DIAGNOSIS — B35.1 FUNGAL INFECTION OF TOENAIL: Primary | ICD-10-CM

## 2025-05-16 RX ORDER — TERBINAFINE HYDROCHLORIDE 250 MG/1
250 TABLET ORAL DAILY
Qty: 14 TABLET | Refills: 0 | Status: SHIPPED | OUTPATIENT
Start: 2025-05-16 | End: 2025-05-30

## 2025-05-21 ENCOUNTER — APPOINTMENT (OUTPATIENT)
Dept: SURGERY | Facility: CLINIC | Age: 50
End: 2025-05-21
Payer: COMMERCIAL

## 2025-05-21 VITALS — WEIGHT: 184 LBS | BODY MASS INDEX: 33.86 KG/M2 | HEIGHT: 62 IN

## 2025-05-21 DIAGNOSIS — K90.9 INTESTINAL MALABSORPTION, UNSPECIFIED TYPE (HHS-HCC): Primary | ICD-10-CM

## 2025-05-21 DIAGNOSIS — E51.9 THIAMINE DEFICIENCY: ICD-10-CM

## 2025-05-21 DIAGNOSIS — I10 PRIMARY HYPERTENSION: ICD-10-CM

## 2025-05-21 DIAGNOSIS — E60 ZINC DEFICIENCY: ICD-10-CM

## 2025-05-21 DIAGNOSIS — E61.1 IRON DEFICIENCY: ICD-10-CM

## 2025-05-21 DIAGNOSIS — E21.3 HYPERPARATHYROIDISM (MULTI): ICD-10-CM

## 2025-05-21 DIAGNOSIS — E53.8 VITAMIN B12 DEFICIENCY: ICD-10-CM

## 2025-05-21 DIAGNOSIS — Z98.84 S/P BARIATRIC SURGERY: ICD-10-CM

## 2025-05-21 DIAGNOSIS — R19.5 LOOSE STOOLS: ICD-10-CM

## 2025-05-21 DIAGNOSIS — E55.9 VITAMIN D DEFICIENCY: ICD-10-CM

## 2025-05-21 PROCEDURE — 3008F BODY MASS INDEX DOCD: CPT

## 2025-05-21 PROCEDURE — 99214 OFFICE O/P EST MOD 30 MIN: CPT

## 2025-05-21 PROCEDURE — 4010F ACE/ARB THERAPY RXD/TAKEN: CPT

## 2025-05-21 RX ORDER — CHOLESTYRAMINE 4 G/5.5G
4 POWDER, FOR SUSPENSION ORAL 3 TIMES DAILY
Qty: 90 PACKET | Refills: 11 | Status: SHIPPED | OUTPATIENT
Start: 2025-05-21 | End: 2026-05-21

## 2025-05-21 NOTE — PROGRESS NOTES
Subjective   Patient ID: Kat Tineo is a 49 y.o. female who presents for No chief complaint on file..  AVINASH Stephens is here for her 6 month follow up after a conversion to a RYGB for reflux. She has lost 52% of her excess weight. She denies reflux. She is having nausea and vomiting or diarrhea with fried foods. She feels hunger. She tells me that she feels full with a very small amount. She is eating about every 2 hours. She is walking for exercise. She is in physical therapy for her hip and neck. She is currently taking 2 tablets of the flintstones MVI, calcium citrate once daily, vitamin D daily, and sublingual B12. She was put on potassium by her PCP. She was given potassium replacement in the ER also. She did not have labwork prior to her appointment.     Objective   Physical Exam  Constitutional:       Appearance: Normal appearance.   Eyes:      Pupils: Pupils are equal, round, and reactive to light.   Cardiovascular:      Rate and Rhythm: Normal rate.   Pulmonary:      Effort: Pulmonary effort is normal.   Abdominal:      Palpations: Abdomen is soft.   Musculoskeletal:         General: Normal range of motion.   Skin:     General: Skin is warm and dry.   Neurological:      General: No focal deficit present.      Mental Status: She is alert and oriented to person, place, and time.   Psychiatric:         Mood and Affect: Mood normal.         Assessment/Plan   Problem List Items Addressed This Visit           ICD-10-CM    Vitamin D deficiency E55.9    Relevant Orders    Vitamin D 25-Hydroxy,Total (for eval of Vitamin D levels)    Hypertension I10    Relevant Orders    CBC and Auto Differential    Comprehensive Metabolic Panel    Copper, Blood    Ferritin    Folate    Iron and TIBC    Parathyroid Hormone, Intact    Vitamin B1, Whole Blood    Vitamin B12    Vitamin D 25-Hydroxy,Total (for eval of Vitamin D levels)    Zinc, Serum or Plasma    Vitamin B12 deficiency E53.8    Relevant Orders    Vitamin B12    S/P  bariatric surgery Z98.84    Malabsorption ([facility]) - Primary K90.9    Relevant Orders    CBC and Auto Differential    Comprehensive Metabolic Panel    Copper, Blood    Ferritin    Folate    Iron and TIBC    Parathyroid Hormone, Intact    Vitamin B1, Whole Blood    Vitamin B12    Vitamin D 25-Hydroxy,Total (for eval of Vitamin D levels)    Zinc, Serum or Plasma    Zinc deficiency E60    Relevant Orders    Zinc, Serum or Plasma    Iron deficiency E61.1    Relevant Orders    Ferritin    Iron and TIBC    Thiamine deficiency E51.9    Relevant Orders    Vitamin B1, Whole Blood    Hyperparathyroidism (Multi) E21.3    Relevant Orders    Parathyroid Hormone, Intact    Loose stools R19.5    Relevant Medications    cholestyramine (Prevalite) 4 gram packet     Kat is doing well with her weight loss. I encouraged her to continue to follow the rules for increased weight loss and maintenance. We discussed her hypokalemia from April. I encouraged her to follow up with her PCP as she has not had follow up since her ER visit. We discussed that potassium would be checked when she gets her labs done, but she would need to follow with her PCP for continued dose adjustments. She will get her labwork done this week. I will message her with the results. She will follow up in 6 months with repeat labwork.     PRACHI Moore-CNP 05/21/25 11:01 AM

## 2025-05-22 ENCOUNTER — TREATMENT (OUTPATIENT)
Dept: PHYSICAL THERAPY | Facility: CLINIC | Age: 50
End: 2025-05-22
Payer: COMMERCIAL

## 2025-05-22 DIAGNOSIS — G44.86 CERVICOGENIC HEADACHE: ICD-10-CM

## 2025-05-22 DIAGNOSIS — M47.816 LUMBAR SPONDYLOSIS: ICD-10-CM

## 2025-05-22 DIAGNOSIS — M54.12 LEFT CERVICAL RADICULOPATHY: Primary | ICD-10-CM

## 2025-05-22 PROCEDURE — 97140 MANUAL THERAPY 1/> REGIONS: CPT | Mod: GP,CQ

## 2025-05-22 PROCEDURE — 97110 THERAPEUTIC EXERCISES: CPT | Mod: GP,CQ

## 2025-05-22 ASSESSMENT — PAIN SCALES - GENERAL: PAINLEVEL_OUTOF10: 5 - MODERATE PAIN

## 2025-05-22 ASSESSMENT — PAIN - FUNCTIONAL ASSESSMENT: PAIN_FUNCTIONAL_ASSESSMENT: 0-10

## 2025-05-22 NOTE — PROGRESS NOTES
Physical Therapy Treatment    Patient Name: Kat Tineo  MRN: [id number]  Today's Date: 5/22/2025  Time Calculation  Start Time: 0855  Stop Time: 0935  Time Calculation (min): 40 min  PT Therapeutic Procedures Time Entry  Manual Therapy Time Entry: 15  Therapeutic Exercise Time Entry: 25,      Current Problem  Problem List Items Addressed This Visit           ICD-10-CM    Lumbar spondylosis M47.816    Headache R51.9     Other Visit Diagnoses         Codes      Left cervical radiculopathy    -  Primary M54.12            Insurance:  Number of Treatments Authorized: 3/30v (100% COVERAGE 30 V A YR)            Subjective   General  General Comment: Achy all over - rainy weather. Last session went really well.  Felt good for about a week.  Able to go to [employer] game and I had a lot of energy. She feels some discomfort into her L UE this morning. Cervical mobility is improved as well.    Performing HEP?: Yes    Precautions  Precautions  Precautions Comment: No Fall risk  Pain  Pain Assessment: 0-10  0-10 (Numeric) Pain Score: 5 - Moderate pain  Pain Type: Chronic pain  Pain Location: Neck  Pain Orientation: Left    Objective       Treatments:    Therapeutic Exercise  Therapeutic Exercise Activity 1: sitting with lumbar roll - OTD pulleys FF x 3 min  Therapeutic Exercise Activity 2: seated retro shld rolls x 10  Therapeutic Exercise Activity 3: doorway pec stretch low and mid x 1 min ea  Therapeutic Exercise Activity 4: purple PB shld ext x 10  Therapeutic Exercise Activity 5: purple PB rows x 10  Therapeutic Exercise Activity 6: purple PB reverse flies x 10  Therapeutic Exercise Activity 7: seated SB RO x 2 min  Therapeutic Exercise Activity 8: supine cervical retraction x 10         Manual Therapy  Manual Therapy Activity 1: supine gentle SOR and manual distraction stretch  Manual Therapy Activity 2: STM B occipitals, SCM, scalenes, UT, Lev, pecs; L ancelmo scap, rhomboids, bicep  Manual Therapy Activity 3: TPR L  levator                     OP EDUCATION:       Assessment:  PT Assessment  Assessment Comment: Patient had a good response to first f/u.  General achiness today from the weather. Able to intorduce light resisted strengthening.  + tightness L levator and biceps.  Felt good after session.    Plan:  OP PT Plan  Number of Treatments Authorized: 3/30v (100% COVERAGE 30 V A YR)    Goals:       Malena Martin, PTA

## 2025-05-27 ENCOUNTER — TREATMENT (OUTPATIENT)
Dept: PHYSICAL THERAPY | Facility: CLINIC | Age: 50
End: 2025-05-27
Payer: COMMERCIAL

## 2025-05-27 DIAGNOSIS — M47.816 LUMBAR SPONDYLOSIS: ICD-10-CM

## 2025-05-27 DIAGNOSIS — M54.12 LEFT CERVICAL RADICULOPATHY: ICD-10-CM

## 2025-05-27 DIAGNOSIS — G44.86 CERVICOGENIC HEADACHE: ICD-10-CM

## 2025-05-27 PROCEDURE — 97110 THERAPEUTIC EXERCISES: CPT | Mod: GP

## 2025-05-27 PROCEDURE — 97140 MANUAL THERAPY 1/> REGIONS: CPT | Mod: GP

## 2025-05-27 NOTE — PROGRESS NOTES
"  Physical Therapy Treatment    Patient Name: Kat Tineo  MRN: [id number]  Today's Date: 5/27/2025  Time Calculation  Start Time: 1226  Stop Time: 1305  Time Calculation (min): 39 min  PT Therapeutic Procedures Time Entry  Manual Therapy Time Entry: 5  Therapeutic Exercise Time Entry: 33,      Current Problem  Problem List Items Addressed This Visit           ICD-10-CM    Lumbar spondylosis M47.816    Headache R51.9     Other Visit Diagnoses         Codes      Left cervical radiculopathy     M54.12            Insurance:  Number of Treatments Authorized: 4/30v (100% COVERAGE 30 V A YR)            Subjective   General   Back of head is still sensitive but better. Denies any concussion sx, messaged primary in [place]. Cleaned her car and was having low back/sciatic nerve pain.  Using an acupuncture mat and it didn't help. Gabapentin just made her tired.  The lower back is the most aggravated today.  Doing all the neck exercises which seem to help.  Does yoga but it just makes it worse. May go back to work soon, has to pass physical and drug test.  Will be doing standing/walking and may have to do take downs.         Performing HEP?: Yes - c/s ret with self, B ER and T/S ext, acupuncture pillow  x 20 min - helps     Precautions  Precautions  Precautions Comment: No Fall risk    Pain   L UT/neck/suboccipital - did not rate     Objective       Treatments:  Therapeutic Exercise  Sitting with roll x 2 min  C/S ret with self OP  x 15 - produce suboccipital    Manual: C/S ret with PT Op x 6 - Increase, NW     Therex:  C/S L rot x 10  C/S L  rot with self OP x 10 - increasing ROM   C/s L Sbing x 10  C/S L SB with PT OP x 10 - \" feels good\"     Manual: C/s L Sbing with OP     Therex: MINNIE with c/s ret 20 x 2 - abolish L UT/GHJ, abolish Right thigh, better        OP EDUCATION:   5/27/25: verbally modified    Assessment:   Seated c/s procedures had nil lasting effect on sx, rot/sbing forces improved mechanics.  Pt has good sx " response to semiloaded/MINNIE ret, HEP was verbally modified and pt demo'd understanding.     Plan:  OP PT Plan  Number of Treatments Authorized: 4/30v (100% COVERAGE 30 V A YR)F/U with changes to HEP  Reassess if NB/NW or W  Progress function/strength as tolerated.         David Jimenez, PT

## 2025-05-28 ENCOUNTER — APPOINTMENT (OUTPATIENT)
Dept: SURGERY | Facility: CLINIC | Age: 50
End: 2025-05-28
Payer: COMMERCIAL

## 2025-05-29 DIAGNOSIS — R19.5 LOOSE STOOLS: Primary | ICD-10-CM

## 2025-05-29 RX ORDER — COLESEVELAM HYDROCHLORIDE 3.75 G/1
3.75 POWDER, FOR SUSPENSION ORAL DAILY
Qty: 30 EACH | Refills: 11 | Status: SHIPPED | OUTPATIENT
Start: 2025-05-29 | End: 2026-05-29

## 2025-06-05 ENCOUNTER — APPOINTMENT (OUTPATIENT)
Dept: PHYSICAL THERAPY | Facility: CLINIC | Age: 50
End: 2025-06-05
Payer: COMMERCIAL

## 2025-06-06 DIAGNOSIS — M19.90 UNSPECIFIED OSTEOARTHRITIS, UNSPECIFIED SITE: ICD-10-CM

## 2025-06-06 RX ORDER — DULOXETIN HYDROCHLORIDE 60 MG/1
60 CAPSULE, DELAYED RELEASE ORAL DAILY
Qty: 90 CAPSULE | Refills: 1 | Status: SHIPPED | OUTPATIENT
Start: 2025-06-06

## 2025-06-09 DIAGNOSIS — B35.1 FUNGAL INFECTION OF TOENAIL: ICD-10-CM

## 2025-06-09 DIAGNOSIS — I10 HYPERTENSION, UNSPECIFIED TYPE: ICD-10-CM

## 2025-06-09 DIAGNOSIS — G47.00 INSOMNIA, UNSPECIFIED: ICD-10-CM

## 2025-06-09 RX ORDER — ZOLPIDEM TARTRATE 10 MG/1
10 TABLET ORAL NIGHTLY PRN
Qty: 30 TABLET | Refills: 3 | Status: SHIPPED | OUTPATIENT
Start: 2025-06-09

## 2025-06-09 RX ORDER — TERBINAFINE HYDROCHLORIDE 250 MG/1
250 TABLET ORAL DAILY
Qty: 14 TABLET | Refills: 0 | Status: SHIPPED | OUTPATIENT
Start: 2025-06-09 | End: 2025-06-23

## 2025-06-09 RX ORDER — AMLODIPINE BESYLATE 10 MG/1
10 TABLET ORAL DAILY
Qty: 90 TABLET | Refills: 1 | Status: SHIPPED | OUTPATIENT
Start: 2025-06-09

## 2025-06-11 DIAGNOSIS — E53.8 VITAMIN B12 DEFICIENCY: ICD-10-CM

## 2025-06-11 DIAGNOSIS — E61.1 IRON DEFICIENCY: ICD-10-CM

## 2025-06-11 DIAGNOSIS — E51.9 THIAMINE DEFICIENCY: ICD-10-CM

## 2025-06-11 DIAGNOSIS — E55.9 VITAMIN D DEFICIENCY: ICD-10-CM

## 2025-06-11 DIAGNOSIS — E60 ZINC DEFICIENCY: ICD-10-CM

## 2025-06-11 DIAGNOSIS — E21.3 HYPERPARATHYROIDISM (MULTI): ICD-10-CM

## 2025-06-11 DIAGNOSIS — K90.9 INTESTINAL MALABSORPTION, UNSPECIFIED TYPE (HHS-HCC): ICD-10-CM

## 2025-06-12 ENCOUNTER — APPOINTMENT (OUTPATIENT)
Dept: PHYSICAL THERAPY | Facility: CLINIC | Age: 50
End: 2025-06-12
Payer: COMMERCIAL

## 2025-06-20 ENCOUNTER — OFFICE VISIT (OUTPATIENT)
Dept: RHEUMATOLOGY | Facility: CLINIC | Age: 50
End: 2025-06-20
Payer: COMMERCIAL

## 2025-06-20 VITALS — DIASTOLIC BLOOD PRESSURE: 80 MMHG | BODY MASS INDEX: 34.93 KG/M2 | WEIGHT: 191 LBS | SYSTOLIC BLOOD PRESSURE: 118 MMHG

## 2025-06-20 DIAGNOSIS — G89.29 OTHER CHRONIC PAIN: ICD-10-CM

## 2025-06-20 DIAGNOSIS — G89.29 CHRONIC NONINTRACTABLE HEADACHE, UNSPECIFIED HEADACHE TYPE: ICD-10-CM

## 2025-06-20 DIAGNOSIS — M15.9 OSTEOARTHRITIS OF MULTIPLE JOINTS, UNSPECIFIED OSTEOARTHRITIS TYPE: ICD-10-CM

## 2025-06-20 DIAGNOSIS — M06.9 RHEUMATOID ARTHRITIS, INVOLVING UNSPECIFIED SITE, UNSPECIFIED WHETHER RHEUMATOID FACTOR PRESENT (MULTI): Primary | ICD-10-CM

## 2025-06-20 DIAGNOSIS — R51.9 CHRONIC NONINTRACTABLE HEADACHE, UNSPECIFIED HEADACHE TYPE: ICD-10-CM

## 2025-06-20 PROCEDURE — 3079F DIAST BP 80-89 MM HG: CPT | Performed by: INTERNAL MEDICINE

## 2025-06-20 PROCEDURE — 99214 OFFICE O/P EST MOD 30 MIN: CPT | Performed by: INTERNAL MEDICINE

## 2025-06-20 PROCEDURE — 4010F ACE/ARB THERAPY RXD/TAKEN: CPT | Performed by: INTERNAL MEDICINE

## 2025-06-20 PROCEDURE — 3074F SYST BP LT 130 MM HG: CPT | Performed by: INTERNAL MEDICINE

## 2025-06-20 PROCEDURE — 99244 OFF/OP CNSLTJ NEW/EST MOD 40: CPT | Performed by: INTERNAL MEDICINE

## 2025-06-20 RX ORDER — PSYLLIUM HUSK 0.4 G
1 CAPSULE ORAL DAILY
COMMUNITY

## 2025-06-20 RX ORDER — CYANOCOBALAMIN (VITAMIN B-12) 250 MCG
250 TABLET ORAL DAILY
COMMUNITY

## 2025-06-20 RX ORDER — CHOLECALCIFEROL (VITAMIN D3) 25 MCG
50 TABLET ORAL DAILY
COMMUNITY

## 2025-06-20 ASSESSMENT — RHEUMATOLOGY NEW PATIENT QUESTIONNAIRE
PAIN OR BURNING ON URINATION: N
SEIZURES: N
VAGINAL DRYNESS: N
NAUSEA: N
ANXIETY: N
AGITATION: N
MORNING STIFFNESS: Y
EYE DRYNESS: N
NIGHT SWEATS: N
VOMITING OF BLOOD OR COFFEE GROUND CONSISTENCY MATERIAL: N
ANEMIA: N
MUSCLE WEAKNESS: Y
FAINTING: N
JAUNDICE: N
JOINT PAIN: Y
BLACK STOOLS: N
LOSS OF CONSCIOUSNESS: N
COUGH: N
MORNING STIFFNESS IN LOWER BACK: Y
HOARSE VOICE: N
SUN SENSITIVE (SUN ALLERGY): N
DIFFICULTY FALLING ASLEEP: Y
DEPRESSION: Y
HEARTBURN OR REFLUX: N
PERSISTENT DIARRHEA: N
SKIN REDNESS: N
HEADACHES: Y
CHEST PAIN: N
EASY BRUISING: Y
SWOLLEN LEGS OR FEET: N
INCREASED SUSCEPTIBILITY TO INFECTION: N
EYE REDNESS: N
UNUSUALLY RAPID OR SLOWED HEART RATE: N
EYE PAIN: N
RASH OR ULCERS: N
UNUSUAL BLEEDING: N
EXCESSIVE HAIR LOSS (MORE THAN YOUR NORM): Y
DIFFICULTY SWALLOWING: N
SHORTNESS OF BREATH: N
COLOR CHANGES OF HANDS OR FEET IN THE COLD: N
BLOOD IN STOOLS: N
UNUSUAL FATIGUE: Y
DRYNESS OF MOUTH: N
DIFFICULTY STAYING ASLEEP: Y
SORES IN MOUTH OR NOSE: N
NUMBNESS OR TINGLING IN HANDS OR FEET: Y
FEVER: N
SWOLLEN OR TENDER GLANDS: N
UNEXPLAINED HEARING LOSS: N
DIFFICULTY BREATHING LYING DOWN: N
DOUBLE OR BLURRED VISION: N
UNEXPLAINED WEIGHT CHANGE: N
HOW WOULD YOU DESCRIBE YOUR STIFFNESS ON AVERAGE: SEVERE
JOINT SWELLING: N
RASH: N
EASILY LOSING TEMPER: N
NODULES/BUMPS: N
ABNORMAL URINE: N
MEMORY LOSS: N
STOMACH PAIN: N
SKIN TIGHTNESS: N
LOSS OF VISION: N
BEHAVIORAL CHANGES: N

## 2025-06-20 NOTE — PROGRESS NOTES
"Previous treatment for RA.  Last visit many, many years.  Methotrexate ? Dosing.   Current treatment with Dr. Mo. States Dr. Mo no longer takes her insurance. Last visit 4-29-25.  Current treatment with rinvoq with good relief.  IV infusion ( ? Actemra ) no insurance coverage. no relief.  Humira, no relief  Enbrel, no relief    HPI - She saw me once in 2018, then she went back to Dr. Hobson, and then she was seeing Dr. Mo.  She doesn't know if rinvoq is working she has piain in her neck causing HA and LBP.  She had cervical and lumbar laminectomy and a L hip replacement.  She is seeing pain mgmt only for hip pain - it's a sports medicine dr and will only do hip injection.  Then she said he isn't a pain mgmt dr, although she has seen one in the past.  Per his note, he was discussing PRP for gluteal tendinopathy.  Her R knee sometimes hurts, and her R toe goes numb, and she has a tonail fungus.  No swelling.  Sleeps poorly.  She is \"very, very stiff - I have to do the stretching exercises when I get OOB - it can last for 30min to to hrs\"  Her L arm and 4th/5th fingers sometimes get numb.  Has never seen neuro about this.  She hasn't seen pain mgmt in several yrs.  No fever.  +daily HA.  Intermittent sicca.  Intermittent mouth sores.  No raynauds or rash.  +CP - sees cardiol but ws told it was \"muscle, and everything points to having rheumatoid arthritis.\"  +IBARRA.  No GI - she had a gastric sleeve but had to change to RNY d/t GERD, and sx are improved.    FH - +arthritis.  No CTD.  No Ps or IBD  SH - nonsmoker.  Rare EtOH.  No marijuana/drugs    PE  Gen - NAD  Neck - supple, no LAD  CV - RRR no r/m/g  Lungs - CTA  Abd - +BS  Extr - 2+ DP, PT, and rad pulses.  No c/c/e  Skin - no rash  Psych - nl affect  Neuro - unable to elicit B ankle DTRs.  Nl strength  Msk - no synovitis.  Tender along the palmar aspect of MCPs but not over the dorsum.  Pain with ROM L elbow and shoulder.  Diffuse trap, paracervical, upper back, " and lower back tenderness      A/P - Pt with h/o OA, RA, and chronic pain.  She has had cervical and lumbar surg and L KYRIE.  She has seen pain mgmt in the past but currently only sees sports medicine for hip pain.  RF min +, no available CCP Ab  She does not have any obvious synovitis at this time  She has taken Methotrexate, lef, humira, enbrel, and actemra in the past - didn't help.  She is currently on rinvoq but doesn't think it's helping d/t chronic neck and back pain - unlikely to be related to RA  Reviewed 4/30 CMP and CBC from ER visit - low K, and she is to have labs from bariatric surg - they have been ordered.    Neuro eval for numbness and HA  Consider going back to pain mgmt  Consider vectra  Request records from Dr. Mo  Follow up 3 mo or sooner PRN

## 2025-06-23 ENCOUNTER — APPOINTMENT (OUTPATIENT)
Dept: PHYSICAL THERAPY | Facility: CLINIC | Age: 50
End: 2025-06-23
Payer: COMMERCIAL

## 2025-06-24 ENCOUNTER — APPOINTMENT (OUTPATIENT)
Dept: ORTHOPEDIC SURGERY | Facility: CLINIC | Age: 50
End: 2025-06-24
Payer: COMMERCIAL

## 2025-06-28 ENCOUNTER — APPOINTMENT (OUTPATIENT)
Dept: RADIOLOGY | Facility: HOSPITAL | Age: 50
End: 2025-06-28
Payer: COMMERCIAL

## 2025-06-28 ENCOUNTER — APPOINTMENT (OUTPATIENT)
Dept: CARDIOLOGY | Facility: HOSPITAL | Age: 50
End: 2025-06-28
Payer: COMMERCIAL

## 2025-06-28 ENCOUNTER — HOSPITAL ENCOUNTER (EMERGENCY)
Facility: HOSPITAL | Age: 50
Discharge: HOME | End: 2025-06-28
Payer: COMMERCIAL

## 2025-06-28 VITALS
RESPIRATION RATE: 15 BRPM | WEIGHT: 184.97 LBS | DIASTOLIC BLOOD PRESSURE: 90 MMHG | HEART RATE: 77 BPM | BODY MASS INDEX: 32.77 KG/M2 | TEMPERATURE: 98 F | OXYGEN SATURATION: 98 % | HEIGHT: 63 IN | SYSTOLIC BLOOD PRESSURE: 138 MMHG

## 2025-06-28 DIAGNOSIS — M79.10 MYALGIA: ICD-10-CM

## 2025-06-28 DIAGNOSIS — R52 BODY ACHES: ICD-10-CM

## 2025-06-28 DIAGNOSIS — M25.519 SHOULDER PAIN, UNSPECIFIED CHRONICITY, UNSPECIFIED LATERALITY: Primary | ICD-10-CM

## 2025-06-28 DIAGNOSIS — R07.9 CHEST PAIN, UNSPECIFIED TYPE: ICD-10-CM

## 2025-06-28 LAB
ALBUMIN SERPL BCP-MCNC: 3.9 G/DL (ref 3.4–5)
ALP SERPL-CCNC: 87 U/L (ref 33–110)
ALT SERPL W P-5'-P-CCNC: 23 U/L (ref 7–45)
ANION GAP SERPL CALCULATED.3IONS-SCNC: 10 MMOL/L (ref 10–20)
AST SERPL W P-5'-P-CCNC: 23 U/L (ref 9–39)
BASOPHILS # BLD AUTO: 0.02 X10*3/UL (ref 0–0.1)
BASOPHILS NFR BLD AUTO: 0.2 %
BILIRUB SERPL-MCNC: 0.3 MG/DL (ref 0–1.2)
BUN SERPL-MCNC: 11 MG/DL (ref 6–23)
CALCIUM SERPL-MCNC: 8.6 MG/DL (ref 8.6–10.3)
CARDIAC TROPONIN I PNL SERPL HS: 7 NG/L (ref 0–13)
CARDIAC TROPONIN I PNL SERPL HS: 8 NG/L (ref 0–13)
CHLORIDE SERPL-SCNC: 106 MMOL/L (ref 98–107)
CO2 SERPL-SCNC: 28 MMOL/L (ref 21–32)
CREAT SERPL-MCNC: 0.63 MG/DL (ref 0.5–1.05)
EGFRCR SERPLBLD CKD-EPI 2021: >90 ML/MIN/1.73M*2
EOSINOPHIL # BLD AUTO: 0.02 X10*3/UL (ref 0–0.7)
EOSINOPHIL NFR BLD AUTO: 0.2 %
ERYTHROCYTE [DISTWIDTH] IN BLOOD BY AUTOMATED COUNT: 12.8 % (ref 11.5–14.5)
GLUCOSE SERPL-MCNC: 122 MG/DL (ref 74–99)
HCT VFR BLD AUTO: 38.1 % (ref 36–46)
HGB BLD-MCNC: 12.7 G/DL (ref 12–16)
IMM GRANULOCYTES # BLD AUTO: 0.06 X10*3/UL (ref 0–0.7)
IMM GRANULOCYTES NFR BLD AUTO: 0.6 % (ref 0–0.9)
LYMPHOCYTES # BLD AUTO: 1.28 X10*3/UL (ref 1.2–4.8)
LYMPHOCYTES NFR BLD AUTO: 12.3 %
MAGNESIUM SERPL-MCNC: 2.14 MG/DL (ref 1.6–2.4)
MCH RBC QN AUTO: 29.5 PG (ref 26–34)
MCHC RBC AUTO-ENTMCNC: 33.3 G/DL (ref 32–36)
MCV RBC AUTO: 88 FL (ref 80–100)
MONOCYTES # BLD AUTO: 0.5 X10*3/UL (ref 0.1–1)
MONOCYTES NFR BLD AUTO: 4.8 %
NEUTROPHILS # BLD AUTO: 8.55 X10*3/UL (ref 1.2–7.7)
NEUTROPHILS NFR BLD AUTO: 81.9 %
NRBC BLD-RTO: 0 /100 WBCS (ref 0–0)
PLATELET # BLD AUTO: 504 X10*3/UL (ref 150–450)
POTASSIUM SERPL-SCNC: 2.7 MMOL/L (ref 3.5–5.3)
PROT SERPL-MCNC: 6.9 G/DL (ref 6.4–8.2)
RBC # BLD AUTO: 4.31 X10*6/UL (ref 4–5.2)
SODIUM SERPL-SCNC: 141 MMOL/L (ref 136–145)
WBC # BLD AUTO: 10.4 X10*3/UL (ref 4.4–11.3)

## 2025-06-28 PROCEDURE — 2500000001 HC RX 250 WO HCPCS SELF ADMINISTERED DRUGS (ALT 637 FOR MEDICARE OP): Performed by: CLINICAL NURSE SPECIALIST

## 2025-06-28 PROCEDURE — 36415 COLL VENOUS BLD VENIPUNCTURE: CPT | Performed by: CLINICAL NURSE SPECIALIST

## 2025-06-28 PROCEDURE — 96375 TX/PRO/DX INJ NEW DRUG ADDON: CPT

## 2025-06-28 PROCEDURE — 93005 ELECTROCARDIOGRAM TRACING: CPT

## 2025-06-28 PROCEDURE — 84484 ASSAY OF TROPONIN QUANT: CPT | Performed by: CLINICAL NURSE SPECIALIST

## 2025-06-28 PROCEDURE — 2500000002 HC RX 250 W HCPCS SELF ADMINISTERED DRUGS (ALT 637 FOR MEDICARE OP, ALT 636 FOR OP/ED): Performed by: PHYSICIAN ASSISTANT

## 2025-06-28 PROCEDURE — 73030 X-RAY EXAM OF SHOULDER: CPT | Mod: LEFT SIDE | Performed by: RADIOLOGY

## 2025-06-28 PROCEDURE — 83735 ASSAY OF MAGNESIUM: CPT | Performed by: PHYSICIAN ASSISTANT

## 2025-06-28 PROCEDURE — 2500000004 HC RX 250 GENERAL PHARMACY W/ HCPCS (ALT 636 FOR OP/ED): Mod: JZ | Performed by: PHYSICIAN ASSISTANT

## 2025-06-28 PROCEDURE — 71046 X-RAY EXAM CHEST 2 VIEWS: CPT

## 2025-06-28 PROCEDURE — 99285 EMERGENCY DEPT VISIT HI MDM: CPT | Mod: 25

## 2025-06-28 PROCEDURE — 85025 COMPLETE CBC W/AUTO DIFF WBC: CPT | Performed by: CLINICAL NURSE SPECIALIST

## 2025-06-28 PROCEDURE — 96366 THER/PROPH/DIAG IV INF ADDON: CPT

## 2025-06-28 PROCEDURE — 2500000004 HC RX 250 GENERAL PHARMACY W/ HCPCS (ALT 636 FOR OP/ED): Mod: JZ | Performed by: CLINICAL NURSE SPECIALIST

## 2025-06-28 PROCEDURE — 73030 X-RAY EXAM OF SHOULDER: CPT | Mod: LT

## 2025-06-28 PROCEDURE — 80053 COMPREHEN METABOLIC PANEL: CPT | Performed by: CLINICAL NURSE SPECIALIST

## 2025-06-28 PROCEDURE — 96365 THER/PROPH/DIAG IV INF INIT: CPT

## 2025-06-28 PROCEDURE — 71046 X-RAY EXAM CHEST 2 VIEWS: CPT | Mod: FOREIGN READ | Performed by: RADIOLOGY

## 2025-06-28 RX ORDER — POTASSIUM CHLORIDE 14.9 MG/ML
20 INJECTION INTRAVENOUS ONCE
Status: COMPLETED | OUTPATIENT
Start: 2025-06-28 | End: 2025-06-28

## 2025-06-28 RX ORDER — ACETAMINOPHEN 325 MG/1
650 TABLET ORAL ONCE
Status: COMPLETED | OUTPATIENT
Start: 2025-06-28 | End: 2025-06-28

## 2025-06-28 RX ORDER — LIDOCAINE 560 MG/1
1 PATCH PERCUTANEOUS; TOPICAL; TRANSDERMAL ONCE
Status: DISCONTINUED | OUTPATIENT
Start: 2025-06-28 | End: 2025-06-28 | Stop reason: HOSPADM

## 2025-06-28 RX ORDER — KETOROLAC TROMETHAMINE 15 MG/ML
15 INJECTION, SOLUTION INTRAMUSCULAR; INTRAVENOUS ONCE
Status: COMPLETED | OUTPATIENT
Start: 2025-06-28 | End: 2025-06-28

## 2025-06-28 RX ORDER — POTASSIUM CHLORIDE 20 MEQ/1
40 TABLET, EXTENDED RELEASE ORAL ONCE
Status: COMPLETED | OUTPATIENT
Start: 2025-06-28 | End: 2025-06-28

## 2025-06-28 RX ORDER — POTASSIUM CHLORIDE 20 MEQ/1
20 TABLET, EXTENDED RELEASE ORAL 2 TIMES DAILY
Qty: 20 TABLET | Refills: 0 | Status: SHIPPED | OUTPATIENT
Start: 2025-06-28 | End: 2025-07-08

## 2025-06-28 RX ORDER — METHYLPREDNISOLONE 4 MG/1
TABLET ORAL
Qty: 21 TABLET | Refills: 0 | Status: SHIPPED | OUTPATIENT
Start: 2025-06-28 | End: 2025-07-05

## 2025-06-28 RX ORDER — MORPHINE SULFATE 4 MG/ML
4 INJECTION, SOLUTION INTRAMUSCULAR; INTRAVENOUS ONCE
Status: COMPLETED | OUTPATIENT
Start: 2025-06-28 | End: 2025-06-28

## 2025-06-28 RX ADMIN — KETOROLAC TROMETHAMINE 15 MG: 15 INJECTION, SOLUTION INTRAMUSCULAR; INTRAVENOUS at 12:02

## 2025-06-28 RX ADMIN — ACETAMINOPHEN 650 MG: 325 TABLET ORAL at 13:37

## 2025-06-28 RX ADMIN — POTASSIUM CHLORIDE 20 MEQ: 14.9 INJECTION, SOLUTION INTRAVENOUS at 14:00

## 2025-06-28 RX ADMIN — MORPHINE SULFATE 4 MG: 4 INJECTION, SOLUTION INTRAMUSCULAR; INTRAVENOUS at 15:01

## 2025-06-28 RX ADMIN — POTASSIUM CHLORIDE 40 MEQ: 1500 TABLET, EXTENDED RELEASE ORAL at 13:59

## 2025-06-28 RX ADMIN — METHYLPREDNISOLONE SODIUM SUCCINATE 125 MG: 125 INJECTION, POWDER, FOR SOLUTION INTRAMUSCULAR; INTRAVENOUS at 14:59

## 2025-06-28 ASSESSMENT — PAIN - FUNCTIONAL ASSESSMENT
PAIN_FUNCTIONAL_ASSESSMENT: 0-10

## 2025-06-28 ASSESSMENT — PAIN SCALES - GENERAL
PAINLEVEL_OUTOF10: 10 - WORST POSSIBLE PAIN
PAINLEVEL_OUTOF10: 10 - WORST POSSIBLE PAIN
PAINLEVEL_OUTOF10: 7
PAINLEVEL_OUTOF10: 5 - MODERATE PAIN
PAINLEVEL_OUTOF10: 9

## 2025-06-28 ASSESSMENT — PAIN DESCRIPTION - PROGRESSION
CLINICAL_PROGRESSION: GRADUALLY WORSENING
CLINICAL_PROGRESSION: NOT CHANGED

## 2025-06-28 ASSESSMENT — PAIN DESCRIPTION - LOCATION
LOCATION: OTHER (COMMENT)
LOCATION: SHOULDER
LOCATION: SHOULDER

## 2025-06-28 ASSESSMENT — PAIN DESCRIPTION - PAIN TYPE
TYPE: ACUTE PAIN
TYPE: ACUTE PAIN

## 2025-06-28 ASSESSMENT — PAIN DESCRIPTION - ORIENTATION
ORIENTATION: LEFT
ORIENTATION: LEFT

## 2025-06-28 ASSESSMENT — PAIN DESCRIPTION - DESCRIPTORS
DESCRIPTORS: SHARP;STABBING
DESCRIPTORS: SHARP
DESCRIPTORS: DISCOMFORT
DESCRIPTORS: STABBING

## 2025-06-28 ASSESSMENT — PAIN DESCRIPTION - ONSET
ONSET: SUDDEN
ONSET: ONGOING

## 2025-06-28 ASSESSMENT — PAIN DESCRIPTION - FREQUENCY
FREQUENCY: CONSTANT/CONTINUOUS
FREQUENCY: CONSTANT/CONTINUOUS

## 2025-06-28 NOTE — ED PROVIDER NOTES
HPI   Chief Complaint   Patient presents with    Shoulder Pain     For the past 2 days I have had sharp lt shoulder pain that goes into my lt arm and into my chest I have felt nauseated and my hips and knee are killing me I have had diarrhea as well        49-year-old female presented emergency department with a chief complaint of 2 days of pain to her left shoulder that radiates into the left side of her chest.  Also complains of diarrhea and loose stools.  No abdominal pain.  She denies fall or injury or trauma.  She states she has a history of rheumatoid arthritis.  She recently saw a rheumatologist.  She was recommended pain management.  She has previously received injections in her hip.  Denies lightheadedness dizziness numbness weakness.  Denies chest pain or shortness of breath.  No other complaint.              Patient History   Medical History[1]  Surgical History[2]  Family History[3]  Social History[4]    Physical Exam   ED Triage Vitals [06/28/25 1137]   Temperature Heart Rate Respirations BP   36.7 °C (98 °F) 91 18 (!) 134/95      Pulse Ox Temp Source Heart Rate Source Patient Position   96 % Temporal Monitor Sitting      BP Location FiO2 (%)     Right arm --       Physical Exam  Vitals and nursing note reviewed.   Constitutional:       Appearance: Normal appearance.   HENT:      Head: Normocephalic.      Nose: Nose normal.      Mouth/Throat:      Mouth: Mucous membranes are moist.   Cardiovascular:      Rate and Rhythm: Normal rate and regular rhythm.      Pulses: Normal pulses.      Heart sounds: Normal heart sounds.   Pulmonary:      Effort: Pulmonary effort is normal.      Breath sounds: Normal breath sounds.   Abdominal:      General: Abdomen is flat.      Palpations: Abdomen is soft.   Musculoskeletal:         General: Normal range of motion.      Cervical back: Normal range of motion.      Right lower leg: No edema.      Left lower leg: No edema.   Skin:     General: Skin is warm.   Neurological:       General: No focal deficit present.      Mental Status: She is alert and oriented to person, place, and time.   Psychiatric:         Mood and Affect: Mood normal.         Behavior: Behavior normal.           ED Course & MDM   ED Course as of 06/28/25 1530   Sat Jun 28, 2025   1150 EKG interpreted by myself independently, EKG shows normal sinus rhythm, rate 88 bpm, parable 138, QRS 90, QT 3 2, QTc 462, patient has no ST elevation depression, negative for acute MI. [PAULA]   1331 EKG interpreted myself independently, EKG showed normal sinus rhythm, rate 71 bpm, TN interval 140, QRS 90, , QTc 441, patient has no ST elevation or depression, negative for acute MI. [PAULA]      ED Course User Index  [PAULA] Michael Huff,          Diagnoses as of 06/28/25 1530   Shoulder pain, unspecified chronicity, unspecified laterality   Chest pain, unspecified type   Myalgia   Body aches                 No data recorded     [place] Coma Scale Score: 15 (06/28/25 1134 : Bart Wolfe, EMT)                           Medical Decision Making  I have seen and evaluated this patient.  Physician available for consultation.  Vital signs have been reviewed.  All laboratory and diagnostic imaging is reviewed by myself and interpreted by myself unless otherwise stated.  Additionally imaging is interpreted by radiologist.    CBC without evidence of leukocytosis or anemia.  Metabolic panel without joy renal impairment.  Does demonstrate hypokalemia with potassium of 2.7.  Potassium supplementation ordered.  Troponin within an appropriate range without significant delta change, chest x-ray without actionable cardiopulmonary process, EKG without evidence of acute ischemia.  X-rays negative for this examination        Labs Reviewed   CBC WITH AUTO DIFFERENTIAL - Abnormal       Result Value    WBC 10.4      nRBC 0.0      RBC 4.31      Hemoglobin 12.7      Hematocrit 38.1      MCV 88      MCH 29.5      MCHC 33.3      RDW 12.8      Platelets 504 (*)      Neutrophils % 81.9      Immature Granulocytes %, Automated 0.6      Lymphocytes % 12.3      Monocytes % 4.8      Eosinophils % 0.2      Basophils % 0.2      Neutrophils Absolute 8.55 (*)     Immature Granulocytes Absolute, Automated 0.06      Lymphocytes Absolute 1.28      Monocytes Absolute 0.50      Eosinophils Absolute 0.02      Basophils Absolute 0.02     COMPREHENSIVE METABOLIC PANEL - Abnormal    Glucose 122 (*)     Sodium 141      Potassium 2.7 (*)     Chloride 106      Bicarbonate 28      Anion Gap 10      Urea Nitrogen 11      Creatinine 0.63      eGFR >90      Calcium 8.6      Albumin 3.9      Alkaline Phosphatase 87      Total Protein 6.9      AST 23      Bilirubin, Total 0.3      ALT 23     SERIAL TROPONIN-INITIAL - Normal    Troponin I, High Sensitivity 8      Narrative:     Less than 99th percentile of normal range cutoff-  Female and children under 18 years old <14 ng/L; Male <21 ng/L: Negative  Repeat testing should be performed if clinically indicated.     Female and children under 18 years old 14-50 ng/L; Male 21-50 ng/L:  Consistent with possible cardiac damage and possible increased clinical   risk. Serial measurements may help to assess extent of myocardial damage.     >50 ng/L: Consistent with cardiac damage, increased clinical risk and  myocardial infarction. Serial measurements may help assess extent of   myocardial damage.      NOTE: Children less than 1 year old may have higher baseline troponin   levels and results should be interpreted in conjunction with the overall   clinical context.     NOTE: Troponin I testing is performed using a different   testing methodology at [facility] than at other   [facility]. Direct result comparisons should only   be made within the same method.   SERIAL TROPONIN, 1 HOUR - Normal    Troponin I, High Sensitivity 7      Narrative:     Less than 99th percentile of normal range cutoff-  Female and children under 18 years old <14 ng/L;  Male <21 ng/L: Negative  Repeat testing should be performed if clinically indicated.     Female and children under 18 years old 14-50 ng/L; Male 21-50 ng/L:  Consistent with possible cardiac damage and possible increased clinical   risk. Serial measurements may help to assess extent of myocardial damage.     >50 ng/L: Consistent with cardiac damage, increased clinical risk and  myocardial infarction. Serial measurements may help assess extent of   myocardial damage.      NOTE: Children less than 1 year old may have higher baseline troponin   levels and results should be interpreted in conjunction with the overall   clinical context.     NOTE: Troponin I testing is performed using a different   testing methodology at [facility] than at other   [facility]. Direct result comparisons should only   be made within the same method.   MAGNESIUM - Normal    Magnesium 2.14     TROPONIN SERIES- (INITIAL, 1 HR)    Narrative:     The following orders were created for panel order Troponin I Series, High Sensitivity (0, 1 HR).  Procedure                               Abnormality         Status                     ---------                               -----------         ------                     Troponin I, High Sensiti...[[id number]]  Normal              Final result               Troponin, High Sensitivi...[[id number]]  Normal              Final result                 Please view results for these tests on the individual orders.     XR shoulder left 2+ views   Final Result   No acute finding.   Signed by Geovani Bunch MD      XR chest 2 views   Final Result   No acute radiographic chest finding.   Signed by Geovani Bunch MD        Medications   lidocaine 4 % patch 1 patch (1 patch transdermal Not Given 6/28/25 1333)   potassium chloride 20 mEq in sterile water for injection 100 mL (20 mEq intravenous New Bag 6/28/25 1400)   acetaminophen (Tylenol) tablet 650 mg (650 mg oral Given 6/28/25 1337)   ketorolac  (Toradol) injection 15 mg (15 mg intravenous Given 25 1202)   potassium chloride CR (Klor-Con M20) ER tablet 40 mEq (40 mEq oral Given 25 1359)   morphine injection 4 mg (4 mg intravenous Given 25 1501)   methylPREDNISolone sod succinate (SOLU-Medrol) injection 125 mg (125 mg intravenous Given 25 1459)     New Prescriptions    METHYLPREDNISOLONE (MEDROL DOSPAK) 4 MG TABLETS    Follow schedule on package instructions    POTASSIUM CHLORIDE CR (KLOR-CON M20) 20 MEQ ER TABLET    Take 1 tablet (20 mEq) by mouth 2 times a day for 10 days. Do not crush or chew.         Procedure  Procedures       [1]   Past Medical History:  Diagnosis Date    Acid reflux     Ankylosing spondylitis of site in spine (Multi)     Arthritis     Bursitis of hip     Cervical disc disorder     CTS (carpal tunnel syndrome)     Depression     Diabetes mellitus (Multi)     Dislocation of hip (Multi)     DM (diabetes mellitus), gestational     Fracture of hip (Multi)     Headache     History of transfusion     Hypertension     Joint pain     Low back pain     Lumbosacral disc disease     Migraine     Neck pain     Obesity     Osteoarthritis     Reflex sympathetic dystrophy     Rheumatoid arthritis     Spinal stenosis     Spondylolisthesis    [2]   Past Surgical History:  Procedure Laterality Date    BACK SURGERY      CARDIAC CATHETERIZATION       SECTION, LOW TRANSVERSE      CHOLECYSTECTOMY      CT GUIDED SOFT TISSUE FLUID DRAIN  2022    CT GUIDED SOFT TISSUE FLUID DRAIN [facility]    ESOPHAGOGASTRODUODENOSCOPY      GASTRIC BYPASS  2024    Gastric Bypass Laparoscopic  Repair Diaphragmatic Hernia Laparoscopy General Lysis Adhesions Laparoscopy Abdominal Cavity ([facility])    HERNIA REPAIR      HIP ARTHROPLASTY      HYSTERECTOMY      JOINT REPLACEMENT      LAMINECTOMY      LAPAROSCOPY GASTRECTOMY PARTIAL / TOTAL  2017    vertical sleeve    NECK SURGERY      OOPHORECTOMY  Approximately      ORTHOPEDIC SURGERY  08/2023    OTHER SURGICAL HISTORY  11/22/2017    laparoscopic cholecystectomy wiht intraoperative cholangiogram, lysis of adhesions, mesh repair of incisional henia using 6 in round ventralight st mesh    OTHER SURGICAL HISTORY Left     revision x 2 hip    PLANTAR FASCIA RELEASE      SPINAL FUSION      SPINE SURGERY  02/14/2023    L4-L5 SPINAL SURGERY ( [facility] [place])    TUBAL LIGATION      US GUIDED SOFT TISSUE FLUID DRAIN  09/30/2021    US GUIDED SOFT TISSUE FLUID DRAIN [facility]   [3]   Family History  Problem Relation Name Age of Onset    Diabetes Mother Darline Calzada     Hypertension Mother Darline Calzada     Other (morbid obesity) Mother Darline Calzada     Hernia Mother Darline Calzada     Vision loss Mother Darline Calzada     Diabetes type I Mother Darline Calzada     Other (htn) Father Ammon Colon     Hypertension Father Ammon Colon     Arthritis Father Ammon Colon     Alcohol abuse Father Ammon Colon     Cancer Father Ammon Colon     Depression Father Ammon Colon     Arthritis Other      Stroke Maternal Grandfather Dan Calzada     Heart attack Maternal Grandfather Dan Calzada     Heart disease Maternal Grandmother Anayeli Calzada     Osteoporosis Maternal Grandmother Anayeli Calzada     Angina Maternal Grandmother Anayeli Calzada     Arrhythmia Maternal Grandmother Anayeli Calzada     Heart attack Maternal Grandmother Anayeli Calzada     Heart failure Maternal Grandmother Anayeli Calzada     Cancer Paternal Grandfather Supa Colon     Anemia Mother's Sister Ada Sebastian     Cancer Father's Brother Tee Colon     Cancer Father's Brother Armando Tracy     Cancer Father's Brother Trenton Colon     Psoriasis Neg Hx      Irritable bowel syndrome Neg Hx     [4]   Social History  Tobacco Use    Smoking status: Never     Passive exposure: Never    Smokeless tobacco: Never   Vaping Use    Vaping status: Never Used   Substance Use Topics    Alcohol use: Not Currently    Drug use: Never        Chris Ozuna  RAMESH  06/28/25 1538

## 2025-06-30 LAB
ATRIAL RATE: 71 BPM
ATRIAL RATE: 88 BPM
P AXIS: 56 DEGREES
P AXIS: 59 DEGREES
P OFFSET: 197 MS
P OFFSET: 197 MS
P ONSET: 146 MS
P ONSET: 147 MS
PR INTERVAL: 138 MS
PR INTERVAL: 140 MS
Q ONSET: 215 MS
Q ONSET: 217 MS
QRS COUNT: 12 BEATS
QRS COUNT: 15 BEATS
QRS DURATION: 90 MS
QRS DURATION: 90 MS
QT INTERVAL: 382 MS
QT INTERVAL: 406 MS
QTC CALCULATION(BAZETT): 441 MS
QTC CALCULATION(BAZETT): 462 MS
QTC FREDERICIA: 429 MS
QTC FREDERICIA: 434 MS
R AXIS: -9 DEGREES
R AXIS: -9 DEGREES
T AXIS: -18 DEGREES
T AXIS: 1 DEGREES
T OFFSET: 406 MS
T OFFSET: 420 MS
VENTRICULAR RATE: 71 BPM
VENTRICULAR RATE: 88 BPM

## 2025-07-01 DIAGNOSIS — B35.1 FUNGAL INFECTION OF TOENAIL: ICD-10-CM

## 2025-07-01 RX ORDER — TERBINAFINE HYDROCHLORIDE 250 MG/1
250 TABLET ORAL DAILY
Qty: 14 TABLET | Refills: 0 | Status: SHIPPED | OUTPATIENT
Start: 2025-07-01 | End: 2025-07-15

## 2025-07-02 ENCOUNTER — APPOINTMENT (OUTPATIENT)
Dept: PHYSICAL THERAPY | Facility: CLINIC | Age: 50
End: 2025-07-02
Payer: COMMERCIAL

## 2025-07-03 ENCOUNTER — APPOINTMENT (OUTPATIENT)
Dept: PRIMARY CARE | Facility: CLINIC | Age: 50
End: 2025-07-03
Payer: COMMERCIAL

## 2025-07-07 DIAGNOSIS — M47.816 LUMBAR SPONDYLOSIS: ICD-10-CM

## 2025-07-07 DIAGNOSIS — M47.812 CERVICAL SPONDYLOSIS: ICD-10-CM

## 2025-07-08 ENCOUNTER — HOSPITAL ENCOUNTER (EMERGENCY)
Facility: HOSPITAL | Age: 50
Discharge: HOME | End: 2025-07-08
Attending: STUDENT IN AN ORGANIZED HEALTH CARE EDUCATION/TRAINING PROGRAM
Payer: COMMERCIAL

## 2025-07-08 VITALS
HEART RATE: 80 BPM | OXYGEN SATURATION: 100 % | TEMPERATURE: 97.7 F | SYSTOLIC BLOOD PRESSURE: 157 MMHG | RESPIRATION RATE: 18 BRPM | HEIGHT: 63 IN | WEIGHT: 184 LBS | BODY MASS INDEX: 32.6 KG/M2 | DIASTOLIC BLOOD PRESSURE: 99 MMHG

## 2025-07-08 DIAGNOSIS — R07.9 CHEST PAIN, UNSPECIFIED TYPE: ICD-10-CM

## 2025-07-08 DIAGNOSIS — M25.519 SHOULDER PAIN, UNSPECIFIED CHRONICITY, UNSPECIFIED LATERALITY: ICD-10-CM

## 2025-07-08 DIAGNOSIS — R52 BODY ACHES: ICD-10-CM

## 2025-07-08 DIAGNOSIS — M54.13 RADICULOPATHY OF CERVICOTHORACIC REGION: Primary | ICD-10-CM

## 2025-07-08 DIAGNOSIS — M79.10 MYALGIA: ICD-10-CM

## 2025-07-08 PROCEDURE — 2500000001 HC RX 250 WO HCPCS SELF ADMINISTERED DRUGS (ALT 637 FOR MEDICARE OP): Performed by: STUDENT IN AN ORGANIZED HEALTH CARE EDUCATION/TRAINING PROGRAM

## 2025-07-08 PROCEDURE — 96372 THER/PROPH/DIAG INJ SC/IM: CPT | Performed by: STUDENT IN AN ORGANIZED HEALTH CARE EDUCATION/TRAINING PROGRAM

## 2025-07-08 PROCEDURE — 99284 EMERGENCY DEPT VISIT MOD MDM: CPT | Performed by: STUDENT IN AN ORGANIZED HEALTH CARE EDUCATION/TRAINING PROGRAM

## 2025-07-08 PROCEDURE — 2500000004 HC RX 250 GENERAL PHARMACY W/ HCPCS (ALT 636 FOR OP/ED): Performed by: STUDENT IN AN ORGANIZED HEALTH CARE EDUCATION/TRAINING PROGRAM

## 2025-07-08 RX ORDER — GABAPENTIN 300 MG/1
300 CAPSULE ORAL 3 TIMES DAILY
Qty: 90 CAPSULE | Refills: 3 | Status: SHIPPED | OUTPATIENT
Start: 2025-07-08

## 2025-07-08 RX ORDER — GABAPENTIN 300 MG/1
300 CAPSULE ORAL ONCE
Status: COMPLETED | OUTPATIENT
Start: 2025-07-08 | End: 2025-07-08

## 2025-07-08 RX ORDER — GABAPENTIN 300 MG/1
300 CAPSULE ORAL 3 TIMES DAILY
Qty: 21 CAPSULE | Refills: 0 | Status: SHIPPED | OUTPATIENT
Start: 2025-07-08 | End: 2025-07-15

## 2025-07-08 RX ORDER — METHYLPREDNISOLONE 4 MG/1
TABLET ORAL
Qty: 21 TABLET | Refills: 0 | Status: SHIPPED | OUTPATIENT
Start: 2025-07-08 | End: 2025-07-15

## 2025-07-08 RX ORDER — DEXAMETHASONE SODIUM PHOSPHATE 10 MG/ML
10 INJECTION INTRAMUSCULAR; INTRAVENOUS ONCE
Status: COMPLETED | OUTPATIENT
Start: 2025-07-08 | End: 2025-07-08

## 2025-07-08 RX ORDER — MORPHINE SULFATE 4 MG/ML
4 INJECTION, SOLUTION INTRAMUSCULAR; INTRAVENOUS ONCE
Status: COMPLETED | OUTPATIENT
Start: 2025-07-08 | End: 2025-07-08

## 2025-07-08 RX ADMIN — MORPHINE SULFATE 4 MG: 4 INJECTION, SOLUTION INTRAMUSCULAR; INTRAVENOUS at 06:55

## 2025-07-08 RX ADMIN — DEXAMETHASONE SODIUM PHOSPHATE 10 MG: 10 INJECTION INTRAMUSCULAR; INTRAVENOUS at 06:52

## 2025-07-08 RX ADMIN — GABAPENTIN 300 MG: 300 CAPSULE ORAL at 06:52

## 2025-07-08 ASSESSMENT — PAIN - FUNCTIONAL ASSESSMENT: PAIN_FUNCTIONAL_ASSESSMENT: 0-10

## 2025-07-08 ASSESSMENT — PAIN DESCRIPTION - PROGRESSION: CLINICAL_PROGRESSION: GRADUALLY WORSENING

## 2025-07-08 ASSESSMENT — PAIN DESCRIPTION - FREQUENCY: FREQUENCY: CONSTANT/CONTINUOUS

## 2025-07-08 ASSESSMENT — PAIN DESCRIPTION - ONSET: ONSET: ONGOING

## 2025-07-08 ASSESSMENT — PAIN SCALES - GENERAL: PAINLEVEL_OUTOF10: 10 - WORST POSSIBLE PAIN

## 2025-07-08 ASSESSMENT — PAIN DESCRIPTION - LOCATION: LOCATION: NECK

## 2025-07-08 ASSESSMENT — PAIN DESCRIPTION - DIRECTION: RADIATING_TOWARDS: SHOULDER BLADE

## 2025-07-08 ASSESSMENT — PAIN DESCRIPTION - ORIENTATION: ORIENTATION: LEFT

## 2025-07-08 ASSESSMENT — PAIN DESCRIPTION - PAIN TYPE: TYPE: ACUTE PAIN

## 2025-07-08 NOTE — ED PROVIDER NOTES
Emergency Department Provider Note              [facility] EMERGENCY MEDICINE  Emergency Department        Pt Name: Kat Tineo  MRN: [id number]  Birthdate 1975  Date of evaluation: [unfilled]    CHIEF COMPLAINT       Chief Complaint   Patient presents with    Neck Pain     Pt started to have pain in her neck for the last few days. Pt states the pain radiates to the back of her left shoulder. Pt went to urgent care yesterday and couldn't get all the medications that they prescribed.        OF PRESENT ILLNESS  (Location/Symptom, Timing/Onset, Context/Setting, Quality, Duration, ModifyingFactors, Severity.)      Kat Tineo is a 49 y.o. female who presents with pain in her left upper scapula radiating to the base of the skull to the right shoulder down the spine.  Patient does have a history of spinal surgery including cervical surgery and has been seeing primary care physician has been referred to orthopedics on the 17th is also referred to pain specialist for which she has been having chronic neck and back pains.  Does have a history of rheumatoid arthritis as well.  Has run out of her gabapentin has also come to the emergency department recently and had an x-ray states that in the last 2 days that she is not sleeping well she is not eating well because of the pain that she is experiencing.  She denied having any red flag symptoms such as difficulty urinating or defecating denied any fever.    PAST MEDICAL / SURGICAL / SOCIAL / FAMILY HISTORY      has a past medical history of Acid reflux, Ankylosing spondylitis of site in spine (Multi), Arthritis (2000), Bursitis of hip, Cervical disc disorder, CTS (carpal tunnel syndrome), Depression, Diabetes mellitus (Multi), Dislocation of hip (Multi), DM (diabetes mellitus), gestational, Fracture of hip (Multi), Headache, History of transfusion, Hypertension, Joint pain, Low back pain, Lumbosacral disc disease, Migraine, Neck pain, Obesity, Osteoarthritis,  Plantar fasciitis, Reflex sympathetic dystrophy, Rheumatoid arthritis, Sciatica, Spinal stenosis, and Spondylolisthesis.    She has no past medical history of Personal history of irradiation.     has a past surgical history that includes US guided soft tissue fluid drain (2021); CT guided soft tissue fluid drainage (2022); Other surgical history (2017); Neck surgery; Other surgical history (Left); Laparoscopy gastrectomy partial / total (); Spine surgery (2023); Hysterectomy (); Gastric bypass (2024); Cholecystectomy ();  section, low transverse (2012); Hernia repair (2024); Esophagogastroduodenoscopy; Cardiac catheterization; Tubal ligation; Back surgery; Joint replacement; Oophorectomy (Approximately ); Laminectomy; Spinal fusion; orthopedic surgery (2023); Hip Arthroplasty; and Plantar fascia release.    Social History     Socioeconomic History    Marital status:      Spouse name: Not on file    Number of children: Not on file    Years of education: Not on file    Highest education level: Not on file   Occupational History    Not on file   Tobacco Use    Smoking status: Never     Passive exposure: Never    Smokeless tobacco: Never   Vaping Use    Vaping status: Never Used   Substance and Sexual Activity    Alcohol use: Not Currently     Alcohol/week: 2.0 standard drinks of alcohol     Types: 2 Shots of liquor per week     Comment: Maybe 2 per year    Drug use: Never    Sexual activity: Yes     Partners: Male     Birth control/protection: Condom Male, Female Sterilization   Other Topics Concern    Not on file   Social History Narrative    Not on file     Social Drivers of Health     Financial Resource Strain: Low Risk  (2024)    Overall Financial Resource Strain (CARDIA)     Difficulty of Paying Living Expenses: Not hard at all   Food Insecurity: No Food Insecurity (2024)    Hunger Vital Sign     Worried About Running Out of  Food in the Last Year: Never true     Ran Out of Food in the Last Year: Never true   Transportation Needs: No Transportation Needs (11/26/2024)    PRAPARE - Transportation     Lack of Transportation (Medical): No     Lack of Transportation (Non-Medical): No   Physical Activity: Insufficiently Active (3/27/2024)    Exercise Vital Sign     Days of Exercise per Week: 3 days     Minutes of Exercise per Session: 30 min   Stress: Not on file   Social Connections: Not on file   Intimate Partner Violence: Not At Risk (11/26/2024)    Humiliation, Afraid, Rape, and Kick questionnaire     Fear of Current or Ex-Partner: No     Emotionally Abused: No     Physically Abused: No     Sexually Abused: No   Housing Stability: Low Risk  (11/26/2024)    Housing Stability Vital Sign     Unable to Pay for Housing in the Last Year: No     Number of Times Moved in the Last Year: 1     Homeless in the Last Year: No       Family History[1]    Allergies:  Ace inhibitors, Adhesive tape-silicones, and Amoxicillin    Home Medications:  Prior to Admission medications    Medication Sig Start Date End Date Taking? Authorizing Provider   amLODIPine (Norvasc) 10 mg tablet TAKE 1 TABLET BY MOUTH EVERY DAY 6/9/25   Dagoberto Ice, DO   calcium carbonate-vitamin D3 500 mg-5 mcg (200 unit) tablet Take 1 tablet by mouth once daily.    Historical Provider, MD   cholecalciferol (Vitamin D3) 25 mcg (1,000 units) tablet Take 2 tablets (50 mcg) by mouth once daily.    Historical Provider, MD   cholestyramine (Prevalite) 4 gram packet Take 1 packet (4 g) by mouth 3 times a day. 5/21/25 5/21/26  PRACHI Moore-CNP   colesevelam 3.75 gram powder in packet Take 3.75 g by mouth once daily. 5/29/25 5/29/26  PRACHI Moore-CNP   cyanocobalamin (Vitamin B-12) 250 mcg tablet Take 1 tablet (250 mcg) by mouth once daily.    Historical Provider, MD   DULoxetine (Cymbalta) 60 mg DR capsule TAKE 1 CAPSULE BY MOUTH EVERY DAY 6/6/25   Dagoberto Ice, DO    gabapentin (Neurontin) 300 mg capsule Take 1 capsule (300 mg) by mouth 3 times a day. 3/12/25   Mariam Todd PA-C   losartan (Cozaar) 100 mg tablet Take 1 tablet (100 mg) by mouth once daily. Do not start before November 27, 2024. 12/23/24   Dagoberto Ice, DO   methocarbamol (Robaxin) 500 mg tablet Take 1 tablet (500 mg) by mouth 4 times a day as needed for muscle spasms for up to 10 days. 4/25/25 5/5/25  Mariam Todd PA-C   methylPREDNISolone (Medrol Dospak) 4 mg tablets Follow schedule on package instructions 6/28/25 7/5/25  Chris Ozuna PA-C   metoprolol tartrate (Lopressor) 50 mg tablet Take 1 tablet (50 mg) by mouth 2 times a day. Do not start before November 27, 2024. 11/27/24   Kristine Hale MD   multivitamin with iron - children's (Cerovite, Jr) chewable tablet Chew 1 tablet once daily.    Historical Provider, MD   orphenadrine (Norflex) 100 mg 12 hr tablet Take 1 tablet (100 mg) by mouth 2 times a day as needed for muscle spasms for up to 5 days. Do not crush, chew, or split. 4/30/25 5/5/25  Rocco Carmona PA-C   potassium bicarbonate (K-Lyte) 25 mEq effervescent tablet Take 1 tablet (25 mEq) by mouth 2 times a day.    Historical Provider, MD   potassium chloride CR (Klor-Con M20) 20 mEq ER tablet Take 1 tablet (20 mEq) by mouth 2 times a day for 10 days. Do not crush or chew. 6/28/25 7/8/25  Chris Ozuna PA-C   rizatriptan (Maxalt) 10 mg tablet TAKE 1 TABLET AT ONSET OF HEADACHE. MAY REPEAT EVERY 2 HOURS AS NEEDED. MAXIMUM 3 TABLETS/24 HOURS. 11/7/24   Dagoberto Ice, DO   terbinafine (LamISIL) 250 mg tablet TAKE 1 TABLET (250 MG) BY MOUTH ONCE DAILY FOR 14 DAYS. 7/1/25 7/15/25  Dagoberto Ice, DO   tiZANidine (Zanaflex) 4 mg tablet TAKE 1 TABLET BY MOUTH EVERYDAY AT BEDTIME 5/8/25   Amisha Maguire PA-C   upadacitinib ER (Rinvoq) 30 mg tablet extended release 24 hr Take 1 tablet (30 mg) by mouth once daily.    Historical Provider, MD   zolpidem (Ambien) 10 mg tablet TAKE 1 TABLET (10 MG) BY  "MOUTH AS NEEDED AT BEDTIME FOR SLEEP. 6/9/25   Dagoberto Ice, DO       REVIEW OF SYSTEMS    (2-9 systems for level 4, 10 or more for level 5)     Review of Systems    PHYSICAL EXAM   (up to 7 for level 4, 8 or more for level 5)     INITIAL VITALS:   BP (!) 157/99 (BP Location: Right arm, Patient Position: Sitting)   Pulse 80   Temp 36.5 °C (97.7 °F) (Temporal)   Resp 18   Ht 1.6 m (5' 3\")   Wt 83.5 kg (184 lb)   SpO2 100%   BMI 32.59 kg/m²     Physical Exam  HENT:      Head: Normocephalic and atraumatic.   Neck:      Thyroid: No thyroid mass or thyromegaly.      Trachea: Trachea and phonation normal. No tracheal tenderness.      Comments: Surgical scar anterior approach well healed  Musculoskeletal:      Cervical back: Neck supple. Tenderness present. No erythema or rigidity. Pain with movement, spinous process tenderness and muscular tenderness present.   Lymphadenopathy:      Cervical: No cervical adenopathy.              DIAGNOSIS         (LABS / IMAGING / EKG):  No orders of the defined types were placed in this encounter.      MEDICATIONS ORDERED:  [unfilled]    RESULTS / EMERGENCY DEPARTMENT COURSE / MDM   :  Results for orders placed or performed during the hospital encounter of 06/28/25   ECG 12 lead    Collection Time: 06/28/25 11:48 AM   Result Value Ref Range    Ventricular Rate 88 BPM    Atrial Rate 88 BPM    NJ Interval 138 ms    QRS Duration 90 ms    QT Interval 382 ms    QTC Calculation(Bazett) 462 ms    P Axis 56 degrees    R Axis -9 degrees    T Axis 1 degrees    QRS Count 15 beats    Q Onset 215 ms    P Onset 146 ms    P Offset 197 ms    T Offset 406 ms    QTC Fredericia 434 ms   CBC with Differential    Collection Time: 06/28/25 11:53 AM   Result Value Ref Range    WBC 10.4 4.4 - 11.3 x10*3/uL    nRBC 0.0 0.0 - 0.0 /100 WBCs    RBC 4.31 4.00 - 5.20 x10*6/uL    Hemoglobin 12.7 12.0 - 16.0 g/dL    Hematocrit 38.1 36.0 - 46.0 %    MCV 88 80 - 100 fL    MCH 29.5 26.0 - 34.0 pg    MCHC 33.3 32.0 - " 36.0 g/dL    RDW 12.8 11.5 - 14.5 %    Platelets 504 (H) 150 - 450 x10*3/uL    Neutrophils % 81.9 40.0 - 80.0 %    Immature Granulocytes %, Automated 0.6 0.0 - 0.9 %    Lymphocytes % 12.3 13.0 - 44.0 %    Monocytes % 4.8 2.0 - 10.0 %    Eosinophils % 0.2 0.0 - 6.0 %    Basophils % 0.2 0.0 - 2.0 %    Neutrophils Absolute 8.55 (H) 1.20 - 7.70 x10*3/uL    Immature Granulocytes Absolute, Automated 0.06 0.00 - 0.70 x10*3/uL    Lymphocytes Absolute 1.28 1.20 - 4.80 x10*3/uL    Monocytes Absolute 0.50 0.10 - 1.00 x10*3/uL    Eosinophils Absolute 0.02 0.00 - 0.70 x10*3/uL    Basophils Absolute 0.02 0.00 - 0.10 x10*3/uL   Comprehensive Metabolic Panel    Collection Time: 06/28/25 11:53 AM   Result Value Ref Range    Glucose 122 (H) 74 - 99 mg/dL    Sodium 141 136 - 145 mmol/L    Potassium 2.7 (LL) 3.5 - 5.3 mmol/L    Chloride 106 98 - 107 mmol/L    Bicarbonate 28 21 - 32 mmol/L    Anion Gap 10 10 - 20 mmol/L    Urea Nitrogen 11 6 - 23 mg/dL    Creatinine 0.63 0.50 - 1.05 mg/dL    eGFR >90 >60 mL/min/1.73m*2    Calcium 8.6 8.6 - 10.3 mg/dL    Albumin 3.9 3.4 - 5.0 g/dL    Alkaline Phosphatase 87 33 - 110 U/L    Total Protein 6.9 6.4 - 8.2 g/dL    AST 23 9 - 39 U/L    Bilirubin, Total 0.3 0.0 - 1.2 mg/dL    ALT 23 7 - 45 U/L   Troponin I, High Sensitivity, Initial    Collection Time: 06/28/25 11:53 AM   Result Value Ref Range    Troponin I, High Sensitivity 8 0 - 13 ng/L   Magnesium    Collection Time: 06/28/25 11:53 AM   Result Value Ref Range    Magnesium 2.14 1.60 - 2.40 mg/dL   Troponin, High Sensitivity, 1 Hour    Collection Time: 06/28/25  1:30 PM   Result Value Ref Range    Troponin I, High Sensitivity 7 0 - 13 ng/L   ECG 12 lead    Collection Time: 06/28/25  1:30 PM   Result Value Ref Range    Ventricular Rate 71 BPM    Atrial Rate 71 BPM    MI Interval 140 ms    QRS Duration 90 ms    QT Interval 406 ms    QTC Calculation(Bazett) 441 ms    P Axis 59 degrees    R Axis -9 degrees    T Axis -18 degrees    QRS Count 12  beats    Q Onset 217 ms    P Onset 147 ms    P Offset 197 ms    T Offset 420 ms    QTC Fredericia 429 ms     *Note: Due to a large number of results and/or encounters for the requested time period, some results have not been displayed. A complete set of results can be found in Results Review.       IMPRESSION:     RADIOLOGY:      EKG:      POC ULTRASOUND:      EMERGENCY DEPARTMENT COURSE    49-year-old female interviewed and appears to be having acute on chronic exacerbation of neck and back pain  I took a careful history including a chart review the patient's symptoms seem to be chronic but acutely exacerbated  She does not exhibit any red flag symptoms that would prompt additional imaging such as CT scan or MRI  History of rheumatism and also cervical surgeries has been told that she has bone spurs pain seems to be exacerbated as she has run out of gabapentin and tramadol has not given her effective pain control  She is pending a pain management follow-up and orthopedic follow-up  At today's visit I did a full examination of her spine and her symptoms do not do seem to be consistent with anything acute other than an exacerbation of musculoskeletal pain  For this reason no additional imaging was obtained today  Made a contract with the patient socially that she would not drive home because I am going to give her an intramuscular dose of morphine and a 10 mg dose of dexamethasone and she assured me that the family member would come pick her up  Trying to balance the patient's pain with other differential diagnoses I think this is likely an exacerbation of her chronic illness and nothing more was done regarding blood work or imaging for this reason  She seemed to be in agreement and very thankful for a refill of her gabapentin and we will start her on a Medrol Dosepak again will hold any other opioids at this time other than 1 dose in the emergency department and she was discharged in stable condition    Diagnoses as  of 07/08/25 0654   Radiculopathy of cervicothoracic region       PROCEDURES:      CONSULTS:  None    CRITICAL CARE:      FINAL IMPRESSION      1. Radiculopathy of cervicothoracic region    2. Shoulder pain, unspecified chronicity, unspecified laterality    3. Chest pain, unspecified type    4. Myalgia    5. Body aches          DISPOSITION / PLAN     [unfilled]    PATIENT REFERRED TO:  No follow-up provider specified.    DISCHARGE MEDICATIONS:  New Prescriptions    GABAPENTIN (NEURONTIN) 300 MG CAPSULE    Take 1 capsule (300 mg) by mouth 3 times a day for 7 days.       Pb Maher MD  6:54 AM    Attending Emergency Physician  [facility] EMERGENCY MEDICINE    (Please note that portions of this note were completed with a voice recognition program.  Effortswere made to edit the dictations but occasionally words are mis-transcribed.)                                                                 [1]   Family History  Problem Relation Name Age of Onset    Diabetes Mother Darline Calzada     Hypertension Mother Darline Calzada     Other (morbid obesity) Mother Darline Calzada     Hernia Mother Darline Calzada     Vision loss Mother Darline Calzada     Diabetes type I Mother Darline Calzada     Other (htn) Father Ammon Colon     Hypertension Father Ammon Colon     Arthritis Father Ammon Colon     Alcohol abuse Father Ammon Colon     Cancer Father Ammon Colon     Depression Father Ammon Colon     Arthritis Other      Stroke Maternal Grandfather Dan Calzada     Heart attack Maternal Grandfather Dan Calzada     Heart disease Maternal Grandmother Anayeli Calzada     Osteoporosis Maternal Grandmother Anayeli Calzada     Angina Maternal Grandmother Anayeli Calzada     Arrhythmia Maternal Grandmother Anayeli Calzada     Heart attack Maternal Grandmother Anayeli Calzada     Heart failure Maternal Grandmother Anayeli Calzada     Cancer Paternal Grandfather Supa Colon     Anemia Mother's Sister Lynne Cortes     Cancer Father's Brother Tee  Colon     Cancer Father's Brother Armando Tracy     Cancer Father's Brother Trenton Colon     Psoriasis Neg Hx      Irritable bowel syndrome Neg Hx      Asthma Son Leonardo     Cancer Father's Brother Tee Colon     Cancer Father's Brother Frank Colon     Cancer Father's Brother Jefe Colon     Cancer Father's Brother Armando Tracy     Cancer Father's Brother Trenton Colon     Cancer Paternal Grandfather Supa Colon     Drug abuse Father's Sister Eduarda Colon     Heart disease Maternal Grandmother Anayeli Calzada     Vision loss Maternal Grandmother Anayeli Calzada     Stroke Maternal Grandfather Sy Calzada     Heart failure Maternal Grandmother Anayeli Calzada     Arrhythmia Maternal Grandmother Anayeli Calzada     Heart attack Maternal Grandmother Anayeli Calzada     Osteoporosis Maternal Grandmother Anayeli Calzada     Angina Maternal Grandmother Anayeli Calzada     Atrial fibrillation Maternal Grandmother Anayeli Calzada     Heart attack Maternal Grandfather Sy Maher MD  07/08/25 0683

## 2025-07-10 ENCOUNTER — APPOINTMENT (OUTPATIENT)
Dept: PHYSICAL THERAPY | Facility: CLINIC | Age: 50
End: 2025-07-10
Payer: COMMERCIAL

## 2025-07-15 DIAGNOSIS — M54.12 CERVICAL RADICULITIS: ICD-10-CM

## 2025-07-15 DIAGNOSIS — M47.812 CERVICAL SPONDYLOSIS: ICD-10-CM

## 2025-07-16 DIAGNOSIS — M19.90 ARTHRITIS: Primary | ICD-10-CM

## 2025-07-16 RX ORDER — TRAMADOL HYDROCHLORIDE 50 MG/1
50 TABLET, FILM COATED ORAL EVERY 6 HOURS PRN
Qty: 15 TABLET | Refills: 0 | Status: SHIPPED | OUTPATIENT
Start: 2025-07-16 | End: 2025-07-24

## 2025-07-16 RX ORDER — METHOCARBAMOL 500 MG/1
500 TABLET, FILM COATED ORAL 4 TIMES DAILY PRN
Qty: 40 TABLET | Refills: 0 | OUTPATIENT
Start: 2025-07-16 | End: 2025-07-26

## 2025-07-16 RX ORDER — PREDNISONE 10 MG/1
TABLET ORAL
Qty: 24 TABLET | Refills: 0 | OUTPATIENT
Start: 2025-07-16 | End: 2025-07-23

## 2025-07-16 NOTE — PROGRESS NOTES
Subjective   Reason for Visit: Kat Tineo is an 49 y.o. female here for a Medicare Wellness visit.               HPI    Patient Care Team:  Dagoberto Garza DO as PCP - General  Pb Jones DO as PCP - [facility] PCP  Pb Jones DO as PCP - [facility] Medicaid PCP  Ryan Quigley MD as Referring Physician (Hematology and Oncology)  Suzy Walker RN as Care Manager (Case Management)     Review of Systems    Objective   Vitals:  There were no vitals taken for this visit.      Physical Exam    Assessment & Plan

## 2025-07-17 ENCOUNTER — APPOINTMENT (OUTPATIENT)
Dept: ORTHOPEDIC SURGERY | Facility: CLINIC | Age: 50
End: 2025-07-17
Payer: COMMERCIAL

## 2025-07-17 DIAGNOSIS — M47.812 CERVICAL SPONDYLOSIS: ICD-10-CM

## 2025-07-17 DIAGNOSIS — Z98.1 HISTORY OF FUSION OF CERVICAL SPINE: ICD-10-CM

## 2025-07-17 DIAGNOSIS — M54.12 CERVICAL RADICULITIS: Primary | ICD-10-CM

## 2025-07-17 PROCEDURE — 99213 OFFICE O/P EST LOW 20 MIN: CPT | Performed by: PHYSICIAN ASSISTANT

## 2025-07-17 PROCEDURE — 4010F ACE/ARB THERAPY RXD/TAKEN: CPT | Performed by: PHYSICIAN ASSISTANT

## 2025-07-17 RX ORDER — KETOROLAC TROMETHAMINE 10 MG/1
10 TABLET, FILM COATED ORAL EVERY 6 HOURS PRN
Qty: 15 TABLET | Refills: 0 | Status: SHIPPED | OUTPATIENT
Start: 2025-07-17 | End: 2025-07-22

## 2025-07-17 RX ORDER — METHOCARBAMOL 750 MG/1
750 TABLET, FILM COATED ORAL 4 TIMES DAILY PRN
Qty: 40 TABLET | Refills: 0 | Status: ON HOLD | OUTPATIENT
Start: 2025-07-17 | End: 2025-08-02

## 2025-07-17 ASSESSMENT — PAIN SCALES - GENERAL: PAINLEVEL_OUTOF10: 9

## 2025-07-17 ASSESSMENT — PAIN - FUNCTIONAL ASSESSMENT: PAIN_FUNCTIONAL_ASSESSMENT: 0-10

## 2025-07-22 ENCOUNTER — HOSPITAL ENCOUNTER (EMERGENCY)
Facility: HOSPITAL | Age: 50
Discharge: HOME | End: 2025-07-22
Payer: COMMERCIAL

## 2025-07-22 ENCOUNTER — APPOINTMENT (OUTPATIENT)
Dept: CARDIOLOGY | Facility: HOSPITAL | Age: 50
End: 2025-07-22
Payer: COMMERCIAL

## 2025-07-22 ENCOUNTER — APPOINTMENT (OUTPATIENT)
Dept: RADIOLOGY | Facility: HOSPITAL | Age: 50
End: 2025-07-22
Payer: COMMERCIAL

## 2025-07-22 VITALS
HEART RATE: 69 BPM | HEIGHT: 62 IN | OXYGEN SATURATION: 98 % | SYSTOLIC BLOOD PRESSURE: 129 MMHG | RESPIRATION RATE: 15 BRPM | DIASTOLIC BLOOD PRESSURE: 87 MMHG | TEMPERATURE: 98.2 F | BODY MASS INDEX: 33.6 KG/M2 | WEIGHT: 182.6 LBS

## 2025-07-22 DIAGNOSIS — G43.409 HEMIPLEGIC MIGRAINE WITHOUT STATUS MIGRAINOSUS, NOT INTRACTABLE: Primary | ICD-10-CM

## 2025-07-22 LAB
ALBUMIN SERPL BCP-MCNC: 3.7 G/DL (ref 3.4–5)
ALP SERPL-CCNC: 89 U/L (ref 33–110)
ALT SERPL W P-5'-P-CCNC: 27 U/L (ref 7–45)
ANION GAP SERPL CALCULATED.3IONS-SCNC: 11 MMOL/L (ref 10–20)
AST SERPL W P-5'-P-CCNC: 25 U/L (ref 9–39)
BASOPHILS # BLD AUTO: 0.02 X10*3/UL (ref 0–0.1)
BASOPHILS NFR BLD AUTO: 0.2 %
BILIRUB SERPL-MCNC: 0.4 MG/DL (ref 0–1.2)
BUN SERPL-MCNC: 12 MG/DL (ref 6–23)
CALCIUM SERPL-MCNC: 9.1 MG/DL (ref 8.6–10.3)
CARDIAC TROPONIN I PNL SERPL HS: 4 NG/L (ref 0–13)
CARDIAC TROPONIN I PNL SERPL HS: 4 NG/L (ref 0–13)
CHLORIDE SERPL-SCNC: 106 MMOL/L (ref 98–107)
CO2 SERPL-SCNC: 27 MMOL/L (ref 21–32)
CREAT SERPL-MCNC: 0.78 MG/DL (ref 0.5–1.05)
EGFRCR SERPLBLD CKD-EPI 2021: >90 ML/MIN/1.73M*2
EOSINOPHIL # BLD AUTO: 0.05 X10*3/UL (ref 0–0.7)
EOSINOPHIL NFR BLD AUTO: 0.6 %
ERYTHROCYTE [DISTWIDTH] IN BLOOD BY AUTOMATED COUNT: 14.2 % (ref 11.5–14.5)
GLUCOSE SERPL-MCNC: 216 MG/DL (ref 74–99)
HCT VFR BLD AUTO: 39.4 % (ref 36–46)
HGB BLD-MCNC: 12.5 G/DL (ref 12–16)
IMM GRANULOCYTES # BLD AUTO: 0.04 X10*3/UL (ref 0–0.7)
IMM GRANULOCYTES NFR BLD AUTO: 0.5 % (ref 0–0.9)
LYMPHOCYTES # BLD AUTO: 1.37 X10*3/UL (ref 1.2–4.8)
LYMPHOCYTES NFR BLD AUTO: 16 %
MCH RBC QN AUTO: 28.9 PG (ref 26–34)
MCHC RBC AUTO-ENTMCNC: 31.7 G/DL (ref 32–36)
MCV RBC AUTO: 91 FL (ref 80–100)
MONOCYTES # BLD AUTO: 0.37 X10*3/UL (ref 0.1–1)
MONOCYTES NFR BLD AUTO: 4.3 %
NEUTROPHILS # BLD AUTO: 6.72 X10*3/UL (ref 1.2–7.7)
NEUTROPHILS NFR BLD AUTO: 78.4 %
NRBC BLD-RTO: 0 /100 WBCS (ref 0–0)
PLATELET # BLD AUTO: 525 X10*3/UL (ref 150–450)
POTASSIUM SERPL-SCNC: 3.1 MMOL/L (ref 3.5–5.3)
PROT SERPL-MCNC: 7.1 G/DL (ref 6.4–8.2)
RBC # BLD AUTO: 4.32 X10*6/UL (ref 4–5.2)
SODIUM SERPL-SCNC: 141 MMOL/L (ref 136–145)
WBC # BLD AUTO: 8.6 X10*3/UL (ref 4.4–11.3)

## 2025-07-22 PROCEDURE — 2500000004 HC RX 250 GENERAL PHARMACY W/ HCPCS (ALT 636 FOR OP/ED)

## 2025-07-22 PROCEDURE — 84484 ASSAY OF TROPONIN QUANT: CPT

## 2025-07-22 PROCEDURE — 96361 HYDRATE IV INFUSION ADD-ON: CPT

## 2025-07-22 PROCEDURE — 70450 CT HEAD/BRAIN W/O DYE: CPT

## 2025-07-22 PROCEDURE — 99285 EMERGENCY DEPT VISIT HI MDM: CPT | Mod: 25

## 2025-07-22 PROCEDURE — 96374 THER/PROPH/DIAG INJ IV PUSH: CPT

## 2025-07-22 PROCEDURE — 70450 CT HEAD/BRAIN W/O DYE: CPT | Performed by: RADIOLOGY

## 2025-07-22 PROCEDURE — 96375 TX/PRO/DX INJ NEW DRUG ADDON: CPT

## 2025-07-22 PROCEDURE — 36415 COLL VENOUS BLD VENIPUNCTURE: CPT

## 2025-07-22 PROCEDURE — 93005 ELECTROCARDIOGRAM TRACING: CPT

## 2025-07-22 PROCEDURE — 85025 COMPLETE CBC W/AUTO DIFF WBC: CPT

## 2025-07-22 PROCEDURE — 2500000001 HC RX 250 WO HCPCS SELF ADMINISTERED DRUGS (ALT 637 FOR MEDICARE OP)

## 2025-07-22 PROCEDURE — 80053 COMPREHEN METABOLIC PANEL: CPT

## 2025-07-22 RX ORDER — PREDNISONE 20 MG/1
40 TABLET ORAL DAILY
Qty: 10 TABLET | Refills: 0 | Status: SHIPPED | OUTPATIENT
Start: 2025-07-22 | End: 2025-07-27

## 2025-07-22 RX ORDER — LORAZEPAM 2 MG/ML
0.5 INJECTION INTRAMUSCULAR ONCE
Status: COMPLETED | OUTPATIENT
Start: 2025-07-22 | End: 2025-07-22

## 2025-07-22 RX ORDER — KETOROLAC TROMETHAMINE 30 MG/ML
30 INJECTION, SOLUTION INTRAMUSCULAR; INTRAVENOUS ONCE
Status: COMPLETED | OUTPATIENT
Start: 2025-07-22 | End: 2025-07-22

## 2025-07-22 RX ORDER — PROCHLORPERAZINE EDISYLATE 5 MG/ML
10 INJECTION INTRAMUSCULAR; INTRAVENOUS ONCE
Status: COMPLETED | OUTPATIENT
Start: 2025-07-22 | End: 2025-07-22

## 2025-07-22 RX ORDER — POTASSIUM CHLORIDE 1.5 G/1.58G
20 POWDER, FOR SOLUTION ORAL 2 TIMES DAILY
Status: DISCONTINUED | OUTPATIENT
Start: 2025-07-22 | End: 2025-07-22 | Stop reason: HOSPADM

## 2025-07-22 RX ORDER — DIPHENHYDRAMINE HYDROCHLORIDE 50 MG/ML
25 INJECTION, SOLUTION INTRAMUSCULAR; INTRAVENOUS ONCE
Status: COMPLETED | OUTPATIENT
Start: 2025-07-22 | End: 2025-07-22

## 2025-07-22 RX ADMIN — DIPHENHYDRAMINE HYDROCHLORIDE 25 MG: 50 INJECTION, SOLUTION INTRAMUSCULAR; INTRAVENOUS at 18:53

## 2025-07-22 RX ADMIN — SODIUM CHLORIDE 500 ML: 900 INJECTION, SOLUTION INTRAVENOUS at 18:52

## 2025-07-22 RX ADMIN — KETOROLAC TROMETHAMINE 30 MG: 30 INJECTION, SOLUTION INTRAMUSCULAR at 18:52

## 2025-07-22 RX ADMIN — LORAZEPAM 0.5 MG: 2 INJECTION INTRAMUSCULAR; INTRAVENOUS at 20:28

## 2025-07-22 RX ADMIN — POTASSIUM CHLORIDE 20 MEQ: 1.5 POWDER, FOR SOLUTION ORAL at 20:28

## 2025-07-22 RX ADMIN — PROCHLORPERAZINE EDISYLATE 10 MG: 5 INJECTION INTRAMUSCULAR; INTRAVENOUS at 18:54

## 2025-07-22 ASSESSMENT — PAIN DESCRIPTION - ORIENTATION: ORIENTATION: LEFT

## 2025-07-22 ASSESSMENT — PAIN DESCRIPTION - DESCRIPTORS: DESCRIPTORS: HEADACHE;THROBBING

## 2025-07-22 ASSESSMENT — PAIN DESCRIPTION - FREQUENCY: FREQUENCY: CONSTANT/CONTINUOUS

## 2025-07-22 ASSESSMENT — PAIN - FUNCTIONAL ASSESSMENT: PAIN_FUNCTIONAL_ASSESSMENT: 0-10

## 2025-07-22 ASSESSMENT — PAIN DESCRIPTION - LOCATION: LOCATION: HEAD

## 2025-07-22 ASSESSMENT — PAIN SCALES - GENERAL: PAINLEVEL_OUTOF10: 10 - WORST POSSIBLE PAIN

## 2025-07-22 NOTE — Clinical Note
Kat Tineo was seen and treated in our emergency department on 7/22/2025.  She may return to work on 07/24/2025.       If you have any questions or concerns, please don't hesitate to call.      Rocco Carmona PA-C

## 2025-07-23 ENCOUNTER — PATIENT MESSAGE (OUTPATIENT)
Dept: CARE COORDINATION | Facility: CLINIC | Age: 50
End: 2025-07-23
Payer: COMMERCIAL

## 2025-07-23 DIAGNOSIS — Z12.31 ENCOUNTER FOR SCREENING MAMMOGRAM FOR BREAST CANCER: ICD-10-CM

## 2025-07-23 NOTE — ED PROVIDER NOTES
HPI   Chief Complaint   Patient presents with    Headache     Noticed left arm numbness and facial droop at 9 am today. Had started to have 10/10 left sided headache at noon, hx of migraines. Hx of migraines and chronic neck pain. Alert and oriented X4. Has left sided facial droop, cinci otherwise neg        Patient is a 49-year-old female presenting with headache, left-sided facial droop.  Onset at 9 AM.  States she has a history of migraines and did take migraine medication without relief.  Does endorse chronic right-sided neck pain.  Still complaining of right-sided neck pain.  States that the left-sided facial droop is slightly improving.  No history of CVA.  This is not the worst headache of her life.  Patient denies fevers, chills, cough, sore throat, runny nose, chest pain, shortness of breath, abdominal pain, nausea, vomiting, diarrhea or urinary complaints.              Patient History   Medical History[1]  Surgical History[2]  Family History[3]  Social History[4]    Physical Exam   ED Triage Vitals [07/22/25 1802]   Temperature Heart Rate Respirations BP   36.8 °C (98.2 °F) 75 17 (!) 136/93      Pulse Ox Temp Source Heart Rate Source Patient Position   98 % Tympanic Monitor Sitting      BP Location FiO2 (%)     Right arm --       Physical Exam  Vitals and nursing note reviewed.   Constitutional:       Appearance: She is well-developed.      Comments: Awake, laying in examination bed, uncomfortable appearing   HENT:      Head: Normocephalic and atraumatic.      Nose: Nose normal.      Mouth/Throat:      Mouth: Mucous membranes are moist.      Pharynx: Oropharynx is clear.     Eyes:      Extraocular Movements: Extraocular movements intact.      Conjunctiva/sclera: Conjunctivae normal.      Pupils: Pupils are equal, round, and reactive to light.       Cardiovascular:      Rate and Rhythm: Normal rate and regular rhythm.      Pulses: Normal pulses.      Heart sounds: Normal heart sounds. No murmur  heard.  Pulmonary:      Effort: Pulmonary effort is normal. No respiratory distress.      Breath sounds: Normal breath sounds.   Abdominal:      General: Abdomen is flat.      Palpations: Abdomen is soft.      Tenderness: There is no abdominal tenderness.     Musculoskeletal:         General: No swelling. Normal range of motion.      Cervical back: Normal range of motion and neck supple.     Skin:     General: Skin is warm and dry.      Capillary Refill: Capillary refill takes less than 2 seconds.     Neurological:      General: No focal deficit present.      Mental Status: She is alert and oriented to person, place, and time.      Comments: No appreciable left-sided facial droop.  Awake, alert and oriented x 3. Power intact in the upper and lower extremities. Sensation is intact to light touch in the upper and lower extremities. Cranial Nerves 2-12 are intact. Patella DTRs intact. Finger-to-nose intact. No truncal ataxia.   Psychiatric:         Mood and Affect: Mood normal.         Behavior: Behavior normal.           ED Course & MDM   ED Course as of 07/22/25 2208   Tue Jul 22, 2025 1838 EKG on my interpretation shows sinus bradycardia, rate of 56 bpm.  Normal axis.  QTc 423 ms, NC interval 142.  No ST elevation or depression, no acute ischemic pattern.  No STEMI.  Normal EKG. [NT]      ED Course User Index  [NT] Charles Sinclair DO         Diagnoses as of 07/22/25 2208   Hemiplegic migraine without status migrainosus, not intractable                 No data recorded     [place] Coma Scale Score: 15 (07/22/25 1849 : Amisha Lyman RN)                           Medical Decision Making  Patient is a 49-year-old female presenting with headache, left-sided facial droop.  Lab work, imaging ordered.  Medication repair conditions considered include but are not limited to: complex migraine, Bell's palsy, CVA.  NIH of 0.  Tested skew was negative.  There is no cerebral ataxia present.    Attending physician  was available for consultation on this patient.  Lab works unremarkable your head CT is negative.  After medication, patient is asymptomatic.  I did not appreciate a left-sided facial droop on this patient.  States that she now has no headache and only has mild right-sided neck pain.    I believe this patient is at low risk for complication, and a disposition of discharge is acceptable.  Return to the Emergency Department if new or worsening symptoms including headache, fever, chills, chest pain, shortness of breath, syncope, near syncope, abdominal pain, nausea, vomiting,  diarrhea, or worsening pain.  High suspicion for complex migraine due to patient's symptoms resolving after migraine cocktail and headache completely resolving.  Discussed with patient we will send a short course of prednisone.  Patient is agreeable to disposition of discharge and to follow with respective fields in the next several days.    Portions of this note made with Dragon software, please be mindful of potential grammatical errors.      Medications   potassium chloride (Klor-Con) packet 20 mEq (20 mEq oral Given 7/22/25 2028)   ketorolac (Toradol) injection 30 mg (30 mg intravenous Given 7/22/25 1852)   diphenhydrAMINE (BENADryl) injection 25 mg (25 mg intravenous Given 7/22/25 1853)   prochlorperazine (Compazine) injection 10 mg (10 mg intravenous Given 7/22/25 1854)   sodium chloride 0.9 % bolus 500 mL (0 mL intravenous Stopped 7/22/25 2126)   LORazepam (Ativan) injection 0.5 mg (0.5 mg intravenous Given 7/22/25 2028)     Labs Reviewed   CBC WITH AUTO DIFFERENTIAL - Abnormal       Result Value    WBC 8.6      nRBC 0.0      RBC 4.32      Hemoglobin 12.5      Hematocrit 39.4      MCV 91      MCH 28.9      MCHC 31.7 (*)     RDW 14.2      Platelets 525 (*)     Neutrophils % 78.4      Immature Granulocytes %, Automated 0.5      Lymphocytes % 16.0      Monocytes % 4.3      Eosinophils % 0.6      Basophils % 0.2      Neutrophils Absolute 6.72       Immature Granulocytes Absolute, Automated 0.04      Lymphocytes Absolute 1.37      Monocytes Absolute 0.37      Eosinophils Absolute 0.05      Basophils Absolute 0.02     COMPREHENSIVE METABOLIC PANEL - Abnormal    Glucose 216 (*)     Sodium 141      Potassium 3.1 (*)     Chloride 106      Bicarbonate 27      Anion Gap 11      Urea Nitrogen 12      Creatinine 0.78      eGFR >90      Calcium 9.1      Albumin 3.7      Alkaline Phosphatase 89      Total Protein 7.1      AST 25      Bilirubin, Total 0.4      ALT 27     SERIAL TROPONIN-INITIAL - Normal    Troponin I, High Sensitivity 4      Narrative:     Less than 99th percentile of normal range cutoff-  Female and children under 18 years old <14 ng/L; Male <21 ng/L: Negative  Repeat testing should be performed if clinically indicated.     Female and children under 18 years old 14-50 ng/L; Male 21-50 ng/L:  Consistent with possible cardiac damage and possible increased clinical   risk. Serial measurements may help to assess extent of myocardial damage.     >50 ng/L: Consistent with cardiac damage, increased clinical risk and  myocardial infarction. Serial measurements may help assess extent of   myocardial damage.      NOTE: Children less than 1 year old may have higher baseline troponin   levels and results should be interpreted in conjunction with the overall   clinical context.     NOTE: Troponin I testing is performed using a different   testing methodology at [facility] than at other   [facility]. Direct result comparisons should only   be made within the same method.   SERIAL TROPONIN, 1 HOUR - Normal    Troponin I, High Sensitivity 4      Narrative:     Less than 99th percentile of normal range cutoff-  Female and children under 18 years old <14 ng/L; Male <21 ng/L: Negative  Repeat testing should be performed if clinically indicated.     Female and children under 18 years old 14-50 ng/L; Male 21-50 ng/L:  Consistent with possible cardiac  damage and possible increased clinical   risk. Serial measurements may help to assess extent of myocardial damage.     >50 ng/L: Consistent with cardiac damage, increased clinical risk and  myocardial infarction. Serial measurements may help assess extent of   myocardial damage.      NOTE: Children less than 1 year old may have higher baseline troponin   levels and results should be interpreted in conjunction with the overall   clinical context.     NOTE: Troponin I testing is performed using a different   testing methodology at [facility] than at other   [facility]. Direct result comparisons should only   be made within the same method.   TROPONIN SERIES- (INITIAL, 1 HR)    Narrative:     The following orders were created for panel order Troponin I Series, High Sensitivity (0, 1 HR).  Procedure                               Abnormality         Status                     ---------                               -----------         ------                     Troponin I, High Sensiti...[[id number]]  Normal              Final result               Troponin, High Sensitivi...[[id number]]  Normal              Final result                 Please view results for these tests on the individual orders.     CT head wo IV contrast   Final Result   No acute intracranial abnormality. If symptoms persist or there is   high clinical concern further evaluation with MRI can be considered.             MACRO:   None        Signed by: Preet Choi 7/22/2025 7:07 PM   Dictation workstation:   [id number]            Procedure  Procedures       [1]   Past Medical History:  Diagnosis Date    Acid reflux     Ankylosing spondylitis of site in spine (Multi)     Arthritis 2000    Bursitis of hip     Cervical disc disorder     CTS (carpal tunnel syndrome)     Depression     Diabetes mellitus (Multi)     Dislocation of hip (Multi)     DM (diabetes mellitus), gestational     Fracture of hip (Multi)     Headache     History of  transfusion     Hypertension     Joint pain     Low back pain     Lumbosacral disc disease     Migraine     Neck pain     Obesity     Osteoarthritis     Plantar fasciitis     Reflex sympathetic dystrophy     Rheumatoid arthritis     Sciatica     Spinal stenosis     Spondylolisthesis    [2]   Past Surgical History:  Procedure Laterality Date    BACK SURGERY      CARDIAC CATHETERIZATION       SECTION, LOW TRANSVERSE  2012    CHOLECYSTECTOMY  2017    CT GUIDED SOFT TISSUE FLUID DRAIN  2022    CT GUIDED SOFT TISSUE FLUID DRAIN [facility]    ESOPHAGOGASTRODUODENOSCOPY      GASTRIC BYPASS  2024    Gastric Bypass Laparoscopic  Repair Diaphragmatic Hernia Laparoscopy General Lysis Adhesions Laparoscopy Abdominal Cavity ([facility])    HERNIA REPAIR  2024    HIP ARTHROPLASTY      HYSTERECTOMY  2012    JOINT REPLACEMENT      LAMINECTOMY      LAPAROSCOPY GASTRECTOMY PARTIAL / TOTAL      vertical sleeve    NECK SURGERY      OOPHORECTOMY  Approximately     ORTHOPEDIC SURGERY  2023    OTHER SURGICAL HISTORY  2017    laparoscopic cholecystectomy wiht intraoperative cholangiogram, lysis of adhesions, mesh repair of incisional henia using 6 in round ventralight st mesh    OTHER SURGICAL HISTORY Left     revision x 2 hip    PLANTAR FASCIA RELEASE      SPINAL FUSION      SPINE SURGERY  2023    L4-L5 SPINAL SURGERY ([facility])    TUBAL LIGATION      US GUIDED SOFT TISSUE FLUID DRAIN  2021    US GUIDED SOFT TISSUE FLUID DRAIN [facility]   [3]   Family History  Problem Relation Name Age of Onset    Diabetes Mother Darline Calzada     Hypertension Mother Darline Calzada     Other (morbid obesity) Mother Darline Calzada     Hernia Mother Darline Calzada     Vision loss Mother Darline Calzada     Diabetes type I Mother Darline Calzada     Other (htn) Father Ammon Colon     Hypertension Father Ammon Colon     Arthritis Father Ammon Colon     Alcohol abuse Father Ammon Colon      Cancer Father Ammon Colon     Depression Father Ammon Colon     Arthritis Other      Stroke Maternal Grandfather Dan Calzada     Heart attack Maternal Grandfather Dan Calzada     Heart disease Maternal Grandmother Anayeli Zheng     Osteoporosis Maternal Grandmother Anayeli Zheng     Angina Maternal Grandmother Anayeli Zheng     Arrhythmia Maternal Grandmother Anayeli Calzada     Heart attack Maternal Grandmother Anayeli Calzada     Heart failure Maternal Grandmother Anayeli Calzada     Cancer Paternal Grandfather Supa Colon     Anemia Mother's Sister Ada Sebastian     Cancer Father's Brother Tee Colon     Cancer Father's Brother Armando Demarcus     Cancer Father's Brother Trenton Colon     Psoriasis Neg Hx      Irritable bowel syndrome Neg Hx      Asthma Son Leonardo     Cancer Father's Brother Tee Colon     Cancer Father's Brother Frank Colon     Cancer Father's Brother Marcil Colon     Cancer Father's Brother Armando Monroego     Cancer Father's Brother Trenton Colon     Cancer Paternal Grandfather Supa Colon     Drug abuse Father's Sister Eduarda Colon     Heart disease Maternal Grandmother Anayeli Zheng     Vision loss Maternal Grandmother Anayeli Calzada     Stroke Maternal Grandfather Dan Calzada     Heart failure Maternal Grandmother Anayeli Zheng     Arrhythmia Maternal Grandmother Anayeli Zheng     Heart attack Maternal Grandmother Anayeli Zheng     Osteoporosis Maternal Grandmother Anayeli Zheng     Angina Maternal Grandmother Anayeli Calzada     Atrial fibrillation Maternal Grandmother Anayeli Calzada     Heart attack Maternal Grandfather Dan Calzada    [4]   Social History  Tobacco Use    Smoking status: Never     Passive exposure: Never    Smokeless tobacco: Never   Vaping Use    Vaping status: Never Used   Substance Use Topics    Alcohol use: Not Currently     Alcohol/week: 2.0 standard drinks of alcohol     Types: 2 Shots of liquor per week     Comment: Maybe 2 per year    Drug use: Never        Rocco Carmona  RAMESH  07/22/25 6430

## 2025-07-23 NOTE — DISCHARGE INSTRUCTIONS
Please follow with neurology.  Take steroids as prescribed.  Follow with your PCP as well.  Be sure to take all medications, over the counter medications or prescription medications only as directed.    Be sure to follow up as directed in 1-2 days. All of the details of your follow up instructions are detailed in the follow up section of this packet.    If you are being discharged with any pains medications or muscle relaxers (norco, Vicodin, hydrocodone products, Percocet, oxycodone products, flexeril, cyclobenzaprine, robaxin, norflex, brand or generic, or any other pain controlling medications with the exception of Ibuprofen and regular Tylenol, do not drive or operate machinery, climb ladders or participate in any activity that could potentially put yourself or others at risk should you get dizzy, or be/feel impaired at all.    Return to emergency room without delay for ANY new or worsening pains or for any other symptoms or concerns. Return with worsening pains, nausea, vomiting, trouble breathing, palpitations, shortness of breath, inability to pass stool or urine, loss of control of stool or urine, any numbness or tingling (that is not normal for you), uncontrolled fevers, the passing of blood or other material in stool or urine, rashes, pains or for any other symptoms or concerns you may have. You are always welcome to return to the ER at any time for any reason or for any other concerns you may have.

## 2025-07-24 DIAGNOSIS — M19.90 ARTHRITIS: ICD-10-CM

## 2025-07-24 LAB
ATRIAL RATE: 56 BPM
P AXIS: 48 DEGREES
P OFFSET: 204 MS
P ONSET: 148 MS
PR INTERVAL: 142 MS
Q ONSET: 219 MS
QRS COUNT: 9 BEATS
QRS DURATION: 86 MS
QT INTERVAL: 418 MS
QTC CALCULATION(BAZETT): 403 MS
QTC FREDERICIA: 408 MS
R AXIS: 11 DEGREES
T AXIS: 24 DEGREES
T OFFSET: 428 MS
VENTRICULAR RATE: 56 BPM

## 2025-07-24 RX ORDER — TRAMADOL HYDROCHLORIDE 50 MG/1
50 TABLET, FILM COATED ORAL EVERY 6 HOURS PRN
Qty: 15 TABLET | Refills: 0 | Status: SHIPPED | OUTPATIENT
Start: 2025-07-24 | End: 2025-07-31

## 2025-07-27 ENCOUNTER — HOSPITAL ENCOUNTER (EMERGENCY)
Facility: HOSPITAL | Age: 50
Discharge: HOME | End: 2025-07-27
Attending: STUDENT IN AN ORGANIZED HEALTH CARE EDUCATION/TRAINING PROGRAM
Payer: COMMERCIAL

## 2025-07-27 ENCOUNTER — APPOINTMENT (OUTPATIENT)
Dept: RADIOLOGY | Facility: HOSPITAL | Age: 50
End: 2025-07-27
Payer: COMMERCIAL

## 2025-07-27 VITALS
HEART RATE: 60 BPM | WEIGHT: 195 LBS | RESPIRATION RATE: 16 BRPM | BODY MASS INDEX: 34.55 KG/M2 | TEMPERATURE: 98.2 F | HEIGHT: 63 IN | DIASTOLIC BLOOD PRESSURE: 89 MMHG | OXYGEN SATURATION: 99 % | SYSTOLIC BLOOD PRESSURE: 134 MMHG

## 2025-07-27 DIAGNOSIS — M54.41 ACUTE RIGHT-SIDED LOW BACK PAIN WITH RIGHT-SIDED SCIATICA: Primary | ICD-10-CM

## 2025-07-27 LAB
APPEARANCE UR: ABNORMAL
BACTERIA #/AREA URNS AUTO: ABNORMAL /HPF
BILIRUB UR STRIP.AUTO-MCNC: NEGATIVE MG/DL
COLOR UR: YELLOW
GLUCOSE UR STRIP.AUTO-MCNC: NORMAL MG/DL
HYALINE CASTS #/AREA URNS AUTO: ABNORMAL /LPF
KETONES UR STRIP.AUTO-MCNC: NEGATIVE MG/DL
LEUKOCYTE ESTERASE UR QL STRIP.AUTO: ABNORMAL
MUCOUS THREADS #/AREA URNS AUTO: ABNORMAL /LPF
NITRITE UR QL STRIP.AUTO: NEGATIVE
PH UR STRIP.AUTO: 6 [PH]
PROT UR STRIP.AUTO-MCNC: ABNORMAL MG/DL
RBC # UR STRIP.AUTO: NEGATIVE MG/DL
RBC #/AREA URNS AUTO: ABNORMAL /HPF
SP GR UR STRIP.AUTO: 1.03
SQUAMOUS #/AREA URNS AUTO: ABNORMAL /HPF
UROBILINOGEN UR STRIP.AUTO-MCNC: NORMAL MG/DL
WBC #/AREA URNS AUTO: ABNORMAL /HPF
WBC CLUMPS #/AREA URNS AUTO: ABNORMAL /HPF

## 2025-07-27 PROCEDURE — 93970 EXTREMITY STUDY: CPT

## 2025-07-27 PROCEDURE — 99285 EMERGENCY DEPT VISIT HI MDM: CPT | Mod: 25

## 2025-07-27 PROCEDURE — 81001 URINALYSIS AUTO W/SCOPE: CPT | Performed by: STUDENT IN AN ORGANIZED HEALTH CARE EDUCATION/TRAINING PROGRAM

## 2025-07-27 PROCEDURE — 96372 THER/PROPH/DIAG INJ SC/IM: CPT | Performed by: STUDENT IN AN ORGANIZED HEALTH CARE EDUCATION/TRAINING PROGRAM

## 2025-07-27 PROCEDURE — 72100 X-RAY EXAM L-S SPINE 2/3 VWS: CPT | Mod: FOREIGN READ | Performed by: RADIOLOGY

## 2025-07-27 PROCEDURE — 72100 X-RAY EXAM L-S SPINE 2/3 VWS: CPT

## 2025-07-27 PROCEDURE — 93971 EXTREMITY STUDY: CPT | Performed by: STUDENT IN AN ORGANIZED HEALTH CARE EDUCATION/TRAINING PROGRAM

## 2025-07-27 PROCEDURE — 99284 EMERGENCY DEPT VISIT MOD MDM: CPT | Mod: 25 | Performed by: STUDENT IN AN ORGANIZED HEALTH CARE EDUCATION/TRAINING PROGRAM

## 2025-07-27 PROCEDURE — 87086 URINE CULTURE/COLONY COUNT: CPT | Mod: TRILAB | Performed by: STUDENT IN AN ORGANIZED HEALTH CARE EDUCATION/TRAINING PROGRAM

## 2025-07-27 PROCEDURE — 2500000001 HC RX 250 WO HCPCS SELF ADMINISTERED DRUGS (ALT 637 FOR MEDICARE OP): Performed by: STUDENT IN AN ORGANIZED HEALTH CARE EDUCATION/TRAINING PROGRAM

## 2025-07-27 PROCEDURE — 2500000004 HC RX 250 GENERAL PHARMACY W/ HCPCS (ALT 636 FOR OP/ED): Mod: JZ | Performed by: STUDENT IN AN ORGANIZED HEALTH CARE EDUCATION/TRAINING PROGRAM

## 2025-07-27 PROCEDURE — 2500000005 HC RX 250 GENERAL PHARMACY W/O HCPCS: Performed by: STUDENT IN AN ORGANIZED HEALTH CARE EDUCATION/TRAINING PROGRAM

## 2025-07-27 RX ORDER — METHOCARBAMOL 500 MG/1
1000 TABLET, FILM COATED ORAL ONCE
Status: COMPLETED | OUTPATIENT
Start: 2025-07-27 | End: 2025-07-27

## 2025-07-27 RX ORDER — PREGABALIN 50 MG/1
50 CAPSULE ORAL ONCE
Status: COMPLETED | OUTPATIENT
Start: 2025-07-27 | End: 2025-07-27

## 2025-07-27 RX ORDER — OXYCODONE HYDROCHLORIDE 5 MG/1
5 TABLET ORAL ONCE
Refills: 0 | Status: COMPLETED | OUTPATIENT
Start: 2025-07-27 | End: 2025-07-27

## 2025-07-27 RX ORDER — ACETAMINOPHEN 500 MG
1000 TABLET ORAL ONCE
Status: COMPLETED | OUTPATIENT
Start: 2025-07-27 | End: 2025-07-27

## 2025-07-27 RX ORDER — ORPHENADRINE CITRATE 30 MG/ML
60 INJECTION INTRAMUSCULAR; INTRAVENOUS ONCE
Status: COMPLETED | OUTPATIENT
Start: 2025-07-27 | End: 2025-07-27

## 2025-07-27 RX ORDER — SULFAMETHOXAZOLE AND TRIMETHOPRIM 800; 160 MG/1; MG/1
1 TABLET ORAL 2 TIMES DAILY
Qty: 14 TABLET | Refills: 0 | Status: SHIPPED | OUTPATIENT
Start: 2025-07-27 | End: 2025-08-02 | Stop reason: HOSPADM

## 2025-07-27 RX ORDER — GABAPENTIN 300 MG/1
300 CAPSULE ORAL ONCE
Status: COMPLETED | OUTPATIENT
Start: 2025-07-27 | End: 2025-07-27

## 2025-07-27 RX ORDER — KETOROLAC TROMETHAMINE 30 MG/ML
30 INJECTION, SOLUTION INTRAMUSCULAR; INTRAVENOUS ONCE
Status: COMPLETED | OUTPATIENT
Start: 2025-07-27 | End: 2025-07-27

## 2025-07-27 RX ORDER — LIDOCAINE 560 MG/1
1 PATCH PERCUTANEOUS; TOPICAL; TRANSDERMAL ONCE
Status: DISCONTINUED | OUTPATIENT
Start: 2025-07-27 | End: 2025-07-27 | Stop reason: HOSPADM

## 2025-07-27 RX ADMIN — OXYCODONE HYDROCHLORIDE 5 MG: 5 TABLET ORAL at 11:34

## 2025-07-27 RX ADMIN — METHOCARBAMOL 1000 MG: 500 TABLET ORAL at 09:18

## 2025-07-27 RX ADMIN — KETOROLAC TROMETHAMINE 30 MG: 30 INJECTION, SOLUTION INTRAMUSCULAR at 09:18

## 2025-07-27 RX ADMIN — GABAPENTIN 300 MG: 300 CAPSULE ORAL at 11:34

## 2025-07-27 RX ADMIN — ORPHENADRINE CITRATE 60 MG: 30 INJECTION, SOLUTION INTRAMUSCULAR; INTRAVENOUS at 09:18

## 2025-07-27 RX ADMIN — PREGABALIN 50 MG: 50 CAPSULE ORAL at 09:18

## 2025-07-27 RX ADMIN — ACETAMINOPHEN 1000 MG: 500 TABLET ORAL at 11:33

## 2025-07-27 ASSESSMENT — PAIN SCALES - GENERAL
PAINLEVEL_OUTOF10: 0 - NO PAIN
PAINLEVEL_OUTOF10: 10 - WORST POSSIBLE PAIN
PAINLEVEL_OUTOF10: 8

## 2025-07-27 ASSESSMENT — PAIN DESCRIPTION - ONSET: ONSET: SUDDEN

## 2025-07-27 ASSESSMENT — PAIN - FUNCTIONAL ASSESSMENT
PAIN_FUNCTIONAL_ASSESSMENT: 0-10
PAIN_FUNCTIONAL_ASSESSMENT: 0-10

## 2025-07-27 ASSESSMENT — PAIN DESCRIPTION - PAIN TYPE: TYPE: ACUTE PAIN

## 2025-07-27 ASSESSMENT — PAIN DESCRIPTION - FREQUENCY: FREQUENCY: CONSTANT/CONTINUOUS

## 2025-07-27 ASSESSMENT — PAIN DESCRIPTION - ORIENTATION: ORIENTATION: RIGHT;LOWER

## 2025-07-27 ASSESSMENT — PAIN DESCRIPTION - DESCRIPTORS: DESCRIPTORS: STABBING

## 2025-07-27 ASSESSMENT — PAIN DESCRIPTION - PROGRESSION: CLINICAL_PROGRESSION: NOT CHANGED

## 2025-07-27 ASSESSMENT — PAIN DESCRIPTION - LOCATION: LOCATION: HEAD

## 2025-07-27 NOTE — ED PROVIDER NOTES
"Emergency Department Provider Note       History of Present Illness     History provided by: {History Provided By:21011}  Limitations to History: {History Limitations:56437}  External Records Reviewed with Brief Summary: {ED External Records Reviewed with Brief Summary:00351}    HPI:  Kat Tineo is a 49 y.o. female ***    Physical Exam   Triage vitals:  T 36.8 °C (98.2 °F)  HR 64  BP (!) 150/95  RR 18  O2 99 % None (Room air)    {ED Physical Exam:17822}      Medical Decision Making & ED Course   Medical Decision Makin y.o. female ***  ----  {Scoring Tools Utilized:53248}    Social Determinants of Health which Significantly Impact Care: {Social Determinants of Health which Significantly Impact Care:61613} {The following actions were taken to address these social determinants (Optional):65374}    EKG Independent Interpretation: {EKG Independent Interpretation:42872}    Independent Result Review and Interpretation: {Independent Result Review and Interpretation:75943::\"Relevant laboratory and radiographic results were reviewed and independently interpreted by myself.  As necessary, they are commented on in the ED Course.\"}    Chronic conditions affecting the patient's care: {Chronic conditions affecting the patient's care:21963::\"As documented above in MDM\"}    The patient was discussed with the following consultants/services: {The patient was discussed with the following consultants/services:28070}    Care Considerations: {Care Considerations:76178::\"As documented above in MDM\"}    ED Course:       Disposition   {ED Disposition:01938}    Procedures   Procedures    Rossy Sheth MD  Emergency Medicine       "      Coordination: Coordination is intact.      Gait: Gait is intact.      Comments: Left-sided cranial nerve VII palsy seen on [facility].   Psychiatric:         Mood and Affect: Mood normal.         Behavior: Behavior normal.           Medical Decision Making & ED Course   Medical Decision Makin y.o. female presents with back pain.  Considered wide ddx for back/hip pain including trauma/fx, epidural abscess, cord compression, pyelonephritis, aortic pathology -- all less/not c/w H&P and supportive studies.  No new numbness/weakness/tingling in legs, bowel/bladder incontinence, new trauma, fever, unexpected weight loss, h/o cancer, h/o IVDU, or indwelling lines. No midline CT LS spine tenderness palpation or step-offs.  5 out of 5 strength in hip and knee flexion extension as well as ankle and great toe plantar dorsiflexion bilaterally.  Normal sensation to light touch in L1-S4 nerve roots bilaterally.  2+ DP pulses bilaterally.  No concern for osteomyelitis, discitis, epidural abscess, cord compression, fracture, or cauda equina.  I do not believe they require MRI or CT imaging at this time.  Patient also noted leg swelling, DVT ultrasound obtained, no evidence of DVT.  X-rays are within normal limits.  Patient describes mild lower back pain/flank pain, UA sent weakly positive, will prescribe antibiotics.  Referral to pain medicine placed as patient has lost follow-up with pain management.  Patient discharged in stable condition.    ----      Social Determinants of Health which Significantly Impact Care: Social Determinants of Health which Significantly Impact Care: None identified     EKG Independent Interpretation: EKG not obtained    Independent Result Review and Interpretation: Relevant laboratory and radiographic results were reviewed and independently interpreted by myself.  As necessary, they are commented on in the ED Course.    Chronic conditions affecting the patient's care: As documented above in  [facility]    The patient was discussed with the following consultants/services: None    Care Considerations: As documented above in [facility]    ED Course:  Diagnoses as of 08/13/25 0558   Acute right-sided low back pain with right-sided sciatica       Disposition   As a result of the work-up, the patient was discharged home.  she was informed of her diagnosis and instructed to come back with any concerns or worsening of condition.  she and was agreeable to the plan as discussed above.  she was given the opportunity to ask questions.  All of the patient's questions were answered.    Procedures   Procedures    Rossy Sheth MD  Emergency Medicine          Rossy Sheth MD  08/13/25 0604

## 2025-07-27 NOTE — DISCHARGE INSTRUCTIONS
You were seen in the Emergency Department today for back pain.  At this time I believe you are having a flareup of your sciatic pain.  Because your UA was positive, I have prescribed you antibiotics to take.  If you have any other concerns you can return for reevaluation.

## 2025-07-28 ENCOUNTER — TELEPHONE (OUTPATIENT)
Dept: PRIMARY CARE | Facility: CLINIC | Age: 50
End: 2025-07-28
Payer: COMMERCIAL

## 2025-07-28 DIAGNOSIS — M54.16 RADICULOPATHY, LUMBAR REGION: ICD-10-CM

## 2025-07-28 LAB
ATRIAL RATE: 56 BPM
BACTERIA UR CULT: NORMAL
HOLD SPECIMEN: NORMAL
P AXIS: 48 DEGREES
P OFFSET: 204 MS
P ONSET: 148 MS
PR INTERVAL: 142 MS
Q ONSET: 219 MS
QRS COUNT: 9 BEATS
QRS DURATION: 86 MS
QT INTERVAL: 418 MS
QTC CALCULATION(BAZETT): 403 MS
QTC FREDERICIA: 408 MS
R AXIS: 11 DEGREES
T AXIS: 24 DEGREES
T OFFSET: 428 MS
VENTRICULAR RATE: 56 BPM

## 2025-07-28 RX ORDER — TIZANIDINE 4 MG/1
4 TABLET ORAL NIGHTLY
Qty: 30 TABLET | Refills: 2 | Status: ON HOLD | OUTPATIENT
Start: 2025-07-28

## 2025-07-28 NOTE — TELEPHONE ENCOUNTER
Pt says she visits the ER very often for excruciating pain  Has appts set up for MRI/ORTHO  Sayss he can't walk, can't drive   Said the pain meds are not helping only making her sleepy  Was wondering what you recommend doing?

## 2025-07-28 NOTE — TELEPHONE ENCOUNTER
If she can't function she needs to go to ER and be admitted if she can't vani eher appointments, she was givne multiple pain meds.

## 2025-07-29 ENCOUNTER — APPOINTMENT (OUTPATIENT)
Dept: ORTHOPEDIC SURGERY | Facility: CLINIC | Age: 50
End: 2025-07-29
Payer: COMMERCIAL

## 2025-07-29 NOTE — TELEPHONE ENCOUNTER
Spoke to pt  Said they wont admit her   Has to wait to get her   MRI 8/1  Ortho 8/4  She said pain meds don't feel like they're working and spaces them out every 6 hours  Not sure what to do until these tests results come back  Pain 8/10

## 2025-07-29 NOTE — PROGRESS NOTES
Subjective   Reason for Visit: Kat Tineo is an 49 y.o. female here for a Medicare Wellness visit.               HPI    Patient Care Team:  Dagoberto Garza DO as PCP - General  Pb Jones DO as PCP - [facility] PCP  Pb Jones DO as PCP - [facility] PCP  Ryan Quigley MD as Referring Physician (Hematology and Oncology)  Suzy Walker RN as Care Manager (Case Management)     Review of Systems    Objective   Vitals:  There were no vitals taken for this visit.      Physical Exam    Assessment & Plan

## 2025-07-30 ENCOUNTER — HOSPITAL ENCOUNTER (EMERGENCY)
Facility: HOSPITAL | Age: 50
Discharge: HOME | End: 2025-07-30
Attending: EMERGENCY MEDICINE
Payer: COMMERCIAL

## 2025-07-30 VITALS
SYSTOLIC BLOOD PRESSURE: 128 MMHG | RESPIRATION RATE: 15 BRPM | TEMPERATURE: 98.3 F | HEART RATE: 82 BPM | OXYGEN SATURATION: 98 % | BODY MASS INDEX: 52.26 KG/M2 | WEIGHT: 284 LBS | HEIGHT: 62 IN | DIASTOLIC BLOOD PRESSURE: 69 MMHG

## 2025-07-30 DIAGNOSIS — M54.13 RADICULOPATHY OF CERVICOTHORACIC REGION: ICD-10-CM

## 2025-07-30 DIAGNOSIS — M79.10 MYALGIA: Primary | ICD-10-CM

## 2025-07-30 DIAGNOSIS — E87.6 HYPOKALEMIA: ICD-10-CM

## 2025-07-30 LAB
ALBUMIN SERPL BCP-MCNC: 3.8 G/DL (ref 3.4–5)
ALP SERPL-CCNC: 87 U/L (ref 33–110)
ALT SERPL W P-5'-P-CCNC: 29 U/L (ref 7–45)
ANION GAP SERPL CALCULATED.3IONS-SCNC: 11 MMOL/L (ref 10–20)
AST SERPL W P-5'-P-CCNC: 30 U/L (ref 9–39)
BASOPHILS # BLD AUTO: 0.02 X10*3/UL (ref 0–0.1)
BASOPHILS NFR BLD AUTO: 0.2 %
BILIRUB SERPL-MCNC: 0.5 MG/DL (ref 0–1.2)
BUN SERPL-MCNC: 11 MG/DL (ref 6–23)
CALCIUM SERPL-MCNC: 8.9 MG/DL (ref 8.6–10.3)
CHLORIDE SERPL-SCNC: 103 MMOL/L (ref 98–107)
CK SERPL-CCNC: 46 U/L (ref 0–215)
CO2 SERPL-SCNC: 31 MMOL/L (ref 21–32)
CREAT SERPL-MCNC: 0.76 MG/DL (ref 0.5–1.05)
EGFRCR SERPLBLD CKD-EPI 2021: >90 ML/MIN/1.73M*2
EOSINOPHIL # BLD AUTO: 0.02 X10*3/UL (ref 0–0.7)
EOSINOPHIL NFR BLD AUTO: 0.2 %
ERYTHROCYTE [DISTWIDTH] IN BLOOD BY AUTOMATED COUNT: 14.1 % (ref 11.5–14.5)
ERYTHROCYTE [SEDIMENTATION RATE] IN BLOOD BY WESTERGREN METHOD: 24 MM/H (ref 0–30)
GLUCOSE SERPL-MCNC: 112 MG/DL (ref 74–99)
HCT VFR BLD AUTO: 40.3 % (ref 36–46)
HGB BLD-MCNC: 13.2 G/DL (ref 12–16)
IMM GRANULOCYTES # BLD AUTO: 0.04 X10*3/UL (ref 0–0.7)
IMM GRANULOCYTES NFR BLD AUTO: 0.4 % (ref 0–0.9)
LYMPHOCYTES # BLD AUTO: 1.33 X10*3/UL (ref 1.2–4.8)
LYMPHOCYTES NFR BLD AUTO: 14.1 %
MCH RBC QN AUTO: 29.2 PG (ref 26–34)
MCHC RBC AUTO-ENTMCNC: 32.8 G/DL (ref 32–36)
MCV RBC AUTO: 89 FL (ref 80–100)
MONOCYTES # BLD AUTO: 0.58 X10*3/UL (ref 0.1–1)
MONOCYTES NFR BLD AUTO: 6.1 %
NEUTROPHILS # BLD AUTO: 7.45 X10*3/UL (ref 1.2–7.7)
NEUTROPHILS NFR BLD AUTO: 79 %
NRBC BLD-RTO: 0 /100 WBCS (ref 0–0)
PLATELET # BLD AUTO: 532 X10*3/UL (ref 150–450)
POTASSIUM SERPL-SCNC: 2.8 MMOL/L (ref 3.5–5.3)
PROT SERPL-MCNC: 7 G/DL (ref 6.4–8.2)
RBC # BLD AUTO: 4.52 X10*6/UL (ref 4–5.2)
SODIUM SERPL-SCNC: 142 MMOL/L (ref 136–145)
WBC # BLD AUTO: 9.4 X10*3/UL (ref 4.4–11.3)

## 2025-07-30 PROCEDURE — 96365 THER/PROPH/DIAG IV INF INIT: CPT | Mod: 59

## 2025-07-30 PROCEDURE — 82550 ASSAY OF CK (CPK): CPT | Performed by: EMERGENCY MEDICINE

## 2025-07-30 PROCEDURE — 96372 THER/PROPH/DIAG INJ SC/IM: CPT | Performed by: EMERGENCY MEDICINE

## 2025-07-30 PROCEDURE — 36415 COLL VENOUS BLD VENIPUNCTURE: CPT | Performed by: EMERGENCY MEDICINE

## 2025-07-30 PROCEDURE — 85652 RBC SED RATE AUTOMATED: CPT | Performed by: EMERGENCY MEDICINE

## 2025-07-30 PROCEDURE — 99284 EMERGENCY DEPT VISIT MOD MDM: CPT | Mod: 25 | Performed by: EMERGENCY MEDICINE

## 2025-07-30 PROCEDURE — 2500000001 HC RX 250 WO HCPCS SELF ADMINISTERED DRUGS (ALT 637 FOR MEDICARE OP): Performed by: EMERGENCY MEDICINE

## 2025-07-30 PROCEDURE — 2500000002 HC RX 250 W HCPCS SELF ADMINISTERED DRUGS (ALT 637 FOR MEDICARE OP, ALT 636 FOR OP/ED): Performed by: EMERGENCY MEDICINE

## 2025-07-30 PROCEDURE — 2500000004 HC RX 250 GENERAL PHARMACY W/ HCPCS (ALT 636 FOR OP/ED): Mod: JZ | Performed by: EMERGENCY MEDICINE

## 2025-07-30 PROCEDURE — 85025 COMPLETE CBC W/AUTO DIFF WBC: CPT | Performed by: EMERGENCY MEDICINE

## 2025-07-30 PROCEDURE — 84075 ASSAY ALKALINE PHOSPHATASE: CPT | Performed by: EMERGENCY MEDICINE

## 2025-07-30 PROCEDURE — 96375 TX/PRO/DX INJ NEW DRUG ADDON: CPT

## 2025-07-30 RX ORDER — POTASSIUM CHLORIDE 20 MEQ/1
40 TABLET, EXTENDED RELEASE ORAL
Status: DISCONTINUED | OUTPATIENT
Start: 2025-07-30 | End: 2025-07-30

## 2025-07-30 RX ORDER — POTASSIUM CHLORIDE 20 MEQ/1
20 TABLET, EXTENDED RELEASE ORAL 2 TIMES DAILY
Qty: 20 TABLET | Refills: 0 | Status: ON HOLD | OUTPATIENT
Start: 2025-07-30 | End: 2025-08-09

## 2025-07-30 RX ORDER — KETOROLAC TROMETHAMINE 15 MG/ML
15 INJECTION, SOLUTION INTRAMUSCULAR; INTRAVENOUS ONCE
Status: COMPLETED | OUTPATIENT
Start: 2025-07-30 | End: 2025-07-30

## 2025-07-30 RX ORDER — GABAPENTIN 300 MG/1
300 CAPSULE ORAL 3 TIMES DAILY
Qty: 21 CAPSULE | Refills: 0 | Status: SHIPPED | OUTPATIENT
Start: 2025-07-30 | End: 2025-08-02 | Stop reason: HOSPADM

## 2025-07-30 RX ORDER — POTASSIUM CHLORIDE 20 MEQ/1
40 TABLET, EXTENDED RELEASE ORAL
Status: DISCONTINUED | OUTPATIENT
Start: 2025-07-30 | End: 2025-07-30 | Stop reason: HOSPADM

## 2025-07-30 RX ORDER — MORPHINE SULFATE 4 MG/ML
4 INJECTION, SOLUTION INTRAMUSCULAR; INTRAVENOUS ONCE
Status: COMPLETED | OUTPATIENT
Start: 2025-07-30 | End: 2025-07-30

## 2025-07-30 RX ORDER — DIAZEPAM 5 MG/ML
5 INJECTION, SOLUTION INTRAMUSCULAR; INTRAVENOUS ONCE
Status: COMPLETED | OUTPATIENT
Start: 2025-07-30 | End: 2025-07-30

## 2025-07-30 RX ORDER — GABAPENTIN 300 MG/1
300 CAPSULE ORAL ONCE
Status: COMPLETED | OUTPATIENT
Start: 2025-07-30 | End: 2025-07-30

## 2025-07-30 RX ADMIN — GABAPENTIN 300 MG: 300 CAPSULE ORAL at 03:02

## 2025-07-30 RX ADMIN — MORPHINE SULFATE 4 MG: 4 INJECTION, SOLUTION INTRAMUSCULAR; INTRAVENOUS at 03:02

## 2025-07-30 RX ADMIN — KETOROLAC TROMETHAMINE 15 MG: 15 INJECTION, SOLUTION INTRAMUSCULAR; INTRAVENOUS at 03:02

## 2025-07-30 RX ADMIN — KETAMINE HYDROCHLORIDE 25 MG: 50 INJECTION, SOLUTION INTRAMUSCULAR; INTRAVENOUS at 04:36

## 2025-07-30 RX ADMIN — DIAZEPAM 5 MG: 10 INJECTION, SOLUTION INTRAMUSCULAR; INTRAVENOUS at 04:36

## 2025-07-30 RX ADMIN — POTASSIUM CHLORIDE 40 MEQ: 1500 TABLET, EXTENDED RELEASE ORAL at 06:10

## 2025-07-30 ASSESSMENT — PAIN DESCRIPTION - LOCATION: LOCATION: BACK

## 2025-07-30 ASSESSMENT — PAIN - FUNCTIONAL ASSESSMENT: PAIN_FUNCTIONAL_ASSESSMENT: 0-10

## 2025-07-30 ASSESSMENT — PAIN SCALES - GENERAL: PAINLEVEL_OUTOF10: 10 - WORST POSSIBLE PAIN

## 2025-07-30 ASSESSMENT — PAIN DESCRIPTION - PAIN TYPE: TYPE: ACUTE PAIN

## 2025-07-30 NOTE — ED PROVIDER NOTES
EMERGENCY DEPARTMENT ENCOUNTER      Pt Name: Kat Tineo  MRN: [id number]  Birthdate 1975  Date of evaluation: 7/30/2025  ED Provider: Venus Carter DO     CHIEF COMPLAINT       Chief Complaint   Patient presents with    Back Pain     Patient presents with chronic back pain. Patient has been seen multiple times for this. States she has an appt with pain management but not for awhile.        HISTORY OF PRESENT ILLNESS    Kat Tineo is a 49 y.o. who presents to the emergency department via EMS with complaints of ongoing pain.  She states that she has been having diffuse body pain for the past month.  She describes it as being in her neck, mid and lower back, and causing cramping in her thighs.  She has ran out of her gabapentin.  Current symptoms have been ongoing for greater than a week but she admits that this has been ongoing for the past month.  She has been seen several times with brief relief in the pain however she has had no longstanding relief.  She does have an upcoming appoint with pain management as well as orthopedics but states that the pain became worse today to the point that she called 911.  She also has a history of rheumatoid arthritis and has been seen by rheumatology and orthopedics for radiculopathy in the past several months as well.  When EMS arrived she did not want to get on the cot and ambulated to the ambulance and was able to climb up the ambulance stairs without difficulty per their report.  She was brought into the emergency department via wheelchair      REVIEW OF SYSTEMS     Focused ROS performed and negative other than as listed in HPI    PAST MEDICAL HISTORY   Medical History[1]    SURGICAL HISTORY     Surgical History[2]    CURRENT MEDICATIONS       Discharge Medication List as of 7/30/2025  5:51 AM        CONTINUE these medications which have NOT CHANGED    Details   amLODIPine (Norvasc) 10 mg tablet TAKE 1 TABLET BY MOUTH EVERY DAY, Starting Mon 6/9/2025, Normal       calcium carbonate-vitamin D3 500 mg-5 mcg (200 unit) tablet Take 1 tablet by mouth once daily., Historical Med      cholecalciferol (Vitamin D3) 25 mcg (1,000 units) tablet Take 2 tablets (50 mcg) by mouth once daily., Historical Med      cholestyramine (Prevalite) 4 gram packet Take 1 packet (4 g) by mouth 3 times a day., Starting Wed 5/21/2025, Until Thu 5/21/2026, Normal      colesevelam 3.75 gram powder in packet Take 3.75 g by mouth once daily., Starting Thu 5/29/2025, Until Fri 5/29/2026, Normal      cyanocobalamin (Vitamin B-12) 250 mcg tablet Take 1 tablet (250 mcg) by mouth once daily., Historical Med      DULoxetine (Cymbalta) 60 mg DR capsule TAKE 1 CAPSULE BY MOUTH EVERY DAY, Starting Fri 6/6/2025, Normal      !! gabapentin (Neurontin) 300 mg capsule TAKE 1 CAPSULE BY MOUTH THREE TIMES A DAY, Starting Tue 7/8/2025, Normal      losartan (Cozaar) 100 mg tablet Take 1 tablet (100 mg) by mouth once daily. Do not start before November 27, 2024., Starting Mon 12/23/2024, Normal      methocarbamol (Robaxin) 750 mg tablet Take 1 tablet (750 mg) by mouth 4 times a day as needed for muscle spasms for up to 10 days., Starting Thu 7/17/2025, Until Sun 7/27/2025 at 2359, Normal      metoprolol tartrate (Lopressor) 50 mg tablet Take 1 tablet (50 mg) by mouth 2 times a day. Do not start before November 27, 2024., Starting Wed 11/27/2024, Normal      multivitamin with iron - children's (Cerovite, Jr) chewable tablet Chew 1 tablet once daily., Historical Med      orphenadrine (Norflex) 100 mg 12 hr tablet Take 1 tablet (100 mg) by mouth 2 times a day as needed for muscle spasms for up to 5 days. Do not crush, chew, or split., Starting Wed 4/30/2025, Until Mon 5/5/2025 at 2359, Normal      potassium bicarbonate (K-Lyte) 25 mEq effervescent tablet Take 1 tablet (25 mEq) by mouth 2 times a day., Historical Med      rizatriptan (Maxalt) 10 mg tablet TAKE 1 TABLET AT ONSET OF HEADACHE. MAY REPEAT EVERY 2 HOURS AS NEEDED.  MAXIMUM 3 TABLETS/24 HOURS., Normal      sulfamethoxazole-trimethoprim (Bactrim DS) 800-160 mg tablet Take 1 tablet by mouth 2 times a day for 7 days., Starting Sun 7/27/2025, Until Sun 8/3/2025, Normal      tiZANidine (Zanaflex) 4 mg tablet TAKE 1 TABLET BY MOUTH EVERYDAY AT BEDTIME, Starting Mon 7/28/2025, Normal      traMADol (Ultram) 50 mg tablet TAKE 1 TABLET (50 MG) BY MOUTH EVERY 6 HOURS IF NEEDED FOR SEVERE PAIN (7 - 10) FOR UP TO 7 DAYS., Starting Thu 7/24/2025, Until Thu 7/31/2025 at 2359, Normal      upadacitinib ER (Rinvoq) 30 mg tablet extended release 24 hr Take 1 tablet (30 mg) by mouth once daily., Historical Med      zolpidem (Ambien) 10 mg tablet TAKE 1 TABLET (10 MG) BY MOUTH AS NEEDED AT BEDTIME FOR SLEEP., Starting Mon 6/9/2025, Normal       !! - Potential duplicate medications found. Please discuss with provider.          ALLERGIES     Ace inhibitors, Adhesive tape-silicones, and Amoxicillin    FAMILY HISTORY     Family History[3]     SOCIAL HISTORY     Social History[4]    PHYSICAL EXAM       ED Triage Vitals [07/30/25 0238]   Temperature Heart Rate Respirations BP   36.8 °C (98.3 °F) 90 18 125/70      Pulse Ox Temp Source Heart Rate Source Patient Position   100 % Temporal Monitor Sitting      BP Location FiO2 (%)     Left arm --        General: Appears well, no acute distress, alert  Head: Head atraumatic; normocephalic  Eyes: normal inspection; no icterus  ENT: mucosa moist without lesion  Neck: Normal inspection, no meningeal signs  Resp: Normal breath sounds, no wheeze or crackles; No respiratory distress  Chest Wall: no tenderness or deformity  Heart: Heart rate and rhythm regular; No Murmurs  Abdomen: Soft, Non-tender; No distention, guarding, rigidity, or rebound  MSK: Normal appearance; Moves all extremities; No Pedal edema; no tenderness to the mid or lower back, moving neck without any apparent difficulty, sitting comfortably in the wheelchair  Neuro: Alert; no focal deficits, moves  "all extremities  Psych: Mood and Affect normal  Skin: Color appropriate; warm; Dry    DIAGNOSTIC RESULTS   Lab and radiology results are independently interpreted unless noted below.  RADIOLOGY (Per Emergency Physician):     Interpretation per the Radiologist below, if available at the time of this note:  No orders to display       LABS:  Abnormal Labs Reviewed   CBC WITH AUTO DIFFERENTIAL - Abnormal; Notable for the following components:       Result Value    Platelets 532 (*)     All other components within normal limits   COMPREHENSIVE METABOLIC PANEL - Abnormal; Notable for the following components:    Glucose 112 (*)     Potassium 2.8 (*)     All other components within normal limits       All other labs were within normal range or not returned as of this dictation.    EKG:  Personally interpreted by Venus Carter DO      EMERGENCY DEPARTMENT COURSE/MDM   Patient presents with ongoing subacute to chronic pain.  I do believe that this is multifactorial with her rheumatoid arthropathy, known cervical issues, and diffuse body pain.  She will be given a dose of gabapentin as well as IM Toradol and morphine here in order to alleviate her symptoms.  Do not feel repeat workup is warranted.  Patient is well-appearing and in no acute distress.  She is agreeable with this plan of care.    ED Course as of 07/30/25 0626   Wed Jul 30, 2025   0350 Patient states that she is feeling no improved with the medication that she was provided.  She is crying secondary to the pain.  Again it is nonspecific diffusely in her back and bilateral hips.  She complains of sciatica going down her legs bilaterally.  She has tenderness with minimal mobilization of the hips bilaterally.  She is concerned about potential for a \"septic joint\" as she is had issues prior to a hip replacement on the left side.  She has had no fever or chills.  She does have tenderness with mobilization of the left hip however this is comparable to tenderness with " mobilization of the right hip.  Basic blood work will be obtained and she is ordered a dose of diazepam in an attempt to relax the muscles as well as the ketamine infusion. [EF]   0512 Pt has no leukocytosis and unmarkable ESR lowering already low clinical suspicion of septic arthropathy  [EF]   0551 Patient is feeling improved at this time.  Potassium is low.  This was repleted orally and she will be given a prescription for repletion at home.  She states that she does take a potassium supplement already.  This will be increased for short period of time.  CK is unremarkable.  Patient is comfortable with plan of discharge.  Her gabapentin will be refilled.  Importance of follow-up is stressed.  She verbalized understanding agrees to plan of discharge.  Discharged in stable condition. [EF]      ED Course User Index  [EF] Venus Carter,          Diagnoses as of 07/30/25 0626   Myalgia   Hypokalemia       Meds Administered:  Medications   potassium chloride CR (Klor-Con M20) ER tablet 40 mEq (40 mEq oral Given 7/30/25 0610)   gabapentin (Neurontin) capsule 300 mg (300 mg oral Given 7/30/25 0302)   ketorolac (Toradol) injection 15 mg (15 mg intramuscular Given 7/30/25 0302)   morphine injection 4 mg (4 mg intramuscular Given 7/30/25 0302)   diazePAM (Valium) injection 5 mg (5 mg intravenous Given 7/30/25 0436)   ketamine 25 mg in sodium chloride 0.9% 100 mL IV (0 mg intravenous Stopped 7/30/25 0602)       PROCEDURES   Unless otherwise noted below, none  Procedures      FINAL IMPRESSION      1. Myalgia    2. Radiculopathy of cervicothoracic region    3. Hypokalemia          DISPOSITION    Discharge 07/30/2025 05:50:50 AM  As a result of the work-up, the patient was discharged home.  she was informed of her diagnosis and instructed to come back with any concerns or worsening of condition.  she and was agreeable to the plan as discussed above.  she was given the opportunity to ask questions.  All of the patient's  questions were answered.    Critical Care time: Not Indicated    (Comment: Please note this report has been produced using speech recognition software and may contain errors related to that system including errors in grammar, punctuation, and spelling, as well as words and phrases that may be inappropriate.  If there are any questions or concerns please feel free to contact the dictating provider for clarification.)    Venus Carter DO, MPH (electronically signed)  Emergency Medicine Physician    History provided by: Patient and EMS  Limitations to History: None  External Records Reviewed with Brief Summary: numerous prior ED notes and outpatient consults for similar   Social Determinants of Health which Significantly Impact Care: None identified   EKG Independent Interpretation: EKG not obtained  Independent Result Review and Interpretation: Relevant laboratory and radiographic results were reviewed and independently interpreted by myself.  As necessary, they are commented on in the ED Course.  Chronic conditions affecting the patient's care: As documented above in MDM  The patient was discussed with the following consultants/services: None  Care Considerations: As documented above in MDM                 [1]   Past Medical History:  Diagnosis Date    Acid reflux     Ankylosing spondylitis of site in spine (Multi)     Arthritis 2000    Bursitis of hip     Cervical disc disorder     CTS (carpal tunnel syndrome)     Depression     Diabetes mellitus (Multi)     Dislocation of hip (Multi)     DM (diabetes mellitus), gestational     Fracture of hip (Multi)     Headache     History of transfusion     Hypertension     Joint pain     Low back pain     Lumbosacral disc disease     Migraine     Neck pain     Obesity     Osteoarthritis     Plantar fasciitis     Reflex sympathetic dystrophy     Rheumatoid arthritis     Sciatica     Spinal stenosis     Spondylolisthesis    [2]   Past Surgical History:  Procedure Laterality Date     BACK SURGERY      CARDIAC CATHETERIZATION       SECTION, LOW TRANSVERSE  2012    CHOLECYSTECTOMY  2017    CT GUIDED SOFT TISSUE FLUID DRAIN  2022    CT GUIDED SOFT TISSUE FLUID DRAIN [facility]    ESOPHAGOGASTRODUODENOSCOPY      GASTRIC BYPASS  2024    Gastric Bypass Laparoscopic  Repair Diaphragmatic Hernia Laparoscopy General Lysis Adhesions Laparoscopy Abdominal Cavity ([facility])    HERNIA REPAIR  2024    HIP ARTHROPLASTY      HYSTERECTOMY  2012    JOINT REPLACEMENT      LAMINECTOMY      LAPAROSCOPY GASTRECTOMY PARTIAL / TOTAL  2017    vertical sleeve    NECK SURGERY      OOPHORECTOMY  Approximately     ORTHOPEDIC SURGERY  2023    OTHER SURGICAL HISTORY  2017    laparoscopic cholecystectomy wiht intraoperative cholangiogram, lysis of adhesions, mesh repair of incisional henia using 6 in round ventralight st mesh    OTHER SURGICAL HISTORY Left     revision x 2 hip    PLANTAR FASCIA RELEASE      SPINAL FUSION      SPINE SURGERY  2023    L4-L5 SPINAL SURGERY ([facility])    TUBAL LIGATION      US GUIDED SOFT TISSUE FLUID DRAIN  2021    US GUIDED SOFT TISSUE FLUID DRAIN [facility]   [3]   Family History  Problem Relation Name Age of Onset    Diabetes Mother Darline Calzada     Hypertension Mother Darline Calzada     Other (morbid obesity) Mother Darline Calzada     Hernia Mother Darline Calzada     Vision loss Mother Darline Calzada     Diabetes type I Mother Darline Calzada     Other (htn) Father Ammon Colon     Hypertension Father Ammon Colon     Arthritis Father Ammon Colon     Alcohol abuse Father Ammon Colon     Cancer Father Ammon Colon     Depression Father Ammon Colon     Arthritis Other      Stroke Maternal Grandfather Dan Calzada     Heart attack Maternal Grandfather Dan Calzada     Heart disease Maternal Grandmother Anayeli Calzada     Osteoporosis Maternal Grandmother Anayeli Calzada     Angina Maternal Grandmother Anayeli Calzada     Arrhythmia  Maternal Grandmother Anayeli Calzada     Heart attack Maternal Grandmother Anayeli Calzada     Heart failure Maternal Grandmother Anayeli Calzada     Cancer Paternal Grandfather Supa Colon     Anemia Mother's Sister Lynne Sebastian     Cancer Father's Brother Tee Colon     Cancer Father's Brother Armando Monroego     Cancer Father's Brother Trenton Colon     Psoriasis Neg Hx      Irritable bowel syndrome Neg Hx      Asthma Son Leonardo     Cancer Father's Brother Tee Colon     Cancer Father's Brother Frank Colon     Cancer Father's Brother Jefe Colon     Cancer Father's Brother Armando Monroego     Cancer Father's Brother Trenton Colon     Cancer Paternal Grandfather Supa Colon     Drug abuse Father's Sister Eduarda Colon     Heart disease Maternal Grandmother Anayeli Zheng     Vision loss Maternal Grandmother Anayeli Zheng     Stroke Maternal Grandfather Dan Calzada     Heart failure Maternal Grandmother Anayeli Calzada     Arrhythmia Maternal Grandmother Anayeli Calzada     Heart attack Maternal Grandmother Anayeli Calzada     Osteoporosis Maternal Grandmother Anayeli Calzada     Angina Maternal Grandmother Anayeli Zheng     Atrial fibrillation Maternal Grandmother Anayeli Calzada     Heart attack Maternal Grandfather Dan Calzada     Cancer Father's Brother Tee Colon    [4]   Social History  Socioeconomic History    Marital status:    Tobacco Use    Smoking status: Never     Passive exposure: Never    Smokeless tobacco: Never   Vaping Use    Vaping status: Never Used   Substance and Sexual Activity    Alcohol use: Not Currently     Alcohol/week: 2.0 standard drinks of alcohol     Types: 2 Shots of liquor per week     Comment: Maybe 2 per year    Drug use: Never    Sexual activity: Yes     Partners: Male     Birth control/protection: Condom Male, Female Sterilization     Social Drivers of Health     Financial Resource Strain: Low Risk  (11/26/2024)    Overall Financial Resource Strain (CARDIA)     Difficulty of Paying Living  Expenses: Not hard at all   Food Insecurity: No Food Insecurity (11/26/2024)    Hunger Vital Sign     Worried About Running Out of Food in the Last Year: Never true     Ran Out of Food in the Last Year: Never true   Transportation Needs: No Transportation Needs (11/26/2024)    PRAPARE - Transportation     Lack of Transportation (Medical): No     Lack of Transportation (Non-Medical): No   Physical Activity: Insufficiently Active (3/27/2024)    Exercise Vital Sign     Days of Exercise per Week: 3 days     Minutes of Exercise per Session: 30 min   Intimate Partner Violence: Not At Risk (11/26/2024)    Humiliation, Afraid, Rape, and Kick questionnaire     Fear of Current or Ex-Partner: No     Emotionally Abused: No     Physically Abused: No     Sexually Abused: No   Housing Stability: Low Risk  (11/26/2024)    Housing Stability Vital Sign     Unable to Pay for Housing in the Last Year: No     Number of Times Moved in the Last Year: 1     Homeless in the Last Year: No        Venus Carter DO  07/30/25 0633

## 2025-07-31 ENCOUNTER — APPOINTMENT (OUTPATIENT)
Dept: RADIOLOGY | Facility: HOSPITAL | Age: 50
End: 2025-07-31
Payer: COMMERCIAL

## 2025-07-31 ENCOUNTER — HOSPITAL ENCOUNTER (INPATIENT)
Facility: HOSPITAL | Age: 50
LOS: 1 days | Discharge: ANOTHER HOSPITAL | End: 2025-08-02
Attending: EMERGENCY MEDICINE | Admitting: INTERNAL MEDICINE
Payer: COMMERCIAL

## 2025-07-31 ENCOUNTER — DOCUMENTATION (OUTPATIENT)
Dept: EMERGENCY MEDICINE | Facility: HOSPITAL | Age: 50
End: 2025-07-31

## 2025-07-31 ENCOUNTER — APPOINTMENT (OUTPATIENT)
Dept: ORTHOPEDIC SURGERY | Facility: CLINIC | Age: 50
End: 2025-07-31
Payer: COMMERCIAL

## 2025-07-31 ENCOUNTER — APPOINTMENT (OUTPATIENT)
Dept: CARDIOLOGY | Facility: HOSPITAL | Age: 50
End: 2025-07-31
Payer: COMMERCIAL

## 2025-07-31 DIAGNOSIS — K21.9 GASTROESOPHAGEAL REFLUX DISEASE, UNSPECIFIED WHETHER ESOPHAGITIS PRESENT: ICD-10-CM

## 2025-07-31 DIAGNOSIS — M54.2 NECK PAIN: Primary | ICD-10-CM

## 2025-07-31 DIAGNOSIS — Z98.1 HISTORY OF FUSION OF CERVICAL SPINE: ICD-10-CM

## 2025-07-31 DIAGNOSIS — M54.12 CERVICAL RADICULITIS: ICD-10-CM

## 2025-07-31 DIAGNOSIS — M47.812 CERVICAL SPONDYLOSIS: ICD-10-CM

## 2025-07-31 PROBLEM — R52 PAIN: Status: ACTIVE | Noted: 2025-07-31

## 2025-07-31 LAB
ALBUMIN SERPL BCP-MCNC: 3.7 G/DL (ref 3.4–5)
ALP SERPL-CCNC: 142 U/L (ref 33–110)
ALT SERPL W P-5'-P-CCNC: 136 U/L (ref 7–45)
ANION GAP SERPL CALC-SCNC: 15 MMOL/L (ref 10–20)
AST SERPL W P-5'-P-CCNC: 89 U/L (ref 9–39)
BASOPHILS # BLD AUTO: 0.03 X10*3/UL (ref 0–0.1)
BASOPHILS NFR BLD AUTO: 0.3 %
BILIRUB SERPL-MCNC: 0.5 MG/DL (ref 0–1.2)
BUN SERPL-MCNC: 12 MG/DL (ref 6–23)
CALCIUM SERPL-MCNC: 8.8 MG/DL (ref 8.6–10.3)
CARDIAC TROPONIN I PNL SERPL HS: 5 NG/L (ref 0–13)
CHLORIDE SERPL-SCNC: 104 MMOL/L (ref 98–107)
CO2 SERPL-SCNC: 24 MMOL/L (ref 21–32)
CREAT SERPL-MCNC: 0.79 MG/DL (ref 0.5–1.05)
EGFRCR SERPLBLD CKD-EPI 2021: >90 ML/MIN/1.73M*2
EOSINOPHIL # BLD AUTO: 0.05 X10*3/UL (ref 0–0.7)
EOSINOPHIL NFR BLD AUTO: 0.5 %
ERYTHROCYTE [DISTWIDTH] IN BLOOD BY AUTOMATED COUNT: 14.2 % (ref 11.5–14.5)
ERYTHROCYTE [SEDIMENTATION RATE] IN BLOOD BY WESTERGREN METHOD: 34 MM/H (ref 0–30)
GLUCOSE SERPL-MCNC: 111 MG/DL (ref 74–99)
HCT VFR BLD AUTO: 37.7 % (ref 36–46)
HGB BLD-MCNC: 12.3 G/DL (ref 12–16)
IMM GRANULOCYTES # BLD AUTO: 0.08 X10*3/UL (ref 0–0.7)
IMM GRANULOCYTES NFR BLD AUTO: 0.9 % (ref 0–0.9)
LYMPHOCYTES # BLD AUTO: 1.47 X10*3/UL (ref 1.2–4.8)
LYMPHOCYTES NFR BLD AUTO: 15.7 %
MAGNESIUM SERPL-MCNC: 1.94 MG/DL (ref 1.6–2.4)
MCH RBC QN AUTO: 29.1 PG (ref 26–34)
MCHC RBC AUTO-ENTMCNC: 32.6 G/DL (ref 32–36)
MCV RBC AUTO: 89 FL (ref 80–100)
MONOCYTES # BLD AUTO: 0.58 X10*3/UL (ref 0.1–1)
MONOCYTES NFR BLD AUTO: 6.2 %
NEUTROPHILS # BLD AUTO: 7.13 X10*3/UL (ref 1.2–7.7)
NEUTROPHILS NFR BLD AUTO: 76.4 %
NRBC BLD-RTO: 0 /100 WBCS (ref 0–0)
PLATELET # BLD AUTO: 531 X10*3/UL (ref 150–450)
POTASSIUM SERPL-SCNC: 2.9 MMOL/L (ref 3.5–5.3)
POTASSIUM SERPL-SCNC: 3.4 MMOL/L (ref 3.5–5.3)
PROT SERPL-MCNC: 6.9 G/DL (ref 6.4–8.2)
RBC # BLD AUTO: 4.22 X10*6/UL (ref 4–5.2)
SODIUM SERPL-SCNC: 140 MMOL/L (ref 136–145)
TSH SERPL-ACNC: 0.83 MIU/L (ref 0.44–3.98)
WBC # BLD AUTO: 9.3 X10*3/UL (ref 4.4–11.3)

## 2025-07-31 PROCEDURE — 72050 X-RAY EXAM NECK SPINE 4/5VWS: CPT

## 2025-07-31 PROCEDURE — 99222 1ST HOSP IP/OBS MODERATE 55: CPT | Performed by: NURSE PRACTITIONER

## 2025-07-31 PROCEDURE — 36415 COLL VENOUS BLD VENIPUNCTURE: CPT | Performed by: EMERGENCY MEDICINE

## 2025-07-31 PROCEDURE — 2500000004 HC RX 250 GENERAL PHARMACY W/ HCPCS (ALT 636 FOR OP/ED): Performed by: NURSE PRACTITIONER

## 2025-07-31 PROCEDURE — 72157 MRI CHEST SPINE W/O & W/DYE: CPT | Performed by: RADIOLOGY

## 2025-07-31 PROCEDURE — 72158 MRI LUMBAR SPINE W/O & W/DYE: CPT

## 2025-07-31 PROCEDURE — 2500000001 HC RX 250 WO HCPCS SELF ADMINISTERED DRUGS (ALT 637 FOR MEDICARE OP): Performed by: NURSE PRACTITIONER

## 2025-07-31 PROCEDURE — 70553 MRI BRAIN STEM W/O & W/DYE: CPT

## 2025-07-31 PROCEDURE — 72156 MRI NECK SPINE W/O & W/DYE: CPT

## 2025-07-31 PROCEDURE — 2500000002 HC RX 250 W HCPCS SELF ADMINISTERED DRUGS (ALT 637 FOR MEDICARE OP, ALT 636 FOR OP/ED)

## 2025-07-31 PROCEDURE — 2500000004 HC RX 250 GENERAL PHARMACY W/ HCPCS (ALT 636 FOR OP/ED)

## 2025-07-31 PROCEDURE — 93005 ELECTROCARDIOGRAM TRACING: CPT

## 2025-07-31 PROCEDURE — 96375 TX/PRO/DX INJ NEW DRUG ADDON: CPT

## 2025-07-31 PROCEDURE — 72158 MRI LUMBAR SPINE W/O & W/DYE: CPT | Performed by: RADIOLOGY

## 2025-07-31 PROCEDURE — 84484 ASSAY OF TROPONIN QUANT: CPT | Performed by: EMERGENCY MEDICINE

## 2025-07-31 PROCEDURE — 80053 COMPREHEN METABOLIC PANEL: CPT

## 2025-07-31 PROCEDURE — 36415 COLL VENOUS BLD VENIPUNCTURE: CPT

## 2025-07-31 PROCEDURE — A9575 INJ GADOTERATE MEGLUMI 0.1ML: HCPCS | Performed by: EMERGENCY MEDICINE

## 2025-07-31 PROCEDURE — 72157 MRI CHEST SPINE W/O & W/DYE: CPT

## 2025-07-31 PROCEDURE — 96365 THER/PROPH/DIAG IV INF INIT: CPT

## 2025-07-31 PROCEDURE — 2550000001 HC RX 255 CONTRASTS: Performed by: EMERGENCY MEDICINE

## 2025-07-31 PROCEDURE — 85652 RBC SED RATE AUTOMATED: CPT

## 2025-07-31 PROCEDURE — 99285 EMERGENCY DEPT VISIT HI MDM: CPT | Mod: 25 | Performed by: EMERGENCY MEDICINE

## 2025-07-31 PROCEDURE — 2500000002 HC RX 250 W HCPCS SELF ADMINISTERED DRUGS (ALT 637 FOR MEDICARE OP, ALT 636 FOR OP/ED): Performed by: EMERGENCY MEDICINE

## 2025-07-31 PROCEDURE — 72040 X-RAY EXAM NECK SPINE 2-3 VW: CPT

## 2025-07-31 PROCEDURE — 76705 ECHO EXAM OF ABDOMEN: CPT | Performed by: RADIOLOGY

## 2025-07-31 PROCEDURE — 83735 ASSAY OF MAGNESIUM: CPT

## 2025-07-31 PROCEDURE — 96366 THER/PROPH/DIAG IV INF ADDON: CPT

## 2025-07-31 PROCEDURE — G0378 HOSPITAL OBSERVATION PER HR: HCPCS

## 2025-07-31 PROCEDURE — 2500000004 HC RX 250 GENERAL PHARMACY W/ HCPCS (ALT 636 FOR OP/ED): Mod: JZ | Performed by: EMERGENCY MEDICINE

## 2025-07-31 PROCEDURE — 2500000002 HC RX 250 W HCPCS SELF ADMINISTERED DRUGS (ALT 637 FOR MEDICARE OP, ALT 636 FOR OP/ED): Performed by: NURSE PRACTITIONER

## 2025-07-31 PROCEDURE — 70553 MRI BRAIN STEM W/O & W/DYE: CPT | Performed by: RADIOLOGY

## 2025-07-31 PROCEDURE — 99291 CRITICAL CARE FIRST HOUR: CPT

## 2025-07-31 PROCEDURE — 84132 ASSAY OF SERUM POTASSIUM: CPT | Performed by: EMERGENCY MEDICINE

## 2025-07-31 PROCEDURE — 84443 ASSAY THYROID STIM HORMONE: CPT | Performed by: EMERGENCY MEDICINE

## 2025-07-31 PROCEDURE — 85025 COMPLETE CBC W/AUTO DIFF WBC: CPT

## 2025-07-31 PROCEDURE — 76705 ECHO EXAM OF ABDOMEN: CPT

## 2025-07-31 PROCEDURE — 72156 MRI NECK SPINE W/O & W/DYE: CPT | Performed by: RADIOLOGY

## 2025-07-31 RX ORDER — ONDANSETRON HYDROCHLORIDE 2 MG/ML
4 INJECTION, SOLUTION INTRAVENOUS ONCE
Status: COMPLETED | OUTPATIENT
Start: 2025-07-31 | End: 2025-07-31

## 2025-07-31 RX ORDER — ORPHENADRINE CITRATE 100 MG/1
100 TABLET, EXTENDED RELEASE ORAL 2 TIMES DAILY PRN
Status: DISCONTINUED | OUTPATIENT
Start: 2025-07-31 | End: 2025-08-01

## 2025-07-31 RX ORDER — POTASSIUM CHLORIDE 20 MEQ/1
20 TABLET, EXTENDED RELEASE ORAL ONCE
Status: COMPLETED | OUTPATIENT
Start: 2025-07-31 | End: 2025-07-31

## 2025-07-31 RX ORDER — GADOTERATE MEGLUMINE 376.9 MG/ML
17 INJECTION INTRAVENOUS
Status: COMPLETED | OUTPATIENT
Start: 2025-07-31 | End: 2025-07-31

## 2025-07-31 RX ORDER — ORPHENADRINE CITRATE 30 MG/ML
60 INJECTION INTRAMUSCULAR; INTRAVENOUS ONCE
Status: COMPLETED | OUTPATIENT
Start: 2025-07-31 | End: 2025-07-31

## 2025-07-31 RX ORDER — POTASSIUM CHLORIDE 20 MEQ/1
40 TABLET, EXTENDED RELEASE ORAL ONCE
Status: COMPLETED | OUTPATIENT
Start: 2025-07-31 | End: 2025-07-31

## 2025-07-31 RX ORDER — GABAPENTIN 300 MG/1
300 CAPSULE ORAL 3 TIMES DAILY
Status: DISCONTINUED | OUTPATIENT
Start: 2025-07-31 | End: 2025-08-02 | Stop reason: HOSPADM

## 2025-07-31 RX ORDER — KETOROLAC TROMETHAMINE 30 MG/ML
15 INJECTION, SOLUTION INTRAMUSCULAR; INTRAVENOUS ONCE
Status: COMPLETED | OUTPATIENT
Start: 2025-07-31 | End: 2025-07-31

## 2025-07-31 RX ORDER — ACETAMINOPHEN 650 MG/1
650 SUPPOSITORY RECTAL EVERY 4 HOURS PRN
Status: DISCONTINUED | OUTPATIENT
Start: 2025-07-31 | End: 2025-08-02

## 2025-07-31 RX ORDER — ONDANSETRON HYDROCHLORIDE 2 MG/ML
4 INJECTION, SOLUTION INTRAVENOUS EVERY 8 HOURS PRN
Status: DISCONTINUED | OUTPATIENT
Start: 2025-07-31 | End: 2025-08-02 | Stop reason: HOSPADM

## 2025-07-31 RX ORDER — POTASSIUM CHLORIDE 14.9 MG/ML
20 INJECTION INTRAVENOUS
Status: DISCONTINUED | OUTPATIENT
Start: 2025-07-31 | End: 2025-07-31

## 2025-07-31 RX ORDER — ACETAMINOPHEN 325 MG/1
650 TABLET ORAL EVERY 4 HOURS PRN
Status: DISCONTINUED | OUTPATIENT
Start: 2025-07-31 | End: 2025-08-02

## 2025-07-31 RX ORDER — ENOXAPARIN SODIUM 100 MG/ML
60 INJECTION SUBCUTANEOUS EVERY 12 HOURS SCHEDULED
Status: DISCONTINUED | OUTPATIENT
Start: 2025-07-31 | End: 2025-08-02

## 2025-07-31 RX ORDER — ONDANSETRON 4 MG/1
4 TABLET, ORALLY DISINTEGRATING ORAL EVERY 8 HOURS PRN
Status: DISCONTINUED | OUTPATIENT
Start: 2025-07-31 | End: 2025-08-02 | Stop reason: HOSPADM

## 2025-07-31 RX ORDER — ZOLPIDEM TARTRATE 5 MG/1
10 TABLET ORAL ONCE
Status: COMPLETED | OUTPATIENT
Start: 2025-07-31 | End: 2025-07-31

## 2025-07-31 RX ORDER — LORAZEPAM 2 MG/ML
1 INJECTION INTRAMUSCULAR ONCE
Status: COMPLETED | OUTPATIENT
Start: 2025-07-31 | End: 2025-07-31

## 2025-07-31 RX ORDER — METOPROLOL TARTRATE 50 MG/1
50 TABLET ORAL 2 TIMES DAILY
Status: DISCONTINUED | OUTPATIENT
Start: 2025-07-31 | End: 2025-08-01

## 2025-07-31 RX ORDER — MORPHINE SULFATE 4 MG/ML
4 INJECTION, SOLUTION INTRAMUSCULAR; INTRAVENOUS ONCE
Status: COMPLETED | OUTPATIENT
Start: 2025-07-31 | End: 2025-07-31

## 2025-07-31 RX ORDER — KETOROLAC TROMETHAMINE 30 MG/ML
15 INJECTION, SOLUTION INTRAMUSCULAR; INTRAVENOUS EVERY 8 HOURS PRN
Status: DISCONTINUED | OUTPATIENT
Start: 2025-07-31 | End: 2025-08-01

## 2025-07-31 RX ORDER — SULFAMETHOXAZOLE AND TRIMETHOPRIM 800; 160 MG/1; MG/1
1 TABLET ORAL 2 TIMES DAILY
Status: DISCONTINUED | OUTPATIENT
Start: 2025-07-31 | End: 2025-08-01

## 2025-07-31 RX ORDER — AMLODIPINE BESYLATE 10 MG/1
10 TABLET ORAL DAILY
Status: DISCONTINUED | OUTPATIENT
Start: 2025-08-01 | End: 2025-08-02 | Stop reason: HOSPADM

## 2025-07-31 RX ORDER — TIZANIDINE 4 MG/1
4 TABLET ORAL NIGHTLY
Status: DISCONTINUED | OUTPATIENT
Start: 2025-07-31 | End: 2025-08-01

## 2025-07-31 RX ORDER — DULOXETIN HYDROCHLORIDE 30 MG/1
60 CAPSULE, DELAYED RELEASE ORAL DAILY
Status: DISCONTINUED | OUTPATIENT
Start: 2025-08-01 | End: 2025-08-02 | Stop reason: HOSPADM

## 2025-07-31 RX ORDER — ACETAMINOPHEN 160 MG/5ML
650 SOLUTION ORAL EVERY 4 HOURS PRN
Status: DISCONTINUED | OUTPATIENT
Start: 2025-07-31 | End: 2025-08-02

## 2025-07-31 RX ORDER — POLYETHYLENE GLYCOL 3350 17 G/17G
17 POWDER, FOR SOLUTION ORAL DAILY PRN
Status: DISCONTINUED | OUTPATIENT
Start: 2025-07-31 | End: 2025-08-02 | Stop reason: HOSPADM

## 2025-07-31 RX ORDER — LOSARTAN POTASSIUM 50 MG/1
100 TABLET ORAL DAILY
Status: DISCONTINUED | OUTPATIENT
Start: 2025-08-01 | End: 2025-08-02 | Stop reason: HOSPADM

## 2025-07-31 RX ORDER — GABAPENTIN 300 MG/1
300 CAPSULE ORAL 3 TIMES DAILY
Status: DISCONTINUED | OUTPATIENT
Start: 2025-07-31 | End: 2025-07-31

## 2025-07-31 RX ADMIN — HYDROMORPHONE HYDROCHLORIDE 0.5 MG: 1 INJECTION, SOLUTION INTRAMUSCULAR; INTRAVENOUS; SUBCUTANEOUS at 14:55

## 2025-07-31 RX ADMIN — KETOROLAC TROMETHAMINE 15 MG: 30 INJECTION, SOLUTION INTRAMUSCULAR at 21:55

## 2025-07-31 RX ADMIN — LORAZEPAM 1 MG: 2 INJECTION, SOLUTION INTRAMUSCULAR; INTRAVENOUS at 09:40

## 2025-07-31 RX ADMIN — POTASSIUM CHLORIDE 20 MEQ: 14.9 INJECTION, SOLUTION INTRAVENOUS at 13:03

## 2025-07-31 RX ADMIN — KETOROLAC TROMETHAMINE 15 MG: 30 INJECTION, SOLUTION INTRAMUSCULAR at 14:54

## 2025-07-31 RX ADMIN — METOPROLOL TARTRATE 50 MG: 50 TABLET, FILM COATED ORAL at 21:55

## 2025-07-31 RX ADMIN — ONDANSETRON 4 MG: 2 INJECTION, SOLUTION INTRAMUSCULAR; INTRAVENOUS at 08:03

## 2025-07-31 RX ADMIN — GABAPENTIN 300 MG: 300 CAPSULE ORAL at 21:55

## 2025-07-31 RX ADMIN — POTASSIUM CHLORIDE EXTENDED-RELEASE 20 MEQ: 1500 TABLET ORAL at 13:03

## 2025-07-31 RX ADMIN — ORPHENADRINE CITRATE 60 MG: 30 INJECTION, SOLUTION INTRAMUSCULAR; INTRAVENOUS at 14:56

## 2025-07-31 RX ADMIN — ZOLPIDEM TARTRATE 10 MG: 5 TABLET ORAL at 22:04

## 2025-07-31 RX ADMIN — SULFAMETHOXAZOLE AND TRIMETHOPRIM 1 TABLET: 800; 160 TABLET ORAL at 22:04

## 2025-07-31 RX ADMIN — TIZANIDINE 4 MG: 4 TABLET ORAL at 21:55

## 2025-07-31 RX ADMIN — GADOTERATE MEGLUMINE 17 ML: 376.9 INJECTION INTRAVENOUS at 11:48

## 2025-07-31 RX ADMIN — MORPHINE SULFATE 4 MG: 4 INJECTION, SOLUTION INTRAMUSCULAR; INTRAVENOUS at 08:03

## 2025-07-31 RX ADMIN — POTASSIUM CHLORIDE EXTENDED-RELEASE 40 MEQ: 1500 TABLET ORAL at 13:03

## 2025-07-31 SDOH — ECONOMIC STABILITY: FOOD INSECURITY: WITHIN THE PAST 12 MONTHS, YOU WORRIED THAT YOUR FOOD WOULD RUN OUT BEFORE YOU GOT THE MONEY TO BUY MORE.: NEVER TRUE

## 2025-07-31 SDOH — SOCIAL STABILITY: SOCIAL INSECURITY: ARE YOU OR HAVE YOU BEEN THREATENED OR ABUSED PHYSICALLY, EMOTIONALLY, OR SEXUALLY BY ANYONE?: NO

## 2025-07-31 SDOH — SOCIAL STABILITY: SOCIAL INSECURITY: WITHIN THE LAST YEAR, HAVE YOU BEEN AFRAID OF YOUR PARTNER OR EX-PARTNER?: NO

## 2025-07-31 SDOH — SOCIAL STABILITY: SOCIAL INSECURITY: DO YOU FEEL ANYONE HAS EXPLOITED OR TAKEN ADVANTAGE OF YOU FINANCIALLY OR OF YOUR PERSONAL PROPERTY?: NO

## 2025-07-31 SDOH — ECONOMIC STABILITY: FOOD INSECURITY: WITHIN THE PAST 12 MONTHS, THE FOOD YOU BOUGHT JUST DIDN'T LAST AND YOU DIDN'T HAVE MONEY TO GET MORE.: NEVER TRUE

## 2025-07-31 SDOH — SOCIAL STABILITY: SOCIAL INSECURITY: ABUSE: ADULT

## 2025-07-31 SDOH — SOCIAL STABILITY: SOCIAL INSECURITY: DOES ANYONE TRY TO KEEP YOU FROM HAVING/CONTACTING OTHER FRIENDS OR DOING THINGS OUTSIDE YOUR HOME?: NO

## 2025-07-31 SDOH — SOCIAL STABILITY: SOCIAL INSECURITY: HAVE YOU HAD THOUGHTS OF HARMING ANYONE ELSE?: NO

## 2025-07-31 SDOH — ECONOMIC STABILITY: INCOME INSECURITY: IN THE PAST 12 MONTHS HAS THE ELECTRIC, GAS, OIL, OR WATER COMPANY THREATENED TO SHUT OFF SERVICES IN YOUR HOME?: NO

## 2025-07-31 SDOH — SOCIAL STABILITY: SOCIAL INSECURITY: WERE YOU ABLE TO COMPLETE ALL THE BEHAVIORAL HEALTH SCREENINGS?: YES

## 2025-07-31 SDOH — SOCIAL STABILITY: SOCIAL INSECURITY: WITHIN THE LAST YEAR, HAVE YOU BEEN HUMILIATED OR EMOTIONALLY ABUSED IN OTHER WAYS BY YOUR PARTNER OR EX-PARTNER?: NO

## 2025-07-31 SDOH — SOCIAL STABILITY: SOCIAL INSECURITY: DO YOU FEEL UNSAFE GOING BACK TO THE PLACE WHERE YOU ARE LIVING?: NO

## 2025-07-31 SDOH — SOCIAL STABILITY: SOCIAL INSECURITY: HAVE YOU HAD ANY THOUGHTS OF HARMING ANYONE ELSE?: NO

## 2025-07-31 SDOH — SOCIAL STABILITY: SOCIAL INSECURITY: HAS ANYONE EVER THREATENED TO HURT YOUR FAMILY OR YOUR PETS?: NO

## 2025-07-31 SDOH — SOCIAL STABILITY: SOCIAL INSECURITY: ARE THERE ANY APPARENT SIGNS OF INJURIES/BEHAVIORS THAT COULD BE RELATED TO ABUSE/NEGLECT?: NO

## 2025-07-31 ASSESSMENT — COGNITIVE AND FUNCTIONAL STATUS - GENERAL
DAILY ACTIVITIY SCORE: 24
MOBILITY SCORE: 24
PATIENT BASELINE BEDBOUND: NO

## 2025-07-31 ASSESSMENT — ACTIVITIES OF DAILY LIVING (ADL)
TOILETING: INDEPENDENT
JUDGMENT_ADEQUATE_SAFELY_COMPLETE_DAILY_ACTIVITIES: YES
BATHING: INDEPENDENT
LACK_OF_TRANSPORTATION: NO
GROOMING: INDEPENDENT
HEARING - RIGHT EAR: FUNCTIONAL
DRESSING YOURSELF: INDEPENDENT
PATIENT'S MEMORY ADEQUATE TO SAFELY COMPLETE DAILY ACTIVITIES?: YES
WALKS IN HOME: INDEPENDENT
HEARING - LEFT EAR: FUNCTIONAL
ADEQUATE_TO_COMPLETE_ADL: YES
FEEDING YOURSELF: INDEPENDENT

## 2025-07-31 ASSESSMENT — PAIN - FUNCTIONAL ASSESSMENT
PAIN_FUNCTIONAL_ASSESSMENT: 0-10

## 2025-07-31 ASSESSMENT — PATIENT HEALTH QUESTIONNAIRE - PHQ9
2. FEELING DOWN, DEPRESSED OR HOPELESS: SEVERAL DAYS
SUM OF ALL RESPONSES TO PHQ9 QUESTIONS 1 & 2: 2
1. LITTLE INTEREST OR PLEASURE IN DOING THINGS: SEVERAL DAYS

## 2025-07-31 ASSESSMENT — LIFESTYLE VARIABLES
HOW MANY STANDARD DRINKS CONTAINING ALCOHOL DO YOU HAVE ON A TYPICAL DAY: 1 OR 2
AUDIT-C TOTAL SCORE: 1
HOW OFTEN DO YOU HAVE 6 OR MORE DRINKS ON ONE OCCASION: NEVER
HOW OFTEN DO YOU HAVE A DRINK CONTAINING ALCOHOL: MONTHLY OR LESS
SKIP TO QUESTIONS 9-10: 1
AUDIT-C TOTAL SCORE: 1

## 2025-07-31 ASSESSMENT — PAIN SCALES - GENERAL
PAINLEVEL_OUTOF10: 10 - WORST POSSIBLE PAIN
PAINLEVEL_OUTOF10: 6
PAINLEVEL_OUTOF10: 6
PAINLEVEL_OUTOF10: 7
PAINLEVEL_OUTOF10: 6

## 2025-07-31 ASSESSMENT — PAIN DESCRIPTION - LOCATION
LOCATION: BACK

## 2025-07-31 ASSESSMENT — PAIN DESCRIPTION - PAIN TYPE: TYPE: ACUTE PAIN

## 2025-07-31 NOTE — ED TRIAGE NOTES
Pt from private vehicle c/c of neck pain radiate down to back and sciatic nerve difficulty walking with pain and also complaining of headache. Was [facility] yesterday for same issue but pain is still there    Pt A&Ox3, pt alert calm cooperative with care. Pt resp even and unlabored.     PMH: bells palsy , DM , HTN , rheumatoid arthritis

## 2025-07-31 NOTE — ED PROVIDER NOTES
HPI   Chief Complaint   Patient presents with    Neck Pain    Back Pain    Headache       49-year-old female past medical history of rheumatoid arthritis on Rinvoq presents to the ED today with a chief concern of back pain.  Patient report symptoms started over the past months.  She has been seen and evaluated in the emergency department 3 times already for similar symptoms.  She reports pain in the right side of her neck radiating down into the right 1st through 3rd digit.  She also reports pain in the right side of the back radiating down into the great toe.  The pain is worse with movement.  She is unable to walk due to the pain.  She is been given gabapentin, steroids, and analgesics in the ED with little relief.  She is also given tramadol with little relief.  Has been given muscle relaxants.  She reports the pain typically goes away when she is in the ED however will come back when she comes home.  No rhinorrhea, nasal congestion, or sore throat.  Reports a temperature of 100 °F yesterday.  Reports urinary urgency.  Reports that when she goes to the bathroom she is unable to void.  She did have 1 episode of fecal incontinence yesterday no further episodes of incontinence.  No saddle anesthesia.  No history of IV drug use.  Not anticoagulated.  No chest pain or abdominal pain.  No hematuria.  Reports swelling in the right lower extremity however reports that she had an ultrasound which showed no DVT.  She has no other symptoms or concerns at this time.      History provided by:  Patient   used: No            Patient History   Medical History[1]  Surgical History[2]  Family History[3]  Social History[4]    Physical Exam   ED Triage Vitals   Temperature Heart Rate Respirations BP   07/31/25 0653 07/31/25 0655 07/31/25 0655 07/31/25 0655   36.2 °C (97.1 °F) 79 18 (!) 147/123      Pulse Ox Temp Source Heart Rate Source Patient Position   07/31/25 0655 07/31/25 0653 -- --   100 % Temporal         BP Location FiO2 (%)     -- --             Physical Exam  General: The pain the patient is sitting comfortably no acute distress.  Vital signs per nursing note.  Skin: No rashes, lesions, scars.  Normal skin turgor.  Head: Atraumatic normocephalic  Neck: The neck is supple.  The trachea is midline.  Lungs: Lungs are clear to auscultation bilaterally.  No rhonchi, wheezing, or rales.  No stridor.  Symmetric chest expansion  Heart: Normal S1-S2 no murmurs, rubs, gallops.  Abdomen: Abdomen is flat, nontender, nondistended.  No rebound tenderness or guarding. No pulsatile mass.  No CVA tenderness  Peripheral vascular: Symmetric 2+ radial, dorsalis pedis, and posterior tibial pulses.  Capillary refill is under 3 seconds.  Neurologic: Alert and oriented x4.    5/5 strength in the upper and lower extremity.  Sensation is intact in the upper and lower extremity.  Sensation is intact in the inner thigh and groin.  Patient is able to cross the legs, extend and flex the knee, point the great toe up, dorsiflex and plantarflex the ankle.  2+ reflexes at the patellar tendon. Normal Babinski.  Normal gait.  Musculoskeletal: No overlying skin changes throughout the entire back.  No tenderness over the spinal processes throughout the back.  There is tenderness to palpation over the paraspinal muscles in the lumbar spine.  Full range of motion of the back.  Nonpitting edema noted in the right lower extremity.  : Deferred      ED Course & MDM   ED Course as of 08/01/25 0953   u Jul 31, 2025   0943 POTASSIUM(!!): 2.9 [MC]   1419 Patient's MRI shows C7-T1 anterolisthesis with increased cord signal concerning for cord edema versus myelomalacia, discussed case with Dr. Rothman of neurosurgery who recommended obtaining flexion/extension films and will evaluate the patient.  Patient was hypokalemic initially, did receive IV/p.o. repletion [BR]   Fri Aug 01, 2025   0928 I personally interpreted the EKG.  Ventricular rate 58 bpm.  WI  interval 136 ms.  QRS duration 82 ms.  QT/QTc 430/426 ms.  Patient is in sinus bradycardia at a rate of 58 bpm.  Left axis deviation.  There is good R wave progression.  No right or left bundle branch block.  No ST elevations.  Compared with previous EKGs grossly unchanged. [MC]      ED Course User Index  [BR] Jermaine Jefferson MD  [MC] Pilo Edwards PA-C         Diagnoses as of 08/01/25 0953   Neck pain                 No data recorded     [place] Coma Scale Score: 15 (08/01/25 0810 : Brittnee Andrews, ADRIANE)                           Medical Decision Making  49-year-old female past medical history of rheumatoid arthritis on Rinvoq presents to the ED today with a chief concern of back pain.  Labs obtained.  CBC shows no evidence of leukocytosis or anemia.  Initial CMP shows a potassium of 2.9 so she was given IV potassium and p.o. potassium in the ED.  Was given 40 mg IV potassium in the ED and put 20 mg potassium p.o. in the ED.  She was given morphine and Zofran.  Urinalysis shows no UTI.  Troponin within normal limits.  Given symptoms and history will obtain MRI of brain, C, T, L-spine.  MD handled MRI results and disposition.  Patient was seen and staffed with ED attending physician Dr. Jefferson.     Differential diagnosis: Back strain, AAA rupture, cauda equina syndrome, cord compression, compression fracture, epidural abscess, epidural hematoma, herniated disc, sciatica, nephrolithiasis, ureterolithiasis, PE, pyelonephritis, vertebral fracture, pancreatitis, zoster        Procedure  Critical Care    Performed by: Pilo Edwards PA-C  Authorized by: Jermaine Jefferson MD    Critical care provider statement:     Critical care time (minutes):  30    Critical care time was exclusive of:  Separately billable procedures and treating other patients and teaching time    Critical care was necessary to treat or prevent imminent or life-threatening deterioration of the following conditions:  Metabolic crisis and Other (use comment box to  enter another option) (hypokalemia)    Critical care was time spent personally by me on the following activities:  Ordering and performing treatments and interventions, ordering and review of laboratory studies, ordering and review of radiographic studies, re-evaluation of patient's condition, examination of patient and evaluation of patient's response to treatment         Pilo Edwards PA-C  25 0929       [1]   Past Medical History:  Diagnosis Date    Acid reflux     Ankylosing spondylitis of site in spine (Multi)     Arthritis     Bursitis of hip     Cervical disc disorder     CTS (carpal tunnel syndrome)     Depression     Diabetes mellitus (Multi)     Dislocation of hip (Multi)     DM (diabetes mellitus), gestational     Fracture of hip (Multi)     Headache     History of transfusion     Hypertension     Joint pain     Low back pain     Lumbosacral disc disease     Migraine     Neck pain     Obesity     Osteoarthritis     Plantar fasciitis     Reflex sympathetic dystrophy     Rheumatoid arthritis     Sciatica     Spinal stenosis     Spondylolisthesis    [2]   Past Surgical History:  Procedure Laterality Date    BACK SURGERY      CARDIAC CATHETERIZATION       SECTION, LOW TRANSVERSE  2012    CHOLECYSTECTOMY  2017    CT GUIDED SOFT TISSUE FLUID DRAIN  2022    CT GUIDED SOFT TISSUE FLUID DRAIN [facility]    ESOPHAGOGASTRODUODENOSCOPY      GASTRIC BYPASS  2024    Gastric Bypass Laparoscopic  Repair Diaphragmatic Hernia Laparoscopy General Lysis Adhesions Laparoscopy Abdominal Cavity ([facility])    HERNIA REPAIR  2024    HIP ARTHROPLASTY      HYSTERECTOMY  2012    JOINT REPLACEMENT      LAMINECTOMY      LAPAROSCOPY GASTRECTOMY PARTIAL / TOTAL  2017    vertical sleeve    NECK SURGERY      OOPHORECTOMY  Approximately     ORTHOPEDIC SURGERY  2023    OTHER SURGICAL HISTORY  2017    laparoscopic cholecystectomy wiht intraoperative cholangiogram, lysis of  adhesions, mesh repair of incisional henia using 6 in round ventralight st mesh    OTHER SURGICAL HISTORY Left     revision x 2 hip    PLANTAR FASCIA RELEASE      SPINAL FUSION      SPINE SURGERY  02/14/2023    L4-L5 SPINAL SURGERY ( [facility] [place])    TUBAL LIGATION      US GUIDED SOFT TISSUE FLUID DRAIN  09/30/2021    US GUIDED SOFT TISSUE FLUID DRAIN [facility]   [3]   Family History  Problem Relation Name Age of Onset    Diabetes Mother Darline Calzada     Hypertension Mother Darline Calzada     Other (morbid obesity) Mother Darline Calzada     Hernia Mother Darline Calzada     Vision loss Mother Darline Calzada     Diabetes type I Mother Darline Calzada     Other (htn) Father Ammon Colon     Hypertension Father Ammon Colon     Arthritis Father Ammon Colon     Alcohol abuse Father Ammon Colon     Cancer Father Ammon Colon     Depression Father Ammon Colon     Arthritis Other      Stroke Maternal Grandfather Dan Calzada     Heart attack Maternal Grandfather Dan Calzada     Heart disease Maternal Grandmother Anayeli Zheng     Osteoporosis Maternal Grandmother Anayeli Calzada     Angina Maternal Grandmother Anayeli Calzada     Arrhythmia Maternal Grandmother Anayeli Calzada     Heart attack Maternal Grandmother Anayeli Calzada     Heart failure Maternal Grandmother Anayeli Zheng     Cancer Paternal Grandfather Supa Colon     Anemia Mother's Sister Ada Sebastian     Cancer Father's Brother Tee Colon     Cancer Father's Brother Armando Demarcus     Cancer Father's Brother Trenton Colon     Psoriasis Neg Hx      Irritable bowel syndrome Neg Hx      Asthma Son Leonardo     Cancer Father's Brother Tee Colon     Cancer Father's Brother Frank Colon     Cancer Father's Brother Marcil Colon     Cancer Father's Brother Armando Demarcus     Cancer Father's Brother Trenton Colon     Cancer Paternal Grandfather Supa Colon     Drug abuse Father's Sister Eduarda Colon     Heart disease Maternal Grandmother Anayeli Calzada     Vision loss Maternal Grandmother  Anayeli Zheng     Stroke Maternal Grandfather Sy Dasilvatiz     Heart failure Maternal Grandmother Anayeli Dasilvatiz     Arrhythmia Maternal Grandmother Anayeli Calzada     Heart attack Maternal Grandmother Anayeli Zheng     Osteoporosis Maternal Grandmother Anayeli Zheng     Angina Maternal Grandmother Anayeli Zheng     Atrial fibrillation Maternal Grandmother Anayeli Zheng     Heart attack Maternal Grandfather Sy Calzada     Cancer Father's Brother Tee Noman    [4]   Social History  Tobacco Use    Smoking status: Never     Passive exposure: Never    Smokeless tobacco: Never   Vaping Use    Vaping status: Never Used   Substance Use Topics    Alcohol use: Yes     Alcohol/week: 0.0 - 1.0 standard drinks of alcohol     Comment: Maybe 2 per year    Drug use: Never        Pilo Edwards PA-C  08/01/25 0953

## 2025-07-31 NOTE — PROGRESS NOTES
HPI:  Kat Tineo is a 49 y.o. female who presents to the spine clinic today for follow up of neck and LUE pain.     03/12/25:  History of cervical spine fusion with Dr. Ham at [facility] in approx 2018. She has previously seen Dr. Navas for her lumbar spine.   + rheumatoid arthritis - takes Rinvoq & duloxetine.      Reports new neck and LUE radiculopathy x 1 month. No trauma or injury. Pain has progressively worsened.   Reports neck stiffness & decreased ROM. Pain radiates into the left shoulder to the triceps and stops at the elbow. Notes intermittent hand stiffness & swelling. Reports cervicogenic headaches.   No focal motor weakness. Walking/balance/coordination at baseline.      She completed aqua therapy last year. No recent spine injections.     04/03/25 (Dr. Navas [facility])  Finished prednisone, helpful for couple weeks. Recommended PT prior to obtaining MRI.     07/17/25:  Comes in today for follow up. Continues to report neck pain with radiation into the shoulders and into the LUE. Also notes pain in bilateral hips, knees today.     Reports fall 07/04/25. Has been to the ED multiple times (04/24/25, 04/30/25, 06/28/25, 07/08/25).     She has completed 4 PT visit through  (eval 04/28/25, last visit 05/27/25) but has discontinued due to pain.     She has tried a combination of pain medication including oral steroids, tramadol, gabapentin, tizanidine.     Upcoming follow up appointment scheduled with Amisha Maguire PA-C on 08/05/25.     ROS:  Reviewed on EMR and patient intake sheet.    PMH/SH:  Reviewed on EMR and patient intake sheet.    Exam:  Physical Exam  Spine Musculoskeletal Exam    Well appearing, NAD  Pleasant & cooperative  BMI 35.48  Decreased ROM cervical spine with rotation, flexion/extension  + paraspinal muscle spasms  upper extremity sensation intact to light touch  upper extremity motor 5/5 throughout; no focal motor weakness  normal gait & station; no assistive devices   Reflexes:  hypo; neg hoffmans     Radiology:     I personally reviewed the following imaging studies along with the accompanying rad reports:    XR Cervical spine 03/12/25:  FINDINGS:  There is anterior interbody fusion changes spanning C3 to C7.  Alignment appears within normal limits. Hardware grossly  uncomplicated. There is some mild degenerative disc disease C2-C3  with partial bridging anterior osteophyte. No significant abnormal  motion.      IMPRESSION:  Intact and grossly uncomplicated appearing cervical fusion C3-C7    Assessment and Plan:  1. Cervical radiculitis (Primary)  2. History of fusion of cervical spine  3. Cervical spondylosis  - MR cervical spine wo IV contrast; Future  - ketorolac (Toradol) 10 mg tablet; Take 1 tablet (10 mg) by mouth every 6 hours if needed for moderate pain (4 - 6) for up to 5 days.  Dispense: 15 tablet; Refill: 0  - methocarbamol (Robaxin) 750 mg tablet; Take 1 tablet (750 mg) by mouth 4 times a day as needed for muscle spasms for up to 10 days.  Dispense: 40 tablet; Refill: 0    Recommend MRI cervical spine and keep scheduled follow up with Amisha Maguire PA-C on 08/05/25.     In the meantime will send in scripts for Toradol & Robaxin for her acute pain.     Patient in agreement to plan of care. Of course Kat Tineo is welcome to call at anytime with questions or concerns.     Mariam Todd PA-C  Department of Orthopaedic Surgery    [address] [place] [address]    Voicemail: [phone]   Appts: [phone]  Fax: [phone]

## 2025-07-31 NOTE — PROGRESS NOTES
Chief complaint: neurosurgery consulted for this patient with acute exacerbation of her longstanding neck pain and radicular pain to her shoulders she also has longstanding lumbar spine pain.    HPI: Is a 49-year-old female with history of extensive cervical fusion C3-7, who has had 1 month of worsening neck pain extending to her right shoulder and up and down her spine.  She also complains of low back pain and lower extremity pain.  Patient has 2 different surgeons that she is seeing for her cervical spine and her lumbar spine respectively. She has an appointment with one of her surgeons this upcoming week.  However, her pain is so severe that she came to the ER again to see if something could help her control the pain and/or if she could get any injections from pain management.  An MRI was completed demonstrating extensive anterior cervical fusion and adjacent level disease below the fusion with question of myelomalacia in the cord.    Neuro exam: Patient has near full strength throughout all muscle groups bilateral upper and lower extremities, limited slightly by pain in her neck.  Patient has no clonus or Manny's.    Imaging: Cervical MRI demonstrates extensive anterior cervical fusion and adjacent level disease below the fusion with question of myelomalacia in the cord; exam degraded by motion artifact.  Flexion-extension x-rays were done which did not demonstrate any significant active destabilization the final report is pending.    Impression: 49-year-old female with extensive cervical spine fusion who presents with acute worsening of her neck pain neurosurgery consulted for question of myelomalacia below the level of her fusion, though her neurological exam is intact and she has an appointment with her spine surgeon on Monday.  After long discussion with the patient and her family patient is states that she is not interested in undergoing acute surgery right now nor is it necessarily indicated at this  time.  She is more interested in pain management options such as injections.  She has an appointment to see her spine surgeon next week and if she would like a second opinion she can also see our neurosurgical spine team and/or follow-up with me for long-term pain management options using neuromodulation.    Plan:  1-follow-up with pain management for possible epidural injections.  2-continue physical therapy.  3-see spine surgeon as planned for next week.  4-May follow-up with Dr. Christina Chou for second opinion on the spine.  May also follow-up with me for consideration of neuromodulation for chronic neck and back pain if needed, after first undergoing pain management.

## 2025-08-01 ENCOUNTER — PATIENT OUTREACH (OUTPATIENT)
Dept: CARE COORDINATION | Facility: CLINIC | Age: 50
End: 2025-08-01
Payer: COMMERCIAL

## 2025-08-01 ENCOUNTER — APPOINTMENT (OUTPATIENT)
Dept: RADIOLOGY | Facility: HOSPITAL | Age: 50
End: 2025-08-01
Payer: COMMERCIAL

## 2025-08-01 PROBLEM — M54.2 NECK PAIN: Status: ACTIVE | Noted: 2025-08-01

## 2025-08-01 LAB
ALBUMIN SERPL BCP-MCNC: 3.3 G/DL (ref 3.4–5)
ALP SERPL-CCNC: 181 U/L (ref 33–110)
ALT SERPL W P-5'-P-CCNC: 201 U/L (ref 7–45)
ANION GAP SERPL CALC-SCNC: 10 MMOL/L (ref 10–20)
APPEARANCE UR: ABNORMAL
AST SERPL W P-5'-P-CCNC: 137 U/L (ref 9–39)
BILIRUB DIRECT SERPL-MCNC: 0.1 MG/DL (ref 0–0.3)
BILIRUB SERPL-MCNC: 0.4 MG/DL (ref 0–1.2)
BILIRUB UR STRIP.AUTO-MCNC: NEGATIVE MG/DL
BUN SERPL-MCNC: 12 MG/DL (ref 6–23)
CALCIUM SERPL-MCNC: 8.3 MG/DL (ref 8.6–10.3)
CHLORIDE SERPL-SCNC: 107 MMOL/L (ref 98–107)
CO2 SERPL-SCNC: 27 MMOL/L (ref 21–32)
COLOR UR: YELLOW
CREAT SERPL-MCNC: 0.72 MG/DL (ref 0.5–1.05)
EGFRCR SERPLBLD CKD-EPI 2021: >90 ML/MIN/1.73M*2
ERYTHROCYTE [DISTWIDTH] IN BLOOD BY AUTOMATED COUNT: 14.4 % (ref 11.5–14.5)
GLUCOSE SERPL-MCNC: 114 MG/DL (ref 74–99)
GLUCOSE UR STRIP.AUTO-MCNC: ABNORMAL MG/DL
HCT VFR BLD AUTO: 39.1 % (ref 36–46)
HGB BLD-MCNC: 12 G/DL (ref 12–16)
KETONES UR STRIP.AUTO-MCNC: NEGATIVE MG/DL
LEUKOCYTE ESTERASE UR QL STRIP.AUTO: NEGATIVE
MAGNESIUM SERPL-MCNC: 1.94 MG/DL (ref 1.6–2.4)
MCH RBC QN AUTO: 29.6 PG (ref 26–34)
MCHC RBC AUTO-ENTMCNC: 30.7 G/DL (ref 32–36)
MCV RBC AUTO: 97 FL (ref 80–100)
NITRITE UR QL STRIP.AUTO: NEGATIVE
NRBC BLD-RTO: 0 /100 WBCS (ref 0–0)
PH UR STRIP.AUTO: 6.5 [PH]
PLATELET # BLD AUTO: 368 X10*3/UL (ref 150–450)
POTASSIUM SERPL-SCNC: 3.2 MMOL/L (ref 3.5–5.3)
PROT SERPL-MCNC: 5.9 G/DL (ref 6.4–8.2)
PROT UR STRIP.AUTO-MCNC: NEGATIVE MG/DL
RBC # BLD AUTO: 4.05 X10*6/UL (ref 4–5.2)
RBC # UR STRIP.AUTO: NEGATIVE MG/DL
SODIUM SERPL-SCNC: 141 MMOL/L (ref 136–145)
SP GR UR STRIP.AUTO: 1.02
UROBILINOGEN UR STRIP.AUTO-MCNC: NORMAL MG/DL
WBC # BLD AUTO: 8.1 X10*3/UL (ref 4.4–11.3)

## 2025-08-01 PROCEDURE — 2500000001 HC RX 250 WO HCPCS SELF ADMINISTERED DRUGS (ALT 637 FOR MEDICARE OP): Performed by: INTERNAL MEDICINE

## 2025-08-01 PROCEDURE — 2500000001 HC RX 250 WO HCPCS SELF ADMINISTERED DRUGS (ALT 637 FOR MEDICARE OP): Performed by: NURSE PRACTITIONER

## 2025-08-01 PROCEDURE — 2500000002 HC RX 250 W HCPCS SELF ADMINISTERED DRUGS (ALT 637 FOR MEDICARE OP, ALT 636 FOR OP/ED): Performed by: INTERNAL MEDICINE

## 2025-08-01 PROCEDURE — 99232 SBSQ HOSP IP/OBS MODERATE 35: CPT

## 2025-08-01 PROCEDURE — 96372 THER/PROPH/DIAG INJ SC/IM: CPT | Performed by: NURSE PRACTITIONER

## 2025-08-01 PROCEDURE — 2500000001 HC RX 250 WO HCPCS SELF ADMINISTERED DRUGS (ALT 637 FOR MEDICARE OP): Performed by: HOSPITALIST

## 2025-08-01 PROCEDURE — 2500000002 HC RX 250 W HCPCS SELF ADMINISTERED DRUGS (ALT 637 FOR MEDICARE OP, ALT 636 FOR OP/ED)

## 2025-08-01 PROCEDURE — 36415 COLL VENOUS BLD VENIPUNCTURE: CPT | Performed by: NURSE PRACTITIONER

## 2025-08-01 PROCEDURE — 1100000001 HC PRIVATE ROOM DAILY

## 2025-08-01 PROCEDURE — 82374 ASSAY BLOOD CARBON DIOXIDE: CPT | Performed by: NURSE PRACTITIONER

## 2025-08-01 PROCEDURE — 81003 URINALYSIS AUTO W/O SCOPE: CPT | Performed by: EMERGENCY MEDICINE

## 2025-08-01 PROCEDURE — 2500000001 HC RX 250 WO HCPCS SELF ADMINISTERED DRUGS (ALT 637 FOR MEDICARE OP)

## 2025-08-01 PROCEDURE — 83735 ASSAY OF MAGNESIUM: CPT

## 2025-08-01 PROCEDURE — 2500000004 HC RX 250 GENERAL PHARMACY W/ HCPCS (ALT 636 FOR OP/ED)

## 2025-08-01 PROCEDURE — 84075 ASSAY ALKALINE PHOSPHATASE: CPT | Performed by: INTERNAL MEDICINE

## 2025-08-01 PROCEDURE — 2500000004 HC RX 250 GENERAL PHARMACY W/ HCPCS (ALT 636 FOR OP/ED): Mod: JZ | Performed by: NURSE PRACTITIONER

## 2025-08-01 PROCEDURE — 2500000002 HC RX 250 W HCPCS SELF ADMINISTERED DRUGS (ALT 637 FOR MEDICARE OP, ALT 636 FOR OP/ED): Performed by: NURSE PRACTITIONER

## 2025-08-01 PROCEDURE — 85027 COMPLETE CBC AUTOMATED: CPT | Performed by: NURSE PRACTITIONER

## 2025-08-01 PROCEDURE — 2500000004 HC RX 250 GENERAL PHARMACY W/ HCPCS (ALT 636 FOR OP/ED): Performed by: INTERNAL MEDICINE

## 2025-08-01 RX ORDER — DEXAMETHASONE SODIUM PHOSPHATE 10 MG/ML
6 INJECTION INTRAMUSCULAR; INTRAVENOUS
Status: DISCONTINUED | OUTPATIENT
Start: 2025-08-01 | End: 2025-08-02 | Stop reason: HOSPADM

## 2025-08-01 RX ORDER — OXYCODONE HYDROCHLORIDE 5 MG/1
5 TABLET ORAL EVERY 8 HOURS PRN
Refills: 0 | Status: DISCONTINUED | OUTPATIENT
Start: 2025-08-01 | End: 2025-08-02 | Stop reason: HOSPADM

## 2025-08-01 RX ORDER — TIZANIDINE 4 MG/1
4 TABLET ORAL 3 TIMES DAILY
Status: DISCONTINUED | OUTPATIENT
Start: 2025-08-01 | End: 2025-08-01

## 2025-08-01 RX ORDER — DEXAMETHASONE SODIUM PHOSPHATE 10 MG/ML
10 INJECTION INTRAMUSCULAR; INTRAVENOUS ONCE
Status: COMPLETED | OUTPATIENT
Start: 2025-08-01 | End: 2025-08-01

## 2025-08-01 RX ORDER — LIDOCAINE 560 MG/1
1 PATCH PERCUTANEOUS; TOPICAL; TRANSDERMAL DAILY
Status: DISCONTINUED | OUTPATIENT
Start: 2025-08-01 | End: 2025-08-01

## 2025-08-01 RX ORDER — SENNOSIDES 8.6 MG/1
1 TABLET ORAL NIGHTLY
Status: DISCONTINUED | OUTPATIENT
Start: 2025-08-01 | End: 2025-08-02 | Stop reason: HOSPADM

## 2025-08-01 RX ORDER — MORPHINE SULFATE 2 MG/ML
2 INJECTION, SOLUTION INTRAMUSCULAR; INTRAVENOUS EVERY 4 HOURS PRN
Status: DISCONTINUED | OUTPATIENT
Start: 2025-08-01 | End: 2025-08-02

## 2025-08-01 RX ORDER — KETOROLAC TROMETHAMINE 30 MG/ML
15 INJECTION, SOLUTION INTRAMUSCULAR; INTRAVENOUS EVERY 8 HOURS
Status: DISCONTINUED | OUTPATIENT
Start: 2025-08-01 | End: 2025-08-02

## 2025-08-01 RX ORDER — POTASSIUM CHLORIDE 20 MEQ/1
40 TABLET, EXTENDED RELEASE ORAL ONCE
Status: COMPLETED | OUTPATIENT
Start: 2025-08-01 | End: 2025-08-01

## 2025-08-01 RX ORDER — ZOLPIDEM TARTRATE 5 MG/1
5 TABLET ORAL ONCE
Status: COMPLETED | OUTPATIENT
Start: 2025-08-02 | End: 2025-08-01

## 2025-08-01 RX ORDER — METOPROLOL SUCCINATE 50 MG/1
50 TABLET, EXTENDED RELEASE ORAL DAILY
Status: DISCONTINUED | OUTPATIENT
Start: 2025-08-01 | End: 2025-08-02 | Stop reason: HOSPADM

## 2025-08-01 RX ORDER — FLUTICASONE PROPIONATE 50 MCG
2 SPRAY, SUSPENSION (ML) NASAL DAILY
Status: DISCONTINUED | OUTPATIENT
Start: 2025-08-01 | End: 2025-08-02 | Stop reason: HOSPADM

## 2025-08-01 RX ORDER — GABAPENTIN 300 MG/1
300 CAPSULE ORAL ONCE
Status: COMPLETED | OUTPATIENT
Start: 2025-08-01 | End: 2025-08-01

## 2025-08-01 RX ORDER — TRAMADOL HYDROCHLORIDE 50 MG/1
50 TABLET, FILM COATED ORAL ONCE
Status: COMPLETED | OUTPATIENT
Start: 2025-08-01 | End: 2025-08-01

## 2025-08-01 RX ORDER — METHOCARBAMOL 100 MG/ML
1000 INJECTION, SOLUTION INTRAMUSCULAR; INTRAVENOUS EVERY 8 HOURS
Status: DISCONTINUED | OUTPATIENT
Start: 2025-08-01 | End: 2025-08-02

## 2025-08-01 RX ORDER — OXYMETAZOLINE HCL 0.05 %
2 SPRAY, NON-AEROSOL (ML) NASAL 2 TIMES DAILY
Status: DISCONTINUED | OUTPATIENT
Start: 2025-08-01 | End: 2025-08-02 | Stop reason: HOSPADM

## 2025-08-01 RX ORDER — METOPROLOL SUCCINATE 50 MG/1
50 TABLET, EXTENDED RELEASE ORAL DAILY
COMMUNITY
Start: 2025-07-07

## 2025-08-01 RX ADMIN — METHOCARBAMOL 1000 MG: 100 INJECTION INTRAMUSCULAR; INTRAVENOUS at 15:05

## 2025-08-01 RX ADMIN — OXYCODONE HYDROCHLORIDE 5 MG: 5 TABLET ORAL at 13:01

## 2025-08-01 RX ADMIN — SULFAMETHOXAZOLE AND TRIMETHOPRIM 1 TABLET: 800; 160 TABLET ORAL at 08:11

## 2025-08-01 RX ADMIN — GABAPENTIN 300 MG: 300 CAPSULE ORAL at 15:05

## 2025-08-01 RX ADMIN — GABAPENTIN 300 MG: 300 CAPSULE ORAL at 08:11

## 2025-08-01 RX ADMIN — FLUTICASONE PROPIONATE 2 SPRAY: 50 SPRAY, METERED NASAL at 15:05

## 2025-08-01 RX ADMIN — OXYCODONE HYDROCHLORIDE 5 MG: 5 TABLET ORAL at 21:03

## 2025-08-01 RX ADMIN — TRAMADOL HYDROCHLORIDE 50 MG: 50 TABLET, COATED ORAL at 01:07

## 2025-08-01 RX ADMIN — DEXAMETHASONE SODIUM PHOSPHATE 10 MG: 10 INJECTION, SOLUTION INTRAMUSCULAR; INTRAVENOUS at 08:50

## 2025-08-01 RX ADMIN — ZOLPIDEM TARTRATE 5 MG: 5 TABLET, FILM COATED ORAL at 23:55

## 2025-08-01 RX ADMIN — ENOXAPARIN SODIUM 60 MG: 60 INJECTION SUBCUTANEOUS at 21:02

## 2025-08-01 RX ADMIN — Medication 2 SPRAY: at 15:05

## 2025-08-01 RX ADMIN — DEXAMETHASONE SODIUM PHOSPHATE 6 MG: 10 INJECTION, SOLUTION INTRAMUSCULAR; INTRAVENOUS at 16:34

## 2025-08-01 RX ADMIN — GABAPENTIN 300 MG: 300 CAPSULE ORAL at 21:02

## 2025-08-01 RX ADMIN — DULOXETINE 60 MG: 30 CAPSULE, DELAYED RELEASE ORAL at 08:11

## 2025-08-01 RX ADMIN — TIZANIDINE 4 MG: 4 TABLET ORAL at 08:50

## 2025-08-01 RX ADMIN — ENOXAPARIN SODIUM 60 MG: 60 INJECTION SUBCUTANEOUS at 08:10

## 2025-08-01 RX ADMIN — POTASSIUM CHLORIDE EXTENDED-RELEASE 40 MEQ: 1500 TABLET ORAL at 08:50

## 2025-08-01 RX ADMIN — KETOROLAC TROMETHAMINE 15 MG: 30 INJECTION, SOLUTION INTRAMUSCULAR at 23:12

## 2025-08-01 RX ADMIN — KETOROLAC TROMETHAMINE 15 MG: 30 INJECTION, SOLUTION INTRAMUSCULAR at 15:05

## 2025-08-01 RX ADMIN — Medication 2 SPRAY: at 21:07

## 2025-08-01 RX ADMIN — SODIUM CHLORIDE, SODIUM LACTATE, POTASSIUM CHLORIDE, AND CALCIUM CHLORIDE 500 ML: .6; .31; .03; .02 INJECTION, SOLUTION INTRAVENOUS at 10:28

## 2025-08-01 RX ADMIN — METHOCARBAMOL 1000 MG: 100 INJECTION INTRAMUSCULAR; INTRAVENOUS at 23:12

## 2025-08-01 RX ADMIN — GABAPENTIN 300 MG: 300 CAPSULE ORAL at 16:34

## 2025-08-01 RX ADMIN — KETOROLAC TROMETHAMINE 15 MG: 30 INJECTION, SOLUTION INTRAMUSCULAR at 08:11

## 2025-08-01 RX ADMIN — SENNOSIDES 8.6 MG: 8.6 TABLET, FILM COATED ORAL at 21:03

## 2025-08-01 ASSESSMENT — COGNITIVE AND FUNCTIONAL STATUS - GENERAL
MOBILITY SCORE: 23
MOBILITY SCORE: 24
DAILY ACTIVITIY SCORE: 24
MOBILITY SCORE: 24
CLIMB 3 TO 5 STEPS WITH RAILING: A LITTLE

## 2025-08-01 ASSESSMENT — PAIN SCALES - GENERAL
PAINLEVEL_OUTOF10: 2
PAINLEVEL_OUTOF10: 5 - MODERATE PAIN
PAINLEVEL_OUTOF10: 8
PAINLEVEL_OUTOF10: 8
PAINLEVEL_OUTOF10: 5 - MODERATE PAIN
PAINLEVEL_OUTOF10: 9
PAINLEVEL_OUTOF10: 7

## 2025-08-01 ASSESSMENT — PAIN - FUNCTIONAL ASSESSMENT
PAIN_FUNCTIONAL_ASSESSMENT: 0-10

## 2025-08-01 ASSESSMENT — PAIN DESCRIPTION - LOCATION
LOCATION: NECK
LOCATION: NECK
LOCATION: OTHER (COMMENT)
LOCATION: NECK

## 2025-08-01 ASSESSMENT — PAIN DESCRIPTION - DESCRIPTORS
DESCRIPTORS: RADIATING
DESCRIPTORS: RADIATING

## 2025-08-01 ASSESSMENT — ACTIVITIES OF DAILY LIVING (ADL): LACK_OF_TRANSPORTATION: NO

## 2025-08-01 NOTE — H&P
HPI: Kat Tineo is a 49 y.o. female, with a PMH of hx of bariatric surgery, vit deficiencies headaches, chronic pain, RA,  OA, radiculopathy, cervical radiculitis, hx of cervical fusion, cervical spondylosis, HTN.  who presented to [facility] ED on 7/31/2025 for neck, back pain and headache.    Patient report symptoms started over the past months. She has been seen and evaluated in the emergency department 3 times already for similar symptoms. She reports pain in the right side of her neck radiating down into the right 1st through 3rd digit. She also reports pain in the right side of the back radiating down into the great toe. The pain is worse with movement. She is unable to walk due to the pain. She is been given gabapentin, steroids, and analgesics in the ED with little relief. She is also given tramadol with little relief. Has been given muscle relaxants. She reports the pain typically goes away when she is in the ED however will come back when she comes home. No rhinorrhea, nasal congestion, or sore throat. Reports a temperature of 100 °F yesterday.   Denies any bowel or bladder issues at this time.  Denies CP, SOB, Drug use  Pt states was on bactrim from [facility] Ed for UTI  Pt reports decreased urination at times and reports one episode of loose stools ( diarrhea) yesterday with some incontinence, denies any additional incontinence  of bowel or bladder.    Recent US 7/7/2025 González LE - negative    Per Neuro surgery :Plan:  1-follow-up with pain management for possible epidural injections.  2-continue physical therapy.  3-see spine surgeon as planned for next week.  4-May follow-up with Dr. Christina Chou for second opinion on the spine.  May also follow-up with me for consideration of neuromodulation for chronic neck and back pain if needed, after first undergoing pain management    ED Course:   VSS  EKG : Sinus bradycardia Inferior infarct , age undetermined HR 58  Labs UA- awaiting culture, trop -negative, Sed  rate 34, no leukocytosis  Cervical XR:Metallic plate fusion anteriorly see C4 through C7 and additional hardware fusion C3-4  No spondylolisthesis. No motion is noted at the fused levels.  US RUQ: Hepatomegaly and diffuse fatty infiltration of the liver. Previous cholecystectomy. No biliary dilation. Sub visualization of the pancreas.  MR Brain: No acute infarct, recent hemorrhage, or intracranial mass effect. No abnormal intracranial enhancement.Trace white matter signal change may represent sequela of migraines  versus trace chronic small vessel ischemic disease  MR Thoracic: No abnormal enhancement within the thoracic spine. Similar appearing thoracic degenerative change, worst at T7-T8, with moderate spinal canal stenosis and ventral cord  flattening/indentation but no cord signal abnormality.  MR Cervical: Motion degraded examination.C3-C7 ACDF components again evident. Bridging osseous fusion suggested along the C4-C7 regions with limited assessment for osseous  fusion at C3-C4.There is worsened degenerative anterolisthesis of C7 on T1 with moderate spinal canal stenosis suggested. There is also new cord signal abnormality focally at C7-T1 concerning for potential cord edema or myelomalacia. Severe left neural foraminal narrowing is  again suggested.No abnormal enhancement.  MR Lumbar:  Postoperative change of L4-L5 posterior spinal fusion with interbody fixation and laminectomy/decompression. Enhancing granulation tissue noted within the operative bed. Resolution of previous epidural fluid collection. Worsening degenerative change at L3-L4 with narrowing/effacement of the right-greater-than-left lateral recesses and severe right neural  foraminal narrowing with impingement of the exiting right L3 nerve  root. Moderate spinal canal stenosis with right-greater-than-left lateral recess narrowing at L2-L3, similar to slightly worsened in the interim.  Prn analgesics  K+ replacement  PRN Antiemetics        Patient  History   PMH: She has a past medical history of Acid reflux, Ankylosing spondylitis of site in spine (Multi), Arthritis (), Bursitis of hip, Cervical disc disorder, CTS (carpal tunnel syndrome), Depression, Diabetes mellitus (Multi), Dislocation of hip (Multi), DM (diabetes mellitus), gestational, Fracture of hip (Multi), Headache, History of transfusion, Hypertension, Joint pain, Low back pain, Lumbosacral disc disease, Migraine, Neck pain, Obesity, Osteoarthritis, Plantar fasciitis, Reflex sympathetic dystrophy, Rheumatoid arthritis, Sciatica, Spinal stenosis, and Spondylolisthesis.    She has no past medical history of Personal history of irradiation.  PSH: She has a past surgical history that includes US guided soft tissue fluid drain (2021); CT guided soft tissue fluid drainage (2022); Other surgical history (2017); Neck surgery; Other surgical history (Left); Laparoscopy gastrectomy partial / total (); Spine surgery (2023); Hysterectomy (); Gastric bypass (2024); Cholecystectomy ();  section, low transverse (2012); Hernia repair (2024); Esophagogastroduodenoscopy; Cardiac catheterization; Tubal ligation; Back surgery; Joint replacement; Oophorectomy (Approximately ); Laminectomy; Spinal fusion; orthopedic surgery (2023); Hip Arthroplasty; and Plantar fascia release.  SH: She reports that she has never smoked. She has never been exposed to tobacco smoke. She has never used smokeless tobacco. She reports that she does not currently use alcohol after a past usage of about 2.0 standard drinks of alcohol per week. She reports that she does not use drugs.  FH: family history includes Alcohol abuse in her father; Anemia in her mother's sister; Angina in her maternal grandmother and maternal grandmother; Arrhythmia in her maternal grandmother and maternal grandmother; Arthritis in her father and another family member; Asthma in her son; Atrial  fibrillation in her maternal grandmother; Cancer in her father, father's brother, father's brother, father's brother, father's brother, father's brother, father's brother, father's brother, father's brother, father's brother, paternal grandfather, and paternal grandfather; Depression in her father; Diabetes in her mother; Diabetes type I in her mother; Drug abuse in her father's sister; Heart attack in her maternal grandfather, maternal grandfather, maternal grandmother, and maternal grandmother; Heart disease in her maternal grandmother and maternal grandmother; Heart failure in her maternal grandmother and maternal grandmother; Hernia in her mother; Hypertension in her father and mother; Osteoporosis in her maternal grandmother and maternal grandmother; Stroke in her maternal grandfather and maternal grandfather; Vision loss in her maternal grandmother and mother; htn in her father; morbid obesity in her mother.     RX Allergies[1]  Current Outpatient Medications   Medication Instructions    amLODIPine (NORVASC) 10 mg, oral, Daily    calcium carbonate-vitamin D3 500 mg-5 mcg (200 unit) tablet 1 tablet, Daily    cholecalciferol (VITAMIN D3) 50 mcg, Daily    colesevelam 3.75 g, oral, Daily    cyanocobalamin (VITAMIN B-12) 250 mcg, Daily    DULoxetine (CYMBALTA) 60 mg, oral, Daily    gabapentin (NEURONTIN) 300 mg, oral, 3 times daily    gabapentin (NEURONTIN) 300 mg, oral, 3 times daily    losartan (Cozaar) 100 mg tablet Take 1 tablet (100 mg) by mouth once daily. Do not start before November 27, 2024.    methocarbamol (ROBAXIN) 750 mg, oral, 4 times daily PRN    metoprolol tartrate (Lopressor) 50 mg tablet Take 1 tablet (50 mg) by mouth 2 times a day. Do not start before November 27, 2024.    multivitamin with iron - children's (Cerovite, Jr) chewable tablet 1 tablet, Daily    orphenadrine (NORFLEX) 100 mg, oral, 2 times daily PRN, Do not crush, chew, or split.    potassium bicarbonate (K-Lyte) 25 mEq effervescent  tablet 25 mEq, 2 times daily    potassium chloride CR (Klor-Con M20) 20 mEq ER tablet 20 mEq, oral, 2 times daily, Do not crush or chew.    Prevalite 4 g, oral, 3 times daily    rizatriptan (Maxalt) 10 mg tablet TAKE 1 TABLET AT ONSET OF HEADACHE. MAY REPEAT EVERY 2 HOURS AS NEEDED. MAXIMUM 3 TABLETS/24 HOURS.    sulfamethoxazole-trimethoprim (Bactrim DS) 800-160 mg tablet 1 tablet, oral, 2 times daily    tiZANidine (ZANAFLEX) 4 mg, oral, Nightly    traMADol (ULTRAM) 50 mg, oral, Every 6 hours PRN    upadacitinib ER (RINVOQ) 30 mg, Daily    zolpidem (AMBIEN) 10 mg, oral, Nightly PRN     Review of Systems   ROS: 10-point review of systems was performed and is otherwise negative except as noted in HPI.        Heart Rate:  [54-86]   Temperature:  [36.2 °C (97.1 °F)]   Respirations:  [11-20]   BP: (120-169)/()   Height:  [152.4 cm (5')]   Weight:  [129 kg (284 lb)]   Pulse Ox:  [92 %-100 %]      0-10 (Numeric) Pain Score: 6   Vitals:    07/31/25 0655   Weight: 129 kg (284 lb)        Physical Exam:  Vitals and nursing notes reviewed.  GENERAL: Alert and awake, cooperative; in no acute distress  SKIN: Warm and dry, cap refill <2  HEENT: Normocephalic, PEERL, mucous membranes pink and moist  CARDIAC: Regular rate and rhythm, S1S2, no murmurs or abnormal heart sounds  CHEST: Normal respiratory effort, no abnormal breath sounds  ABDOMEN: soft, non-distended, non-tender with palpation  EXTREMITIES: No lower extremity edema, normal pulses all 4 extremities  NEURO: Alert and oriented, mental status at baseline, no focal deficits  PSYCH: Behavior and affect as expected     Medications  Scheduled Medications[2]Continuous Medications[3]PRN Medications[4]    Diagnostic Results   CBC- 7/31/2025:  8:03 AM  9.3 12.3 531    37.7      BMP- 7/31/2025:  8:03 AM  140 12 _ 111   3.4 0.79 24    Estimated Creatinine Clearance: 104.9 mL/min (by C-G formula based on SCr of 0.79 mg/dL).     CA: 8.8 PROTIEN: 6.9 ALT: 136 Total Bili: 0.5 Mg:  1.94   PHOS: _ ALBUMIN: 3.7 AST: 89   Alk Phos: 142      COAGS- No results in last year.  _   _ _     CV Labs  Troponin I, High Sensitivity   Date/Time Value Ref Range Status   07/31/2025 08:03 AM 5 0 - 13 ng/L Final   07/22/2025 08:26 PM 4 0 - 13 ng/L Final   07/22/2025 06:50 PM 4 0 - 13 ng/L Final   06/28/2025 01:30 PM 7 0 - 13 ng/L Final   06/28/2025 11:53 AM 8 0 - 13 ng/L Final   04/24/2025 05:50 PM 7 0 - 13 ng/L Final     BNP   Date/Time Value Ref Range Status   11/02/2024 04:40 AM 40 0 - 99 pg/mL Final   01/08/2023 09:41 PM 24 0 - 99 pg/mL Final     Comment:     .  <100 pg/mL - Heart failure unlikely  100-299 pg/mL - Intermediate probability of acute heart  .               failure exacerbation. Correlate with clinical  .               context and patient history.    >=300 pg/mL - Heart Failure likely. Correlate with clinical  .               context and patient history.  BNP testing is performed using different testing   methodology at [facility] than at other   [facility] hospitals. Direct result comparisons should   only be made within the same method.       TR HGBA1C (Data Conversion)   Date/Time Value Ref Range Status   07/25/2022 11:03 AM 6.1 4.2 - 6.5 % Final     Hemoglobin A1C   Date/Time Value Ref Range Status   04/09/2024 01:34 PM 6.4 (H) see below % Final   05/31/2022 04:55 AM 6.2 (H) 4.0 - 6.0 % Final     Comment:     Hemoglobin A1C levels are related to mean blood glucose during the   preceding 2-3 months. The relationship table below may be used as a   general guide. Each 1% increase in HGB A1C is a reflection of an   increase in mean glucose of approximately 30 mg/dl.   Reference: Diabetes Care, volume 29, supplement 1 Jan. 2006                        HGB A1C ................. Approx. Mean Glucose   _______________________________________________   6%   ...............................  120 mg/dl   7%   ...............................  150 mg/dl   8%   ...............................   180 mg/dl   9%   ...............................  210 mg/dl   10%  ...............................  240 mg/dl  Performed at [address] [address]     05/09/2022 12:03 PM 6.6 (H) 4.0 - 6.0 % Final     Comment:     Hemoglobin A1C levels are related to mean blood glucose during the   preceding 2-3 months. The relationship table below may be used as a   general guide. Each 1% increase in HGB A1C is a reflection of an   increase in mean glucose of approximately 30 mg/dl.   Reference: Diabetes Care, volume 29, supplement 1 Jan. 2006                        HGB A1C ................. Approx. Mean Glucose   _______________________________________________   6%   ...............................  120 mg/dl   7%   ...............................  150 mg/dl   8%   ...............................  180 mg/dl   9%   ...............................  210 mg/dl   10%  ...............................  240 mg/dl  Performed at [address] [address]       LDL Calculated   Date/Time Value Ref Range Status   05/09/2022 12:03 PM 89 65 - 130 MG/DL Final   12/13/2021 12:12 PM 99 65 - 130 MG/DL Final   08/17/2021 08:32  65 - 130 MG/DL Final       Results for orders placed or performed during the hospital encounter of 07/31/25 (from the past 24 hours)   Magnesium   Result Value Ref Range    Magnesium 1.94 1.60 - 2.40 mg/dL   CBC and Auto Differential   Result Value Ref Range    WBC 9.3 4.4 - 11.3 x10*3/uL    nRBC 0.0 0.0 - 0.0 /100 WBCs    RBC 4.22 4.00 - 5.20 x10*6/uL    Hemoglobin 12.3 12.0 - 16.0 g/dL    Hematocrit 37.7 36.0 - 46.0 %    MCV 89 80 - 100 fL    MCH 29.1 26.0 - 34.0 pg    MCHC 32.6 32.0 - 36.0 g/dL    RDW 14.2 11.5 - 14.5 %    Platelets 531 (H) 150 - 450 x10*3/uL    Neutrophils % 76.4 40.0 - 80.0 %    Immature Granulocytes %, Automated 0.9 0.0 - 0.9 %    Lymphocytes % 15.7 13.0 - 44.0 %    Monocytes % 6.2 2.0 - 10.0 %    Eosinophils % 0.5 0.0 - 6.0 %    Basophils % 0.3 0.0 -  2.0 %    Neutrophils Absolute 7.13 1.20 - 7.70 x10*3/uL    Immature Granulocytes Absolute, Automated 0.08 0.00 - 0.70 x10*3/uL    Lymphocytes Absolute 1.47 1.20 - 4.80 x10*3/uL    Monocytes Absolute 0.58 0.10 - 1.00 x10*3/uL    Eosinophils Absolute 0.05 0.00 - 0.70 x10*3/uL    Basophils Absolute 0.03 0.00 - 0.10 x10*3/uL   Comprehensive metabolic panel   Result Value Ref Range    Glucose 111 (H) 74 - 99 mg/dL    Sodium 140 136 - 145 mmol/L    Potassium 2.9 (LL) 3.5 - 5.3 mmol/L    Chloride 104 98 - 107 mmol/L    Bicarbonate 24 21 - 32 mmol/L    Anion Gap 15 10 - 20 mmol/L    Urea Nitrogen 12 6 - 23 mg/dL    Creatinine 0.79 0.50 - 1.05 mg/dL    eGFR >90 >60 mL/min/1.73m*2    Calcium 8.8 8.6 - 10.3 mg/dL    Albumin 3.7 3.4 - 5.0 g/dL    Alkaline Phosphatase 142 (H) 33 - 110 U/L    Total Protein 6.9 6.4 - 8.2 g/dL    AST 89 (H) 9 - 39 U/L    Bilirubin, Total 0.5 0.0 - 1.2 mg/dL     (H) 7 - 45 U/L   Sedimentation rate, automated   Result Value Ref Range    Sedimentation Rate 34 (H) 0 - 30 mm/h   Troponin I, High Sensitivity   Result Value Ref Range    Troponin I, High Sensitivity 5 0 - 13 ng/L   TSH with reflex to Free T4 if abnormal   Result Value Ref Range    Thyroid Stimulating Hormone 0.83 0.44 - 3.98 mIU/L   ECG 12 lead   Result Value Ref Range    Ventricular Rate 58 BPM    Atrial Rate 58 BPM    CA Interval 136 ms    QRS Duration 82 ms    QT Interval 434 ms    QTC Calculation(Bazett) 426 ms    P Axis 9 degrees    R Axis -11 degrees    T Axis -65 degrees    QRS Count 10 beats    Q Onset 221 ms    P Onset 153 ms    P Offset 206 ms    T Offset 438 ms    QTC Fredericia 428 ms   Potassium   Result Value Ref Range    Potassium 3.4 (L) 3.5 - 5.3 mmol/L     *Note: Due to a large number of results and/or encounters for the requested time period, some results have not been displayed. A complete set of results can be found in Results Review.     XR cervical spine 2-3 views   Final Result   Metallic plate fusion  anteriorly see C4 through C7 and additional   hardware fusion C3-4        No spondylolisthesis. No motion is noted at the fused levels.             MACRO:   None        Signed by: Janet Patel 7/31/2025 4:05 PM   Dictation workstation:   [id number]      US right upper quadrant   Final Result   Hepatomegaly and diffuse fatty infiltration of the liver.        Previous cholecystectomy. No biliary dilation.        Sub visualization of the pancreas.        MACRO:   None.        Signed by: Naveen Sevilla 7/31/2025 2:28 PM   Dictation workstation:   [id number]      MR brain w and wo IV contrast   Final Result   No acute infarct, recent hemorrhage, or intracranial mass effect. No   abnormal intracranial enhancement.        Trace white matter signal change may represent sequela of migraines   versus trace chronic small vessel ischemic disease.        MACRO:   None        Signed by: Nasim Guzmán 7/31/2025 1:11 PM   Dictation workstation:   [id number]      MR cervical spine w and wo IV contrast   Final Result   Motion degraded examination.        C3-C7 ACDF components again evident. Bridging osseous fusion   suggested along the C4-C7 regions with limited assessment for osseous   fusion at C3-C4.        There is worsened degenerative anterolisthesis of C7 on T1 with   moderate spinal canal stenosis suggested. There is also new cord   signal abnormality focally at C7-T1 concerning for potential cord   edema or myelomalacia. Severe left neural foraminal narrowing is   again suggested.        No abnormal enhancement.        MACRO:   None        Signed by: Nasim Guzmán 7/31/2025 12:53 PM   Dictation workstation:   [id number]      MR thoracic spine w and wo IV contrast   Final Result   No abnormal enhancement within the thoracic spine.        Similar appearing thoracic degenerative change, worst at T7-T8, with   moderate spinal canal stenosis and ventral cord   flattening/indentation but no cord signal abnormality.         MACRO:   None        Signed by: Nasim Guzmán 7/31/2025 1:03 PM   Dictation workstation:   [id number]      MR lumbar spine w and wo IV contrast   Final Result   Addendum (preliminary) 1 of 1   Interpreted By:  Nasim Guzmán,    ADDENDUM:   No abnormal enhancement within the lumbar spine.        Signed by: Nasim Guzmán 7/31/2025 1:12 PM        -------- ORIGINAL REPORT --------   Dictation workstation:   [id number]      Final   Postoperative change of L4-L5 posterior spinal fusion with interbody   fixation and laminectomy/decompression. Enhancing granulation tissue   noted within the operative bed. Resolution of previous epidural fluid   collection.        Worsening degenerative change at L3-L4 with narrowing/effacement of   the right-greater-than-left lateral recesses and severe right neural   foraminal narrowing with impingement of the exiting right L3 nerve   root.        Moderate spinal canal stenosis with right-greater-than-left lateral   recess narrowing at L2-L3, similar to slightly worsened in the   interim.        MACRO:   None        Signed by: Nasim Guzmán 7/31/2025 12:32 PM   Dictation workstation:   [id number]          Assessment/Plan       HPI: Kat Tineo is a 49 y.o. female, with a PMH of hx of bariatric surgery, vit deficiencies headaches, chronic pain, RA,  OA, radiculopathy, cervical radiculitis, hx of cervical fusion, cervical spondylosis, HTN.  who presented to [facility] ED on 7/31/2025 for neck, back pain and headache.    Patient report symptoms started over the past months. She has been seen and evaluated in the emergency department 3 times already for similar symptoms. She reports pain in the right side of her neck radiating down into the right 1st through 3rd digit. She also reports pain in the right side of the back radiating down into the great toe. The pain is worse with movement. She is unable to walk due to the pain. She is been given gabapentin, steroids, and analgesics in  the ED with little relief. She is also given tramadol with little relief. Has been given muscle relaxants. She reports the pain typically goes away when she is in the ED however will come back when she comes home. No rhinorrhea, nasal congestion, or sore throat. Reports a temperature of 100 °F yesterday.   Denies CP, SOB, Drug use  Pt states was on bactrim from [facility] for UTI  Pt reports decreased urination at times and reports one episode of loose stools ( diarrhea) yesterday with some incontinence, denies any additional incontinence  of bowel or bladder.    Recent US 7/7/2025 González LE - negative    Per Neuro surgery Plan:  1-follow-up with pain management for possible epidural injections.  2-continue physical therapy.  3-see spine surgeon as planned for next week.  4-May follow-up with Dr. Christina Chou for second opinion on the spine.  May also follow-up with me for consideration of neuromodulation for chronic neck and back pain if needed, after first undergoing pain management    Chronic pain  Back, Neck   - Neuro surgery consult- see notes above  - pending pain management consult ( ? Transfer to [facility])  -Cervical XR:Metallic plate fusion anteriorly see C4 through C7 and additional hardware fusion C3-4 No spondylolisthesis. No motion is noted at the fused levels.  -US RUQ: Hepatomegaly and diffuse fatty infiltration of the liver. Previous cholecystectomy. No biliary dilation. Sub visualization of the pancreas.  -MR Brain: No acute infarct, recent hemorrhage, or intracranial mass effect. No abnormal intracranial enhancement.Trace white matter signal change may represent sequela of migraines  versus trace chronic small vessel ischemic disease  -MR Thoracic: No abnormal enhancement within the thoracic spine. Similar appearing thoracic degenerative change, worst at T7-T8, with moderate spinal canal stenosis and ventral cord  flattening/indentation but no cord signal abnormality.  -MR Cervical: Motion degraded  examination.C3-C7 ACDF components again evident. Bridging osseous fusion suggested along the C4-C7 regions with limited assessment for osseous  fusion at C3-C4.There is worsened degenerative anterolisthesis of C7 on T1 with moderate spinal canal stenosis suggested. There is also new cord signal abnormality focally at C7-T1 concerning for potential cord edema or myelomalacia. Severe left neural foraminal narrowing is  again suggested.No abnormal enhancement.  -MR Lumbar:  Postoperative change of L4-L5 posterior spinal fusion with interbody fixation and laminectomy/decompression. Enhancing granulation tissue noted within the operative bed. Resolution of previous epidural fluid collection. Worsening degenerative change at L3-L4 with narrowing/effacement of the right-greater-than-left lateral recesses and severe right neural  foraminal narrowing with impingement of the exiting right L3 nerveroot. Moderate spinal canal stenosis with right-greater-than-left lateral recess narrowing at L2-L3, similar to slightly worsened in the interim.  -PRN analgesics  - continue home medication     ? UTI  Pt c/o decreased urination- ? Related to UTI  - Continue bactrim previous Rx from [facility] ED for UTI  - Pending UA and culture results  - Bladder scan x 1    HTN  - continue home medications    Hypokalemia  - K+  replacement current 3.4  - trend labs    GI/VTE PPX: PPI, SQ Lovenox      Chart, medical history, and labs/testing reviewed in detail.   Case and plan of care  to be  discussed with attending provider.    Disposition: Discharge home  once medically cleared and stable, awaiting bed at [place] for pain management,    PRACHI Kaur-CNP   Observation/Internal Med [facility]  [facility]  07/31/25  8:42 PM  Total time of 60 minutes spent on professional and overall care, with >50% of time dedicated to counseling/coordination of care.         [1]   Allergies  Allergen Reactions    Ace Inhibitors Cough, Shortness of  breath and Unknown    Adhesive Tape-Silicones Rash and Unknown    Amoxicillin Unknown     Urinary tract infection   [2] enoxaparin, 60 mg, subcutaneous, q12h ADRY  [3]    [4] PRN medications: acetaminophen **OR** acetaminophen **OR** acetaminophen, ondansetron ODT **OR** ondansetron, polyethylene glycol

## 2025-08-01 NOTE — PROGRESS NOTES
08/01/25 1046   Discharge Planning   Living Arrangements Family members   Support Systems Family members;Children   Assistance Needed NOne   Type of Residence Private residence   Number of Stairs to Enter Residence 5   Number of Stairs Within Residence 7   Do you have animals or pets at home? Yes   Home or Post Acute Services Community services  (outpatient PT/OT)   Expected Discharge Disposition Home   Does the patient need discharge transport arranged? No   Financial Resource Strain   How hard is it for you to pay for the very basics like food, housing, medical care, and heating? Not hard   Housing Stability   In the last 12 months, was there a time when you were not able to pay the mortgage or rent on time? N   At any time in the past 12 months, were you homeless or living in a shelter (including now)? N   Transportation Needs   In the past 12 months, has lack of transportation kept you from medical appointments or from getting medications? no   In the past 12 months, has lack of transportation kept you from meetings, work, or from getting things needed for daily living? No     Plan per Medical/Surgical team: pending ua and culture, neuro surgery consult, pain control  Status: observation  Payor source:  [facility]  Discharge disposition: Home--outpatient PT/OT  Potential Barriers:    ADOD: 8/2/25    Possible transfer to [facility] for pain management consult

## 2025-08-01 NOTE — PROGRESS NOTES
Pharmacy Medication History     Source of Information: PATIENT    Additional concerns with the patient's PTA list.     Notified Provider via Haiku : No-no changes    The following updates were made to the Prior to Admission medication list:     Medications ADDED:   N/A  Medications CHANGED:  N/A  Medications REMOVED:   N/A  Medications NOT TAKING:   N/A    Allergy reviewed : Yes    Meds 2 Beds : Yes    Outpatient pharmacy confirmed and updated in chart : Yes    [facility]    The list below reflectives the updated PTA list. Please review each medication in order reconciliation for additional clarification and justification.    Prior to Admission Medications   Prescriptions Last Dose      DULoxetine (Cymbalta) 60 mg DR capsule 7/30/2025 Morning      Sig: TAKE 1 CAPSULE BY MOUTH EVERY DAY   amLODIPine (Norvasc) 10 mg tablet 7/30/2025 Morning      Sig: TAKE 1 TABLET BY MOUTH EVERY DAY   calcium carbonate-vitamin D3 500 mg-5 mcg (200 unit) tablet 7/30/2025 Morning      Sig: Take 1 tablet by mouth once daily.   cholecalciferol (Vitamin D3) 25 mcg (1,000 units) tablet 7/30/2025 Morning      Sig: Take 2 tablets (50 mcg) by mouth once daily.   cyanocobalamin (Vitamin B-12) 250 mcg tablet 7/30/2025 Morning      Sig: Take 1 tablet (250 mcg) by mouth once daily.             gabapentin (Neurontin) 300 mg capsule 7/30/2025 Morning      Sig: Take 1 capsule (300 mg) by mouth 3 times a day for 7 days.   losartan (Cozaar) 100 mg tablet 7/30/2025 Morning      Sig: Take 1 tablet (100 mg) by mouth once daily. Do not start before November 27, 2024.             metoprolol succinate XL (Toprol-XL) 50 mg 24 hr tablet 7/31/2025 Morning      Sig: Take 1 tablet (50 mg) by mouth once daily. DO NOT CRUSH OR CHEW                multivitamin with iron - children's (Cerovite, Jr) chewable tablet 7/30/2025 Morning      Sig: Chew and swallow 1 tablet once daily.                       potassium chloride CR (Klor-Con M20) 20 mEq ER tablet  7/30/2025 Morning      Sig: Take 1 tablet (20 mEq) by mouth 2 times a day for 10 days. Do not crush or chew.             rizatriptan (Maxalt) 10 mg tablet More than a month      Sig: TAKE 1 TABLET AT ONSET OF HEADACHE. MAY REPEAT EVERY 2 HOURS AS NEEDED. MAXIMUM 3 TABLETS/24 HOURS.   sulfamethoxazole-trimethoprim (Bactrim DS) 800-160 mg tablet 7/30/2025 Morning      Sig: Take 1 tablet by mouth 2 times a day for 7 days.   tiZANidine (Zanaflex) 4 mg tablet 7/30/2025 Evening      Sig: TAKE 1 TABLET BY MOUTH EVERYDAY AT BEDTIME             upadacitinib ER (Rinvoq) 30 mg tablet extended release 24 hr 7/30/2025 Morning      Sig: Take 1 tablet (30 mg) by mouth once daily.   zolpidem (Ambien) 10 mg tablet 7/30/2025 Bedtime      Sig: TAKE 1 TABLET (10 MG) BY MOUTH AS NEEDED AT BEDTIME FOR SLEEP.      Facility-Administered Medications: None       The list below reflectives the updated allergy list. Please review each documented allergy for additional clarification and justification.    Allergies   Allergen Reactions    Ace Inhibitors Cough, Shortness of breath and Unknown    Adhesive Tape-Silicones Rash and Unknown    Amoxicillin Unknown     Urinary tract infection          08/01/25 at 10:38 AM - Liliane Jauregui

## 2025-08-01 NOTE — PROGRESS NOTES
Care Manager attempted outreach to the patient following recent Emergency Department visit. Patient is currently admitted to the hospital. No further outreach will be made at this time.     Suzy Walker RN, Care Manager  [facility], [facility]  [phone]

## 2025-08-01 NOTE — CARE PLAN
The patient's goals for the shift include      The clinical goals for the shift include adequate pain control      Problem: Diabetes  Goal: Achieve decreasing blood glucose levels by end of shift  Outcome: Progressing  Goal: Increase stability of blood glucose readings by end of shift  Outcome: Progressing  Goal: Decrease in ketones present in urine by end of shift  Outcome: Progressing  Goal: Maintain electrolyte levels within acceptable range throughout shift  Outcome: Progressing  Goal: Maintain glucose levels >70mg/dl to <250mg/dl throughout shift  Outcome: Progressing  Goal: No changes in neurological exam by end of shift  Outcome: Progressing  Goal: Learn about and adhere to nutrition recommendations by end of shift  Outcome: Progressing  Goal: Vital signs within normal range for age by end of shift  Outcome: Progressing  Goal: Increase self care and/or family involovement by end of shift  Outcome: Progressing  Goal: Receive DSME education by end of shift  Outcome: Progressing     Problem: Pain  Goal: Takes deep breaths with improved pain control throughout the shift  Outcome: Progressing  Goal: Turns in bed with improved pain control throughout the shift  Outcome: Progressing  Goal: Walks with improved pain control throughout the shift  Outcome: Progressing  Goal: Performs ADL's with improved pain control throughout shift  Outcome: Progressing  Goal: Participates in PT with improved pain control throughout the shift  Outcome: Progressing  Goal: Free from opioid side effects throughout the shift  Outcome: Progressing  Goal: Free from acute confusion related to pain meds throughout the shift  Outcome: Progressing     Problem: Pain - Adult  Goal: Verbalizes/displays adequate comfort level or baseline comfort level  Outcome: Progressing     Problem: Safety - Adult  Goal: Free from fall injury  Outcome: Progressing     Problem: Discharge Planning  Goal: Discharge to home or other facility with appropriate  resources  Outcome: Progressing     Problem: Chronic Conditions and Co-morbidities  Goal: Patient's chronic conditions and co-morbidity symptoms are monitored and maintained or improved  Outcome: Progressing     Problem: Nutrition  Goal: Nutrient intake appropriate for maintaining nutritional needs  Outcome: Progressing

## 2025-08-01 NOTE — CARE PLAN
The patient's goals for the shift include remain free from falls and injuries     The clinical goals for the shift include Pain management

## 2025-08-01 NOTE — PROGRESS NOTES
Medicine PA follow up note    Subjective:  Patient lying in bed in mild distress. Patient reports both neck and back pain. She states pain has improved minimally since yesterday.      Vitals (Last 24 Hours):  Heart Rate:  [53-85]   Temp:  [35.4 °C (95.7 °F)-36.7 °C (98.1 °F)]   Resp:  [12-19]   BP: ()/()   SpO2:  [94 %-99 %]       I have reviewed all imaging reports and labs pertinent to this visit / presenting problem    PHYSICAL EXAM:  Constitutional: NAD, alert and cooperative  Eyes: no icterus  ENMT: mucous membranes moist, no lesions  Head/Neck: supple  Respiratory/Thorax: CTA bilaterally, non-labored breathing, no cough, on RA  Cardiovascular: RRR, no murmurs heard  Gastrointestinal: ND/S/NT  : no Hobson, no SP/flank discomfort  Musculoskeletal: no joint swelling, ROM intact  Extremities: no edema, unable to do full exam of neck/back 2/2 pain  Neurological: non-focal  Skin: warm and dry  Psych: calm, stable mood     MEDS:  Scheduled meds  Scheduled Medications[1]    Continuous meds  Continuous Medications[2]    PRN meds  PRN Medications[3]      ASSESSMENT/PLAN:  Kat Tineo is a 49 y.o. female, with a PMH of hx of bariatric surgery, vit deficiencies headaches, chronic pain, RA,  OA, radiculopathy, cervical radiculitis, hx of cervical fusion, cervical spondylosis, HTN.  who presented to [facility] ED on 7/31/2025 for neck, back pain and headache.     Patient report symptoms started over the past months. She has been seen and evaluated in the emergency department 3 times already for similar symptoms. She reports pain in the right side of her neck radiating down into the right 1st through 3rd digit. She also reports pain in the right side of the back radiating down into the great toe. The pain is worse with movement. She is unable to walk due to the pain. She is been given gabapentin, steroids, and analgesics in the ED with little relief. She is also given tramadol with little relief. Has been given  muscle relaxants. She reports the pain typically goes away when she is in the ED however will come back when she comes home. No rhinorrhea, nasal congestion, or sore throat. Reports a temperature of 100 °F yesterday.   Denies CP, SOB, Drug use  Pt states was on bactrim from [facility] for UTI  Pt reports decreased urination at times and reports one episode of loose stools ( diarrhea) yesterday with some incontinence, denies any additional incontinence  of bowel or bladder.     Recent US 7/7/2025 Gonz[facility]lez LE - negative     Per Neuro surgery Plan:  1-follow-up with pain management for possible epidural injections.  2-continue physical therapy.  3-see spine surgeon as planned for next week.  4-May follow-up with Dr. Christina Chou for second opinion on the spine.  May also follow-up with me for consideration of neuromodulation for chronic neck and back pain if needed, after first undergoing pain management       Chronic pain  Back, Neck   - Neuro surgery consult- see notes above  -Cervical XR:Metallic plate fusion anteriorly see C4 through C7 and additional hardware fusion C3-4 No spondylolisthesis. No motion is noted at the fused levels.  -MR Brain: No acute infarct, recent hemorrhage, or intracranial mass effect. No abnormal intracranial enhancement.Trace white matter signal change may represent sequela of migraines  versus trace chronic small vessel ischemic disease  -MR Thoracic: No abnormal enhancement within the thoracic spine. Similar appearing thoracic degenerative change, worst at T7-T8, with moderate spinal canal stenosis and ventral cord  flattening/indentation but no cord signal abnormality.  -MR Cervical: Motion degraded examination.C3-C7 ACDF components again evident. Bridging osseous fusion suggested along the C4-C7 regions with limited assessment for osseous  fusion at C3-C4.There is worsened degenerative anterolisthesis of C7 on T1 with moderate spinal canal stenosis suggested. There is also new cord signal  abnormality focally at C7-T1 concerning for potential cord edema or myelomalacia. Severe left neural foraminal narrowing is  again suggested.No abnormal enhancement.  -MR Lumbar:  Postoperative change of L4-L5 posterior spinal fusion with interbody fixation and laminectomy/decompression. Enhancing granulation tissue noted within the operative bed. Resolution of previous epidural fluid collection. Worsening degenerative change at L3-L4 with narrowing/effacement of the right-greater-than-left lateral recesses and severe right neural  foraminal narrowing with impingement of the exiting right L3 nerveroot. Moderate spinal canal stenosis with right-greater-than-left lateral recess narrowing at L2-L3, similar to slightly worsened in the interim.  -PRN analgesics (toradol, methocarbamol, dexamethasone, oxy, morphine)  - continue home medication     Elevated LFTs  -pt not endorsing any pain in RUQ/ epigastrium   -Alk phos 142/181, /201, AST 89/137  -after discussion with pharmacy bactrim could elevated LFTs  -US RUQ: Hepatomegaly and diffuse fatty infiltration of the liver. Previous cholecystectomy. No biliary dilation. Sub visualization of the pancreas.  -will monitor,trend     HTN  -holding losartan 100 mg daily, metoprolol 50 mg daily, and     Hypokalemia  - K+: 3.2, replaced   -mag checked 1.94  - trend labs    Other comorbidities as above  -continue medications as ordered and adjust based on clinical course     VTE / GI prophylaxis   -subcutaneous Lovenox, bowel regimen in place     Discharge planning  -[facility]NN when medically stable     Upgraded from obs to inpatient, will keep on our list for now    Discussed with Dr. Canchola and the interdisciplinary team     Clarita Langston PA-C         [1] [Held by provider] amLODIPine, 10 mg, oral, Daily  dexAMETHasone, 6 mg, intravenous, BID AC  DULoxetine, 60 mg, oral, Daily  enoxaparin, 60 mg, subcutaneous, q12h ADRY  fluticasone, 2 spray, Each Nostril, Daily  gabapentin, 300  mg, oral, TID  ketorolac, 15 mg, intravenous, q8h  [Held by provider] losartan, 100 mg, oral, Daily  methocarbamol, 1,000 mg, intravenous, q8h  [Held by provider] metoprolol succinate XL, 50 mg, oral, Daily  [Held by provider] metoprolol tartrate, 50 mg, oral, BID  oxymetazoline, 2 spray, Each Nostril, BID  [Held by provider] sulfamethoxazole-trimethoprim, 1 tablet, oral, BID  [2]    [3] PRN medications: acetaminophen **OR** acetaminophen **OR** acetaminophen, morphine, ondansetron ODT **OR** ondansetron, oxyCODONE, polyethylene glycol

## 2025-08-02 ENCOUNTER — HOSPITAL ENCOUNTER (INPATIENT)
Facility: HOSPITAL | Age: 50
End: 2025-08-02
Attending: STUDENT IN AN ORGANIZED HEALTH CARE EDUCATION/TRAINING PROGRAM | Admitting: STUDENT IN AN ORGANIZED HEALTH CARE EDUCATION/TRAINING PROGRAM
Payer: COMMERCIAL

## 2025-08-02 VITALS
RESPIRATION RATE: 16 BRPM | BODY MASS INDEX: 55.76 KG/M2 | WEIGHT: 284 LBS | DIASTOLIC BLOOD PRESSURE: 80 MMHG | HEART RATE: 60 BPM | SYSTOLIC BLOOD PRESSURE: 128 MMHG | HEIGHT: 60 IN | TEMPERATURE: 98.6 F | OXYGEN SATURATION: 99 %

## 2025-08-02 DIAGNOSIS — M54.41 CHRONIC BILATERAL LOW BACK PAIN WITH RIGHT-SIDED SCIATICA: ICD-10-CM

## 2025-08-02 DIAGNOSIS — G89.29 CHRONIC BILATERAL LOW BACK PAIN WITH RIGHT-SIDED SCIATICA: ICD-10-CM

## 2025-08-02 DIAGNOSIS — M54.42 CHRONIC BILATERAL LOW BACK PAIN WITH BILATERAL SCIATICA: ICD-10-CM

## 2025-08-02 DIAGNOSIS — M54.12 CERVICAL RADICULOPATHY: ICD-10-CM

## 2025-08-02 DIAGNOSIS — G89.29 CHRONIC BILATERAL LOW BACK PAIN WITH BILATERAL SCIATICA: ICD-10-CM

## 2025-08-02 DIAGNOSIS — M54.59 INTRACTABLE LOW BACK PAIN: Primary | ICD-10-CM

## 2025-08-02 DIAGNOSIS — M54.41 CHRONIC BILATERAL LOW BACK PAIN WITH BILATERAL SCIATICA: ICD-10-CM

## 2025-08-02 PROBLEM — T81.9XXA COMPLICATION OF SURGICAL PROCEDURE: Status: RESOLVED | Noted: 2024-04-10 | Resolved: 2025-08-02

## 2025-08-02 PROBLEM — E87.6 HYPOKALEMIA: Status: RESOLVED | Noted: 2023-09-22 | Resolved: 2025-08-02

## 2025-08-02 PROBLEM — J06.9 UPPER RESPIRATORY TRACT INFECTION: Status: RESOLVED | Noted: 2024-04-10 | Resolved: 2025-08-02

## 2025-08-02 PROBLEM — K90.9 MALABSORPTION (HHS-HCC): Status: RESOLVED | Noted: 2024-04-12 | Resolved: 2025-08-02

## 2025-08-02 PROBLEM — G95.9 DISEASE OF SPINAL CORD, UNSPECIFIED: Status: RESOLVED | Noted: 2022-10-01 | Resolved: 2025-08-02

## 2025-08-02 PROBLEM — N39.0 ACUTE URINARY TRACT INFECTION: Status: RESOLVED | Noted: 2024-04-10 | Resolved: 2025-08-02

## 2025-08-02 PROBLEM — U07.1 DISEASE DUE TO SEVERE ACUTE RESPIRATORY SYNDROME CORONAVIRUS 2 (SARS-COV-2): Status: RESOLVED | Noted: 2022-05-29 | Resolved: 2025-08-02

## 2025-08-02 PROBLEM — S46.819A STRAIN OF TRAPEZIUS MUSCLE: Status: RESOLVED | Noted: 2024-04-10 | Resolved: 2025-08-02

## 2025-08-02 PROBLEM — R20.0 NUMBNESS: Status: RESOLVED | Noted: 2023-04-12 | Resolved: 2025-08-02

## 2025-08-02 PROBLEM — K44.9 GASTROESOPHAGEAL REFLUX DISEASE WITH HIATAL HERNIA: Status: RESOLVED | Noted: 2024-11-19 | Resolved: 2025-08-02

## 2025-08-02 PROBLEM — L03.119 CELLULITIS OF LEG, EXCEPT FOOT: Status: RESOLVED | Noted: 2024-04-10 | Resolved: 2025-08-02

## 2025-08-02 PROBLEM — L76.82 PAIN AT SURGICAL INCISION: Status: RESOLVED | Noted: 2024-04-10 | Resolved: 2025-08-02

## 2025-08-02 PROBLEM — M25.9 DISORDER OF HIP REGION: Status: RESOLVED | Noted: 2024-04-10 | Resolved: 2025-08-02

## 2025-08-02 PROBLEM — M70.62 TROCHANTERIC BURSITIS OF LEFT HIP: Status: RESOLVED | Noted: 2024-04-10 | Resolved: 2025-08-02

## 2025-08-02 PROBLEM — J04.0 LARYNGITIS: Status: RESOLVED | Noted: 2024-04-10 | Resolved: 2025-08-02

## 2025-08-02 PROBLEM — K21.9 GASTROESOPHAGEAL REFLUX DISEASE WITH HIATAL HERNIA: Status: RESOLVED | Noted: 2024-11-19 | Resolved: 2025-08-02

## 2025-08-02 PROBLEM — N91.2 AMENORRHEA: Status: RESOLVED | Noted: 2024-04-10 | Resolved: 2025-08-02

## 2025-08-02 PROBLEM — R94.31 ELECTROCARDIOGRAM ABNORMAL: Status: RESOLVED | Noted: 2024-04-10 | Resolved: 2025-08-02

## 2025-08-02 PROBLEM — G47.9 SLEEP DISTURBANCE: Status: RESOLVED | Noted: 2024-04-10 | Resolved: 2025-08-02

## 2025-08-02 PROBLEM — K90.9 INTESTINAL MALABSORPTION: Status: RESOLVED | Noted: 2024-04-10 | Resolved: 2025-08-02

## 2025-08-02 PROBLEM — R42 LIGHTHEADEDNESS: Status: RESOLVED | Noted: 2024-04-10 | Resolved: 2025-08-02

## 2025-08-02 PROBLEM — S72.009A FRACTURE OF BONE OF HIP (MULTI): Status: RESOLVED | Noted: 2022-05-29 | Resolved: 2025-08-02

## 2025-08-02 PROBLEM — R05.9 COUGH: Status: RESOLVED | Noted: 2023-02-12 | Resolved: 2025-08-02

## 2025-08-02 PROBLEM — Z09 SURGERY FOLLOW-UP EXAMINATION: Status: RESOLVED | Noted: 2024-12-09 | Resolved: 2025-08-02

## 2025-08-02 PROBLEM — B35.1 ONYCHOMYCOSIS: Status: RESOLVED | Noted: 2023-02-12 | Resolved: 2025-08-02

## 2025-08-02 PROBLEM — K44.9 HIATAL HERNIA: Status: RESOLVED | Noted: 2024-06-20 | Resolved: 2025-08-02

## 2025-08-02 PROBLEM — R19.5 LOOSE STOOLS: Status: RESOLVED | Noted: 2025-05-21 | Resolved: 2025-08-02

## 2025-08-02 PROBLEM — G89.18 POSTOPERATIVE PAIN: Status: RESOLVED | Noted: 2024-04-10 | Resolved: 2025-08-02

## 2025-08-02 PROBLEM — M25.459: Status: RESOLVED | Noted: 2024-04-10 | Resolved: 2025-08-02

## 2025-08-02 PROBLEM — S73.005A HIP DISLOCATION, LEFT (MULTI): Status: RESOLVED | Noted: 2024-04-10 | Resolved: 2025-08-02

## 2025-08-02 PROBLEM — M62.81 MUSCLE WEAKNESS: Status: RESOLVED | Noted: 2023-04-12 | Resolved: 2025-08-02

## 2025-08-02 PROBLEM — R11.0 NAUSEA: Status: RESOLVED | Noted: 2024-04-10 | Resolved: 2025-08-02

## 2025-08-02 PROBLEM — R07.9 CHEST PAIN: Status: RESOLVED | Noted: 2024-03-27 | Resolved: 2025-08-02

## 2025-08-02 PROBLEM — R07.9 CHEST PAIN AT REST: Status: RESOLVED | Noted: 2023-04-12 | Resolved: 2025-08-02

## 2025-08-02 PROBLEM — J40 BRONCHITIS: Status: RESOLVED | Noted: 2023-04-12 | Resolved: 2025-08-02

## 2025-08-02 PROBLEM — K80.20 GALLSTONE: Status: RESOLVED | Noted: 2024-04-10 | Resolved: 2025-08-02

## 2025-08-02 PROBLEM — Z71.89 ENCOUNTER FOR PRE-BARIATRIC SURGERY COUNSELING AND EDUCATION: Status: RESOLVED | Noted: 2024-11-13 | Resolved: 2025-08-02

## 2025-08-02 PROBLEM — H60.501 ACUTE OTITIS EXTERNA OF RIGHT EAR: Status: RESOLVED | Noted: 2024-04-10 | Resolved: 2025-08-02

## 2025-08-02 PROBLEM — M25.352 INSTABILITY OF LEFT HIP JOINT: Status: RESOLVED | Noted: 2024-04-10 | Resolved: 2025-08-02

## 2025-08-02 PROBLEM — M79.601 PAIN OF RIGHT UPPER EXTREMITY: Status: RESOLVED | Noted: 2023-04-12 | Resolved: 2025-08-02

## 2025-08-02 PROBLEM — R10.9 ABDOMINAL PAIN: Status: RESOLVED | Noted: 2024-04-10 | Resolved: 2025-08-02

## 2025-08-02 PROBLEM — M19.90 ARTHRITIS: Status: RESOLVED | Noted: 2023-02-12 | Resolved: 2025-08-02

## 2025-08-02 PROBLEM — M79.606 PAIN OF LOWER EXTREMITY: Status: RESOLVED | Noted: 2024-04-10 | Resolved: 2025-08-02

## 2025-08-02 PROBLEM — M79.602 PAIN OF LEFT UPPER EXTREMITY: Status: RESOLVED | Noted: 2023-04-12 | Resolved: 2025-08-02

## 2025-08-02 PROBLEM — J11.2 INFLUENZA WITH GASTROINTESTINAL TRACT INVOLVEMENT: Status: RESOLVED | Noted: 2024-04-10 | Resolved: 2025-08-02

## 2025-08-02 PROBLEM — R60.0 EDEMA OF BOTH LOWER EXTREMITIES: Status: RESOLVED | Noted: 2024-04-10 | Resolved: 2025-08-02

## 2025-08-02 PROBLEM — M25.552 LEFT HIP PAIN: Status: RESOLVED | Noted: 2023-02-12 | Resolved: 2025-08-02

## 2025-08-02 PROBLEM — M79.604 PAIN IN BOTH LOWER EXTREMITIES: Status: RESOLVED | Noted: 2024-04-10 | Resolved: 2025-08-02

## 2025-08-02 PROBLEM — R51.9 HEADACHE: Status: RESOLVED | Noted: 2024-04-10 | Resolved: 2025-08-02

## 2025-08-02 PROBLEM — M79.605 PAIN IN BOTH LOWER EXTREMITIES: Status: RESOLVED | Noted: 2024-04-10 | Resolved: 2025-08-02

## 2025-08-02 PROBLEM — K04.7 DENTAL INFECTION: Status: RESOLVED | Noted: 2024-10-03 | Resolved: 2025-08-02

## 2025-08-02 PROBLEM — S99.929A FOOT INJURY: Status: RESOLVED | Noted: 2023-02-12 | Resolved: 2025-08-02

## 2025-08-02 PROBLEM — M79.18 MYOFASCIAL PAIN: Status: RESOLVED | Noted: 2024-04-10 | Resolved: 2025-08-02

## 2025-08-02 PROBLEM — E61.1 IRON DEFICIENCY: Status: RESOLVED | Noted: 2024-04-23 | Resolved: 2025-08-02

## 2025-08-02 PROBLEM — R20.2 PARESTHESIA OF UPPER EXTREMITY: Status: RESOLVED | Noted: 2024-04-10 | Resolved: 2025-08-02
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
## 2025-08-02 PROBLEM — E11.9 DIABETES (MULTI): Status: RESOLVED | Noted: 2023-02-12 | Resolved: 2025-08-02

## 2025-08-02 PROBLEM — M89.8X5 PAIN IN FEMUR: Status: RESOLVED | Noted: 2024-04-10 | Resolved: 2025-08-02

## 2025-08-02 PROBLEM — M79.18 MUSCULOSKELETAL PAIN: Status: RESOLVED | Noted: 2024-04-10 | Resolved: 2025-08-02

## 2025-08-02 LAB
ALBUMIN SERPL BCP-MCNC: 3.8 G/DL (ref 3.4–5)
ALP SERPL-CCNC: 174 U/L (ref 33–110)
ALT SERPL W P-5'-P-CCNC: 151 U/L (ref 7–45)
ANION GAP SERPL CALC-SCNC: 11 MMOL/L (ref 10–20)
AST SERPL W P-5'-P-CCNC: 44 U/L (ref 9–39)
ATRIAL RATE: 58 BPM
BILIRUB SERPL-MCNC: 0.3 MG/DL (ref 0–1.2)
BUN SERPL-MCNC: 11 MG/DL (ref 6–23)
CALCIUM SERPL-MCNC: 9.3 MG/DL (ref 8.6–10.3)
CHLORIDE SERPL-SCNC: 107 MMOL/L (ref 98–107)
CO2 SERPL-SCNC: 25 MMOL/L (ref 21–32)
CREAT SERPL-MCNC: 0.66 MG/DL (ref 0.5–1.05)
EGFRCR SERPLBLD CKD-EPI 2021: >90 ML/MIN/1.73M*2
ERYTHROCYTE [DISTWIDTH] IN BLOOD BY AUTOMATED COUNT: 13.8 % (ref 11.5–14.5)
GLUCOSE SERPL-MCNC: 134 MG/DL (ref 74–99)
HCT VFR BLD AUTO: 41.3 % (ref 36–46)
HGB BLD-MCNC: 12.9 G/DL (ref 12–16)
HOLD SPECIMEN: NORMAL
MCH RBC QN AUTO: 29.1 PG (ref 26–34)
MCHC RBC AUTO-ENTMCNC: 31.2 G/DL (ref 32–36)
MCV RBC AUTO: 93 FL (ref 80–100)
NRBC BLD-RTO: 0 /100 WBCS (ref 0–0)
P AXIS: 9 DEGREES
P OFFSET: 206 MS
P ONSET: 153 MS
PLATELET # BLD AUTO: 481 X10*3/UL (ref 150–450)
POTASSIUM SERPL-SCNC: 3.6 MMOL/L (ref 3.5–5.3)
PR INTERVAL: 136 MS
PROT SERPL-MCNC: 7.3 G/DL (ref 6.4–8.2)
Q ONSET: 221 MS
QRS COUNT: 10 BEATS
QRS DURATION: 82 MS
QT INTERVAL: 434 MS
QTC CALCULATION(BAZETT): 426 MS
QTC FREDERICIA: 428 MS
R AXIS: -11 DEGREES
RBC # BLD AUTO: 4.44 X10*6/UL (ref 4–5.2)
SODIUM SERPL-SCNC: 139 MMOL/L (ref 136–145)
T AXIS: -65 DEGREES
T OFFSET: 438 MS
VENTRICULAR RATE: 58 BPM
WBC # BLD AUTO: 16.1 X10*3/UL (ref 4.4–11.3)

## 2025-08-02 PROCEDURE — 85027 COMPLETE CBC AUTOMATED: CPT

## 2025-08-02 PROCEDURE — 2500000001 HC RX 250 WO HCPCS SELF ADMINISTERED DRUGS (ALT 637 FOR MEDICARE OP): Performed by: STUDENT IN AN ORGANIZED HEALTH CARE EDUCATION/TRAINING PROGRAM

## 2025-08-02 PROCEDURE — 36415 COLL VENOUS BLD VENIPUNCTURE: CPT

## 2025-08-02 PROCEDURE — 99223 1ST HOSP IP/OBS HIGH 75: CPT | Performed by: STUDENT IN AN ORGANIZED HEALTH CARE EDUCATION/TRAINING PROGRAM

## 2025-08-02 PROCEDURE — 2500000004 HC RX 250 GENERAL PHARMACY W/ HCPCS (ALT 636 FOR OP/ED): Mod: JZ | Performed by: STUDENT IN AN ORGANIZED HEALTH CARE EDUCATION/TRAINING PROGRAM

## 2025-08-02 PROCEDURE — 2500000004 HC RX 250 GENERAL PHARMACY W/ HCPCS (ALT 636 FOR OP/ED): Mod: JZ | Performed by: NURSE PRACTITIONER

## 2025-08-02 PROCEDURE — 2500000004 HC RX 250 GENERAL PHARMACY W/ HCPCS (ALT 636 FOR OP/ED): Mod: JZ | Performed by: INTERNAL MEDICINE

## 2025-08-02 PROCEDURE — 80053 COMPREHEN METABOLIC PANEL: CPT

## 2025-08-02 PROCEDURE — 2500000001 HC RX 250 WO HCPCS SELF ADMINISTERED DRUGS (ALT 637 FOR MEDICARE OP): Performed by: NURSE PRACTITIONER

## 2025-08-02 PROCEDURE — 1100000001 HC PRIVATE ROOM DAILY

## 2025-08-02 PROCEDURE — 99239 HOSP IP/OBS DSCHRG MGMT >30: CPT | Performed by: NURSE PRACTITIONER

## 2025-08-02 PROCEDURE — 2500000001 HC RX 250 WO HCPCS SELF ADMINISTERED DRUGS (ALT 637 FOR MEDICARE OP): Performed by: INTERNAL MEDICINE

## 2025-08-02 PROCEDURE — 2500000002 HC RX 250 W HCPCS SELF ADMINISTERED DRUGS (ALT 637 FOR MEDICARE OP, ALT 636 FOR OP/ED): Performed by: NURSE PRACTITIONER

## 2025-08-02 PROCEDURE — 2500000002 HC RX 250 W HCPCS SELF ADMINISTERED DRUGS (ALT 637 FOR MEDICARE OP, ALT 636 FOR OP/ED): Performed by: STUDENT IN AN ORGANIZED HEALTH CARE EDUCATION/TRAINING PROGRAM

## 2025-08-02 RX ORDER — ACETAMINOPHEN 325 MG/1
975 TABLET ORAL EVERY 6 HOURS SCHEDULED
Status: DISCONTINUED | OUTPATIENT
Start: 2025-08-02 | End: 2025-08-02 | Stop reason: HOSPADM

## 2025-08-02 RX ORDER — METOCLOPRAMIDE HYDROCHLORIDE 5 MG/ML
10 INJECTION INTRAMUSCULAR; INTRAVENOUS EVERY 6 HOURS PRN
Status: DISCONTINUED | OUTPATIENT
Start: 2025-08-02 | End: 2025-08-06 | Stop reason: HOSPADM

## 2025-08-02 RX ORDER — PSYLLIUM HUSK 0.4 G
1 CAPSULE ORAL DAILY
Status: DISCONTINUED | OUTPATIENT
Start: 2025-08-02 | End: 2025-08-06 | Stop reason: HOSPADM

## 2025-08-02 RX ORDER — OXYCODONE HYDROCHLORIDE 5 MG/1
10 TABLET ORAL EVERY 4 HOURS PRN
Refills: 0 | Status: DISCONTINUED | OUTPATIENT
Start: 2025-08-02 | End: 2025-08-06 | Stop reason: HOSPADM

## 2025-08-02 RX ORDER — SIMETHICONE 80 MG
80 TABLET,CHEWABLE ORAL 4 TIMES DAILY PRN
Status: DISCONTINUED | OUTPATIENT
Start: 2025-08-02 | End: 2025-08-06 | Stop reason: HOSPADM

## 2025-08-02 RX ORDER — ALUMINUM HYDROXIDE, MAGNESIUM HYDROXIDE, AND SIMETHICONE 1200; 120; 1200 MG/30ML; MG/30ML; MG/30ML
20 SUSPENSION ORAL EVERY 4 HOURS PRN
Status: DISCONTINUED | OUTPATIENT
Start: 2025-08-02 | End: 2025-08-06 | Stop reason: HOSPADM

## 2025-08-02 RX ORDER — METHOCARBAMOL 500 MG/1
1000 TABLET, FILM COATED ORAL EVERY 6 HOURS SCHEDULED
Status: DISCONTINUED | OUTPATIENT
Start: 2025-08-02 | End: 2025-08-02 | Stop reason: HOSPADM

## 2025-08-02 RX ORDER — PANTOPRAZOLE SODIUM 40 MG/1
40 TABLET, DELAYED RELEASE ORAL
Status: DISCONTINUED | OUTPATIENT
Start: 2025-08-02 | End: 2025-08-02 | Stop reason: HOSPADM

## 2025-08-02 RX ORDER — MELOXICAM 7.5 MG/1
15 TABLET ORAL DAILY
Status: DISCONTINUED | OUTPATIENT
Start: 2025-08-02 | End: 2025-08-02 | Stop reason: HOSPADM

## 2025-08-02 RX ORDER — PANTOPRAZOLE SODIUM 40 MG/1
40 TABLET, DELAYED RELEASE ORAL
Start: 2025-08-03

## 2025-08-02 RX ORDER — AMLODIPINE BESYLATE 5 MG/1
10 TABLET ORAL DAILY
Status: DISCONTINUED | OUTPATIENT
Start: 2025-08-03 | End: 2025-08-06 | Stop reason: HOSPADM

## 2025-08-02 RX ORDER — DULOXETIN HYDROCHLORIDE 30 MG/1
60 CAPSULE, DELAYED RELEASE ORAL DAILY
Status: DISCONTINUED | OUTPATIENT
Start: 2025-08-03 | End: 2025-08-06 | Stop reason: HOSPADM

## 2025-08-02 RX ORDER — ENOXAPARIN SODIUM 100 MG/ML
60 INJECTION SUBCUTANEOUS EVERY 12 HOURS SCHEDULED
Status: DISCONTINUED | OUTPATIENT
Start: 2025-08-02 | End: 2025-08-03

## 2025-08-02 RX ORDER — ACETAMINOPHEN 500 MG
1000 TABLET ORAL EVERY 6 HOURS SCHEDULED
Start: 2025-08-02 | End: 2025-08-06 | Stop reason: HOSPADM

## 2025-08-02 RX ORDER — CHOLECALCIFEROL (VITAMIN D3) 25 MCG
50 TABLET ORAL DAILY
Status: DISCONTINUED | OUTPATIENT
Start: 2025-08-02 | End: 2025-08-06 | Stop reason: HOSPADM

## 2025-08-02 RX ORDER — METHOCARBAMOL 1000 MG/1
100 TABLET, FILM COATED ORAL 4 TIMES DAILY PRN
Start: 2025-08-02

## 2025-08-02 RX ORDER — METOPROLOL SUCCINATE 50 MG/1
50 TABLET, EXTENDED RELEASE ORAL DAILY
Status: DISCONTINUED | OUTPATIENT
Start: 2025-08-03 | End: 2025-08-06 | Stop reason: HOSPADM

## 2025-08-02 RX ORDER — DEXAMETHASONE SODIUM PHOSPHATE 10 MG/ML
6 INJECTION INTRAMUSCULAR; INTRAVENOUS
Status: COMPLETED | OUTPATIENT
Start: 2025-08-03 | End: 2025-08-03

## 2025-08-02 RX ORDER — MELOXICAM 15 MG/1
15 TABLET ORAL DAILY
Start: 2025-08-03

## 2025-08-02 RX ORDER — METHOCARBAMOL 500 MG/1
1000 TABLET, FILM COATED ORAL EVERY 6 HOURS SCHEDULED
Status: DISCONTINUED | OUTPATIENT
Start: 2025-08-02 | End: 2025-08-06 | Stop reason: HOSPADM

## 2025-08-02 RX ORDER — MELOXICAM 7.5 MG/1
15 TABLET ORAL DAILY
Status: DISCONTINUED | OUTPATIENT
Start: 2025-08-03 | End: 2025-08-06 | Stop reason: HOSPADM

## 2025-08-02 RX ORDER — VIT C/E/ZN/COPPR/LUTEIN/ZEAXAN 250MG-90MG
250 CAPSULE ORAL DAILY
Status: DISCONTINUED | OUTPATIENT
Start: 2025-08-02 | End: 2025-08-06 | Stop reason: HOSPADM

## 2025-08-02 RX ORDER — ZOLPIDEM TARTRATE 5 MG/1
10 TABLET ORAL NIGHTLY PRN
Status: DISCONTINUED | OUTPATIENT
Start: 2025-08-02 | End: 2025-08-06 | Stop reason: HOSPADM

## 2025-08-02 RX ORDER — PANTOPRAZOLE SODIUM 40 MG/1
40 TABLET, DELAYED RELEASE ORAL
Status: DISCONTINUED | OUTPATIENT
Start: 2025-08-03 | End: 2025-08-06 | Stop reason: HOSPADM

## 2025-08-02 RX ORDER — LOSARTAN POTASSIUM 100 MG/1
100 TABLET ORAL DAILY
Status: DISCONTINUED | OUTPATIENT
Start: 2025-08-03 | End: 2025-08-06 | Stop reason: HOSPADM

## 2025-08-02 RX ORDER — POLYETHYLENE GLYCOL 3350 17 G/17G
17 POWDER, FOR SOLUTION ORAL DAILY
Status: DISCONTINUED | OUTPATIENT
Start: 2025-08-02 | End: 2025-08-06 | Stop reason: HOSPADM

## 2025-08-02 RX ORDER — OXYCODONE HYDROCHLORIDE 5 MG/1
5 TABLET ORAL EVERY 4 HOURS PRN
Refills: 0 | Status: DISCONTINUED | OUTPATIENT
Start: 2025-08-02 | End: 2025-08-06 | Stop reason: HOSPADM

## 2025-08-02 RX ORDER — GABAPENTIN 300 MG/1
300 CAPSULE ORAL 3 TIMES DAILY
Status: DISCONTINUED | OUTPATIENT
Start: 2025-08-02 | End: 2025-08-06 | Stop reason: HOSPADM

## 2025-08-02 RX ORDER — POTASSIUM CHLORIDE 20 MEQ/1
20 TABLET, EXTENDED RELEASE ORAL 2 TIMES DAILY
Status: DISCONTINUED | OUTPATIENT
Start: 2025-08-02 | End: 2025-08-06 | Stop reason: HOSPADM

## 2025-08-02 RX ORDER — ACETAMINOPHEN 325 MG/1
975 TABLET ORAL EVERY 6 HOURS SCHEDULED
Status: DISCONTINUED | OUTPATIENT
Start: 2025-08-02 | End: 2025-08-06 | Stop reason: HOSPADM

## 2025-08-02 RX ORDER — MORPHINE SULFATE 2 MG/ML
2 INJECTION, SOLUTION INTRAMUSCULAR; INTRAVENOUS EVERY 4 HOURS PRN
Status: DISCONTINUED | OUTPATIENT
Start: 2025-08-02 | End: 2025-08-06 | Stop reason: HOSPADM

## 2025-08-02 RX ORDER — SUMATRIPTAN SUCCINATE 25 MG/1
50 TABLET ORAL EVERY 2 HOUR PRN
Status: DISCONTINUED | OUTPATIENT
Start: 2025-08-02 | End: 2025-08-06 | Stop reason: HOSPADM

## 2025-08-02 RX ORDER — ONDANSETRON HYDROCHLORIDE 2 MG/ML
4 INJECTION, SOLUTION INTRAVENOUS EVERY 4 HOURS PRN
Status: DISCONTINUED | OUTPATIENT
Start: 2025-08-02 | End: 2025-08-06 | Stop reason: HOSPADM

## 2025-08-02 RX ORDER — DIPHENHYDRAMINE HCL 25 MG
25 TABLET ORAL EVERY 6 HOURS PRN
Status: DISCONTINUED | OUTPATIENT
Start: 2025-08-02 | End: 2025-08-06 | Stop reason: HOSPADM

## 2025-08-02 RX ADMIN — POTASSIUM CHLORIDE EXTENDED-RELEASE 20 MEQ: 1500 TABLET ORAL at 20:39

## 2025-08-02 RX ADMIN — DULOXETINE 60 MG: 30 CAPSULE, DELAYED RELEASE ORAL at 08:22

## 2025-08-02 RX ADMIN — METHOCARBAMOL 1000 MG: 100 INJECTION INTRAMUSCULAR; INTRAVENOUS at 08:22

## 2025-08-02 RX ADMIN — CYANOCOBALAMIN TAB 500 MCG 250 MCG: 500 TAB at 20:40

## 2025-08-02 RX ADMIN — DEXAMETHASONE SODIUM PHOSPHATE 6 MG: 10 INJECTION, SOLUTION INTRAMUSCULAR; INTRAVENOUS at 06:45

## 2025-08-02 RX ADMIN — OXYCODONE HYDROCHLORIDE 10 MG: 5 TABLET ORAL at 19:58

## 2025-08-02 RX ADMIN — FLUTICASONE PROPIONATE 2 SPRAY: 50 SPRAY, METERED NASAL at 08:23

## 2025-08-02 RX ADMIN — Medication 50 MCG: at 20:42

## 2025-08-02 RX ADMIN — PANTOPRAZOLE SODIUM 40 MG: 40 TABLET, DELAYED RELEASE ORAL at 09:50

## 2025-08-02 RX ADMIN — MORPHINE SULFATE 2 MG: 2 INJECTION, SOLUTION INTRAMUSCULAR; INTRAVENOUS at 21:32

## 2025-08-02 RX ADMIN — GABAPENTIN 300 MG: 300 CAPSULE ORAL at 20:39

## 2025-08-02 RX ADMIN — ENOXAPARIN SODIUM 60 MG: 60 INJECTION SUBCUTANEOUS at 08:22

## 2025-08-02 RX ADMIN — ACETAMINOPHEN 975 MG: 325 TABLET ORAL at 23:24

## 2025-08-02 RX ADMIN — MELOXICAM 15 MG: 7.5 TABLET ORAL at 09:50

## 2025-08-02 RX ADMIN — ACETAMINOPHEN 975 MG: 325 TABLET ORAL at 12:37

## 2025-08-02 RX ADMIN — ACETAMINOPHEN 975 MG: 325 TABLET ORAL at 20:39

## 2025-08-02 RX ADMIN — DEXAMETHASONE SODIUM PHOSPHATE 6 MG: 10 INJECTION, SOLUTION INTRAMUSCULAR; INTRAVENOUS at 15:02

## 2025-08-02 RX ADMIN — Medication 2 SPRAY: at 08:23

## 2025-08-02 RX ADMIN — ZOLPIDEM TARTRATE 10 MG: 5 TABLET ORAL at 23:24

## 2025-08-02 RX ADMIN — KETOROLAC TROMETHAMINE 15 MG: 30 INJECTION, SOLUTION INTRAMUSCULAR at 06:43

## 2025-08-02 RX ADMIN — GABAPENTIN 300 MG: 300 CAPSULE ORAL at 15:01

## 2025-08-02 RX ADMIN — GABAPENTIN 300 MG: 300 CAPSULE ORAL at 08:22

## 2025-08-02 RX ADMIN — METHOCARBAMOL 1000 MG: 500 TABLET ORAL at 12:37

## 2025-08-02 RX ADMIN — Medication 1 TABLET: at 20:42

## 2025-08-02 RX ADMIN — MORPHINE SULFATE 2 MG: 2 INJECTION, SOLUTION INTRAMUSCULAR; INTRAVENOUS at 02:55

## 2025-08-02 SDOH — HEALTH STABILITY: MENTAL HEALTH: HOW OFTEN DO YOU HAVE SIX OR MORE DRINKS ON ONE OCCASION?: NEVER

## 2025-08-02 SDOH — ECONOMIC STABILITY: HOUSING INSECURITY: AT ANY TIME IN THE PAST 12 MONTHS, WERE YOU HOMELESS OR LIVING IN A SHELTER (INCLUDING NOW)?: NO

## 2025-08-02 SDOH — SOCIAL STABILITY: SOCIAL INSECURITY: WITHIN THE LAST YEAR, HAVE YOU BEEN AFRAID OF YOUR PARTNER OR EX-PARTNER?: NO

## 2025-08-02 SDOH — ECONOMIC STABILITY: FOOD INSECURITY: HOW HARD IS IT FOR YOU TO PAY FOR THE VERY BASICS LIKE FOOD, HOUSING, MEDICAL CARE, AND HEATING?: NOT VERY HARD

## 2025-08-02 SDOH — SOCIAL STABILITY: SOCIAL INSECURITY: WERE YOU ABLE TO COMPLETE ALL THE BEHAVIORAL HEALTH SCREENINGS?: YES

## 2025-08-02 SDOH — ECONOMIC STABILITY: FOOD INSECURITY: WITHIN THE PAST 12 MONTHS, YOU WORRIED THAT YOUR FOOD WOULD RUN OUT BEFORE YOU GOT THE MONEY TO BUY MORE.: NEVER TRUE

## 2025-08-02 SDOH — ECONOMIC STABILITY: INCOME INSECURITY: IN THE PAST 12 MONTHS HAS THE ELECTRIC, GAS, OIL, OR WATER COMPANY THREATENED TO SHUT OFF SERVICES IN YOUR HOME?: NO

## 2025-08-02 SDOH — SOCIAL STABILITY: SOCIAL INSECURITY: WITHIN THE LAST YEAR, HAVE YOU BEEN HUMILIATED OR EMOTIONALLY ABUSED IN OTHER WAYS BY YOUR PARTNER OR EX-PARTNER?: NO

## 2025-08-02 SDOH — SOCIAL STABILITY: SOCIAL INSECURITY: DOES ANYONE TRY TO KEEP YOU FROM HAVING/CONTACTING OTHER FRIENDS OR DOING THINGS OUTSIDE YOUR HOME?: NO

## 2025-08-02 SDOH — HEALTH STABILITY: MENTAL HEALTH: HOW MANY DRINKS CONTAINING ALCOHOL DO YOU HAVE ON A TYPICAL DAY WHEN YOU ARE DRINKING?: PATIENT DOES NOT DRINK

## 2025-08-02 SDOH — SOCIAL STABILITY: SOCIAL INSECURITY: ABUSE: ADULT

## 2025-08-02 SDOH — HEALTH STABILITY: MENTAL HEALTH: HOW OFTEN DO YOU HAVE A DRINK CONTAINING ALCOHOL?: NEVER

## 2025-08-02 SDOH — ECONOMIC STABILITY: TRANSPORTATION INSECURITY: IN THE PAST 12 MONTHS, HAS LACK OF TRANSPORTATION KEPT YOU FROM MEDICAL APPOINTMENTS OR FROM GETTING MEDICATIONS?: NO

## 2025-08-02 SDOH — SOCIAL STABILITY: SOCIAL INSECURITY: ARE THERE ANY APPARENT SIGNS OF INJURIES/BEHAVIORS THAT COULD BE RELATED TO ABUSE/NEGLECT?: NO

## 2025-08-02 SDOH — ECONOMIC STABILITY: FOOD INSECURITY: WITHIN THE PAST 12 MONTHS, THE FOOD YOU BOUGHT JUST DIDN'T LAST AND YOU DIDN'T HAVE MONEY TO GET MORE.: NEVER TRUE

## 2025-08-02 SDOH — SOCIAL STABILITY: SOCIAL INSECURITY: ARE YOU OR HAVE YOU BEEN THREATENED OR ABUSED PHYSICALLY, EMOTIONALLY, OR SEXUALLY BY ANYONE?: NO

## 2025-08-02 SDOH — ECONOMIC STABILITY: HOUSING INSECURITY: IN THE PAST 12 MONTHS, HOW MANY TIMES HAVE YOU MOVED WHERE YOU WERE LIVING?: 0

## 2025-08-02 SDOH — ECONOMIC STABILITY: HOUSING INSECURITY: IN THE LAST 12 MONTHS, WAS THERE A TIME WHEN YOU WERE NOT ABLE TO PAY THE MORTGAGE OR RENT ON TIME?: NO

## 2025-08-02 SDOH — SOCIAL STABILITY: SOCIAL INSECURITY: DO YOU FEEL ANYONE HAS EXPLOITED OR TAKEN ADVANTAGE OF YOU FINANCIALLY OR OF YOUR PERSONAL PROPERTY?: NO

## 2025-08-02 SDOH — SOCIAL STABILITY: SOCIAL INSECURITY: HAVE YOU HAD THOUGHTS OF HARMING ANYONE ELSE?: NO

## 2025-08-02 SDOH — SOCIAL STABILITY: SOCIAL INSECURITY: HAS ANYONE EVER THREATENED TO HURT YOUR FAMILY OR YOUR PETS?: NO

## 2025-08-02 SDOH — SOCIAL STABILITY: SOCIAL INSECURITY: DO YOU FEEL UNSAFE GOING BACK TO THE PLACE WHERE YOU ARE LIVING?: NO

## 2025-08-02 ASSESSMENT — COGNITIVE AND FUNCTIONAL STATUS - GENERAL
DAILY ACTIVITIY SCORE: 21
HELP NEEDED FOR BATHING: A LITTLE
MOVING TO AND FROM BED TO CHAIR: A LITTLE
DRESSING REGULAR LOWER BODY CLOTHING: A LITTLE
MOVING FROM LYING ON BACK TO SITTING ON SIDE OF FLAT BED WITH BEDRAILS: A LITTLE
MOBILITY SCORE: 18
MOBILITY SCORE: 18
STANDING UP FROM CHAIR USING ARMS: A LITTLE
TOILETING: A LITTLE
MOVING TO AND FROM BED TO CHAIR: A LITTLE
MOBILITY SCORE: 19
DRESSING REGULAR LOWER BODY CLOTHING: A LITTLE
TURNING FROM BACK TO SIDE WHILE IN FLAT BAD: A LITTLE
TOILETING: A LITTLE
MOVING TO AND FROM BED TO CHAIR: A LITTLE
MOBILITY SCORE: 24
CLIMB 3 TO 5 STEPS WITH RAILING: A LITTLE
STANDING UP FROM CHAIR USING ARMS: A LITTLE
TOILETING: A LITTLE
TURNING FROM BACK TO SIDE WHILE IN FLAT BAD: A LITTLE
DAILY ACTIVITIY SCORE: 21
PATIENT BASELINE BEDBOUND: NO
WALKING IN HOSPITAL ROOM: A LITTLE
CLIMB 3 TO 5 STEPS WITH RAILING: A LITTLE
HELP NEEDED FOR BATHING: A LITTLE
MOVING TO AND FROM BED TO CHAIR: A LITTLE
TURNING FROM BACK TO SIDE WHILE IN FLAT BAD: A LITTLE
WALKING IN HOSPITAL ROOM: A LITTLE
TOILETING: A LITTLE
MOVING FROM LYING ON BACK TO SITTING ON SIDE OF FLAT BED WITH BEDRAILS: A LITTLE
CLIMB 3 TO 5 STEPS WITH RAILING: A LITTLE
TURNING FROM BACK TO SIDE WHILE IN FLAT BAD: A LITTLE
WALKING IN HOSPITAL ROOM: A LITTLE
DRESSING REGULAR UPPER BODY CLOTHING: A LITTLE
DAILY ACTIVITIY SCORE: 20
STANDING UP FROM CHAIR USING ARMS: A LITTLE
MOBILITY SCORE: 19
DAILY ACTIVITIY SCORE: 24
DRESSING REGULAR LOWER BODY CLOTHING: A LITTLE
WALKING IN HOSPITAL ROOM: A LITTLE
HELP NEEDED FOR BATHING: A LITTLE
STANDING UP FROM CHAIR USING ARMS: A LITTLE
DRESSING REGULAR LOWER BODY CLOTHING: A LITTLE
DAILY ACTIVITIY SCORE: 21
CLIMB 3 TO 5 STEPS WITH RAILING: A LITTLE
HELP NEEDED FOR BATHING: A LITTLE

## 2025-08-02 ASSESSMENT — PAIN DESCRIPTION - DESCRIPTORS
DESCRIPTORS: ACHING;SHARP
DESCRIPTORS: ACHING;SHARP
DESCRIPTORS: ACHING
DESCRIPTORS: ACHING;SHARP

## 2025-08-02 ASSESSMENT — LIFESTYLE VARIABLES
AUDIT-C TOTAL SCORE: 0
AUDIT-C TOTAL SCORE: 0
HOW OFTEN DO YOU HAVE A DRINK CONTAINING ALCOHOL: NEVER
HOW OFTEN DO YOU HAVE 6 OR MORE DRINKS ON ONE OCCASION: NEVER
HOW MANY STANDARD DRINKS CONTAINING ALCOHOL DO YOU HAVE ON A TYPICAL DAY: PATIENT DOES NOT DRINK
SKIP TO QUESTIONS 9-10: 1
AUDIT-C TOTAL SCORE: 0
SKIP TO QUESTIONS 9-10: 1

## 2025-08-02 ASSESSMENT — PAIN SCALES - GENERAL
PAINLEVEL_OUTOF10: 8
PAINLEVEL_OUTOF10: 6
PAINLEVEL_OUTOF10: 0 - NO PAIN
PAINLEVEL_OUTOF10: 0 - NO PAIN
PAINLEVEL_OUTOF10: 9
PAINLEVEL_OUTOF10: 8
PAINLEVEL_OUTOF10: 3

## 2025-08-02 ASSESSMENT — PAIN - FUNCTIONAL ASSESSMENT
PAIN_FUNCTIONAL_ASSESSMENT: 0-10

## 2025-08-02 ASSESSMENT — PAIN DESCRIPTION - ORIENTATION
ORIENTATION: LOWER
ORIENTATION: LOWER
ORIENTATION: POSTERIOR

## 2025-08-02 ASSESSMENT — PAIN DESCRIPTION - LOCATION
LOCATION: NECK
LOCATION: BACK
LOCATION: BACK

## 2025-08-02 ASSESSMENT — ACTIVITIES OF DAILY LIVING (ADL)
HEARING - RIGHT EAR: FUNCTIONAL
ADEQUATE_TO_COMPLETE_ADL: YES
TOILETING: INDEPENDENT
PATIENT'S MEMORY ADEQUATE TO SAFELY COMPLETE DAILY ACTIVITIES?: YES
BATHING: INDEPENDENT
HEARING - LEFT EAR: FUNCTIONAL
WALKS IN HOME: INDEPENDENT
DRESSING YOURSELF: INDEPENDENT
GROOMING: INDEPENDENT
LACK_OF_TRANSPORTATION: NO
JUDGMENT_ADEQUATE_SAFELY_COMPLETE_DAILY_ACTIVITIES: YES
FEEDING YOURSELF: INDEPENDENT

## 2025-08-02 NOTE — CARE PLAN
The patient's goals for the shift include      The clinical goals for the shift include Pain management    Over the shift, the patient made progress toward the following goals

## 2025-08-02 NOTE — NURSING NOTE
Admitted to room 221 from [facility] for complaint of back pain radiating down right leg.Oriented to room. Call light given.Complaining of slight back discomfort at present.

## 2025-08-02 NOTE — CARE PLAN
The patient's goals for the shift include pain management.    The clinical goals for the shift include pain management

## 2025-08-02 NOTE — DISCHARGE SUMMARY
DISCHARGE SUMMARY    Admitting Provider: Beltran Canchola MD  Discharge Provider: Beltran Canchola MD  Primary Care Physician: Dagoberto DO Greg [phone]      Admission Date: 7/31/2025       Discharge Date: 8/2/2025  Discharge Disposition: Home  Code Status at Discharge: Full Code    Present on Admission:   Stenosis, cervical spine   Polyneuropathy, unspecified   Lumbosacral radiculitis   Lumbar spondylosis   Lumbar radiculopathy   Lumbar degenerative disc disease   (Resolved) Hypokalemia   Hypertension   Chronic back pain   Chronic osteoarthritis   Cervical radiculopathy       Hospital Course   Kat Tineo is a 49 y.o. year old female, with a PMH of bariatric surgery, vit deficiencies headaches, chronic pain, RA, OA, radiculopathy, cervical radiculitis, hx of cervical fusion, cervical spondylosis, HTN, who presented to [facility] ED on 7/31/2025 for acute on chronic neck/back pain. ED course was unremarkable. Patient was admitted under observation status on 7/31/2025 for further management. Hospital course outlined in last progress note. Pain remained uncontrolled on PO medications so patient was transferred to [facility] in stable condition for pain management consultation.     Objective    Discharge Vitals  Heart Rate: 63  Resp: 17  BP: 126/76  Temp: 36.5 °C (97.7 °F)  Weight: 129 kg (284 lb)    Physical Exam  Vitals and nursing notes reviewed.  GENERAL: Alert and awake, cooperative; in no acute distress  SKIN: Warm and dry, cap refill <2  HEENT: Normocephalic, PEERL, mucous membranes pink and moist  NECK: No JVD or hepatojugular reflex  CARDIAC: Regular rate and rhythm, S1S2, no murmurs or abnormal heart sounds  CHEST: Normal respiratory effort, no abnormal breath sounds  ABDOMEN: Soft, non-distended, non-tender with palpation  EXTREMITIES: No lower extremity edema, normal pulses all 4 extremities  NEURO: Alert and oriented, mental status at baseline, no focal deficits  PSYCH: Behavior and affect as  expected     Issues Requiring Follow-up: None  Test Results Pending at Discharge  Pending Labs       Order Current Status    Extra Urine Gray Tube In process    Urinalysis with Reflex Culture and Microscopic In process            Discharge Medication List     Medication List      PAUSE taking these medications     amLODIPine 10 mg tablet; Wait to take this until your doctor or other   care provider tells you to start again.; Commonly known as: Norvasc; TAKE   1 TABLET BY MOUTH EVERY DAY   losartan 100 mg tablet; Wait to take this until your doctor or other   care provider tells you to start again.; Commonly known as: Cozaar; Take 1   tablet (100 mg) by mouth once daily. Do not start before November 27, 2024.   metoprolol succinate XL 50 mg 24 hr tablet; Wait to take this until your   doctor or other care provider tells you to start again.; Commonly known   as: Toprol-XL     START taking these medications     acetaminophen 500 mg tablet; Commonly known as: Tylenol; Take 2 tablets   (1,000 mg) by mouth every 6 hours.   meloxicam 15 mg tablet; Commonly known as: Mobic; Take 1 tablet (15 mg)   by mouth once daily.; Start taking on: August 3, 2025   pantoprazole 40 mg EC tablet; Commonly known as: ProtoNix; Take 1 tablet   (40 mg) by mouth once daily in the morning. Take before meals. Do not   crush, chew, or split.; Start taking on: August 3, 2025     CONTINUE taking these medications     calcium carbonate-vitamin D3 500 mg-5 mcg (200 unit) tablet   cyanocobalamin 250 mcg tablet; Commonly known as: Vitamin B-12   DULoxetine 60 mg DR capsule; Commonly known as: Cymbalta; TAKE 1 CAPSULE   BY MOUTH EVERY DAY   gabapentin 300 mg capsule; Commonly known as: Neurontin; TAKE 1 CAPSULE   BY MOUTH THREE TIMES A DAY   methocarbamoL 1,000 mg tablet; Take 100 mg by mouth 4 times a day as   needed for muscle spasms.   multivitamin with iron - children's chewable tablet; Commonly known as:   Cerovite   potassium chloride CR 20  mEq ER tablet; Commonly known as: Klor-Con M20;   Take 1 tablet (20 mEq) by mouth 2 times a day for 10 days. Do not crush or   chew.   Rinvoq 30 mg tablet extended release 24 hr; Generic drug: upadacitinib   ER   rizatriptan 10 mg tablet; Commonly known as: Maxalt; TAKE 1 TABLET AT   ONSET OF HEADACHE. MAY REPEAT EVERY 2 HOURS AS NEEDED. MAXIMUM 3   TABLETS/24 HOURS.   tiZANidine 4 mg tablet; Commonly known as: Zanaflex; TAKE 1 TABLET BY   MOUTH EVERYDAY AT BEDTIME   Vitamin D3 25 mcg (1,000 units) tablet; Generic drug: cholecalciferol   zolpidem 10 mg tablet; Commonly known as: Ambien; TAKE 1 TABLET (10 MG)   BY MOUTH AS NEEDED AT BEDTIME FOR SLEEP.     STOP taking these medications     predniSONE 20 mg tablet; Commonly known as: Deltasone   sulfamethoxazole-trimethoprim 800-160 mg tablet; Commonly known as:   Bactrim DS         Outpatient Follow-Up  Future Appointments   Date Time Provider Department Center   8/4/2025  8:15 AM Sanket Navas MD [id number] [facility]   8/26/2025  8:45 AM Arsh Wick MD [id number] [facility]   9/24/2025  8:45 AM Debbi Petty MD [id number] [facility]   11/17/2025 11:30 AM DEWEY Moore [id number] [facility]   1/28/2026  8:00 AM Abilio Cullen MD [id number] [facility]   3/25/2026  1:00 PM Lauri Morales DO [id number] [facility]   4/28/2026  9:30 AM Kyra Davis PA-C [id number] [facility]       Chart, medical history, and labs/testing reviewed in detail.   Plan of care discussed and discharge plan agreed upon with staff attending, Dr. Hunter.     DEWEY Terry   Observation/Internal Med JASON  [facility]  08/02/25 2:02 PM    On day of discharge, vital signs were stable and no acute distress was noted. All questions were answered and much support was given. Patient/family verbalized understanding. After VS and labs were reviewed, it was determined the patient was stable for discharge. Hospital day of discharge management- spent >30 minutes coordinating  the discharge and counseling/educating patient and family regarding discharge instructions.

## 2025-08-02 NOTE — H&P
History Of Present Illness  Kat Tineo is a 49 y.o. female who was transferred to [facility] due to intractable neck and back pain.  Patient has history of chronic pain, cervical radiculopathy with history of cervical fusion, and lumbar radiculopathy with history of lumbar fusion.  Her acute on chronic pain started over a month ago and has resulted in at least 4 ED visits.  She has been on steroids, gabapentin, analgesics, muscle relaxants, tramadol, and oxycodone.  During her admission at [facility], she had MRIs of the cervical, thoracic, and lumbar spine as well as an MRI of the brain.  There was a question of potential myelomalacia at C7-T1, so neurosurgery was consulted.  Neurosurgery recommended following up with pain management for possible epidural injections, PT, and outpatient follow-up.  She does have appointments scheduled with spine surgery this week.  Patient reports that none of the medications she has been taking have been helpful.  She complains of radicular pain down both of her arms to her fingertips, weakness of her hands, and radicular pain down her right leg to her toes.  She reports numbness in her extremities.  States that she feels that she cannot void properly and has to force the urine out of her bladder manually with her hand on her pelvis.  Also reports fecal incontinence prior to her admission at [facility].  Has not had any fevers, but has been having chills.  She completed a course of Bactrim at [facility] for possible UTI.  At the time of arrival, vital signs are stable.  Patient's only complaint is pain, she is requesting pain medication.     Past Medical History  Medical History[1]    Surgical History  Surgical History[2]     Social History  She reports that she has never smoked. She has never been exposed to tobacco smoke. She has never used smokeless tobacco. She reports that she does not currently use alcohol. She reports that she does not use drugs.    Family History  Family  "History[3]     Allergies  Ace inhibitors, Adhesive tape-silicones, and Amoxicillin    Review of Systems   All other systems reviewed and are negative.       Physical Exam  Constitutional:       General: She is not in acute distress.     Appearance: Normal appearance.   HENT:      Head: Normocephalic and atraumatic.      Nose: Nose normal.      Mouth/Throat:      Mouth: Mucous membranes are moist.      Pharynx: Oropharynx is clear.     Cardiovascular:      Rate and Rhythm: Normal rate and regular rhythm.   Pulmonary:      Effort: Pulmonary effort is normal. No respiratory distress.      Breath sounds: Normal breath sounds.   Abdominal:      General: Bowel sounds are normal.      Palpations: Abdomen is soft.      Tenderness: There is no abdominal tenderness. There is no rebound.     Musculoskeletal:         General: No swelling, deformity or signs of injury.      Cervical back: Normal range of motion and neck supple.     Skin:     General: Skin is warm and dry.     Neurological:      General: No focal deficit present.      Mental Status: She is alert and oriented to person, place, and time. Mental status is at baseline.     Psychiatric:         Attention and Perception: Attention and perception normal.         Mood and Affect: Mood normal.         Behavior: Behavior normal.         Judgment: Judgment normal.          Last Recorded Vitals  Blood pressure 140/76, pulse 54, temperature 36.9 °C (98.4 °F), temperature source Temporal, resp. rate 18, height 1.575 m (5' 2\"), weight 83.9 kg (184 lb 15.5 oz), SpO2 98%.    Relevant Results      Results for orders placed or performed during the hospital encounter of 07/31/25 (from the past 24 hours)   CBC   Result Value Ref Range    WBC 16.1 (H) 4.4 - 11.3 x10*3/uL    nRBC 0.0 0.0 - 0.0 /100 WBCs    RBC 4.44 4.00 - 5.20 x10*6/uL    Hemoglobin 12.9 12.0 - 16.0 g/dL    Hematocrit 41.3 36.0 - 46.0 %    MCV 93 80 - 100 fL    MCH 29.1 26.0 - 34.0 pg    MCHC 31.2 (L) 32.0 - 36.0 g/dL "    RDW 13.8 11.5 - 14.5 %    Platelets 481 (H) 150 - 450 x10*3/uL   Comprehensive metabolic panel   Result Value Ref Range    Glucose 134 (H) 74 - 99 mg/dL    Sodium 139 136 - 145 mmol/L    Potassium 3.6 3.5 - 5.3 mmol/L    Chloride 107 98 - 107 mmol/L    Bicarbonate 25 21 - 32 mmol/L    Anion Gap 11 10 - 20 mmol/L    Urea Nitrogen 11 6 - 23 mg/dL    Creatinine 0.66 0.50 - 1.05 mg/dL    eGFR >90 >60 mL/min/1.73m*2    Calcium 9.3 8.6 - 10.3 mg/dL    Albumin 3.8 3.4 - 5.0 g/dL    Alkaline Phosphatase 174 (H) 33 - 110 U/L    Total Protein 7.3 6.4 - 8.2 g/dL    AST 44 (H) 9 - 39 U/L    Bilirubin, Total 0.3 0.0 - 1.2 mg/dL     (H) 7 - 45 U/L     *Note: Due to a large number of results and/or encounters for the requested time period, some results have not been displayed. A complete set of results can be found in Results Review.     ECG 12 lead  Result Date: 8/2/2025  Sinus bradycardia Inferior infarct , age undetermined Abnormal ECG When compared with ECG of 22-JUL-2025 18:37, Inferior infarct is now Present T wave inversion more evident in Inferior leads See ED provider note for full interpretation and clinical correlation Confirmed by Suzy Fuller ([id number]) on 8/2/2025 9:06:39 AM    XR cervical spine 2-3 views  Result Date: 7/31/2025  Interpreted By:  Janet Patel, STUDY: XR CERVICAL SPINE 2-3 VIEWS; ;  7/31/2025 3:23 pm   INDICATION: Signs/Symptoms:neck pain. Please obtain flexion/ extension views of the C spine.     COMPARISON: 03/12/2025   ACCESSION NUMBER(S): [id number]   ORDERING CLINICIAN: YANCI SOTOMAYOR   FINDINGS: Upright lateral flexion extension views were obtained. Metallic plate with vertebral body screws spans C4 through C7. Fusion device with obliquely oriented screws at C3-4 extends into the adjacent vertebral bodies infuses this level. C4 through C7 show osseous fusion with the obliteration of discs. Anterior spur partially bridges at the C2-3 level. With flexion and extension motion is  primarily at the craniocervical junction. There is no significant motion from C2 inferiorly.       Metallic plate fusion anteriorly see C4 through C7 and additional hardware fusion C3-4   No spondylolisthesis. No motion is noted at the fused levels.     MACRO: None   Signed by: Janet Patel 7/31/2025 4:05 PM Dictation workstation:   [id number]    US right upper quadrant  Result Date: 7/31/2025  Interpreted By:  Naveen Sevilla, STUDY: US RIGHT UPPER QUADRANT;  7/31/2025 1:49 pm   INDICATION: Signs/Symptoms:elevated LFTs/ alk phos.     COMPARISON: CT abdomen/pelvis of 04/30/2025.   ACCESSION NUMBER(S): [id number]   ORDERING CLINICIAN: YANCI SOTOMAYOR   TECHNIQUE: Real-time sonographic evaluation of the right upper quadrant was performed.   FINDINGS: LIVER: The liver is diffusely echogenic in appearance with impaired acoustic penetration, consistent with diffuse fatty infiltration.  Liver is enlarged measuring approximately 18 cm in sagittal dimension.   In the venu hepatis region there is a rounded hypoechoic cystic appearing region measuring 3 x 3.1 x 2.6 cm which more likely corresponds to a fluid containing bowel segment. No cystic lesion evident in this region on prior CT.   GALLBLADDER: Surgically absent.   BILIARY TREE: Common bile duct does not appear dilated measuring 4 mm in diameter.   PANCREAS: The pancreas is poorly visualized due to overlying bowel gas.   RIGHT KIDNEY: Right kidney measures  10.4cm in length.  No right hydronephrosis is seen.       Hepatomegaly and diffuse fatty infiltration of the liver.   Previous cholecystectomy. No biliary dilation.   Sub visualization of the pancreas.   MACRO: None.   Signed by: Naveen Sevilla 7/31/2025 2:28 PM Dictation workstation:   [id number]    MR lumbar spine w and wo IV contrast  Addendum Date: 7/31/2025  Interpreted By:  Nasim Guzmán, ADDENDUM: No abnormal enhancement within the lumbar spine.   Signed by: Nasim Guzmán 7/31/2025 1:12 PM   --------  ORIGINAL REPORT -------- Dictation workstation:   [id number]    Result Date: 7/31/2025  Interpreted By:  Nasim Guzmán, STUDY: MR LUMBAR SPINE W AND WO IV CONTRAST;  7/31/2025 11:57 am   INDICATION: Signs/Symptoms:Back pain, fever, bowel incontinence.     COMPARISON: NR MRI L-SPINE W/CONTRAST 3/3/2023   ACCESSION NUMBER(S): [id number]   ORDERING CLINICIAN: YANCI SOTOMAYOR   TECHNIQUE: Multiplanar multisequence MR imaging of the lumbar spine performed prior to and following administration of 17 ML Dotarem intravenous contrast. Sagittal T1, T2, STIR, axial T1 and T2 weighted images of the lumbar spine were acquired. Postcontrast sagittal and axial T1 imaging obtained.   Mild motion artifact degrades evaluation.   FINDINGS: Segmentation: Normal.   Conus: The lower thoracic cord appears unremarkable. The conus terminates at the level of superior endplate of L2. Cauda equina are unremarkable. No abnormal enhancement.   Epidural fluid: None.   Hardware: L4-L5 posterior spinal fusion with bilateral posterior pedicular screws and stabilization rods as well as interbody spacer. L4 laminectomy/decompression is also evident.   Alignment: Rotatory dextroscoliosis of the lumbar spine. Trace anterolisthesis of L4 on L5 status post instrumented fusion. Trace retrolisthesis of L1 on L2.   Vertebral bodies: Lumbar vertebral body heights are grossly maintained.   Marrow signal: Within normal limits. No suspicious STIR hyperintensity or abnormal osseous enhancement.   Intervertebral discs: Mild multilevel degenerative disc height loss and disc desiccation.     Degenerative change:   T12-L1: Unremarkable.   L1-2: Moderate bilateral facet arthropathy with ligamentum flavum hypertrophy. Mild disc bulge and endplate spurring. No spinal canal stenosis. No neural foraminal narrowing.   L2-3: Moderate bilateral facet arthropathy with ligamentum flavum hypertrophy. Prominent dorsal epidural fat. Moderate disc bulge/uncovering. There is  right-greater-than-left lateral recess narrowing with moderate spinal canal stenosis. There is mild bilateral neural foraminal narrowing.   L3-4: Laminectomy/posterior decompression. Bilateral facet arthropathy with residual ligamentum flavum thickening. There is moderate disc bulge with hypertrophic endplate spurring. There is narrowing/effacement of the right-greater-than-left lateral recesses. There is no additional spinal canal stenosis. There is severe right and mild left neural foraminal narrowing with impingement of the exiting right L3 nerve root by facet osteophytes. Findings have worsened in the interim.   L4-5: Laminectomy/posterior decompression. Residual disc bulge, endplate spurring, and facet spurring with no significant spinal canal stenosis. Enhancing granulation tissue noted overlying the laminectomy bed and left lateral recess. Mild bilateral neural foraminal narrowing.   L5-S1: Moderate right and mild left facet arthropathy. Minimal disc bulge, eccentric to the right. No spinal canal stenosis. Moderate right and minimal/mild left neural foraminal narrowing.   Soft tissues: Fatty atrophy overlying the laminectomy bed with postsurgical scarring. Tiny right cortical renal cyst.       Postoperative change of L4-L5 posterior spinal fusion with interbody fixation and laminectomy/decompression. Enhancing granulation tissue noted within the operative bed. Resolution of previous epidural fluid collection.   Worsening degenerative change at L3-L4 with narrowing/effacement of the right-greater-than-left lateral recesses and severe right neural foraminal narrowing with impingement of the exiting right L3 nerve root.   Moderate spinal canal stenosis with right-greater-than-left lateral recess narrowing at L2-L3, similar to slightly worsened in the interim.   MACRO: None   Signed by: Nasim Guzmán 7/31/2025 12:32 PM Dictation workstation:   [id number]    MR brain w and wo IV contrast  Result Date:  7/31/2025  Interpreted By:  Nasim Guzmán, STUDY: MR BRAIN W AND WO IV CONTRAST;  7/31/2025 12:37 pm   INDICATION: Signs/Symptoms:Headache, left sided facial droop, right arm numbness/ weakness.     COMPARISON: CT HEAD WO IV CONTRAST 7/22/2025   ACCESSION NUMBER(S): [id number]   ORDERING CLINICIAN: YANCI SOTOMAYOR   TECHNIQUE: Standard multiplanar multisequence MR imaging was performed through the brain prior to and following administration of 17 ML Dotarem intravenous contrast. Mild motion degradation on several sequences.   FINDINGS: Parenchyma: There is no diffusion restriction abnormality to suggest acute infarct.  No evidence of recent hemorrhage. There is no mass effect or midline shift. No abnormal parenchymal or leptomeningeal enhancement. Mild burden of tiny nonspecific T2/FLAIR white matter hyperintensities predominantly within the bifrontal regions.   CSF Spaces: Nonspecific expanded partial empty sella. The ventricles, sulci and basal cisterns are within normal limits for age. Basilar cisterns are patent.   Extra-axial spaces: No extra-axial fluid collection.   Intracranial Flow Voids: Patent appearing.   Paranasal Sinuses: Mild anterior ethmoidal mucosal sinus thickening. Otherwise clear.   Mastoids: Clear.   Orbits: Normal.   Calvarium: No suspicious osseous marrow signal.       No acute infarct, recent hemorrhage, or intracranial mass effect. No abnormal intracranial enhancement.   Trace white matter signal change may represent sequela of migraines versus trace chronic small vessel ischemic disease.   MACRO: None   Signed by: Nasim Guzmán 7/31/2025 1:11 PM Dictation workstation:   [id number]    MR thoracic spine w and wo IV contrast  Result Date: 7/31/2025  Interpreted By:  Nasim Guzmán, STUDY: MR THORACIC SPINE W AND WO IV CONTRAST;  7/31/2025 12:27 pm   INDICATION: Signs/Symptoms:Neck pain, back pain, bowel incontinence, fever.     COMPARISON: NR MRI T-SPINE W 3/3/2023   ACCESSION NUMBER(S):  [id number]   ORDERING CLINICIAN: YANCI SOTOMAYOR   TECHNIQUE: Noncontrast multiplanar multisequence MR imaging of the thoracic spine was performed. Sagittal T1, T2, STIR and axial T2 and T1 weighted MR images of the thoracic spine were obtained. Axial sagittal T1 postcontrast imaging was also obtained. A total of 17 ML Dotarem intravenous contrast was administered.   Mild motion artifact.   FINDINGS: Spinal cord: Normal in caliber and signal characteristics. No abnormal cord or leptomeningeal enhancement.   Extra-axial fluid: None.   Alignment: Trace anterolisthesis of T3 on T4.   Vertebral body heights: Thoracic vertebral body heights are grossly maintained.   Marrow signal: No suspicious STIR hyperintensity or abnormal osseous enhancement. Type 2 Modic endplate signal change anteriorly at T8-T9, T10-T11, and T11-T12.   Intervertebral discs: Mild multilevel degenerative disc height loss.   Degenerative change: Central disc protrusion at T7-T8 with moderate spinal canal stenosis with similar ventral cord flattening/indentation. There is an element of mild congenital spinal canal narrowing and epidural lipomatosis within the midthoracic region. Moderate right-greater-than-left facet arthropathy at T11-T12. Additional moderate scattered facet arthropathy within the lower thoracic regions. No high-grade thoracic neural foraminal narrowing.   Paraspinous Soft Tissues: Within normal limits.       No abnormal enhancement within the thoracic spine.   Similar appearing thoracic degenerative change, worst at T7-T8, with moderate spinal canal stenosis and ventral cord flattening/indentation but no cord signal abnormality.   MACRO: None   Signed by: Nasim Guzmán 7/31/2025 1:03 PM Dictation workstation:   [id number]    MR cervical spine w and wo IV contrast  Result Date: 7/31/2025  Interpreted By:  Nasim Guzmán, STUDY: MR CERVICAL SPINE W AND WO IV CONTRAST;  7/31/2025 12:27 pm   INDICATION: Signs/Symptoms:Neck pain, back  pain, fever, bowel incontinence.     COMPARISON: NR MRI CERVICAL W 3/3/2023, XR CERVICAL SPINE COMPLETE 4-5 VIEWS 3/12/2025   ACCESSION NUMBER(S): [id number]   ORDERING CLINICIAN: YANCI SOTOMAYOR   TECHNIQUE: Multiplanar multisequence MR imaging was performed through the cervical spine prior to and following the administration of intravenous contrast. Noncontrast sagittal T1, T2, STIR, axial T1 and axial T2 weighted images were acquired through the cervical spine. Postcontrast sagittal and axial imaging obtained. A total of 17 ML Dotarem intravenous contrast was administered.   Evaluation is degraded by motion artifact.   FINDINGS: Cord: There is T2/STIR hyperintensity involving the cervical spinal cord of the C7-T1 level, new from remote MRI examination (series 801, image 7 and series 701, image 7) with potential subtle volume loss. No definite abnormal enhancement. No additional cervical spinal cord signal abnormality identified.   Epidural fluid: None.   Hardware: Postoperative change of C3-C7 ACDF with plate and screw components as well as interbody spacers. There are elements of bridging osseous fusion suggested involving the C4-C7 vertebral bodies.   Alignment: Straightening of the expected cervical lordosis. New 2-3 mm anterolisthesis of C7 on T1.   Vertebral bodies: Cervical vertebral body heights are grossly maintained. Aforementioned elements of bridging osseous fusion evident at the C4-C7 levels. Hardware artifact precludes evaluation at C3-C4 for bridging osseous fusion.   Marrow signal: Questionable degenerative endplate signal change along the superior endplate of C3 versus hardware artifact. No suspicious STIR hyperintensity or abnormal osseous enhancement within the cervical region.   Intervertebral Discs: Normal signal.     Degenerative change:   C1-C2:  Arthrosis centered about the dens. No spinal canal stenosis.   C2-C3:  Disc bulge and bilateral uncovertebral spurring. There is mild spinal canal  stenosis. There is no significant neural foraminal narrowing.   C3-C4:  ACDF components. There is residual uncovertebral spurring and disc bulge with mild-to-moderate spinal canal stenosis. There is severe bilateral neural foraminal narrowing suggested.   C4-C5:  ACDF components. Mild spinal canal stenosis suspected. Mild right-greater-than-left neural foraminal narrowing suspected with motion artifact significantly limiting assessment.   C5-C6:  ACDF components. Mild spinal canal stenosis suggested. No high-grade neural foraminal narrowing with motion artifact degrading evaluation.   C6-C7:  ACDF components. Residual uncovertebral and endplate spurring. There is moderate spinal canal stenosis suspected. There is moderate right and mild left neural foraminal narrowing.   C7-T1:  Severe left and mild right facet arthropathy with ligamentum flavum hypertrophy. Disc uncovering/bulge. There is moderate spinal canal stenosis. There is severe left and mild right neural foraminal narrowing suggested.   Soft tissues: The prevertebral and posterior paraspinous soft tissues are within normal limits.       Motion degraded examination.   C3-C7 ACDF components again evident. Bridging osseous fusion suggested along the C4-C7 regions with limited assessment for osseous fusion at C3-C4.   There is worsened degenerative anterolisthesis of C7 on T1 with moderate spinal canal stenosis suggested. There is also new cord signal abnormality focally at C7-T1 concerning for potential cord edema or myelomalacia. Severe left neural foraminal narrowing is again suggested.   No abnormal enhancement.   MACRO: None   Signed by: Nasim Guzmán 7/31/2025 12:53 PM Dictation workstation:   [id number]         Assessment & Plan  Intractable low back pain    Intractable low back pain, neck pain  MRIs reviewed  Continue pain regimen started at [facility] including scheduled Robaxin 1000 mg 4 times daily, meloxicam, gabapentin, and Tylenol  As needed  oxycodone/morphine  PT/OT  Pain management consult  Needs to follow-up outpatient with spine surgery  HTN  Stable  Continue metoprolol, losartan, amlodipine  Hypokalemia   Resolved  Continue potassium supplement  Check BMP in a.m.  Depression   Continue Cymbalta      Amisha Salazar MD         [1]   Past Medical History:  Diagnosis Date    Acid reflux     Ankylosing spondylitis of site in spine (Multi)     Arthritis 2000    Bursitis of hip     Cervical disc disorder     CTS (carpal tunnel syndrome)     Depression     Diabetes mellitus (Multi)     Dislocation of hip (Multi)     DM (diabetes mellitus), gestational     Fracture of hip (Multi)     Headache     History of transfusion 2023    Hypertension     Joint pain     Low back pain     Lumbosacral disc disease     Migraine     Neck pain     Obesity     Osteoarthritis     Plantar fasciitis     Reflex sympathetic dystrophy     Rheumatoid arthritis     Sciatica     Spinal stenosis     Spondylolisthesis    [2]   Past Surgical History:  Procedure Laterality Date    BACK SURGERY      CARDIAC CATHETERIZATION       SECTION, LOW TRANSVERSE  2012    CHOLECYSTECTOMY      CT GUIDED SOFT TISSUE FLUID DRAIN  2022    CT GUIDED SOFT TISSUE FLUID DRAIN [facility]    ESOPHAGOGASTRODUODENOSCOPY      GASTRIC BYPASS  2024    Gastric Bypass Laparoscopic  Repair Diaphragmatic Hernia Laparoscopy General Lysis Adhesions Laparoscopy Abdominal Cavity ([facility])    HERNIA REPAIR  2024    HIP ARTHROPLASTY      HYSTERECTOMY  2012    JOINT REPLACEMENT      LAMINECTOMY      LAPAROSCOPY GASTRECTOMY PARTIAL / TOTAL      vertical sleeve    NECK SURGERY      OOPHORECTOMY  Approximately     ORTHOPEDIC SURGERY  2023    OTHER SURGICAL HISTORY  2017    laparoscopic cholecystectomy wiht intraoperative cholangiogram, lysis of adhesions, mesh repair of incisional henia using 6 in round ventralight st mesh    OTHER SURGICAL HISTORY  Left     revision x 2 hip    PLANTAR FASCIA RELEASE      SPINAL FUSION      SPINE SURGERY      L4-L5 SPINAL SURGERY ( [facility] [place])    TUBAL LIGATION      US GUIDED SOFT TISSUE FLUID DRAIN  09/30/2021    US GUIDED SOFT TISSUE FLUID DRAIN [facility]   [3]   Family History  Problem Relation Name Age of Onset    Diabetes Mother Darline Calzada     Hypertension Mother Darline Calzada     Other (morbid obesity) Mother Darline Calzada     Hernia Mother Darline Calzada     Vision loss Mother Darline Calzada     Diabetes type I Mother Darline Calzada     Other (htn) Father Ammon Colon     Hypertension Father Ammon Colon     Arthritis Father Ammon Colon     Alcohol abuse Father Ammon Colon     Cancer Father Ammon Colon     Depression Father Ammon Colon     Arthritis Other      Stroke Maternal Grandfather Sy Calzada     Heart attack Maternal Grandfather Sy Calzada     Heart disease Maternal Grandmother Anayeli Calzada     Osteoporosis Maternal Grandmother Anayeli Calzada     Angina Maternal Grandmother Anayeli Calzada     Arrhythmia Maternal Grandmother Anayeli Calzada     Heart attack Maternal Grandmother Aanyeli Calzada     Heart failure Maternal Grandmother Anayeli Calzada     Cancer Paternal Grandfather Supa Colon     Anemia Mother's Sister Ada Sebastian     Cancer Father's Brother Tee Colon     Cancer Father's Brother Armando Demarcus     Cancer Father's Brother Trenton Colon     Asthma Son Leonardo     Cancer Father's Brother Tee Colon     Cancer Father's Brother Frank Colon     Cancer Father's Brother Marcil Colon     Cancer Father's Brother Armando Demarcus     Cancer Father's Brother Trenton Colon     Cancer Paternal Grandfather Supa Colon     Drug abuse Father's Sister Eduarda Colon     Heart disease Maternal Grandmother Anayeli Calzada     Vision loss Maternal Grandmother Anayeli Calzada     Heart failure Maternal Grandmother Anayeli Calzada     Arrhythmia Maternal Grandmother Anayeli Calzada     Heart attack Maternal Grandmother Anayeli Calzada      Osteoporosis Maternal Grandmother Anayeli Calzada     Angina Maternal Grandmother Anayeli Calzada     Atrial fibrillation Maternal Grandmother Anayeli Calzada     Stroke Maternal Grandfather Sy Calzada     Heart attack Maternal Grandfather Sy Calzada     Cancer Father's Brother Tee Colon     Psoriasis Neg Hx      Irritable bowel syndrome Neg Hx

## 2025-08-02 NOTE — LETTER
August 6, 2025     Patient: Kat Tineo   YOB: 1975   Date of Visit: 8/2/2025       To Whom It May Concern:    It is my medical opinion that Kat Tineo may return to full duty immediately with no restrictions.    If you have any questions or concerns, please don't hesitate to call.         Sincerely,      Zully Ramos MD

## 2025-08-02 NOTE — PROGRESS NOTES
OBSERVATION DAILY PROGRESS NOTE    Subjective   Interval Events: Remains with uncontrolled neck/back pain, reports pain relief is short lived from current regimen      Objective   VITALS  Heart Rate:  [57-75]   Temp:  [36 °C (96.8 °F)-36.5 °C (97.7 °F)]   Resp:  [17-18]   BP: (113-131)/(75-83)   SpO2:  [95 %-100 %]      0-10 (Numeric) Pain Score: 0 - No pain    Intake/Output Summary (Last 24 hours) at 2025 1330  Last data filed at 2025 1930  Gross per 24 hour   Intake 980 ml   Output --   Net 980 ml       PHYSICAL EXAM  Vitals and nursing notes reviewed.  /76 (BP Location: Left arm, Patient Position: Lying)   Pulse 63   Temp 36.5 °C (97.7 °F) (Temporal)   Resp 17   Ht (!) 1.524 m (5')   Wt 129 kg (284 lb)   SpO2 97%   BMI 55.46 kg/m²    GENERAL: Alert and awake, cooperative; in no acute distress  SKIN: Warm and dry, cap refill <2  HEENT: Normocephalic, PEERL, mucous membranes pink and moist  NECK: No JVD or hepatojugular reflex  CARDIAC: Regular rate and rhythm, S1S2, no murmurs or abnormal heart sounds  CHEST: Normal respiratory effort, no abnormal breath sounds  ABDOMEN: soft, non-distended, non-tender with palpation  EXTREMITIES: No lower extremity edema, normal pulses all 4 extremities; limited ROM in all motional planes d/t pain  NEURO: Alert and oriented, mental status at baseline, no focal deficits  PSYCH: Behavior and affect as expected       MEDICATIONS  Scheduled Medications[1]Continuous Medications[2]PRN Medications[3]        Diagnostic Results   Labs  CBC- 2025:  7:05 AM  16.1 12.9 481    41.3      BMP- 2025:  7:06 AM  139 11 107 134   3.6 0.66 25    Estimated Creatinine Clearance: 125 mL/min (by C-G formula based on SCr of 0.66 mg/dL).     CA: 9.3 PROTIEN: 7.3 ALT: 151 Total Bili: 0.3 M.94   PHOS: _ ALBUMIN: 3.8 AST: 44   Alk Phos: 174      COAGS- No results in last year.  _   _ _     CV Labs  Troponin I, High Sensitivity   Date/Time Value Ref Range Status   2025  08:03 AM 5 0 - 13 ng/L Final   07/22/2025 08:26 PM 4 0 - 13 ng/L Final   07/22/2025 06:50 PM 4 0 - 13 ng/L Final   06/28/2025 01:30 PM 7 0 - 13 ng/L Final   06/28/2025 11:53 AM 8 0 - 13 ng/L Final   04/24/2025 05:50 PM 7 0 - 13 ng/L Final     BNP   Date/Time Value Ref Range Status   11/02/2024 04:40 AM 40 0 - 99 pg/mL Final   01/08/2023 09:41 PM 24 0 - 99 pg/mL Final     Comment:     .  <100 pg/mL - Heart failure unlikely  100-299 pg/mL - Intermediate probability of acute heart  .               failure exacerbation. Correlate with clinical  .               context and patient history.    >=300 pg/mL - Heart Failure likely. Correlate with clinical  .               context and patient history.  BNP testing is performed using different testing   methodology at [facility] than at other   [facility]. Direct result comparisons should   only be made within the same method.       TR HGBA1C (Data Conversion)   Date/Time Value Ref Range Status   07/25/2022 11:03 AM 6.1 4.2 - 6.5 % Final     Hemoglobin A1C   Date/Time Value Ref Range Status   04/09/2024 01:34 PM 6.4 (H) see below % Final   05/31/2022 04:55 AM 6.2 (H) 4.0 - 6.0 % Final     Comment:     Hemoglobin A1C levels are related to mean blood glucose during the   preceding 2-3 months. The relationship table below may be used as a   general guide. Each 1% increase in HGB A1C is a reflection of an   increase in mean glucose of approximately 30 mg/dl.   Reference: Diabetes Care, volume 29, supplement 1 Jan. 2006                        HGB A1C ................. Approx. Mean Glucose   _______________________________________________   6%   ...............................  120 mg/dl   7%   ...............................  150 mg/dl   8%   ...............................  180 mg/dl   9%   ...............................  210 mg/dl   10%  ...............................  240 mg/dl  Performed at [facility] [id number] [place] [id number]     05/09/2022  12:03 PM 6.6 (H) 4.0 - 6.0 % Final     Comment:     Hemoglobin A1C levels are related to mean blood glucose during the   preceding 2-3 months. The relationship table below may be used as a   general guide. Each 1% increase in HGB A1C is a reflection of an   increase in mean glucose of approximately 30 mg/dl.   Reference: Diabetes Care, volume 29, supplement 1 Jan. 2006                        HGB A1C ................. Approx. Mean Glucose   _______________________________________________   6%   ...............................  120 mg/dl   7%   ...............................  150 mg/dl   8%   ...............................  180 mg/dl   9%   ...............................  210 mg/dl   10%  ...............................  240 mg/dl  Performed at [address] [address]       LDL Calculated   Date/Time Value Ref Range Status   05/09/2022 12:03 PM 89 65 - 130 MG/DL Final   12/13/2021 12:12 PM 99 65 - 130 MG/DL Final   08/17/2021 08:32  65 - 130 MG/DL Final           Assessment/Plan   Ms.Marisol Tineo is a 49 y.o. female who p/w acute on chronic neck/back pain and is admitted for further management. PMH includes bariatric surgery, vit deficiencies headaches, chronic pain, RA, OA, radiculopathy, cervical radiculitis, hx of cervical fusion, cervical spondylosis, HTN.    #Acute on Chronic Neck/Back Pain  #Cervical Radiculopathy/Spondylosis  #Hx of Cervical Fusion  -NSGY consulted, no acute intervention  -Pain regimen transitioned to PO  -c/w Dexamethasone x2 days, elevated WBC today in the setting of IV steroids  -Txfr to [facility] for pain management consult  -Stop VTE ppx for possible spinal injections    #Elevated LFTs  -Downtrending, no abd pain  -Likely medication induced, ?r/t recent Bactrim use per pharmacy  -US RUQ: Hepatomegaly and diffuse fatty infiltration of the liver. Previous cholecystectomy. No biliary dilation. Sub visualization of the pancreas.  -Avoid hepatotoxic medications  -FU  with PCP as outpatient for repeat labs    #HTN  -BP remains soft, holding home medications  -Resume home medications as able  -FU with PCP as outpatient for medication adjustments    #Hypokalemia  -Resolved with supplementation      GI/VTE PPX: PPI, SQ Lovenox stopped for possible spinal injections  Code Status: Full Code    Chart, medical history, and labs/testing reviewed in detail.   Interdisciplinary rounding completed.  Plan of care discussed and agreed upon with staff attending.     [place] [facility] [place] accepting physician Dr. Martin Ruiz, APRN-CNP   Observation/Internal Med JASON  [facility]  08/02/25  1:30 PM           [1] acetaminophen, 975 mg, oral, q6h ADRY  [Held by provider] amLODIPine, 10 mg, oral, Daily  dexAMETHasone, 6 mg, intravenous, BID AC  DULoxetine, 60 mg, oral, Daily  enoxaparin, 60 mg, subcutaneous, q12h ADRY  fluticasone, 2 spray, Each Nostril, Daily  gabapentin, 300 mg, oral, TID  [Held by provider] losartan, 100 mg, oral, Daily  meloxicam, 15 mg, oral, Daily  methocarbamol, 1,000 mg, oral, q6h ADRY  [Held by provider] metoprolol succinate XL, 50 mg, oral, Daily  oxymetazoline, 2 spray, Each Nostril, BID  pantoprazole, 40 mg, oral, Daily before breakfast  sennosides, 1 tablet, oral, Nightly    [2]    [3] PRN medications: ondansetron ODT **OR** ondansetron, oxyCODONE, polyethylene glycol

## 2025-08-02 NOTE — DISCHARGE INSTRUCTIONS
Discharge Instructions  You came to the hospital for neck/back pain and were admitted for observation and further care.   You were treated with pain medications and supportive measures.    A neurosurgery specialist saw you while admitted and helped manage your care. They did not recommend any surgical procedure at this time.     Your discharge plan is to be transferred to another facility to be evaluated by pain management doctors.

## 2025-08-03 VITALS
OXYGEN SATURATION: 96 % | HEART RATE: 61 BPM | TEMPERATURE: 98.1 F | SYSTOLIC BLOOD PRESSURE: 125 MMHG | RESPIRATION RATE: 16 BRPM | HEIGHT: 62 IN | BODY MASS INDEX: 34.04 KG/M2 | WEIGHT: 184.97 LBS | DIASTOLIC BLOOD PRESSURE: 82 MMHG

## 2025-08-03 LAB
ANION GAP SERPL CALC-SCNC: 12 MMOL/L (ref 10–20)
BUN SERPL-MCNC: 15 MG/DL (ref 6–23)
CALCIUM SERPL-MCNC: 9 MG/DL (ref 8.6–10.3)
CHLORIDE SERPL-SCNC: 107 MMOL/L (ref 98–107)
CO2 SERPL-SCNC: 23 MMOL/L (ref 21–32)
CREAT SERPL-MCNC: 0.61 MG/DL (ref 0.5–1.05)
EGFRCR SERPLBLD CKD-EPI 2021: >90 ML/MIN/1.73M*2
ERYTHROCYTE [DISTWIDTH] IN BLOOD BY AUTOMATED COUNT: 14.2 % (ref 11.5–14.5)
GLUCOSE SERPL-MCNC: 120 MG/DL (ref 74–99)
HCT VFR BLD AUTO: 36.1 % (ref 36–46)
HGB BLD-MCNC: 11.2 G/DL (ref 12–16)
MCH RBC QN AUTO: 28.6 PG (ref 26–34)
MCHC RBC AUTO-ENTMCNC: 31 G/DL (ref 32–36)
MCV RBC AUTO: 92 FL (ref 80–100)
NRBC BLD-RTO: ABNORMAL /100{WBCS}
PLATELET # BLD AUTO: 382 X10*3/UL (ref 150–450)
POTASSIUM SERPL-SCNC: 3.8 MMOL/L (ref 3.5–5.3)
RBC # BLD AUTO: 3.92 X10*6/UL (ref 4–5.2)
SODIUM SERPL-SCNC: 138 MMOL/L (ref 136–145)
WBC # BLD AUTO: 13.4 X10*3/UL (ref 4.4–11.3)

## 2025-08-03 PROCEDURE — 1100000001 HC PRIVATE ROOM DAILY

## 2025-08-03 PROCEDURE — 80048 BASIC METABOLIC PNL TOTAL CA: CPT | Performed by: STUDENT IN AN ORGANIZED HEALTH CARE EDUCATION/TRAINING PROGRAM

## 2025-08-03 PROCEDURE — 2500000002 HC RX 250 W HCPCS SELF ADMINISTERED DRUGS (ALT 637 FOR MEDICARE OP, ALT 636 FOR OP/ED): Performed by: STUDENT IN AN ORGANIZED HEALTH CARE EDUCATION/TRAINING PROGRAM

## 2025-08-03 PROCEDURE — 2500000001 HC RX 250 WO HCPCS SELF ADMINISTERED DRUGS (ALT 637 FOR MEDICARE OP): Performed by: STUDENT IN AN ORGANIZED HEALTH CARE EDUCATION/TRAINING PROGRAM

## 2025-08-03 PROCEDURE — 99232 SBSQ HOSP IP/OBS MODERATE 35: CPT | Performed by: STUDENT IN AN ORGANIZED HEALTH CARE EDUCATION/TRAINING PROGRAM

## 2025-08-03 PROCEDURE — 2500000004 HC RX 250 GENERAL PHARMACY W/ HCPCS (ALT 636 FOR OP/ED): Performed by: STUDENT IN AN ORGANIZED HEALTH CARE EDUCATION/TRAINING PROGRAM

## 2025-08-03 PROCEDURE — 85027 COMPLETE CBC AUTOMATED: CPT | Performed by: STUDENT IN AN ORGANIZED HEALTH CARE EDUCATION/TRAINING PROGRAM

## 2025-08-03 PROCEDURE — 36415 COLL VENOUS BLD VENIPUNCTURE: CPT | Performed by: STUDENT IN AN ORGANIZED HEALTH CARE EDUCATION/TRAINING PROGRAM

## 2025-08-03 RX ORDER — ACETAMINOPHEN 500 MG
10 TABLET ORAL DAILY PRN
Status: DISCONTINUED | OUTPATIENT
Start: 2025-08-03 | End: 2025-08-06 | Stop reason: HOSPADM

## 2025-08-03 RX ORDER — ENOXAPARIN SODIUM 100 MG/ML
40 INJECTION SUBCUTANEOUS DAILY
Status: DISCONTINUED | OUTPATIENT
Start: 2025-08-04 | End: 2025-08-04

## 2025-08-03 RX ADMIN — POTASSIUM CHLORIDE EXTENDED-RELEASE 20 MEQ: 1500 TABLET ORAL at 20:34

## 2025-08-03 RX ADMIN — ACETAMINOPHEN 975 MG: 325 TABLET ORAL at 23:08

## 2025-08-03 RX ADMIN — PANTOPRAZOLE SODIUM 40 MG: 40 TABLET, DELAYED RELEASE ORAL at 06:32

## 2025-08-03 RX ADMIN — OXYCODONE HYDROCHLORIDE 10 MG: 5 TABLET ORAL at 06:33

## 2025-08-03 RX ADMIN — Medication 50 MCG: at 09:51

## 2025-08-03 RX ADMIN — OXYCODONE HYDROCHLORIDE 10 MG: 5 TABLET ORAL at 20:35

## 2025-08-03 RX ADMIN — DEXAMETHASONE SODIUM PHOSPHATE 6 MG: 10 INJECTION INTRAMUSCULAR; INTRAVENOUS at 06:33

## 2025-08-03 RX ADMIN — ACETAMINOPHEN 975 MG: 325 TABLET ORAL at 13:02

## 2025-08-03 RX ADMIN — OXYCODONE HYDROCHLORIDE 10 MG: 5 TABLET ORAL at 02:02

## 2025-08-03 RX ADMIN — ZOLPIDEM TARTRATE 10 MG: 5 TABLET ORAL at 23:08

## 2025-08-03 RX ADMIN — METHOCARBAMOL 1000 MG: 500 TABLET ORAL at 23:08

## 2025-08-03 RX ADMIN — MELOXICAM 15 MG: 7.5 TABLET ORAL at 09:50

## 2025-08-03 RX ADMIN — DULOXETINE HYDROCHLORIDE 60 MG: 30 CAPSULE, DELAYED RELEASE ORAL at 09:51

## 2025-08-03 RX ADMIN — MORPHINE SULFATE 2 MG: 2 INJECTION, SOLUTION INTRAMUSCULAR; INTRAVENOUS at 03:30

## 2025-08-03 RX ADMIN — METOPROLOL SUCCINATE 50 MG: 50 TABLET, EXTENDED RELEASE ORAL at 09:50

## 2025-08-03 RX ADMIN — GABAPENTIN 300 MG: 300 CAPSULE ORAL at 09:51

## 2025-08-03 RX ADMIN — METHOCARBAMOL 1000 MG: 500 TABLET ORAL at 18:13

## 2025-08-03 RX ADMIN — CYANOCOBALAMIN TAB 500 MCG 250 MCG: 500 TAB at 09:50

## 2025-08-03 RX ADMIN — AMLODIPINE BESYLATE 10 MG: 5 TABLET ORAL at 09:51

## 2025-08-03 RX ADMIN — ENOXAPARIN SODIUM 60 MG: 30 INJECTION SUBCUTANEOUS at 09:52

## 2025-08-03 RX ADMIN — Medication 1 TABLET: at 09:51

## 2025-08-03 RX ADMIN — ACETAMINOPHEN 975 MG: 325 TABLET ORAL at 06:31

## 2025-08-03 RX ADMIN — GABAPENTIN 300 MG: 300 CAPSULE ORAL at 20:34

## 2025-08-03 RX ADMIN — LOSARTAN POTASSIUM 100 MG: 100 TABLET, FILM COATED ORAL at 09:50

## 2025-08-03 RX ADMIN — DIPHENHYDRAMINE HYDROCHLORIDE 25 MG: 25 TABLET ORAL at 03:45

## 2025-08-03 RX ADMIN — OXYCODONE HYDROCHLORIDE 10 MG: 5 TABLET ORAL at 13:06

## 2025-08-03 RX ADMIN — METHOCARBAMOL 1000 MG: 500 TABLET ORAL at 13:02

## 2025-08-03 RX ADMIN — POTASSIUM CHLORIDE EXTENDED-RELEASE 20 MEQ: 1500 TABLET ORAL at 09:51

## 2025-08-03 SDOH — ECONOMIC STABILITY: INCOME INSECURITY: IN THE LAST 12 MONTHS, WAS THERE A TIME WHEN YOU WERE NOT ABLE TO PAY THE MORTGAGE OR RENT ON TIME?: NO

## 2025-08-03 SDOH — ECONOMIC STABILITY: GENERAL

## 2025-08-03 SDOH — ECONOMIC STABILITY: TRANSPORTATION INSECURITY: IN THE PAST 12 MONTHS, HAS LACK OF TRANSPORTATION KEPT YOU FROM MEDICAL APPOINTMENTS OR FROM GETTING MEDICATIONS?: NO

## 2025-08-03 SDOH — ECONOMIC STABILITY: FOOD INSECURITY
WITHIN THE PAST 12 MONTHS, YOU WORRIED THAT YOUR FOOD WOULD RUN OUT BEFORE YOU GOT THE MONEY TO BUY MORE.: SOMETIMES TRUE

## 2025-08-03 SDOH — ECONOMIC STABILITY: FOOD INSECURITY: WITHIN THE PAST 12 MONTHS, YOU WORRIED THAT YOUR FOOD WOULD RUN OUT BEFORE YOU GOT MONEY TO BUY MORE.: SOMETIMES TRUE

## 2025-08-03 SDOH — ECONOMIC STABILITY: HOUSING INSECURITY

## 2025-08-03 SDOH — ECONOMIC STABILITY: FOOD INSECURITY: WITHIN THE PAST 12 MONTHS, THE FOOD YOU BOUGHT JUST DIDN'T LAST AND YOU DIDN'T HAVE MONEY TO GET MORE.: SOMETIMES TRUE

## 2025-08-03 SDOH — ECONOMIC STABILITY: HOUSING INSECURITY: IN THE LAST 12 MONTHS, HOW MANY PLACES HAVE YOU LIVED?: 1

## 2025-08-03 SDOH — ECONOMIC STABILITY: TRANSPORTATION INSECURITY

## 2025-08-03 SDOH — ECONOMIC STABILITY: FOOD INSECURITY

## 2025-08-03 SDOH — ECONOMIC STABILITY: HOUSING INSECURITY: IN THE LAST 12 MONTHS, WAS THERE A TIME WHEN YOU WERE NOT ABLE TO PAY THE MORTGAGE OR RENT ON TIME?: NO

## 2025-08-03 SDOH — ECONOMIC STABILITY: HOUSING INSECURITY: IN THE PAST 12 MONTHS HAS THE ELECTRIC, GAS, OIL, OR WATER COMPANY THREATENED TO SHUT OFF SERVICES IN YOUR HOME?: NO

## 2025-08-03 ASSESSMENT — PAIN DESCRIPTION - DESCRIPTORS
DESCRIPTORS: ACHING

## 2025-08-03 ASSESSMENT — PAIN DESCRIPTION - LOCATION
LOCATION: BACK

## 2025-08-03 ASSESSMENT — ACTIVITIES OF DAILY LIVING (ADL): LACK_OF_TRANSPORTATION: NO

## 2025-08-03 ASSESSMENT — COGNITIVE AND FUNCTIONAL STATUS - GENERAL
DRESSING REGULAR LOWER BODY CLOTHING: A LITTLE
HELP NEEDED FOR BATHING: A LITTLE
DAILY ACTIVITIY SCORE: 21
MOBILITY SCORE: 21
WALKING IN HOSPITAL ROOM: A LITTLE
CLIMB 3 TO 5 STEPS WITH RAILING: A LITTLE
TOILETING: A LITTLE
STANDING UP FROM CHAIR USING ARMS: A LITTLE

## 2025-08-03 ASSESSMENT — PAIN SCALES - GENERAL
PAINLEVEL_OUTOF10: 9
PAINLEVEL_OUTOF10: 8
PAINLEVEL_OUTOF10: 7
PAINLEVEL_OUTOF10: 8
PAINLEVEL_OUTOF10: 8
PAINLEVEL_OUTOF10: 5 - MODERATE PAIN
PAINLEVEL_OUTOF10: 8
PAINLEVEL_OUTOF10: 5 - MODERATE PAIN
PAINLEVEL_OUTOF10: 8
PAINLEVEL_OUTOF10: 7
PAINLEVEL_OUTOF10: 8
PAINLEVEL_OUTOF10: 7
PAINLEVEL_OUTOF10: 8
PAINLEVEL_OUTOF10: 7

## 2025-08-03 ASSESSMENT — SOCIAL DETERMINANTS OF HEALTH (SDOH): IN THE PAST 12 MONTHS, HAS THE ELECTRIC, GAS, OIL, OR WATER COMPANY THREATENED TO SHUT OFF SERVICE IN YOUR HOME?: NO

## 2025-08-03 ASSESSMENT — PAIN DESCRIPTION - ORIENTATION: ORIENTATION: RIGHT

## 2025-08-03 NOTE — PROGRESS NOTES
Kat Tineo is a 49 y.o. female on day 1 of admission presenting with Intractable low back pain.      Subjective   Patient seen for follow-up of intractable cervical and lumbar back pain.  No acute events overnight.  States she was unable to sleep.  Still complains of pain, radiation not as intense today.  Awaiting pain management consult.       Objective     Last Recorded Vitals  /86 (BP Location: Right arm, Patient Position: Lying)   Pulse 69   Temp 36.8 °C (98.2 °F) (Temporal)   Resp 18   Wt 83.9 kg (184 lb 15.5 oz)   SpO2 97%   Intake/Output last 3 Shifts:    Intake/Output Summary (Last 24 hours) at 8/3/2025 0958  Last data filed at 8/3/2025 0900  Gross per 24 hour   Intake 480 ml   Output --   Net 480 ml       Admission Weight  Weight: 83.9 kg (184 lb 15.5 oz) (08/02/25 1713)    Daily Weight  08/02/25 : 83.9 kg (184 lb 15.5 oz)    Image Results  ECG 12 lead  Sinus bradycardia  Inferior infarct , age undetermined  Abnormal ECG  When compared with ECG of 22-JUL-2025 18:37,  Inferior infarct is now Present  T wave inversion more evident in Inferior leads  See ED provider note for full interpretation and clinical correlation  Confirmed by Suzy Fuller ([id number]) on 8/2/2025 9:06:39 AM      Physical Exam  Constitutional:       General: She is not in acute distress.     Appearance: Normal appearance.   HENT:      Head: Normocephalic and atraumatic.      Nose: Nose normal.      Mouth/Throat:      Mouth: Mucous membranes are moist.      Pharynx: Oropharynx is clear.     Cardiovascular:      Rate and Rhythm: Normal rate and regular rhythm.   Pulmonary:      Effort: Pulmonary effort is normal. No respiratory distress.      Breath sounds: Normal breath sounds.   Abdominal:      General: Bowel sounds are normal.      Palpations: Abdomen is soft.      Tenderness: There is no abdominal tenderness. There is no rebound.     Musculoskeletal:         General: No swelling, deformity or signs of injury.      Cervical  back: Normal range of motion and neck supple.     Skin:     General: Skin is warm and dry.     Neurological:      General: No focal deficit present.      Mental Status: She is alert and oriented to person, place, and time. Mental status is at baseline.     Psychiatric:         Attention and Perception: Attention and perception normal.         Mood and Affect: Mood normal.         Behavior: Behavior normal.         Judgment: Judgment normal.         Relevant Results                  Results for orders placed or performed during the hospital encounter of 08/02/25 (from the past 24 hours)   Basic Metabolic Panel   Result Value Ref Range    Glucose 120 (H) 74 - 99 mg/dL    Sodium 138 136 - 145 mmol/L    Potassium 3.8 3.5 - 5.3 mmol/L    Chloride 107 98 - 107 mmol/L    Bicarbonate 23 21 - 32 mmol/L    Anion Gap 12 10 - 20 mmol/L    Urea Nitrogen 15 6 - 23 mg/dL    Creatinine 0.61 0.50 - 1.05 mg/dL    eGFR >90 >60 mL/min/1.73m*2    Calcium 9.0 8.6 - 10.3 mg/dL   CBC   Result Value Ref Range    WBC 13.4 (H) 4.4 - 11.3 x10*3/uL    nRBC      RBC 3.92 (L) 4.00 - 5.20 x10*6/uL    Hemoglobin 11.2 (L) 12.0 - 16.0 g/dL    Hematocrit 36.1 36.0 - 46.0 %    MCV 92 80 - 100 fL    MCH 28.6 26.0 - 34.0 pg    MCHC 31.0 (L) 32.0 - 36.0 g/dL    RDW 14.2 11.5 - 14.5 %    Platelets 382 150 - 450 x10*3/uL     *Note: Due to a large number of results and/or encounters for the requested time period, some results have not been displayed. A complete set of results can be found in Results Review.               Assessment & Plan  Intractable low back pain    Intractable low back pain, neck pain  MRIs reviewed  Continue pain regimen started at [facility] including scheduled Robaxin 1000 mg 4 times daily, meloxicam, gabapentin, and Tylenol  As needed oxycodone/morphine  PT/OT  Pain management consult  Needs to follow-up outpatient with spine surgery  HTN  Stable, well-controlled  Continue metoprolol, losartan, amlodipine  Hypokalemia   Resolved  Continue  potassium supplement  Depression   Continue Cymbalkinjal Salazar MD

## 2025-08-03 NOTE — CARE PLAN
The patient's goals for the shift include pain management.    The clinical goals for the shift include pain management      Problem: Diabetes  Goal: Achieve decreasing blood glucose levels by end of shift  Outcome: Progressing  Goal: Increase stability of blood glucose readings by end of shift  Outcome: Progressing  Goal: Decrease in ketones present in urine by end of shift  Outcome: Progressing  Goal: Maintain electrolyte levels within acceptable range throughout shift  Outcome: Progressing  Goal: Maintain glucose levels >70mg/dl to <250mg/dl throughout shift  Outcome: Progressing  Goal: No changes in neurological exam by end of shift  Outcome: Progressing  Goal: Learn about and adhere to nutrition recommendations by end of shift  Outcome: Progressing  Goal: Vital signs within normal range for age by end of shift  Outcome: Progressing  Goal: Increase self care and/or family involovement by end of shift  Outcome: Progressing  Goal: Receive DSME education by end of shift  Outcome: Progressing     Problem: Pain - Adult  Goal: Verbalizes/displays adequate comfort level or baseline comfort level  Outcome: Progressing     Problem: Safety - Adult  Goal: Free from fall injury  Outcome: Progressing     Problem: Discharge Planning  Goal: Discharge to home or other facility with appropriate resources  Outcome: Progressing     Problem: Chronic Conditions and Co-morbidities  Goal: Patient's chronic conditions and co-morbidity symptoms are monitored and maintained or improved  Outcome: Progressing     Problem: Nutrition  Goal: Nutrient intake appropriate for maintaining nutritional needs  Outcome: Progressing     Problem: Fall/Injury  Goal: Not fall by end of shift  Outcome: Progressing  Goal: Be free from injury by end of the shift  Outcome: Progressing  Goal: Verbalize understanding of personal risk factors for fall in the hospital  Outcome: Progressing  Goal: Verbalize understanding of risk factor reduction measures to  prevent injury from fall in the home  Outcome: Progressing  Goal: Use assistive devices by end of the shift  Outcome: Progressing  Goal: Pace activities to prevent fatigue by end of the shift  Outcome: Progressing     Problem: Pain  Goal: Takes deep breaths with improved pain control throughout the shift  Outcome: Progressing  Goal: Turns in bed with improved pain control throughout the shift  Outcome: Progressing  Goal: Walks with improved pain control throughout the shift  Outcome: Progressing  Goal: Performs ADL's with improved pain control throughout shift  Outcome: Progressing  Goal: Participates in PT with improved pain control throughout the shift  Outcome: Progressing  Goal: Free from opioid side effects throughout the shift  Outcome: Progressing  Goal: Free from acute confusion related to pain meds throughout the shift  Outcome: Progressing

## 2025-08-03 NOTE — NURSING NOTE
Patient vitals were obtained and discussed.   Breakfast is at bedside.   Call light is within reach.   Bed is in the lowest position and is locked.

## 2025-08-03 NOTE — PROGRESS NOTES
HOSPITALIST    Patient seen, chart reviewed.  Vital signs stable.  Patient awake in bed.  She states pain is slightly improved but still present.  Patient on multimodal treatment.  Awaiting for pain management consultation.  Patient requesting Ambien tonight for sleep.

## 2025-08-04 ENCOUNTER — APPOINTMENT (OUTPATIENT)
Dept: ORTHOPEDIC SURGERY | Facility: CLINIC | Age: 50
End: 2025-08-04
Payer: COMMERCIAL

## 2025-08-04 ENCOUNTER — APPOINTMENT (OUTPATIENT)
Facility: HOSPITAL | Age: 50
End: 2025-08-04
Payer: COMMERCIAL

## 2025-08-04 DIAGNOSIS — M54.12 CERVICAL RADICULOPATHY: ICD-10-CM

## 2025-08-04 PROCEDURE — 2500000001 HC RX 250 WO HCPCS SELF ADMINISTERED DRUGS (ALT 637 FOR MEDICARE OP): Performed by: STUDENT IN AN ORGANIZED HEALTH CARE EDUCATION/TRAINING PROGRAM

## 2025-08-04 PROCEDURE — 2500000001 HC RX 250 WO HCPCS SELF ADMINISTERED DRUGS (ALT 637 FOR MEDICARE OP): Performed by: EMERGENCY MEDICINE

## 2025-08-04 PROCEDURE — 7100000009 HC PHASE TWO TIME - INITIAL BASE CHARGE

## 2025-08-04 PROCEDURE — 62321 NJX INTERLAMINAR CRV/THRC: CPT

## 2025-08-04 PROCEDURE — 1100000001 HC PRIVATE ROOM DAILY

## 2025-08-04 PROCEDURE — 3E0R33Z INTRODUCTION OF ANTI-INFLAMMATORY INTO SPINAL CANAL, PERCUTANEOUS APPROACH: ICD-10-PCS | Performed by: ANESTHESIOLOGY

## 2025-08-04 PROCEDURE — 99254 IP/OBS CNSLTJ NEW/EST MOD 60: CPT | Performed by: ANESTHESIOLOGY

## 2025-08-04 PROCEDURE — 2500000004 HC RX 250 GENERAL PHARMACY W/ HCPCS (ALT 636 FOR OP/ED): Mod: JZ | Performed by: EMERGENCY MEDICINE

## 2025-08-04 PROCEDURE — 2550000001 HC RX 255 CONTRASTS: Performed by: ANESTHESIOLOGY

## 2025-08-04 PROCEDURE — 2500000002 HC RX 250 W HCPCS SELF ADMINISTERED DRUGS (ALT 637 FOR MEDICARE OP, ALT 636 FOR OP/ED): Performed by: STUDENT IN AN ORGANIZED HEALTH CARE EDUCATION/TRAINING PROGRAM

## 2025-08-04 PROCEDURE — 3E0R3BZ INTRODUCTION OF ANESTHETIC AGENT INTO SPINAL CANAL, PERCUTANEOUS APPROACH: ICD-10-PCS | Performed by: ANESTHESIOLOGY

## 2025-08-04 PROCEDURE — 2500000004 HC RX 250 GENERAL PHARMACY W/ HCPCS (ALT 636 FOR OP/ED): Performed by: ANESTHESIOLOGY

## 2025-08-04 PROCEDURE — 2500000004 HC RX 250 GENERAL PHARMACY W/ HCPCS (ALT 636 FOR OP/ED): Mod: JZ | Performed by: STUDENT IN AN ORGANIZED HEALTH CARE EDUCATION/TRAINING PROGRAM

## 2025-08-04 PROCEDURE — 2500000002 HC RX 250 W HCPCS SELF ADMINISTERED DRUGS (ALT 637 FOR MEDICARE OP, ALT 636 FOR OP/ED): Performed by: EMERGENCY MEDICINE

## 2025-08-04 PROCEDURE — 62321 NJX INTERLAMINAR CRV/THRC: CPT | Performed by: ANESTHESIOLOGY

## 2025-08-04 PROCEDURE — 99254 IP/OBS CNSLTJ NEW/EST MOD 60: CPT | Performed by: EMERGENCY MEDICINE

## 2025-08-04 PROCEDURE — 7100000010 HC PHASE TWO TIME - EACH INCREMENTAL 1 MINUTE

## 2025-08-04 RX ORDER — METHYLPREDNISOLONE ACETATE 40 MG/ML
INJECTION, SUSPENSION INTRA-ARTICULAR; INTRALESIONAL; INTRAMUSCULAR; SOFT TISSUE
Status: COMPLETED | OUTPATIENT
Start: 2025-08-04 | End: 2025-08-04

## 2025-08-04 RX ORDER — LIDOCAINE HYDROCHLORIDE 5 MG/ML
INJECTION, SOLUTION INFILTRATION; INTRAVENOUS
Status: COMPLETED | OUTPATIENT
Start: 2025-08-04 | End: 2025-08-04

## 2025-08-04 RX ORDER — MIDAZOLAM HYDROCHLORIDE 1 MG/ML
INJECTION, SOLUTION INTRAMUSCULAR; INTRAVENOUS
Status: COMPLETED | OUTPATIENT
Start: 2025-08-04 | End: 2025-08-04

## 2025-08-04 RX ADMIN — GABAPENTIN 300 MG: 300 CAPSULE ORAL at 08:16

## 2025-08-04 RX ADMIN — METHOCARBAMOL 1000 MG: 500 TABLET ORAL at 06:10

## 2025-08-04 RX ADMIN — METHOCARBAMOL 1000 MG: 500 TABLET ORAL at 11:39

## 2025-08-04 RX ADMIN — PANTOPRAZOLE SODIUM 40 MG: 40 TABLET, DELAYED RELEASE ORAL at 06:10

## 2025-08-04 RX ADMIN — MELOXICAM 15 MG: 7.5 TABLET ORAL at 08:16

## 2025-08-04 RX ADMIN — MIDAZOLAM 2 MG: 1 INJECTION INTRAMUSCULAR; INTRAVENOUS at 15:40

## 2025-08-04 RX ADMIN — IOHEXOL 2 ML: 240 INJECTION, SOLUTION INTRATHECAL; INTRAVASCULAR; INTRAVENOUS; ORAL at 15:44

## 2025-08-04 RX ADMIN — POTASSIUM CHLORIDE EXTENDED-RELEASE 20 MEQ: 1500 TABLET ORAL at 08:21

## 2025-08-04 RX ADMIN — OXYCODONE HYDROCHLORIDE 10 MG: 5 TABLET ORAL at 22:35

## 2025-08-04 RX ADMIN — GABAPENTIN 300 MG: 300 CAPSULE ORAL at 21:23

## 2025-08-04 RX ADMIN — ACETAMINOPHEN 975 MG: 325 TABLET ORAL at 23:51

## 2025-08-04 RX ADMIN — METHOCARBAMOL 1000 MG: 500 TABLET ORAL at 17:35

## 2025-08-04 RX ADMIN — METHYLPREDNISOLONE ACETATE 40 MG: 40 INJECTION, SUSPENSION INTRA-ARTICULAR; INTRALESIONAL; INTRAMUSCULAR; INTRASYNOVIAL; SOFT TISSUE at 15:43

## 2025-08-04 RX ADMIN — CYANOCOBALAMIN TAB 500 MCG 250 MCG: 500 TAB at 08:17

## 2025-08-04 RX ADMIN — AMLODIPINE BESYLATE 10 MG: 5 TABLET ORAL at 08:17

## 2025-08-04 RX ADMIN — GABAPENTIN 300 MG: 300 CAPSULE ORAL at 16:51

## 2025-08-04 RX ADMIN — METOPROLOL SUCCINATE 50 MG: 50 TABLET, EXTENDED RELEASE ORAL at 08:16

## 2025-08-04 RX ADMIN — LOSARTAN POTASSIUM 100 MG: 100 TABLET, FILM COATED ORAL at 08:17

## 2025-08-04 RX ADMIN — ACETAMINOPHEN 975 MG: 325 TABLET ORAL at 06:09

## 2025-08-04 RX ADMIN — OXYCODONE HYDROCHLORIDE 10 MG: 5 TABLET ORAL at 06:36

## 2025-08-04 RX ADMIN — Medication 50 MCG: at 08:17

## 2025-08-04 RX ADMIN — MORPHINE SULFATE 2 MG: 2 INJECTION, SOLUTION INTRAMUSCULAR; INTRAVENOUS at 21:23

## 2025-08-04 RX ADMIN — METHOCARBAMOL 1000 MG: 500 TABLET ORAL at 23:50

## 2025-08-04 RX ADMIN — MORPHINE SULFATE 2 MG: 2 INJECTION, SOLUTION INTRAMUSCULAR; INTRAVENOUS at 08:13

## 2025-08-04 RX ADMIN — ACETAMINOPHEN 975 MG: 325 TABLET ORAL at 17:34

## 2025-08-04 RX ADMIN — ACETAMINOPHEN 975 MG: 325 TABLET ORAL at 11:39

## 2025-08-04 RX ADMIN — LIDOCAINE HYDROCHLORIDE 5 ML: 5 INJECTION, SOLUTION INFILTRATION at 15:44

## 2025-08-04 RX ADMIN — ZOLPIDEM TARTRATE 10 MG: 5 TABLET ORAL at 23:11

## 2025-08-04 RX ADMIN — OXYCODONE HYDROCHLORIDE 10 MG: 5 TABLET ORAL at 17:40

## 2025-08-04 RX ADMIN — POTASSIUM CHLORIDE EXTENDED-RELEASE 20 MEQ: 1500 TABLET ORAL at 21:23

## 2025-08-04 RX ADMIN — Medication 1 TABLET: at 08:17

## 2025-08-04 RX ADMIN — OXYCODONE HYDROCHLORIDE 10 MG: 5 TABLET ORAL at 11:39

## 2025-08-04 RX ADMIN — DULOXETINE HYDROCHLORIDE 60 MG: 30 CAPSULE, DELAYED RELEASE ORAL at 08:16

## 2025-08-04 ASSESSMENT — PAIN SCALES - GENERAL
PAINLEVEL_OUTOF10: 7
PAINLEVEL_OUTOF10: 2
PAINLEVEL_OUTOF10: 7
PAINLEVEL_OUTOF10: 5 - MODERATE PAIN
PAINLEVEL_OUTOF10: 9
PAINLEVEL_OUTOF10: 2
PAINLEVEL_OUTOF10: 2
PAINLEVEL_OUTOF10: 8
PAINLEVEL_OUTOF10: 9
PAINLEVEL_OUTOF10: 8
PAINLEVEL_OUTOF10: 5 - MODERATE PAIN
PAINLEVEL_OUTOF10: 5 - MODERATE PAIN
PAINLEVEL_OUTOF10: 9
PAINLEVEL_OUTOF10: 7
PAINLEVEL_OUTOF10: 7
PAINLEVEL_OUTOF10: 8
PAINLEVEL_OUTOF10: 1
PAINLEVEL_OUTOF10: 9
PAINLEVEL_OUTOF10: 2
PAINLEVEL_OUTOF10: 7
PAINLEVEL_OUTOF10: 5 - MODERATE PAIN
PAINLEVEL_OUTOF10: 2
PAINLEVEL_OUTOF10: 0 - NO PAIN
PAINLEVEL_OUTOF10: 7
PAINLEVEL_OUTOF10: 7
PAINLEVEL_OUTOF10: 9
PAINLEVEL_OUTOF10: 7
PAINLEVEL_OUTOF10: 7
PAINLEVEL_OUTOF10: 2
PAINLEVEL_OUTOF10: 5 - MODERATE PAIN
PAINLEVEL_OUTOF10: 2
PAINLEVEL_OUTOF10: 8

## 2025-08-04 ASSESSMENT — COGNITIVE AND FUNCTIONAL STATUS - GENERAL
DAILY ACTIVITIY SCORE: 21
DRESSING REGULAR LOWER BODY CLOTHING: A LITTLE
TOILETING: A LITTLE
WALKING IN HOSPITAL ROOM: A LITTLE
CLIMB 3 TO 5 STEPS WITH RAILING: A LITTLE
STANDING UP FROM CHAIR USING ARMS: A LITTLE
HELP NEEDED FOR BATHING: A LITTLE
MOBILITY SCORE: 21

## 2025-08-04 ASSESSMENT — PAIN - FUNCTIONAL ASSESSMENT
PAIN_FUNCTIONAL_ASSESSMENT: 0-10
PAIN_FUNCTIONAL_ASSESSMENT: WONG-BAKER FACES
PAIN_FUNCTIONAL_ASSESSMENT: 0-10
PAIN_FUNCTIONAL_ASSESSMENT: WONG-BAKER FACES
PAIN_FUNCTIONAL_ASSESSMENT: 0-10
PAIN_FUNCTIONAL_ASSESSMENT: UNABLE TO SELF-REPORT

## 2025-08-04 ASSESSMENT — PAIN DESCRIPTION - DESCRIPTORS
DESCRIPTORS: ACHING;SHARP
DESCRIPTORS: ACHING
DESCRIPTORS: NUMBNESS
DESCRIPTORS: NUMBNESS
DESCRIPTORS: SHARP
DESCRIPTORS: NUMBNESS
DESCRIPTORS: POUNDING
DESCRIPTORS: DISCOMFORT
DESCRIPTORS: ACHING
DESCRIPTORS: NUMBNESS
DESCRIPTORS: POUNDING
DESCRIPTORS: OTHER (COMMENT)
DESCRIPTORS: SHARP
DESCRIPTORS: DISCOMFORT
DESCRIPTORS: DULL;ACHING
DESCRIPTORS: SHARP;OTHER (COMMENT)
DESCRIPTORS: ACHING;SHARP
DESCRIPTORS: ACHING
DESCRIPTORS: SHARP
DESCRIPTORS: SHARP;RADIATING
DESCRIPTORS: ACHING

## 2025-08-04 ASSESSMENT — PAIN DESCRIPTION - ORIENTATION
ORIENTATION: RIGHT
ORIENTATION: RIGHT;LOWER

## 2025-08-04 ASSESSMENT — PAIN DESCRIPTION - LOCATION
LOCATION: BACK
LOCATION: BACK

## 2025-08-04 NOTE — CONSULTS
Consults    Reason For Consult  Medical management for history of rheumatoid arthritis, carpal tunnel syndrome, depression, diabetes, low back pain, cervical neck pain    History Of Present Illness  Kat Tineo is a 49 y.o. female presenting with cervical neck pain and lumbar spine pain; I proceeded with her for cervical neck hemodynamically stable upon arrival from PACU.     Past Medical History  She has a past medical history of Acid reflux, Ankylosing spondylitis of site in spine (Multi), Arthritis (), Bursitis of hip, Cervical disc disorder, CTS (carpal tunnel syndrome), Depression, Diabetes mellitus (Multi), Dislocation of hip (Multi), DM (diabetes mellitus), gestational, Fracture of hip (Multi), Headache, History of transfusion (2023), Hypertension, Joint pain, Low back pain, Lumbosacral disc disease, Migraine, Neck pain, Obesity, Osteoarthritis, Plantar fasciitis, Reflex sympathetic dystrophy, Rheumatoid arthritis, Sciatica, Spinal stenosis, and Spondylolisthesis.    She has no past medical history of Personal history of irradiation.    Surgical History  She has a past surgical history that includes US guided soft tissue fluid drain (2021); CT guided soft tissue fluid drainage (2022); Other surgical history (2017); Neck surgery; Other surgical history (Left); Laparoscopy gastrectomy partial / total (); Spine surgery; Hysterectomy (2012); Gastric bypass (2024); Cholecystectomy;  section, low transverse (2012); Hernia repair (2024); Esophagogastroduodenoscopy; Cardiac catheterization; Tubal ligation; Back surgery; Joint replacement; Oophorectomy (Approximately ); Laminectomy; Spinal fusion; orthopedic surgery (2023); Hip Arthroplasty; and Plantar fascia release.     Social History  She reports that she has never smoked. She has never been exposed to tobacco smoke. She has never used smokeless tobacco. She reports that she does not currently  use alcohol. She reports that she does not use drugs.    Family History  Family History[1]     Allergies  Ace inhibitors, Adhesive tape-silicones, and Amoxicillin    Review of Systems  10 system review noncontributory  Physical Exam  Vital signs are stable  Patient is alert in no acute distress  Lungs are clear to auscultation and percussion  Cardiac is regular rate and rhythm normal S1-S2 without murmur gallop rub click S3 or S4  Abdomen soft nontender positive bowel sounds there is no hepatosplenomegaly or CVA tenderness appreciated  Musculoskeletal:  Patient states cervical neck pain is 2 out of 10  Continues to have 8 out of 10 lower back  Last Recorded Vitals  /83   Pulse 63   Temp 36.6 °C (97.9 °F) (Temporal)   Resp 16   Wt 83.9 kg (184 lb 15.5 oz)   SpO2 99%     Relevant Results   Scheduled medications  acetaminophen, 975 mg, oral, q6h ADRY  amLODIPine, 10 mg, oral, Daily  calcium carbonate-vitamin D3, 1 tablet, oral, Daily  cholecalciferol, 50 mcg, oral, Daily  cyanocobalamin, 250 mcg, oral, Daily  DULoxetine, 60 mg, oral, Daily  gabapentin, 300 mg, oral, TID  losartan, 100 mg, oral, Daily  meloxicam, 15 mg, oral, Daily  methocarbamol, 1,000 mg, oral, q6h ADRY  metoprolol succinate XL, 50 mg, oral, Daily  pantoprazole, 40 mg, oral, Daily before breakfast  polyethylene glycol, 17 g, oral, Daily  potassium chloride CR, 20 mEq, oral, BID  upadacitinib ER, 30 mg, oral, Daily      Continuous medications  Continuous Medications[2]  PRN medications  PRN Medications[3]  Results for orders placed or performed during the hospital encounter of 08/02/25 (from the past 96 hours)   Basic Metabolic Panel   Result Value Ref Range    Glucose 120 (H) 74 - 99 mg/dL    Sodium 138 136 - 145 mmol/L    Potassium 3.8 3.5 - 5.3 mmol/L    Chloride 107 98 - 107 mmol/L    Bicarbonate 23 21 - 32 mmol/L    Anion Gap 12 10 - 20 mmol/L    Urea Nitrogen 15 6 - 23 mg/dL    Creatinine 0.61 0.50 - 1.05 mg/dL    eGFR >90 >60  mL/min/1.73m*2    Calcium 9.0 8.6 - 10.3 mg/dL   CBC   Result Value Ref Range    WBC 13.4 (H) 4.4 - 11.3 x10*3/uL    nRBC      RBC 3.92 (L) 4.00 - 5.20 x10*6/uL    Hemoglobin 11.2 (L) 12.0 - 16.0 g/dL    Hematocrit 36.1 36.0 - 46.0 %    MCV 92 80 - 100 fL    MCH 28.6 26.0 - 34.0 pg    MCHC 31.0 (L) 32.0 - 36.0 g/dL    RDW 14.2 11.5 - 14.5 %    Platelets 382 150 - 450 x10*3/uL     *Note: Due to a large number of results and/or encounters for the requested time period, some results have not been displayed. A complete set of results can be found in Results Review.           Assessment/Plan   Status post cervical epidural steroid injection  -Management per pain management  -Supportive care    Rheumatoid arthritis  Restart medications for RA    Hypertension  -Continue metoprolol  -Continue losartan    DVT prophylaxis  -SCDs  -Ambulate      Zully Ramos MD             [1]   Family History  Problem Relation Name Age of Onset    Diabetes Mother Darline Calzada     Hypertension Mother Darline Calzada     Other (morbid obesity) Mother Darline Calzada     Hernia Mother Darline Calzada     Vision loss Mother Darline Calzada     Diabetes type I Mother Darline Calzada     Other (htn) Father Ammon Colon     Hypertension Father Ammon Colon     Arthritis Father Ammon Colon     Alcohol abuse Father Ammon Colon     Cancer Father Ammon Colon     Depression Father Ammon Colon     Arthritis Other      Stroke Maternal Grandfather Dan Calzada     Heart attack Maternal Grandfather Dan Calzada     Heart disease Maternal Grandmother Anayeli Calzada     Osteoporosis Maternal Grandmother Anayeli Calzada     Angina Maternal Grandmother Anayeli Calzada     Arrhythmia Maternal Grandmother Anayeli Calzada     Heart attack Maternal Grandmother Anayeli Calzada     Heart failure Maternal Grandmother Anayeli Calzada     Cancer Paternal Grandfather Supa Colon     Anemia Mother's Sister Lynne Cortes     Cancer Father's Brother Tee Colon     Cancer Father's Brother Armando Tracy      Cancer Father's Brother Trenton Colon     Asthma Son Leonardo     Cancer Father's Brother Tee Colon     Cancer Father's Brother Frank Colon     Cancer Father's Brother Jefe Colon     Cancer Father's Brother Armando Monroego     Cancer Father's Brother Trenton Colon     Cancer Paternal Grandfather Supa Colon     Drug abuse Father's Sister Eduarda Colon     Heart disease Maternal Grandmother Anayeli Zheng     Vision loss Maternal Grandmother Anayeli Calzada     Heart failure Maternal Grandmother Anayeli Calzada     Arrhythmia Maternal Grandmother Anayeli Calzada     Heart attack Maternal Grandmother Anayeli Calzada     Osteoporosis Maternal Grandmother Anayeli Calzada     Angina Maternal Grandmother Anayeli Calzada     Atrial fibrillation Maternal Grandmother Anayeli Calzada     Stroke Maternal Grandfather Sy Calzada     Heart attack Maternal Grandfather Dan Calzada     Cancer Father's Brother Tee Colon     Psoriasis Neg Hx      Irritable bowel syndrome Neg Hx     [2]    [3] PRN medications: alum-mag hydroxide-simeth, benzocaine-menthol, diphenhydrAMINE, lubricating eye drops, melatonin, metoclopramide, morphine, ondansetron, oxyCODONE, oxyCODONE, simethicone, sodium chloride, SUMAtriptan, zolpidem

## 2025-08-04 NOTE — H&P
Pain Management H&P    History Of Present Illness  Kat Tineo is a 49 y.o. female w/PMHx of C3-7 ACDF who presented to [facility] ED with complaints of severe radicular back pain to RU- and RLE. Now transferred to [place] for evaluation of interventional pain procedure.     Past Medical History  She has a past medical history of Acid reflux, Ankylosing spondylitis of site in spine (Multi), Arthritis (), Bursitis of hip, Cervical disc disorder, CTS (carpal tunnel syndrome), Depression, Diabetes mellitus (Multi), Dislocation of hip (Multi), DM (diabetes mellitus), gestational, Fracture of hip (Multi), Headache, History of transfusion (2023), Hypertension, Joint pain, Low back pain, Lumbosacral disc disease, Migraine, Neck pain, Obesity, Osteoarthritis, Plantar fasciitis, Reflex sympathetic dystrophy, Rheumatoid arthritis, Sciatica, Spinal stenosis, and Spondylolisthesis.    She has no past medical history of Personal history of irradiation.    Surgical History  She has a past surgical history that includes US guided soft tissue fluid drain (2021); CT guided soft tissue fluid drainage (2022); Other surgical history (2017); Neck surgery; Other surgical history (Left); Laparoscopy gastrectomy partial / total (); Spine surgery; Hysterectomy (2012); Gastric bypass (2024); Cholecystectomy;  section, low transverse (2012); Hernia repair (2024); Esophagogastroduodenoscopy; Cardiac catheterization; Tubal ligation; Back surgery; Joint replacement; Oophorectomy (Approximately ); Laminectomy; Spinal fusion; orthopedic surgery (2023); Hip Arthroplasty; and Plantar fascia release.     Social History  She reports that she has never smoked. She has never been exposed to tobacco smoke. She has never used smokeless tobacco. She reports that she does not currently use alcohol. She reports that she does not use drugs.    Family History  Family History[1]      Allergies  Ace inhibitors, Adhesive tape-silicones, and Amoxicillin    Review of Symptoms:   Constitutional: Negative for chills, diaphoresis or fever  HENT: Negative for neck swelling  Eyes:.  Negative for eye pain  Respiratory:.  Negative for cough, shortness of breath or wheezing    Cardiovascular:.  Negative for chest pain or palpitations  Gastrointestinal:.  Negative for abdominal pain, nausea and vomiting  Genitourinary:.  Negative for urgency  Musculoskeletal:  Positive for neck and back pain. Positive for joint pain. Denies falls within the past 3 months.  Skin: Negative for wounds or itching   Neurological: Negative for dizziness, seizures, loss of consciousness and weakness  Endo/Heme/Allergies: Does not bruise/bleed easily  Psychiatric/Behavioral: Negative for depression. The patient does not appear anxious.      Pre-sedation Evaluation  ASA class 2  Mallampati score 3     PHYSICAL EXAM  Vitals signs reviewed  Constitutional:       General: Not in acute distress     Appearance: Normal appearance. Not ill-appearing.  HENT:     Head: Normocephalic and atraumatic  Eyes:     Conjunctiva/sclera: Conjunctivae normal  Cardiovascular:     Rate and Rhythm: Normal rate and regular rhythm  Pulmonary:     Effort: No respiratory distress  Abdominal:     Palpations: Abdomen is soft  Musculoskeletal: 5/5 strength in all upper and lower extremity muscle groups, slightly limited by pain on Right side.  Positive Spurling's on the left.  Skin:     General: Skin is warm and dry  Neurological:     General: No focal deficit present, normal sensation of bilateral upper and lower extremities and bilateral face.  Psychiatric:         Mood and Affect: Mood normal         Behavior: Behavior normal    Straight leg raise:  negative bilaterally  VAUGHN:  negative bilaterally       Last Recorded Vitals  /80 (BP Location: Left arm, Patient Position: Lying)   Pulse 65   Temp 36.6 °C (97.9 °F) (Temporal)   Resp 17   Wt 83.9 kg  (184 lb 15.5 oz)   SpO2 99%     Relevant Results  Current Outpatient Medications   Medication Instructions    acetaminophen (TYLENOL) 1,000 mg, oral, Every 6 hours scheduled    [Paused] amLODIPine (NORVASC) 10 mg, oral, Daily    calcium carbonate-vitamin D3 500 mg-5 mcg (200 unit) tablet 1 tablet, Daily    cholecalciferol (VITAMIN D3) 50 mcg, Daily    cyanocobalamin (VITAMIN B-12) 250 mcg, Daily    DULoxetine (CYMBALTA) 60 mg, oral, Daily    gabapentin (NEURONTIN) 300 mg, oral, 3 times daily    [Paused] losartan (Cozaar) 100 mg tablet Take 1 tablet (100 mg) by mouth once daily. Do not start before November 27, 2024.    meloxicam (MOBIC) 15 mg, oral, Daily    methocarbamoL 100 mg, oral, 4 times daily PRN    [Paused] metoprolol succinate XL (TOPROL-XL) 50 mg, Daily    multivitamin with iron - children's (Cerovite, Jr) chewable tablet 1 tablet, Daily    pantoprazole (PROTONIX) 40 mg, oral, Daily before breakfast, Do not crush, chew, or split.    potassium chloride CR (Klor-Con M20) 20 mEq ER tablet 20 mEq, oral, 2 times daily, Do not crush or chew.    rizatriptan (Maxalt) 10 mg tablet TAKE 1 TABLET AT ONSET OF HEADACHE. MAY REPEAT EVERY 2 HOURS AS NEEDED. MAXIMUM 3 TABLETS/24 HOURS.    tiZANidine (ZANAFLEX) 4 mg, oral, Nightly    upadacitinib ER (RINVOQ) 30 mg, Daily    zolpidem (AMBIEN) 10 mg, oral, Nightly PRN         XR cervical spine 2-3 views 07/31/2025    Narrative  Interpreted By:  Janet Patel,  STUDY:  XR CERVICAL SPINE 2-3 VIEWS; ;  7/31/2025 3:23 pm    INDICATION:  Signs/Symptoms:neck pain. Please obtain flexion/ extension views of  the C spine.      COMPARISON:  03/12/2025    ACCESSION NUMBER(S):  [id number]    ORDERING CLINICIAN:  YANCI SOTOMAYOR    FINDINGS:  Upright lateral flexion extension views were obtained. Metallic plate  with vertebral body screws spans C4 through C7. Fusion device with  obliquely oriented screws at C3-4 extends into the adjacent vertebral  bodies infuses this level. C4 through  C7 show osseous fusion with the  obliteration of discs. Anterior spur partially bridges at the C2-3  level. With flexion and extension motion is primarily at the  craniocervical junction. There is no significant motion from C2  inferiorly.    Impression  Metallic plate fusion anteriorly see C4 through C7 and additional  hardware fusion C3-4    No spondylolisthesis. No motion is noted at the fused levels.      MACRO:  None    Signed by: Janet Patel 7/31/2025 4:05 PM  Dictation workstation:   [id number]      MR lumbar spine wo IV contrast 12/30/2022    Narrative  MRN: [id number]  Patient Name: HALEY GARCÍA    STUDY:  MRI L-SPINE WO;  12/30/2022 11:32 am    INDICATION:  lumbar radiculopathy  M54.16: Lumbar radiculopathy PT HAS LT HIP  REPLACE SINCE 6/22 AND NECK WITH PLATES 5YRS.    COMPARISON:  MRI of lumbar spine from 09/30/2020    ACCESSION NUMBER(S):  [id number]    ORDERING CLINICIAN:  FRITZ RECIO    TECHNIQUE:  Sagittal and axial T1 and T2 weighted images of the lumbar spine were  acquired. Sagittal STIR imaging was also performed    FINDINGS:  The vertebral bodies demonstrate expected height and alignment. The  marrow signal is within normal limits. Mild desiccation of several  intervertebral discs is noted with minimal loss of disc height at  L2-L3, L3-L4 and L4-L5 levels.    The lower thoracic cord is unremarkable. The conus terminates  appropriately at L1-L2.    At L5-S1 there is circumferential disc bulge with bilateral facet  hypertrophy. There is a small synovial cyst arising from the medial  aspect of the right facet joint encroaching upon the right proximal  neural foramina. This cyst measures 3 x 4 mm in size and also causes  minimal impingement upon the right lateral recess. There is no  overall central canal stenosis. There is mild right neural foraminal  narrowing.    At L4-L5 there is a circumferential disc bulge with marked bilateral  facet and ligamentum flavum hypertrophy. There is  moderate bilateral  facet joint effusion. There is a prominent lobulated septate T2  hyperintense focus arising from the right-sided facet joint which  measures 14 x 10 mm in craniocaudal and AP dimension and 8 mm in  maximum transverse dimension. It causes effacement of right lateral  recess with mild-to-moderate mass effect on thecal sac with central  canal stenosis. There is additional T2 hyperintense focus arising  from inferior aspect of the left-sided facet joint which measures 10  x 7 mm in size in cross-sectional dimension and 10 mm in craniocaudal  dimension. It causes effacement of left lateral recess at upper L5  level with mild mass effect on the thecal sac. There is moderate  overall central canal stenosis. There is minimal bilateral neural  foraminal narrowing.    At L3-L4, there is a circumferential disc bulge with bilateral facet  and ligamentum flavum hypertrophy. There are bilateral facet joint  effusions. There is a multilobulated small focus of T2 hyperintense  signal with peripheral hypointensity arising from the right-sided  facet joint which measures approximately 10 mm in maximum  craniocaudal dimension and 7 x 6 mm in maximum cross-sectional  dimension. This finding is also suggestive of a synovial cyst. It  causes effacement of right lateral recess with mild overall central  canal stenosis. There is moderate right neural foraminal narrowing.    At L2-L3, there is circumferential disc bulge with bilateral facet  and ligamentum flavum hypertrophy. There are bilateral facet joint  effusions. There is mild central canal stenosis with mild left neural  foraminal narrowing.    At L1-L2, there is posterior disc bulge with bilateral facet  hypertrophy and facet joint effusions. There is no central canal  stenosis or neural foraminal narrowing.    There is no evidence of significant central canal stenosis or neural  foraminal narrowing in the lower thoracic region.    The anterior and posterior  paraspinous soft tissues are within normal  limits.    Impression  Lumbar spondylosis most significantly affecting L3-L4, L4-L5 and  L5-S1 with multiple small synovial cysts associated with the facet  joints causing impingement upon lateral recess, spinal canal and  neural foramina. These findings are new since the prior study.  Evaluation is degraded by motion artifact.       === Cervical spine MRI 7/31/2025 ===   FINDINGS:  Cord: There is T2/STIR hyperintensity involving the cervical spinal  cord of the C7-T1 level, new from remote MRI examination (series 801,  image 7 and series 701, image 7) with potential subtle volume loss.  No definite abnormal enhancement. No additional cervical spinal cord  signal abnormality identified.      Epidural fluid: None.      Hardware: Postoperative change of C3-C7 ACDF with plate and screw  components as well as interbody spacers. There are elements of  bridging osseous fusion suggested involving the C4-C7 vertebral  bodies.      Alignment: Straightening of the expected cervical lordosis. New 2-3  mm anterolisthesis of C7 on T1.      Vertebral bodies: Cervical vertebral body heights are grossly  maintained. Aforementioned elements of bridging osseous fusion  evident at the C4-C7 levels. Hardware artifact precludes evaluation  at C3-C4 for bridging osseous fusion.      Marrow signal: Questionable degenerative endplate signal change along  the superior endplate of C3 versus hardware artifact. No suspicious  STIR hyperintensity or abnormal osseous enhancement within the  cervical region.      Intervertebral Discs: Normal signal.          Degenerative change:      C1-C2:  Arthrosis centered about the dens. No spinal canal stenosis.      C2-C3:  Disc bulge and bilateral uncovertebral spurring. There is  mild spinal canal stenosis. There is no significant neural foraminal  narrowing.      C3-C4:  ACDF components. There is residual uncovertebral spurring and  disc bulge with  mild-to-moderate spinal canal stenosis. There is  severe bilateral neural foraminal narrowing suggested.      C4-C5:  ACDF components. Mild spinal canal stenosis suspected. Mild  right-greater-than-left neural foraminal narrowing suspected with  motion artifact significantly limiting assessment.      C5-C6:  ACDF components. Mild spinal canal stenosis suggested. No  high-grade neural foraminal narrowing with motion artifact degrading  evaluation.      C6-C7:  ACDF components. Residual uncovertebral and endplate  spurring. There is moderate spinal canal stenosis suspected. There is  moderate right and mild left neural foraminal narrowing.      C7-T1:  Severe left and mild right facet arthropathy with ligamentum  flavum hypertrophy. Disc uncovering/bulge. There is moderate spinal  canal stenosis. There is severe left and mild right neural foraminal  narrowing suggested.      Soft tissues: The prevertebral and posterior paraspinous soft tissues  are within normal limits.      IMPRESSION:  Motion degraded examination.      C3-C7 ACDF components again evident. Bridging osseous fusion  suggested along the C4-C7 regions with limited assessment for osseous  fusion at C3-C4.      There is worsened degenerative anterolisthesis of C7 on T1 with  moderate spinal canal stenosis suggested. There is also new cord  signal abnormality focally at C7-T1 concerning for potential cord  edema or myelomalacia. Severe left neural foraminal narrowing is  again suggested.      No abnormal enhancement.    === lumbar spine MRI 7/31/2025 ===  FINDINGS:  Segmentation: Normal.      Conus: The lower thoracic cord appears unremarkable. The conus  terminates at the level of superior endplate of L2. Cauda equina are  unremarkable. No abnormal enhancement.      Epidural fluid: None.      Hardware: L4-L5 posterior spinal fusion with bilateral posterior  pedicular screws and stabilization rods as well as interbody spacer.  L4 laminectomy/decompression  is also evident.      Alignment: Rotatory dextroscoliosis of the lumbar spine. Trace  anterolisthesis of L4 on L5 status post instrumented fusion. Trace  retrolisthesis of L1 on L2.      Vertebral bodies: Lumbar vertebral body heights are grossly  maintained.      Marrow signal: Within normal limits. No suspicious STIR  hyperintensity or abnormal osseous enhancement.      Intervertebral discs: Mild multilevel degenerative disc height loss  and disc desiccation.          Degenerative change:      T12-L1: Unremarkable.      L1-2: Moderate bilateral facet arthropathy with ligamentum flavum  hypertrophy. Mild disc bulge and endplate spurring. No spinal canal  stenosis. No neural foraminal narrowing.      L2-3: Moderate bilateral facet arthropathy with ligamentum flavum  hypertrophy. Prominent dorsal epidural fat. Moderate disc  bulge/uncovering. There is right-greater-than-left lateral recess  narrowing with moderate spinal canal stenosis. There is mild  bilateral neural foraminal narrowing.      L3-4: Laminectomy/posterior decompression. Bilateral facet  arthropathy with residual ligamentum flavum thickening. There is  moderate disc bulge with hypertrophic endplate spurring. There is  narrowing/effacement of the right-greater-than-left lateral recesses.  There is no additional spinal canal stenosis. There is severe right  and mild left neural foraminal narrowing with impingement of the  exiting right L3 nerve root by facet osteophytes. Findings have  worsened in the interim.      L4-5: Laminectomy/posterior decompression. Residual disc bulge,  endplate spurring, and facet spurring with no significant spinal  canal stenosis. Enhancing granulation tissue noted overlying the  laminectomy bed and left lateral recess. Mild bilateral neural  foraminal narrowing.      L5-S1: Moderate right and mild left facet arthropathy. Minimal disc  bulge, eccentric to the right. No spinal canal stenosis. Moderate  right and minimal/mild  left neural foraminal narrowing.      Soft tissues: Fatty atrophy overlying the laminectomy bed with  postsurgical scarring. Tiny right cortical renal cyst.      IMPRESSION:  Postoperative change of L4-L5 posterior spinal fusion with interbody  fixation and laminectomy/decompression. Enhancing granulation tissue  noted within the operative bed. Resolution of previous epidural fluid  collection.      Worsening degenerative change at L3-L4 with narrowing/effacement of  the right-greater-than-left lateral recesses and severe right neural  foraminal narrowing with impingement of the exiting right L3 nerve  root.      Moderate spinal canal stenosis with right-greater-than-left lateral  recess narrowing at L2-L3, similar to slightly worsened in the  interim.       ASSESSMENT/PLAN  Kat Tineo is a 49 y.o. female here for cervical epidural steroid injection under fluoroscopy      Patient denies any recent antibiotic use or infections, denies any blood thinner use, and denies contrast or local anesthetic allergies     Risks, benefits, alternatives discussed. All questions answered to the best of my ability. Patient agrees to proceed.      Our plan is as follows:  - Proceed with aforementioned procedure       Guido Joyce MD  Chronic Pain Management Fellow     A&P discussed with Pain attending, Dr. Messina, who agrees with the above.          [1]   Family History  Problem Relation Name Age of Onset    Diabetes Mother Darline Calzada     Hypertension Mother Darline Calzada     Other (morbid obesity) Mother Darline Calzada     Hernia Mother Darline Calzada     Vision loss Mother Darline Calzada     Diabetes type I Mother Darline Calzada     Other (htn) Father Ammon Colon     Hypertension Father Ammon Colon     Arthritis Father Ammno Colon     Alcohol abuse Father Ammon Colon     Cancer Father Ammon Colon     Depression Father Ammon Colon     Arthritis Other      Stroke Maternal Grandfather Sy Calzada     Heart attack Maternal Grandfather  Dan Calzada     Heart disease Maternal Grandmother Anayeli Calzada     Osteoporosis Maternal Grandmother Anayeli Calzada     Angina Maternal Grandmother Anayeli Calzada     Arrhythmia Maternal Grandmother Anayeli Calzada     Heart attack Maternal Grandmother Anayeli Calzada     Heart failure Maternal Grandmother Anayeli Calzada     Cancer Paternal Grandfather Supa Colon     Anemia Mother's Sister Ada Sebastian     Cancer Father's Brother Tee Colon     Cancer Father's Brother Armanod Demarcus     Cancer Father's Brother Trenton Colon     Asthma Son Leonardo     Cancer Father's Brother Tee Colon     Cancer Father's Brother Frank Colon     Cancer Father's Brother Marcil Colon     Cancer Father's Brother Armando Demarcus     Cancer Father's Brother Trenton Colon     Cancer Paternal Grandfather Supa Colon     Drug abuse Father's Sister Eduarda Colon     Heart disease Maternal Grandmother Anayeli Calzada     Vision loss Maternal Grandmother Anayeli Calzada     Heart failure Maternal Grandmother Anayeli Calzada     Arrhythmia Maternal Grandmother Anayeli Calzada     Heart attack Maternal Grandmother Anayeli Calzada     Osteoporosis Maternal Grandmother Anayeli Calzada     Angina Maternal Grandmother Anayeli Calzada     Atrial fibrillation Maternal Grandmother Anayeli Calzada     Stroke Maternal Grandfather Sy Calzada     Heart attack Maternal Grandfather Dan Calzada     Cancer Father's Brother Tee Colon     Psoriasis Neg Hx      Irritable bowel syndrome Neg Hx

## 2025-08-04 NOTE — NURSING NOTE
Patient resting in bed. Vital signs stable. Denies the need for pain medication at this time. Patient has been kept NPO since midnight pending pain management consult for possible procedure later today. No stated needs. Call light is within reach.

## 2025-08-04 NOTE — NURSING NOTE
Assumed care of pt. She's A&O x 4, currently resting in bed. She denies complaints or needs at this time, says that she'll be getting up shortly to go into the bathroom to wash up. Pt. was encouraged to use the call light if any assistance is needed. Call light is within reach.

## 2025-08-04 NOTE — NURSING NOTE
Patient returned to the floor from the surgical procedure. Bandage intact to upper mid back.  Patient states neck pain is 2/10 and back pain is 8/10. States  the pain radiates from the back to the right buttocks area near the tailbone.  Continue to monitor.

## 2025-08-04 NOTE — NURSING NOTE
Patient resting in bed with eyes closed. Respirations even and unlabored. Call light is within reach.

## 2025-08-04 NOTE — PROGRESS NOTES
HOSPITALIST    Patient seen, chart reviewed.  Vital signs stable.  Patient awake in bed.  Pain improved but still present.  Patient placed n.p.o. pending pain management evaluation for possible intervention.  Will hold Lovenox.

## 2025-08-04 NOTE — PROCEDURES
Preoperative diagnosis/postoperative diagnosis: Cervical radiculitis  Procedure: C7-T1 epidural steroid injection under fluoroscopic guidance  Surgeon: Neris Messina  Assistant:  Fellow, Guido Joyce  Anesthesia: Local, IV sedation  Complications: Apparently none    Clinical note: Kat is a 49-year-old female with a history of neck and predominately right sided upper extremity symptoms who presents today for a cervical epidural.    Procedure note: The patient was met in the preoperative holding area after risks, benefits, and alternatives to procedure were discussed with the patient, informed consent was obtained. Patient brought back to the procedure room and she positioned herself in the prone position on the fluoroscopy table. Area over the posterior cervical spine was exposed, prepped, draped, in the usual sterile fashion.  Skin and subcutaneous tissues to the C7-T1 intralaminar space was anesthetized using 0.5% lidocaine.  An 18-gauge Tuohy needle was inserted in the skin and advanced into the interlaminar space to be more right-sided in the final position as the patient has right-sided symptoms. A glass syringe was used to achieve the epidural space using the loss resistance technique in the contralateral oblique view. Contrast was injected which showed appropriate epidural spread, no intravascular or intrathecal uptake. A total of 2 mL's of 0.5% lidocaine mixed with 40 mg methylprednisolone was injected. Needle removed, bandage applied, patient tolerated the procedure well with no immediate complications.

## 2025-08-04 NOTE — PROGRESS NOTES
Physical Therapy                 Therapy Communication Note    Patient Name: Kat Tineo  MRN: [id number]  Department: [facility]  [facility]  Today's Date: 8/4/2025     Discipline: Physical Therapy    Missed Visit:       Missed Visit Reason:      Missed Time: Cancel    Comment: PT eval orders received, however patient is scheduled for pain management procedure this date. PT will evaluate after.     Elizabeth Tracy, PT, DPT

## 2025-08-04 NOTE — CONSULTS
Pain Management H&P    History Of Present Illness  Kat Tineo is a 49 y.o. female w/PMHx of C3-7 ACDF who presented to [facility] ED with complaints of severe radicular back pain to RU- and RLE. Now transferred to [place] for evaluation of interventional pain procedure.     States pain has been ongoing and slowly worsening since March 2025. No trauma or inciting event noted by patient. She has seen ortho and participated in PT, but thinks the PT aggravates her symptoms more than anything. Pain is 8-9/10. Located in her neck and radiating down bilateral (R>L) shoulders into her R elbow and hand. She also endorses low back pain that radiates down her RLE, but she states that the upper extremity and neck pain is more severe at this time. She is noting that she is dropping things more often now. She also endorses slight difficulty walking at times due to the LE pain. No saddle anesthesia or incontinence. She notes some HA's that are distinct from her migraines as well as slight visual changes (occasional flashing of light).     She is on gabapentin, which she thinks helps her RLE pain, but not as much for the RUE pain. She is not on blood thinners. She is currently NPO.      Past Medical History  She has a past medical history of Acid reflux, Ankylosing spondylitis of site in spine (Multi), Arthritis (2000), Bursitis of hip, Cervical disc disorder, CTS (carpal tunnel syndrome), Depression, Diabetes mellitus (Multi), Dislocation of hip (Multi), DM (diabetes mellitus), gestational, Fracture of hip (Multi), Headache, History of transfusion (08/14/2023), Hypertension, Joint pain, Low back pain, Lumbosacral disc disease, Migraine, Neck pain, Obesity, Osteoarthritis, Plantar fasciitis, Reflex sympathetic dystrophy, Rheumatoid arthritis, Sciatica, Spinal stenosis, and Spondylolisthesis.    She has no past medical history of Personal history of irradiation.    Surgical History  She has a past surgical history that includes US  guided soft tissue fluid drain (2021); CT guided soft tissue fluid drainage (2022); Other surgical history (2017); Neck surgery; Other surgical history (Left); Laparoscopy gastrectomy partial / total (); Spine surgery; Hysterectomy (2012); Gastric bypass (2024); Cholecystectomy;  section, low transverse (2012); Hernia repair (2024); Esophagogastroduodenoscopy; Cardiac catheterization; Tubal ligation; Back surgery; Joint replacement; Oophorectomy (Approximately ); Laminectomy; Spinal fusion; orthopedic surgery (2023); Hip Arthroplasty; and Plantar fascia release.     Social History  She reports that she has never smoked. She has never been exposed to tobacco smoke. She has never used smokeless tobacco. She reports that she does not currently use alcohol. She reports that she does not use drugs.    Family History  [Family History]    [Family History]  Problem Relation Name Age of Onset    Diabetes Mother Darline Calzada     Hypertension Mother Darline Calzada     Other (morbid obesity) Mother Darline Calzada     Hernia Mother Darline Calzada     Vision loss Mother Darline Calzada     Diabetes type I Mother Darline Calzada     Other (htn) Father Ammon Colon     Hypertension Father Ammon Colon     Arthritis Father Ammon Colon     Alcohol abuse Father Ammon Colon     Cancer Father Ammon Colon     Depression Father Ammon Colon     Arthritis Other      Stroke Maternal Grandfather Dan Calzada     Heart attack Maternal Grandfather Dan Calzada     Heart disease Maternal Grandmother Anayeli Calzada     Osteoporosis Maternal Grandmother Anayeli Calzada     Angina Maternal Grandmother Anayeli Calzada     Arrhythmia Maternal Grandmother Anayeli Calzada     Heart attack Maternal Grandmother Anayeli Calzada     Heart failure Maternal Grandmother Anayeli Calzada     Cancer Paternal Grandfather Supa Colon     Anemia Mother's Sister Ada Sebastian     Cancer Father's Brother Tee Colon     Cancer Father's  Brother Armando Monroego     Cancer Father's Brother Trenton Colon     Asthma Son Leonardo     Cancer Father's Brother Tee Colon     Cancer Father's Brother Frank Colon     Cancer Father's Brother Jefe Colon     Cancer Father's Brother Armando Zabalaiago     Cancer Father's Brother Trenton Colon     Cancer Paternal Grandfather Supa Colon     Drug abuse Father's Sister Eduarda Colon     Heart disease Maternal Grandmother Anayeli Calzada     Vision loss Maternal Grandmother Anayeli Calzada     Heart failure Maternal Grandmother Anayeli Calzada     Arrhythmia Maternal Grandmother Anayeli Calzada     Heart attack Maternal Grandmother Anayeli Calzada     Osteoporosis Maternal Grandmother Anayeli Calzada     Angina Maternal Grandmother Anayeli Calzada     Atrial fibrillation Maternal Grandmother Anayeli Calzada     Stroke Maternal Grandfather Sy Calzada     Heart attack Maternal Grandfather Dan Calzada     Cancer Father's Brother Tee Colon     Psoriasis Neg Hx      Irritable bowel syndrome Neg Hx          Allergies  Ace inhibitors, Adhesive tape-silicones, and Amoxicillin    Review of Symptoms:   Constitutional: Negative for chills, diaphoresis or fever  HENT: Negative for neck swelling  Eyes:.  Negative for eye pain  Respiratory:.  Negative for cough, shortness of breath or wheezing    Cardiovascular:.  Negative for chest pain or palpitations  Gastrointestinal:.  Negative for abdominal pain, nausea and vomiting  Genitourinary:.  Negative for urgency  Musculoskeletal:  Positive for neck and back pain. Positive for joint pain. Denies falls within the past 3 months.  Skin: Negative for wounds or itching   Neurological: Negative for dizziness, seizures, loss of consciousness and weakness  Endo/Heme/Allergies: Does not bruise/bleed easily  Psychiatric/Behavioral: Negative for depression. The patient does not appear anxious.      Pre-sedation Evaluation  ASA class 2  Mallampati score 3     PHYSICAL EXAM  Vitals signs reviewed  Constitutional:        General: Not in acute distress     Appearance: Normal appearance. Not ill-appearing.  HENT:     Head: Normocephalic and atraumatic  Eyes:     Conjunctiva/sclera: Conjunctivae normal  Cardiovascular:     Rate and Rhythm: Normal rate and regular rhythm  Pulmonary:     Effort: No respiratory distress  Abdominal:     Palpations: Abdomen is soft  Musculoskeletal: 5/5 strength in all upper and lower extremity muscle groups, slightly limited by pain on Right side.  Positive Spurling's on the left side.  Skin:     General: Skin is warm and dry  Neurological:     General: No focal deficit present, normal sensation of bilateral upper and lower extremities and bilateral face.  Psychiatric:         Mood and Affect: Mood normal         Behavior: Behavior normal    Straight leg raise:  negative bilaterally  VAUGHN:  negative bilaterally       Last Recorded Vitals  /80 (BP Location: Left arm, Patient Position: Lying)   Pulse 65   Temp 36.6 °C (97.9 °F) (Temporal)   Resp 17   Wt 83.9 kg (184 lb 15.5 oz)   SpO2 99%     Relevant Results  Current Outpatient Medications   Medication Instructions    acetaminophen (TYLENOL) 1,000 mg, oral, Every 6 hours scheduled    [Paused] amLODIPine (NORVASC) 10 mg, oral, Daily    calcium carbonate-vitamin D3 500 mg-5 mcg (200 unit) tablet 1 tablet, Daily    cholecalciferol (VITAMIN D3) 50 mcg, Daily    cyanocobalamin (VITAMIN B-12) 250 mcg, Daily    DULoxetine (CYMBALTA) 60 mg, oral, Daily    gabapentin (NEURONTIN) 300 mg, oral, 3 times daily    [Paused] losartan (Cozaar) 100 mg tablet Take 1 tablet (100 mg) by mouth once daily. Do not start before November 27, 2024.    meloxicam (MOBIC) 15 mg, oral, Daily    methocarbamoL 100 mg, oral, 4 times daily PRN    [Paused] metoprolol succinate XL (TOPROL-XL) 50 mg, Daily    multivitamin with iron - children's (Cerovite, Jr) chewable tablet 1 tablet, Daily    pantoprazole (PROTONIX) 40 mg, oral, Daily before breakfast, Do not crush, chew, or  split.    potassium chloride CR (Klor-Con M20) 20 mEq ER tablet 20 mEq, oral, 2 times daily, Do not crush or chew.    rizatriptan (Maxalt) 10 mg tablet TAKE 1 TABLET AT ONSET OF HEADACHE. MAY REPEAT EVERY 2 HOURS AS NEEDED. MAXIMUM 3 TABLETS/24 HOURS.    tiZANidine (ZANAFLEX) 4 mg, oral, Nightly    upadacitinib ER (RINVOQ) 30 mg, Daily    zolpidem (AMBIEN) 10 mg, oral, Nightly PRN         XR cervical spine 2-3 views 07/31/2025    Narrative  Interpreted By:  Janet Patel,  STUDY:  XR CERVICAL SPINE 2-3 VIEWS; ;  7/31/2025 3:23 pm    INDICATION:  Signs/Symptoms:neck pain. Please obtain flexion/ extension views of  the C spine.      COMPARISON:  03/12/2025    ACCESSION NUMBER(S):  [id number]    ORDERING CLINICIAN:  YANCI SOTOMAYOR    FINDINGS:  Upright lateral flexion extension views were obtained. Metallic plate  with vertebral body screws spans C4 through C7. Fusion device with  obliquely oriented screws at C3-4 extends into the adjacent vertebral  bodies infuses this level. C4 through C7 show osseous fusion with the  obliteration of discs. Anterior spur partially bridges at the C2-3  level. With flexion and extension motion is primarily at the  craniocervical junction. There is no significant motion from C2  inferiorly.    Impression  Metallic plate fusion anteriorly see C4 through C7 and additional  hardware fusion C3-4    No spondylolisthesis. No motion is noted at the fused levels.      MACRO:  None    Signed by: Janet Patel 7/31/2025 4:05 PM  Dictation workstation:   [id number]      MR lumbar spine wo IV contrast 12/30/2022    Narrative  MRN: [id number]  Patient Name: HALEY GARCÍA    STUDY:  MRI L-SPINE WO;  12/30/2022 11:32 am    INDICATION:  lumbar radiculopathy  M54.16: Lumbar radiculopathy PT HAS LT HIP  REPLACE SINCE 6/22 AND NECK WITH PLATES 5YRS.    COMPARISON:  MRI of lumbar spine from 09/30/2020    ACCESSION NUMBER(S):  [id number]    ORDERING CLINICIAN:  FRITZ  RECIO    TECHNIQUE:  Sagittal and axial T1 and T2 weighted images of the lumbar spine were  acquired. Sagittal STIR imaging was also performed    FINDINGS:  The vertebral bodies demonstrate expected height and alignment. The  marrow signal is within normal limits. Mild desiccation of several  intervertebral discs is noted with minimal loss of disc height at  L2-L3, L3-L4 and L4-L5 levels.    The lower thoracic cord is unremarkable. The conus terminates  appropriately at L1-L2.    At L5-S1 there is circumferential disc bulge with bilateral facet  hypertrophy. There is a small synovial cyst arising from the medial  aspect of the right facet joint encroaching upon the right proximal  neural foramina. This cyst measures 3 x 4 mm in size and also causes  minimal impingement upon the right lateral recess. There is no  overall central canal stenosis. There is mild right neural foraminal  narrowing.    At L4-L5 there is a circumferential disc bulge with marked bilateral  facet and ligamentum flavum hypertrophy. There is moderate bilateral  facet joint effusion. There is a prominent lobulated septate T2  hyperintense focus arising from the right-sided facet joint which  measures 14 x 10 mm in craniocaudal and AP dimension and 8 mm in  maximum transverse dimension. It causes effacement of right lateral  recess with mild-to-moderate mass effect on thecal sac with central  canal stenosis. There is additional T2 hyperintense focus arising  from inferior aspect of the left-sided facet joint which measures 10  x 7 mm in size in cross-sectional dimension and 10 mm in craniocaudal  dimension. It causes effacement of left lateral recess at upper L5  level with mild mass effect on the thecal sac. There is moderate  overall central canal stenosis. There is minimal bilateral neural  foraminal narrowing.    At L3-L4, there is a circumferential disc bulge with bilateral facet  and ligamentum flavum hypertrophy. There are bilateral facet  joint  effusions. There is a multilobulated small focus of T2 hyperintense  signal with peripheral hypointensity arising from the right-sided  facet joint which measures approximately 10 mm in maximum  craniocaudal dimension and 7 x 6 mm in maximum cross-sectional  dimension. This finding is also suggestive of a synovial cyst. It  causes effacement of right lateral recess with mild overall central  canal stenosis. There is moderate right neural foraminal narrowing.    At L2-L3, there is circumferential disc bulge with bilateral facet  and ligamentum flavum hypertrophy. There are bilateral facet joint  effusions. There is mild central canal stenosis with mild left neural  foraminal narrowing.    At L1-L2, there is posterior disc bulge with bilateral facet  hypertrophy and facet joint effusions. There is no central canal  stenosis or neural foraminal narrowing.    There is no evidence of significant central canal stenosis or neural  foraminal narrowing in the lower thoracic region.    The anterior and posterior paraspinous soft tissues are within normal  limits.    Impression  Lumbar spondylosis most significantly affecting L3-L4, L4-L5 and  L5-S1 with multiple small synovial cysts associated with the facet  joints causing impingement upon lateral recess, spinal canal and  neural foramina. These findings are new since the prior study.  Evaluation is degraded by motion artifact.       === Cervical spine MRI 7/31/2025 ===   FINDINGS:  Cord: There is T2/STIR hyperintensity involving the cervical spinal  cord of the C7-T1 level, new from remote MRI examination (series 801,  image 7 and series 701, image 7) with potential subtle volume loss.  No definite abnormal enhancement. No additional cervical spinal cord  signal abnormality identified.      Epidural fluid: None.      Hardware: Postoperative change of C3-C7 ACDF with plate and screw  components as well as interbody spacers. There are elements of  bridging osseous  fusion suggested involving the C4-C7 vertebral  bodies.      Alignment: Straightening of the expected cervical lordosis. New 2-3  mm anterolisthesis of C7 on T1.      Vertebral bodies: Cervical vertebral body heights are grossly  maintained. Aforementioned elements of bridging osseous fusion  evident at the C4-C7 levels. Hardware artifact precludes evaluation  at C3-C4 for bridging osseous fusion.      Marrow signal: Questionable degenerative endplate signal change along  the superior endplate of C3 versus hardware artifact. No suspicious  STIR hyperintensity or abnormal osseous enhancement within the  cervical region.      Intervertebral Discs: Normal signal.          Degenerative change:      C1-C2:  Arthrosis centered about the dens. No spinal canal stenosis.      C2-C3:  Disc bulge and bilateral uncovertebral spurring. There is  mild spinal canal stenosis. There is no significant neural foraminal  narrowing.      C3-C4:  ACDF components. There is residual uncovertebral spurring and  disc bulge with mild-to-moderate spinal canal stenosis. There is  severe bilateral neural foraminal narrowing suggested.      C4-C5:  ACDF components. Mild spinal canal stenosis suspected. Mild  right-greater-than-left neural foraminal narrowing suspected with  motion artifact significantly limiting assessment.      C5-C6:  ACDF components. Mild spinal canal stenosis suggested. No  high-grade neural foraminal narrowing with motion artifact degrading  evaluation.      C6-C7:  ACDF components. Residual uncovertebral and endplate  spurring. There is moderate spinal canal stenosis suspected. There is  moderate right and mild left neural foraminal narrowing.      C7-T1:  Severe left and mild right facet arthropathy with ligamentum  flavum hypertrophy. Disc uncovering/bulge. There is moderate spinal  canal stenosis. There is severe left and mild right neural foraminal  narrowing suggested.      Soft tissues: The prevertebral and posterior  paraspinous soft tissues  are within normal limits.      IMPRESSION:  Motion degraded examination.      C3-C7 ACDF components again evident. Bridging osseous fusion  suggested along the C4-C7 regions with limited assessment for osseous  fusion at C3-C4.      There is worsened degenerative anterolisthesis of C7 on T1 with  moderate spinal canal stenosis suggested. There is also new cord  signal abnormality focally at C7-T1 concerning for potential cord  edema or myelomalacia. Severe left neural foraminal narrowing is  again suggested.      No abnormal enhancement.    === lumbar spine MRI 7/31/2025 ===  FINDINGS:  Segmentation: Normal.      Conus: The lower thoracic cord appears unremarkable. The conus  terminates at the level of superior endplate of L2. Cauda equina are  unremarkable. No abnormal enhancement.      Epidural fluid: None.      Hardware: L4-L5 posterior spinal fusion with bilateral posterior  pedicular screws and stabilization rods as well as interbody spacer.  L4 laminectomy/decompression is also evident.      Alignment: Rotatory dextroscoliosis of the lumbar spine. Trace  anterolisthesis of L4 on L5 status post instrumented fusion. Trace  retrolisthesis of L1 on L2.      Vertebral bodies: Lumbar vertebral body heights are grossly  maintained.      Marrow signal: Within normal limits. No suspicious STIR  hyperintensity or abnormal osseous enhancement.      Intervertebral discs: Mild multilevel degenerative disc height loss  and disc desiccation.          Degenerative change:      T12-L1: Unremarkable.      L1-2: Moderate bilateral facet arthropathy with ligamentum flavum  hypertrophy. Mild disc bulge and endplate spurring. No spinal canal  stenosis. No neural foraminal narrowing.      L2-3: Moderate bilateral facet arthropathy with ligamentum flavum  hypertrophy. Prominent dorsal epidural fat. Moderate disc  bulge/uncovering. There is right-greater-than-left lateral recess  narrowing with moderate  spinal canal stenosis. There is mild  bilateral neural foraminal narrowing.      L3-4: Laminectomy/posterior decompression. Bilateral facet  arthropathy with residual ligamentum flavum thickening. There is  moderate disc bulge with hypertrophic endplate spurring. There is  narrowing/effacement of the right-greater-than-left lateral recesses.  There is no additional spinal canal stenosis. There is severe right  and mild left neural foraminal narrowing with impingement of the  exiting right L3 nerve root by facet osteophytes. Findings have  worsened in the interim.      L4-5: Laminectomy/posterior decompression. Residual disc bulge,  endplate spurring, and facet spurring with no significant spinal  canal stenosis. Enhancing granulation tissue noted overlying the  laminectomy bed and left lateral recess. Mild bilateral neural  foraminal narrowing.      L5-S1: Moderate right and mild left facet arthropathy. Minimal disc  bulge, eccentric to the right. No spinal canal stenosis. Moderate  right and minimal/mild left neural foraminal narrowing.      Soft tissues: Fatty atrophy overlying the laminectomy bed with  postsurgical scarring. Tiny right cortical renal cyst.      IMPRESSION:  Postoperative change of L4-L5 posterior spinal fusion with interbody  fixation and laminectomy/decompression. Enhancing granulation tissue  noted within the operative bed. Resolution of previous epidural fluid  collection.      Worsening degenerative change at L3-L4 with narrowing/effacement of  the right-greater-than-left lateral recesses and severe right neural  foraminal narrowing with impingement of the exiting right L3 nerve  root.      Moderate spinal canal stenosis with right-greater-than-left lateral  recess narrowing at L2-L3, similar to slightly worsened in the  interim.       ASSESSMENT/PLAN  Kat Tineo is a 49 y.o. female here for cervical epidural steroid injection under fluoroscopy      Patient denies any recent antibiotic use  or infections, denies any blood thinner use, and denies contrast or local anesthetic allergies     Risks, benefits, alternatives discussed. All questions answered to the best of my ability. Patient agrees to proceed.      Our plan is as follows:  - Proceed with aforementioned procedure     - As an outpatient would plan for physical therapy.  If not better, she may need to see a surgeon.      Guido Joyce MD  Chronic Pain Management Fellow     A&P discussed with Pain attending, Dr. Messina, who agrees with the above.

## 2025-08-04 NOTE — PROGRESS NOTES
49 yr old female admitted inpatient due to severe radicular back pain radiating to RUE and RLE.  Pain and PT consulted. MRI lumbar spine completed.  Anticipate cervical epidural procedure with pain  Today or tomorrow.  TCC anticipates home with OPT or Home PT.  Will continue to follow to determine.     08/04/25 1523   Discharge Planning   Expected Discharge Disposition Home  (No needs vs OPT)

## 2025-08-04 NOTE — NURSING NOTE
Assumed care of patient this am. Patient lying in bed. She voices c/oio neck pain. Call light within reach, continue to monitor.

## 2025-08-04 NOTE — NURSING NOTE
Assumed care of patient. Report received from ADRIANE Gutiérrez. Patient resting in bed. Vital signs stable. She states her pain is slightly improved.  Awaiting consult from pain management. Lovenox on hold and patient will be kept NPO after midnight for possible procedure tomorrow. No stated needs at this time. Call light is within reach.

## 2025-08-05 PROBLEM — M54.59 INTRACTABLE LOW BACK PAIN: Status: RESOLVED | Noted: 2025-08-02 | Resolved: 2025-08-05

## 2025-08-05 PROCEDURE — 2500000001 HC RX 250 WO HCPCS SELF ADMINISTERED DRUGS (ALT 637 FOR MEDICARE OP): Performed by: EMERGENCY MEDICINE

## 2025-08-05 PROCEDURE — 99253 IP/OBS CNSLTJ NEW/EST LOW 45: CPT | Performed by: EMERGENCY MEDICINE

## 2025-08-05 PROCEDURE — 2500000004 HC RX 250 GENERAL PHARMACY W/ HCPCS (ALT 636 FOR OP/ED): Mod: JZ | Performed by: EMERGENCY MEDICINE

## 2025-08-05 PROCEDURE — 2500000002 HC RX 250 W HCPCS SELF ADMINISTERED DRUGS (ALT 637 FOR MEDICARE OP, ALT 636 FOR OP/ED): Performed by: EMERGENCY MEDICINE

## 2025-08-05 PROCEDURE — 1100000001 HC PRIVATE ROOM DAILY

## 2025-08-05 RX ORDER — OXYCODONE HYDROCHLORIDE 10 MG/1
10 TABLET ORAL EVERY 4 HOURS PRN
Qty: 15 TABLET | Refills: 0 | Status: SHIPPED | OUTPATIENT
Start: 2025-08-05 | End: 2025-08-08

## 2025-08-05 RX ORDER — DEXAMETHASONE SODIUM PHOSPHATE 10 MG/ML
10 INJECTION INTRAMUSCULAR; INTRAVENOUS ONCE
Status: COMPLETED | OUTPATIENT
Start: 2025-08-05 | End: 2025-08-05

## 2025-08-05 RX ORDER — DEXAMETHASONE SODIUM PHOSPHATE 10 MG/ML
10 INJECTION INTRAMUSCULAR; INTRAVENOUS DAILY
Status: DISCONTINUED | OUTPATIENT
Start: 2025-08-06 | End: 2025-08-06 | Stop reason: HOSPADM

## 2025-08-05 RX ORDER — OXYCODONE HYDROCHLORIDE 5 MG/1
5 TABLET ORAL EVERY 4 HOURS PRN
Qty: 15 TABLET | Refills: 0 | Status: SHIPPED | OUTPATIENT
Start: 2025-08-05

## 2025-08-05 RX ORDER — MORPHINE SULFATE 2 MG/ML
2 INJECTION, SOLUTION INTRAMUSCULAR; INTRAVENOUS ONCE
Status: DISCONTINUED | OUTPATIENT
Start: 2025-08-05 | End: 2025-08-06 | Stop reason: HOSPADM

## 2025-08-05 RX ADMIN — GABAPENTIN 300 MG: 300 CAPSULE ORAL at 22:35

## 2025-08-05 RX ADMIN — POTASSIUM CHLORIDE EXTENDED-RELEASE 20 MEQ: 1500 TABLET ORAL at 22:35

## 2025-08-05 RX ADMIN — DULOXETINE HYDROCHLORIDE 60 MG: 30 CAPSULE, DELAYED RELEASE ORAL at 08:51

## 2025-08-05 RX ADMIN — ACETAMINOPHEN 975 MG: 325 TABLET ORAL at 06:14

## 2025-08-05 RX ADMIN — LOSARTAN POTASSIUM 100 MG: 100 TABLET, FILM COATED ORAL at 08:51

## 2025-08-05 RX ADMIN — ZOLPIDEM TARTRATE 10 MG: 5 TABLET ORAL at 22:35

## 2025-08-05 RX ADMIN — Medication 50 MCG: at 08:51

## 2025-08-05 RX ADMIN — Medication 1 TABLET: at 08:51

## 2025-08-05 RX ADMIN — METHOCARBAMOL 1000 MG: 500 TABLET ORAL at 11:43

## 2025-08-05 RX ADMIN — POTASSIUM CHLORIDE EXTENDED-RELEASE 20 MEQ: 1500 TABLET ORAL at 08:51

## 2025-08-05 RX ADMIN — MELOXICAM 15 MG: 7.5 TABLET ORAL at 08:51

## 2025-08-05 RX ADMIN — MORPHINE SULFATE 2 MG: 2 INJECTION, SOLUTION INTRAMUSCULAR; INTRAVENOUS at 10:46

## 2025-08-05 RX ADMIN — PANTOPRAZOLE SODIUM 40 MG: 40 TABLET, DELAYED RELEASE ORAL at 06:14

## 2025-08-05 RX ADMIN — DEXAMETHASONE SODIUM PHOSPHATE 10 MG: 10 INJECTION INTRAMUSCULAR; INTRAVENOUS at 11:43

## 2025-08-05 RX ADMIN — ACETAMINOPHEN 975 MG: 325 TABLET ORAL at 11:43

## 2025-08-05 RX ADMIN — OXYCODONE HYDROCHLORIDE 10 MG: 5 TABLET ORAL at 09:46

## 2025-08-05 RX ADMIN — METHOCARBAMOL 1000 MG: 500 TABLET ORAL at 17:39

## 2025-08-05 RX ADMIN — AMLODIPINE BESYLATE 10 MG: 5 TABLET ORAL at 08:50

## 2025-08-05 RX ADMIN — METOPROLOL SUCCINATE 50 MG: 50 TABLET, EXTENDED RELEASE ORAL at 08:51

## 2025-08-05 RX ADMIN — ACETAMINOPHEN 975 MG: 325 TABLET ORAL at 17:39

## 2025-08-05 RX ADMIN — METHOCARBAMOL 1000 MG: 500 TABLET ORAL at 05:53

## 2025-08-05 RX ADMIN — CYANOCOBALAMIN TAB 500 MCG 250 MCG: 500 TAB at 08:51

## 2025-08-05 RX ADMIN — ONDANSETRON 4 MG: 2 INJECTION, SOLUTION INTRAMUSCULAR; INTRAVENOUS at 09:27

## 2025-08-05 RX ADMIN — GABAPENTIN 300 MG: 300 CAPSULE ORAL at 08:51

## 2025-08-05 ASSESSMENT — PAIN SCALES - GENERAL
PAINLEVEL_OUTOF10: 0 - NO PAIN
PAINLEVEL_OUTOF10: 4
PAINLEVEL_OUTOF10: 4
PAINLEVEL_OUTOF10: 5 - MODERATE PAIN
PAINLEVEL_OUTOF10: 9
PAINLEVEL_OUTOF10: 9
PAINLEVEL_OUTOF10: 6
PAINLEVEL_OUTOF10: 3

## 2025-08-05 ASSESSMENT — COGNITIVE AND FUNCTIONAL STATUS - GENERAL
DAILY ACTIVITIY SCORE: 24
MOBILITY SCORE: 24

## 2025-08-05 ASSESSMENT — PAIN DESCRIPTION - DESCRIPTORS
DESCRIPTORS: ACHING
DESCRIPTORS: DULL;DISCOMFORT
DESCRIPTORS: CRUSHING
DESCRIPTORS: ACHING
DESCRIPTORS: ACHING;SORE
DESCRIPTORS: CRUSHING
DESCRIPTORS: ACHING

## 2025-08-05 ASSESSMENT — PAIN - FUNCTIONAL ASSESSMENT
PAIN_FUNCTIONAL_ASSESSMENT: 0-10

## 2025-08-05 NOTE — NURSING NOTE
Assumed care of patient. Patient in bed resting with her eyes closed. Pt's bed is in the lowest setting . Call light within reach.

## 2025-08-05 NOTE — NURSING NOTE
Patient rang call light, stated her pain is 9/10 to her right lower back and left side. Morphine 2mg administered. New order to be entered for Decadron.

## 2025-08-05 NOTE — CARE PLAN
Problem: Diabetes  Goal: Maintain electrolyte levels within acceptable range throughout shift  Outcome: Progressing  Goal: Maintain glucose levels >70mg/dl to <250mg/dl throughout shift  Outcome: Progressing  Goal: No changes in neurological exam by end of shift  Outcome: Progressing  Goal: Learn about and adhere to nutrition recommendations by end of shift  Outcome: Progressing  Goal: Vital signs within normal range for age by end of shift  Outcome: Progressing  Goal: Increase self care and/or family involovement by end of shift  Outcome: Progressing     Problem: Pain - Adult  Goal: Verbalizes/displays adequate comfort level or baseline comfort level  Outcome: Progressing     Problem: Safety - Adult  Goal: Free from fall injury  Outcome: Progressing     Problem: Discharge Planning  Goal: Discharge to home or other facility with appropriate resources  Outcome: Progressing     Problem: Chronic Conditions and Co-morbidities  Goal: Patient's chronic conditions and co-morbidity symptoms are monitored and maintained or improved  Outcome: Progressing     Problem: Nutrition  Goal: Nutrient intake appropriate for maintaining nutritional needs  Outcome: Progressing     Problem: Fall/Injury  Goal: Not fall by end of shift  Outcome: Progressing  Goal: Be free from injury by end of the shift  Outcome: Progressing  Goal: Verbalize understanding of personal risk factors for fall in the hospital  Outcome: Progressing  Goal: Verbalize understanding of risk factor reduction measures to prevent injury from fall in the home  Outcome: Progressing  Goal: Use assistive devices by end of the shift  Outcome: Progressing  Goal: Pace activities to prevent fatigue by end of the shift  Outcome: Progressing     Problem: Pain  Goal: Takes deep breaths with improved pain control throughout the shift  Outcome: Progressing  Goal: Turns in bed with improved pain control throughout the shift  Outcome: Progressing  Goal: Walks with improved pain  control throughout the shift  Outcome: Progressing  Goal: Performs ADL's with improved pain control throughout shift  Outcome: Progressing  Goal: Participates in PT with improved pain control throughout the shift  Outcome: Progressing  Goal: Free from opioid side effects throughout the shift  Outcome: Progressing  Goal: Free from acute confusion related to pain meds throughout the shift  Outcome: Progressing     Problem: Deep Vein Thrombosis  Goal: I will remain free from complications of deep vein thrombosis and maintain current level of mobility  Outcome: Progressing   The patient's goals for the shift include pain management.    The clinical goals for the shift include pain control, safety

## 2025-08-05 NOTE — PROGRESS NOTES
08/05/25 1018   Discharge Planning   Who is requesting discharge planning? Other (Comment)   Home or Post Acute Services None   Expected Discharge Disposition Home     Dispo: Home self care per Dr. Shaver  DYLAN: today or per team    Cleared by [facility] for discharge

## 2025-08-05 NOTE — CARE PLAN
The patient's goals for the shift include pain management.    The clinical goals for the shift include pain control

## 2025-08-05 NOTE — CONSULTS
Consults    Reason For Consult  Medical management for history of GERD ankylosing spondylitis of lower spine arthritis diabetes rheumatoid arthritis and postop pain    History Of Present Illness  Kat Tineo is a 49 y.o. female presenting with .  Cervical neck and lumbar spine discomfort she had a procedure done on her cervical spine this morning she states she was getting pain back rotating down her shoulder and tingling in her fingers  Lower back was significant for pain radiating into her groin no weakness     Past Medical History  She has a past medical history of Acid reflux, Ankylosing spondylitis of site in spine (Multi), Arthritis (), Bursitis of hip, Cervical disc disorder, CTS (carpal tunnel syndrome), Depression, Diabetes mellitus (Multi), Dislocation of hip (Multi), DM (diabetes mellitus), gestational, Fracture of hip (Multi), Headache, History of transfusion (2023), Hypertension, Joint pain, Low back pain, Lumbosacral disc disease, Migraine, Neck pain, Obesity, Osteoarthritis, Plantar fasciitis, Reflex sympathetic dystrophy, Rheumatoid arthritis, Sciatica, Spinal stenosis, and Spondylolisthesis.    She has no past medical history of Personal history of irradiation.    Surgical History  She has a past surgical history that includes US guided soft tissue fluid drain (2021); CT guided soft tissue fluid drainage (2022); Other surgical history (2017); Neck surgery; Other surgical history (Left); Laparoscopy gastrectomy partial / total (); Spine surgery; Hysterectomy (2012); Gastric bypass (2024); Cholecystectomy;  section, low transverse (2012); Hernia repair (2024); Esophagogastroduodenoscopy; Cardiac catheterization; Tubal ligation; Back surgery; Joint replacement; Oophorectomy (Approximately ); Laminectomy; Spinal fusion; orthopedic surgery (2023); Hip Arthroplasty; and Plantar fascia release.     Social History  She reports that she  has never smoked. She has never been exposed to tobacco smoke. She has never used smokeless tobacco. She reports that she does not currently use alcohol. She reports that she does not use drugs.    Family History  Family History[1]     Allergies  Ace inhibitors, Adhesive tape-silicones, and Amoxicillin    Review of Systems  10 system review is noncontributory  Physical Exam  Vital signs are stable  Patient is alert in no acute distress  Lungs are clear  Cardiac is regular rate and rhythm normal S1-S2 without murmur gallop rub click S3 or S4  Abdomen is benign  Extremities without cyanosis clubbing erythema or edema  Extremities warm pulses intact patient has full range of motion but does have discomfort  Last Recorded Vitals  /87   Pulse 62   Temp 36.3 °C (97.3 °F)   Resp 18   Wt 83.9 kg (184 lb 15.5 oz)   SpO2 100%     Relevant Results  Scheduled medications  acetaminophen, 975 mg, oral, q6h ADRY  amLODIPine, 10 mg, oral, Daily  calcium carbonate-vitamin D3, 1 tablet, oral, Daily  cholecalciferol, 50 mcg, oral, Daily  cyanocobalamin, 250 mcg, oral, Daily  DULoxetine, 60 mg, oral, Daily  gabapentin, 300 mg, oral, TID  losartan, 100 mg, oral, Daily  meloxicam, 15 mg, oral, Daily  methocarbamol, 1,000 mg, oral, q6h ADRY  metoprolol succinate XL, 50 mg, oral, Daily  morphine, 2 mg, intravenous, Once  pantoprazole, 40 mg, oral, Daily before breakfast  polyethylene glycol, 17 g, oral, Daily  potassium chloride CR, 20 mEq, oral, BID  upadacitinib ER, 30 mg, oral, Daily      Continuous medications  Continuous Medications[2]  PRN medications  PRN Medications[3]       Assessment/Plan   Status post cervical epidural steroid injection  -Management per pain management  -Supportive care  This morning patient was having increased pain to her cervical neck was given Decadron 10 mg IV patient states it helped her discomfort  Continues to have discomfort radiating to her right groin and down her leg     Rheumatoid  arthritis  Restart medications for RA     Hypertension  -Continue metoprolol  -Continue losartan     DVT prophylaxis  -SCDs  -Ambulate         Zully Ramos MD             [1]   Family History  Problem Relation Name Age of Onset    Diabetes Mother Darline Calzada     Hypertension Mother Darline Calzada     Other (morbid obesity) Mother Darline Calzada     Hernia Mother Darline Calzada     Vision loss Mother Darline Calzada     Diabetes type I Mother Darline Calzada     Other (htn) Father Ammon Colon     Hypertension Father Ammon Colon     Arthritis Father Ammon Colon     Alcohol abuse Father Ammon Colon     Cancer Father Ammon Colon     Depression Father Ammon Colon     Arthritis Other      Stroke Maternal Grandfather Dan Calzada     Heart attack Maternal Grandfather Dan Calzada     Heart disease Maternal Grandmother Anayeli Calzada     Osteoporosis Maternal Grandmother Anayeli Calzada     Angina Maternal Grandmother Anayeli Calzada     Arrhythmia Maternal Grandmother Anayeli Calzada     Heart attack Maternal Grandmother Anayeli Calzada     Heart failure Maternal Grandmother Anayeli Calzada     Cancer Paternal Grandfather Supa Colon     Anemia Mother's Sister Ada Sebastian     Cancer Father's Brother Tee Colon     Cancer Father's Brother Armando Demarcus     Cancer Father's Brother Trenton Colon     Asthma Son Leonardo     Cancer Father's Brother Tee Colon     Cancer Father's Brother Frank Colon     Cancer Father's Brother Marcil Colon     Cancer Father's Brother Armando Demarcus     Cancer Father's Brother Trenton Colon     Cancer Paternal Grandfather Supa Colon     Drug abuse Father's Sister Eduarda Colon     Heart disease Maternal Grandmother Anayeli Calzada     Vision loss Maternal Grandmother Anayeli Calzada     Heart failure Maternal Grandmother Anayeli Calzada     Arrhythmia Maternal Grandmother Anaylei Calzada     Heart attack Maternal Grandmother Anayeli Calzada     Osteoporosis Maternal Grandmother Anayeli Calzada     Angina Maternal Grandmother Anayeli Calzada      Atrial fibrillation Maternal Grandmother Anayeli Calzada     Stroke Maternal Grandfather Sy Calzada     Heart attack Maternal Grandfather Sy Dasilvatiz     Cancer Father's Brother Tee Colon     Psoriasis Neg Hx      Irritable bowel syndrome Neg Hx     [2]    [3] PRN medications: alum-mag hydroxide-simeth, benzocaine-menthol, diphenhydrAMINE, lubricating eye drops, melatonin, metoclopramide, morphine, ondansetron, oxyCODONE, oxyCODONE, simethicone, sodium chloride, SUMAtriptan, zolpidem

## 2025-08-05 NOTE — PROGRESS NOTES
Physical Therapy                 Therapy Communication Note    Patient Name: Kat Tineo  MRN: [id number]  Department: [facility]  Room: 221/221-A  Today's Date: 8/5/2025     Discipline: Physical Therapy    Comment: PT eval orders received, chart reviewed. Nursing staff stating patient has been independent in her room. PT spoke with patient about home going safety. Pt stating she has been in/out of bed, to/from restroom, and mobilizing without difficulties here at [facility]. Pt stating she does not have any acute PT needs at this time. PT recommended to patient that she follows up with outpatient PT for strengthening/stretching POC regarding chronic back pain. Pt is agreeable and states she has been to therapy before and knows where to go. PT will sign off on patient at this time.     Elizabeth Tracy, PT, DPT

## 2025-08-05 NOTE — NURSING NOTE
"Rounded on patient. Pt in bed watching TV pt states her \"pain is better since receiving steroids\".  Pain rating 3/10. Call light within reach and bed in the lowest position.   "

## 2025-08-06 ENCOUNTER — APPOINTMENT (OUTPATIENT)
Dept: ORTHOPEDIC SURGERY | Facility: CLINIC | Age: 50
End: 2025-08-06
Payer: COMMERCIAL

## 2025-08-06 VITALS
BODY MASS INDEX: 34.04 KG/M2 | SYSTOLIC BLOOD PRESSURE: 149 MMHG | HEART RATE: 75 BPM | WEIGHT: 184.97 LBS | DIASTOLIC BLOOD PRESSURE: 91 MMHG | OXYGEN SATURATION: 100 % | TEMPERATURE: 98.4 F | RESPIRATION RATE: 16 BRPM | HEIGHT: 62 IN

## 2025-08-06 PROCEDURE — 2500000001 HC RX 250 WO HCPCS SELF ADMINISTERED DRUGS (ALT 637 FOR MEDICARE OP): Performed by: EMERGENCY MEDICINE

## 2025-08-06 PROCEDURE — 2500000002 HC RX 250 W HCPCS SELF ADMINISTERED DRUGS (ALT 637 FOR MEDICARE OP, ALT 636 FOR OP/ED): Performed by: EMERGENCY MEDICINE

## 2025-08-06 PROCEDURE — 2500000004 HC RX 250 GENERAL PHARMACY W/ HCPCS (ALT 636 FOR OP/ED): Mod: JZ | Performed by: EMERGENCY MEDICINE

## 2025-08-06 PROCEDURE — 99238 HOSP IP/OBS DSCHRG MGMT 30/<: CPT | Performed by: EMERGENCY MEDICINE

## 2025-08-06 RX ORDER — DEXAMETHASONE 6 MG/1
3 TABLET ORAL DAILY
Qty: 2 TABLET | Refills: 0 | Status: SHIPPED | OUTPATIENT
Start: 2025-08-06 | End: 2025-08-09

## 2025-08-06 RX ADMIN — OXYCODONE HYDROCHLORIDE 10 MG: 5 TABLET ORAL at 03:18

## 2025-08-06 RX ADMIN — CYANOCOBALAMIN TAB 500 MCG 250 MCG: 500 TAB at 08:40

## 2025-08-06 RX ADMIN — MELOXICAM 15 MG: 7.5 TABLET ORAL at 08:40

## 2025-08-06 RX ADMIN — PANTOPRAZOLE SODIUM 40 MG: 40 TABLET, DELAYED RELEASE ORAL at 06:17

## 2025-08-06 RX ADMIN — Medication 50 MCG: at 08:40

## 2025-08-06 RX ADMIN — AMLODIPINE BESYLATE 10 MG: 5 TABLET ORAL at 08:39

## 2025-08-06 RX ADMIN — MORPHINE SULFATE 2 MG: 2 INJECTION, SOLUTION INTRAMUSCULAR; INTRAVENOUS at 04:42

## 2025-08-06 RX ADMIN — METOPROLOL SUCCINATE 50 MG: 50 TABLET, EXTENDED RELEASE ORAL at 08:40

## 2025-08-06 RX ADMIN — DULOXETINE HYDROCHLORIDE 60 MG: 30 CAPSULE, DELAYED RELEASE ORAL at 08:40

## 2025-08-06 RX ADMIN — POTASSIUM CHLORIDE EXTENDED-RELEASE 20 MEQ: 1500 TABLET ORAL at 09:58

## 2025-08-06 RX ADMIN — Medication 1 TABLET: at 08:40

## 2025-08-06 RX ADMIN — LOSARTAN POTASSIUM 100 MG: 100 TABLET, FILM COATED ORAL at 08:40

## 2025-08-06 RX ADMIN — ACETAMINOPHEN 975 MG: 325 TABLET ORAL at 11:03

## 2025-08-06 RX ADMIN — METHOCARBAMOL 1000 MG: 500 TABLET ORAL at 11:03

## 2025-08-06 RX ADMIN — DEXAMETHASONE SODIUM PHOSPHATE 10 MG: 10 INJECTION INTRAMUSCULAR; INTRAVENOUS at 08:32

## 2025-08-06 RX ADMIN — OXYCODONE HYDROCHLORIDE 10 MG: 5 TABLET ORAL at 08:39

## 2025-08-06 RX ADMIN — GABAPENTIN 300 MG: 300 CAPSULE ORAL at 08:39

## 2025-08-06 RX ADMIN — ACETAMINOPHEN 975 MG: 325 TABLET ORAL at 06:17

## 2025-08-06 RX ADMIN — ONDANSETRON 4 MG: 2 INJECTION, SOLUTION INTRAMUSCULAR; INTRAVENOUS at 08:32

## 2025-08-06 RX ADMIN — METHOCARBAMOL 1000 MG: 500 TABLET ORAL at 06:17

## 2025-08-06 ASSESSMENT — PAIN SCALES - GENERAL
PAINLEVEL_OUTOF10: 2
PAINLEVEL_OUTOF10: 7
PAINLEVEL_OUTOF10: 7
PAINLEVEL_OUTOF10: 8
PAINLEVEL_OUTOF10: 7
PAINLEVEL_OUTOF10: 8
PAINLEVEL_OUTOF10: 4
PAINLEVEL_OUTOF10: 7
PAINLEVEL_OUTOF10: 0 - NO PAIN
PAINLEVEL_OUTOF10: 4

## 2025-08-06 ASSESSMENT — COGNITIVE AND FUNCTIONAL STATUS - GENERAL
MOBILITY SCORE: 24
DAILY ACTIVITIY SCORE: 24

## 2025-08-06 ASSESSMENT — PAIN - FUNCTIONAL ASSESSMENT
PAIN_FUNCTIONAL_ASSESSMENT: 0-10

## 2025-08-06 ASSESSMENT — PAIN DESCRIPTION - DESCRIPTORS
DESCRIPTORS: THROBBING
DESCRIPTORS: ACHING;RADIATING
DESCRIPTORS: RADIATING
DESCRIPTORS: RADIATING
DESCRIPTORS: ACHING;RADIATING
DESCRIPTORS: SORE

## 2025-08-06 ASSESSMENT — PAIN DESCRIPTION - ORIENTATION
ORIENTATION: LEFT
ORIENTATION: RIGHT

## 2025-08-06 ASSESSMENT — PAIN DESCRIPTION - LOCATION
LOCATION: HIP
LOCATION: BACK

## 2025-08-06 NOTE — NURSING NOTE
Assumed care of pt. She's currently resting in bed. She states that the pain in her neck and to her lower back has improved since earlier in the day. She rates her back pain at a 6/10 and her neck pain at a 4/10. She's declining medication intervention at this time, states that she would just like to rest for now. She denies further needs. Call light is within reach.

## 2025-08-06 NOTE — NURSING NOTE
Reviewed discharge instructions with patient at bedside. Answered any questions that needed clarification. I escorted patient to her mothers vehicle by walking with her.

## 2025-08-06 NOTE — DISCHARGE SUMMARY
Discharge Diagnosis  Intractable low back pain           Issues Requiring Follow-Up  Schedule follow-up appointment with Dr. Messina    Discharge Meds     Medication List      START taking these medications     amLODIPine 10 mg tablet; Commonly known as: Norvasc; TAKE 1 TABLET BY   MOUTH EVERY DAY   dexAMETHasone 6 mg tablet; Commonly known as: Decadron; Take 0.5 tablets   (3 mg) by mouth once daily for 3 days.   losartan 100 mg tablet; Commonly known as: Cozaar; Take 1 tablet (100   mg) by mouth once daily. Do not start before November 27, 2024.   metoprolol succinate XL 50 mg 24 hr tablet; Commonly known as: Toprol-XL   * oxyCODONE 10 mg immediate release tablet; Commonly known as:   Roxicodone; Take 1 tablet (10 mg) by mouth every 4 hours if needed for   severe pain (7 - 10) for up to 3 days.   * oxyCODONE 5 mg immediate release tablet; Commonly known as:   Roxicodone; Take 1 tablet (5 mg) by mouth every 4 hours if needed for   moderate pain (4 - 6).  * This list has 2 medication(s) that are the same as other medications   prescribed for you. Read the directions carefully, and ask your doctor or   other care provider to review them with you.     CONTINUE taking these medications     calcium carbonate-vitamin D3 500 mg-5 mcg (200 unit) tablet   cyanocobalamin 250 mcg tablet; Commonly known as: Vitamin B-12   DULoxetine 60 mg DR capsule; Commonly known as: Cymbalta; TAKE 1 CAPSULE   BY MOUTH EVERY DAY   gabapentin 300 mg capsule; Commonly known as: Neurontin; TAKE 1 CAPSULE   BY MOUTH THREE TIMES A DAY   meloxicam 15 mg tablet; Commonly known as: Mobic; Take 1 tablet (15 mg)   by mouth once daily.   methocarbamoL 1,000 mg tablet; Take 100 mg by mouth 4 times a day as   needed for muscle spasms.   multivitamin with iron - children's chewable tablet; Commonly known as:   Cerovite, Jr   pantoprazole 40 mg EC tablet; Commonly known as: ProtoNix; Take 1 tablet   (40 mg) by mouth once daily in the morning. Take before  meals. Do not   crush, chew, or split.   potassium chloride CR 20 mEq ER tablet; Commonly known as: Klor-Con M20;   Take 1 tablet (20 mEq) by mouth 2 times a day for 10 days. Do not crush or   chew.   Rinvoq 30 mg tablet extended release 24 hr; Generic drug: upadacitinib   ER   rizatriptan 10 mg tablet; Commonly known as: Maxalt; TAKE 1 TABLET AT   ONSET OF HEADACHE. MAY REPEAT EVERY 2 HOURS AS NEEDED. MAXIMUM 3   TABLETS/24 HOURS.   tiZANidine 4 mg tablet; Commonly known as: Zanaflex; TAKE 1 TABLET BY   MOUTH EVERYDAY AT BEDTIME   Vitamin D3 25 mcg (1,000 units) tablet; Generic drug: cholecalciferol   zolpidem 10 mg tablet; Commonly known as: Ambien; TAKE 1 TABLET (10 MG)   BY MOUTH AS NEEDED AT BEDTIME FOR SLEEP.     STOP taking these medications     acetaminophen 500 mg tablet; Commonly known as: Tylenol       Test Results Pending At Discharge  Pending Labs       No current pending labs.            Hospital Course   49-year-old female admitted to [facility] for epidural block.  She had her cervical spine blocked and the plan is to have the patient come back and do her lumbar sacral spine'; patient had a lot of pain postoperatively mostly the lumbar spine was given a short course of Decadron with dramatic improvement should be discharged home and she is to call Dr. Messina for follow-up and to schedule appointment for her lower back  Pertinent Physical Exam At Time of Discharge  Physical Exam  Vital signs are stable  Patient is alert in no acute distress  Lungs are clear to auscultation and percussion  Cardiac is regular rate and rhythm normal S1-S2 without murmur gallop rub click S3 or S4  Abdomen is benign  Extremities without cyanosis clubbing erythema or edema  Neuroexam is nonfocal  Outpatient Follow-Up  Future Appointments   Date Time Provider Department Center   8/26/2025  8:45 AM Arsh Wick MD [id number] [facility]   9/24/2025  8:45 AM Debbi Petty MD [id number] [facility]   11/17/2025 11:30 AM  Keke Sutton, APRN-CNP [id number] [facility]   1/28/2026  8:00 AM Abilio Cullen MD [id number] [facility]   3/25/2026  1:00 PM Lauri Morales DO [id number] [facility]   4/28/2026  9:30 AM Kyra Davis PA-C [id number] [facility]         Zully Ramos MD

## 2025-08-06 NOTE — NURSING NOTE
Nusrat Garnica RN/ [phone].  Writer reviewed and provided pt with OBSERVATION FYWB;pt verbalized understanding. Floor RN(Víctor) notified of bed status change.      When administering potassium chloride 20 meq I received an alert message for potassium results. Notified Hospitalist of the alert.  Hospitalist approved administration of potassium chloride .

## 2025-08-06 NOTE — CARE PLAN
TCC met with patient at the bedside during IDR to discuss care/discharge plan.  Pt admitted with intractable low back pain.  Is s/p C7-T1- epidural steroid injection.  Plan is home today and follow up with outpatient with Dr. Messina for further interventions.  No skilled needs identified    8/26/2025  8:45 AM Arsh Wick MD [id number] [facility]   9/24/2025  8:45 AM Debbi Petty MD [id number] [facility]   11/17/2025 11:30 AM Keke Sutton, APRN-CNP [id number] [facility]   1/28/2026  8:00 AM Abilio Cullen MD [id number] [facility]   3/25/2026  1:00 PM Lauri Morales DO [id number] [facility]   4/28/2026  9:30 AM Kyra Davis PA-C [id number] [facility]

## 2025-08-06 NOTE — NURSING NOTE
Assumed care of patient from night shift nurse.  Pt in bed resting with eyes closed. Bed is in lowest position. Call light within reach.

## 2025-08-06 NOTE — CARE PLAN
The patient's goals for the shift include pain management.    The clinical goals for the shift include pain control, safety

## 2025-08-11 ENCOUNTER — PATIENT OUTREACH (OUTPATIENT)
Dept: CARE COORDINATION | Facility: CLINIC | Age: 50
End: 2025-08-11
Payer: COMMERCIAL

## 2025-08-11 ENCOUNTER — PATIENT MESSAGE (OUTPATIENT)
Dept: CARE COORDINATION | Facility: CLINIC | Age: 50
End: 2025-08-11
Payer: COMMERCIAL

## 2025-08-13 ENCOUNTER — OFFICE VISIT (OUTPATIENT)
Dept: ORTHOPEDIC SURGERY | Facility: CLINIC | Age: 50
End: 2025-08-13
Payer: COMMERCIAL

## 2025-08-13 DIAGNOSIS — G95.20 SPINAL CORD COMPRESSION (MULTI): Primary | ICD-10-CM

## 2025-08-13 DIAGNOSIS — M47.12 CERVICAL SPONDYLOSIS WITH MYELOPATHY: Primary | ICD-10-CM

## 2025-08-13 DIAGNOSIS — G95.20 SPINAL CORD COMPRESSION (MULTI): ICD-10-CM

## 2025-08-13 PROCEDURE — 99214 OFFICE O/P EST MOD 30 MIN: CPT | Performed by: ORTHOPAEDIC SURGERY

## 2025-08-13 PROCEDURE — 1036F TOBACCO NON-USER: CPT | Performed by: ORTHOPAEDIC SURGERY

## 2025-08-13 PROCEDURE — 99212 OFFICE O/P EST SF 10 MIN: CPT | Performed by: ORTHOPAEDIC SURGERY

## 2025-08-13 RX ORDER — CEFAZOLIN SODIUM 2 G/50ML
2 SOLUTION INTRAVENOUS ONCE
OUTPATIENT
Start: 2025-08-13 | End: 2025-08-13

## 2025-08-13 RX ORDER — HYDROCODONE BITARTRATE AND ACETAMINOPHEN 5; 325 MG/1; MG/1
1 TABLET ORAL EVERY 6 HOURS PRN
Qty: 28 TABLET | Refills: 0 | Status: SHIPPED | OUTPATIENT
Start: 2025-08-13 | End: 2025-08-20

## 2025-08-13 RX ORDER — ACETAMINOPHEN 325 MG/1
975 TABLET ORAL ONCE
OUTPATIENT
Start: 2025-08-13 | End: 2025-08-13

## 2025-08-13 RX ORDER — GABAPENTIN 300 MG/1
600 CAPSULE ORAL ONCE
OUTPATIENT
Start: 2025-08-13 | End: 2025-08-13

## 2025-08-13 ASSESSMENT — PAIN SCALES - GENERAL: PAINLEVEL_OUTOF10: 9

## 2025-08-13 ASSESSMENT — PAIN - FUNCTIONAL ASSESSMENT: PAIN_FUNCTIONAL_ASSESSMENT: 0-10

## 2025-08-18 DIAGNOSIS — M47.812 CERVICAL SPONDYLOSIS: ICD-10-CM

## 2025-08-18 DIAGNOSIS — M47.816 LUMBAR SPONDYLOSIS: ICD-10-CM

## 2025-08-18 RX ORDER — GABAPENTIN 300 MG/1
300 CAPSULE ORAL 3 TIMES DAILY
Qty: 90 CAPSULE | Refills: 3 | Status: SHIPPED | OUTPATIENT
Start: 2025-08-18

## 2025-08-20 ENCOUNTER — PRE-ADMISSION TESTING (OUTPATIENT)
Dept: PREADMISSION TESTING | Facility: HOSPITAL | Age: 50
End: 2025-08-20
Payer: COMMERCIAL

## 2025-08-20 VITALS
WEIGHT: 183 LBS | HEIGHT: 63 IN | BODY MASS INDEX: 32.43 KG/M2 | TEMPERATURE: 96.8 F | OXYGEN SATURATION: 98 % | RESPIRATION RATE: 16 BRPM | SYSTOLIC BLOOD PRESSURE: 137 MMHG | HEART RATE: 87 BPM | DIASTOLIC BLOOD PRESSURE: 91 MMHG

## 2025-08-20 DIAGNOSIS — Z01.818 PREOPERATIVE TESTING: Primary | ICD-10-CM

## 2025-08-20 DIAGNOSIS — G95.20 SPINAL CORD COMPRESSION (MULTI): ICD-10-CM

## 2025-08-20 DIAGNOSIS — M47.12 CERVICAL SPONDYLOSIS WITH MYELOPATHY: ICD-10-CM

## 2025-08-20 LAB
ABO GROUP (TYPE) IN BLOOD: NORMAL
ANION GAP SERPL CALCULATED.3IONS-SCNC: 9 MMOL/L (ref 10–20)
ANTIBODY SCREEN: NORMAL
APPEARANCE UR: ABNORMAL
APTT PPP: 26.2 SECONDS (ref 22–32.5)
BASOPHILS # BLD AUTO: 0.02 X10*3/UL (ref 0–0.1)
BASOPHILS NFR BLD AUTO: 0.2 %
BILIRUB UR STRIP.AUTO-MCNC: NEGATIVE MG/DL
BUN SERPL-MCNC: 10 MG/DL (ref 6–23)
CALCIUM SERPL-MCNC: 9.1 MG/DL (ref 8.6–10.3)
CHLORIDE SERPL-SCNC: 109 MMOL/L (ref 98–107)
CO2 SERPL-SCNC: 26 MMOL/L (ref 21–32)
COLOR UR: YELLOW
CREAT SERPL-MCNC: 0.66 MG/DL (ref 0.5–1.05)
EGFRCR SERPLBLD CKD-EPI 2021: >90 ML/MIN/1.73M*2
EOSINOPHIL # BLD AUTO: 0.03 X10*3/UL (ref 0–0.7)
EOSINOPHIL NFR BLD AUTO: 0.3 %
ERYTHROCYTE [DISTWIDTH] IN BLOOD BY AUTOMATED COUNT: 14.9 % (ref 11.5–14.5)
EST. AVERAGE GLUCOSE BLD GHB EST-MCNC: 134 MG/DL
GLUCOSE SERPL-MCNC: 102 MG/DL (ref 74–99)
GLUCOSE UR STRIP.AUTO-MCNC: NORMAL MG/DL
HBA1C MFR BLD: 6.3 % (ref ?–5.7)
HCT VFR BLD AUTO: 39.6 % (ref 36–46)
HGB BLD-MCNC: 12.3 G/DL (ref 12–16)
IMM GRANULOCYTES # BLD AUTO: 0.05 X10*3/UL (ref 0–0.7)
IMM GRANULOCYTES NFR BLD AUTO: 0.5 % (ref 0–0.9)
INR PPP: 1 (ref 0.9–1.2)
KETONES UR STRIP.AUTO-MCNC: NEGATIVE MG/DL
LEUKOCYTE ESTERASE UR QL STRIP.AUTO: NEGATIVE
LYMPHOCYTES # BLD AUTO: 0.81 X10*3/UL (ref 1.2–4.8)
LYMPHOCYTES NFR BLD AUTO: 8.9 %
MCH RBC QN AUTO: 29 PG (ref 26–34)
MCHC RBC AUTO-ENTMCNC: 31.1 G/DL (ref 32–36)
MCV RBC AUTO: 93 FL (ref 80–100)
MONOCYTES # BLD AUTO: 0.51 X10*3/UL (ref 0.1–1)
MONOCYTES NFR BLD AUTO: 5.6 %
NEUTROPHILS # BLD AUTO: 7.73 X10*3/UL (ref 1.2–7.7)
NEUTROPHILS NFR BLD AUTO: 84.5 %
NITRITE UR QL STRIP.AUTO: NEGATIVE
NRBC BLD-RTO: 0 /100 WBCS (ref 0–0)
PH UR STRIP.AUTO: 5.5 [PH]
PLATELET # BLD AUTO: 434 X10*3/UL (ref 150–450)
POTASSIUM SERPL-SCNC: 3.5 MMOL/L (ref 3.5–5.3)
PROT UR STRIP.AUTO-MCNC: NEGATIVE MG/DL
PROTHROMBIN TIME: 10.7 SECONDS (ref 9.3–12.7)
RBC # BLD AUTO: 4.24 X10*6/UL (ref 4–5.2)
RBC # UR STRIP.AUTO: NEGATIVE MG/DL
RH FACTOR (ANTIGEN D): NORMAL
SODIUM SERPL-SCNC: 140 MMOL/L (ref 136–145)
SP GR UR STRIP.AUTO: 1.02
UROBILINOGEN UR STRIP.AUTO-MCNC: NORMAL MG/DL
WBC # BLD AUTO: 9.2 X10*3/UL (ref 4.4–11.3)

## 2025-08-20 PROCEDURE — 81003 URINALYSIS AUTO W/O SCOPE: CPT

## 2025-08-20 PROCEDURE — 99204 OFFICE O/P NEW MOD 45 MIN: CPT | Performed by: NURSE PRACTITIONER

## 2025-08-20 PROCEDURE — 85025 COMPLETE CBC W/AUTO DIFF WBC: CPT | Performed by: ORTHOPAEDIC SURGERY

## 2025-08-20 PROCEDURE — 83036 HEMOGLOBIN GLYCOSYLATED A1C: CPT | Mod: TRILAB

## 2025-08-20 PROCEDURE — 80048 BASIC METABOLIC PNL TOTAL CA: CPT | Performed by: ORTHOPAEDIC SURGERY

## 2025-08-20 PROCEDURE — 86901 BLOOD TYPING SEROLOGIC RH(D): CPT

## 2025-08-20 PROCEDURE — 87081 CULTURE SCREEN ONLY: CPT | Mod: TRILAB

## 2025-08-20 PROCEDURE — 85610 PROTHROMBIN TIME: CPT | Performed by: ORTHOPAEDIC SURGERY

## 2025-08-20 RX ORDER — UPADACITINIB 15 MG/1
15 TABLET, EXTENDED RELEASE ORAL DAILY
COMMUNITY
Start: 2025-08-10

## 2025-08-20 RX ORDER — CHLORHEXIDINE GLUCONATE ORAL RINSE 1.2 MG/ML
SOLUTION DENTAL
Qty: 473 ML | Refills: 0 | Status: SHIPPED | OUTPATIENT
Start: 2025-08-20

## 2025-08-20 RX ORDER — CHLORHEXIDINE GLUCONATE 40 MG/ML
SOLUTION TOPICAL
Qty: 473 ML | Refills: 0 | Status: SHIPPED | OUTPATIENT
Start: 2025-08-20

## 2025-08-20 ASSESSMENT — ENCOUNTER SYMPTOMS
GASTROINTESTINAL NEGATIVE: 1
EYES NEGATIVE: 1
NECK PAIN: 1
NUMBNESS: 1
RESPIRATORY NEGATIVE: 1
PSYCHIATRIC NEGATIVE: 1
CARDIOVASCULAR NEGATIVE: 1
ACTIVITY CHANGE: 1
BACK PAIN: 1
ARTHRALGIAS: 1

## 2025-08-21 ENCOUNTER — OFFICE VISIT (OUTPATIENT)
Dept: CARDIOLOGY | Facility: HOSPITAL | Age: 50
End: 2025-08-21
Payer: COMMERCIAL

## 2025-08-21 VITALS
OXYGEN SATURATION: 98 % | BODY MASS INDEX: 33.09 KG/M2 | DIASTOLIC BLOOD PRESSURE: 94 MMHG | WEIGHT: 183.86 LBS | HEART RATE: 80 BPM | SYSTOLIC BLOOD PRESSURE: 150 MMHG

## 2025-08-21 DIAGNOSIS — Z01.818 PRE-OPERATIVE CLEARANCE: Primary | ICD-10-CM

## 2025-08-21 DIAGNOSIS — I10 PRIMARY HYPERTENSION: ICD-10-CM

## 2025-08-21 LAB — STAPHYLOCOCCUS SPEC CULT: NORMAL

## 2025-08-21 PROCEDURE — 3080F DIAST BP >= 90 MM HG: CPT | Performed by: NURSE PRACTITIONER

## 2025-08-21 PROCEDURE — 1036F TOBACCO NON-USER: CPT | Performed by: NURSE PRACTITIONER

## 2025-08-21 PROCEDURE — 3077F SYST BP >= 140 MM HG: CPT | Performed by: NURSE PRACTITIONER

## 2025-08-21 PROCEDURE — 99202 OFFICE O/P NEW SF 15 MIN: CPT

## 2025-08-21 PROCEDURE — 99204 OFFICE O/P NEW MOD 45 MIN: CPT | Performed by: NURSE PRACTITIONER

## 2025-08-21 ASSESSMENT — ENCOUNTER SYMPTOMS
BACK PAIN: 1
SHORTNESS OF BREATH: 0
NUMBNESS: 1
FATIGUE: 1
NECK PAIN: 1
DIZZINESS: 1
ARTHRALGIAS: 1

## 2025-08-22 DIAGNOSIS — G95.20 SPINAL CORD COMPRESSION (MULTI): ICD-10-CM

## 2025-08-22 DIAGNOSIS — G43.909 MIGRAINE, UNSPECIFIED, NOT INTRACTABLE, WITHOUT STATUS MIGRAINOSUS: ICD-10-CM

## 2025-08-22 RX ORDER — HYDROCODONE BITARTRATE AND ACETAMINOPHEN 5; 325 MG/1; MG/1
1 TABLET ORAL EVERY 6 HOURS PRN
Qty: 28 TABLET | Refills: 0 | Status: SHIPPED | OUTPATIENT
Start: 2025-08-22 | End: 2025-08-29

## 2025-08-24 RX ORDER — RIZATRIPTAN BENZOATE 10 MG/1
TABLET ORAL
Qty: 8 TABLET | Refills: 6 | Status: SHIPPED | OUTPATIENT
Start: 2025-08-24

## 2025-08-26 ENCOUNTER — APPOINTMENT (OUTPATIENT)
Dept: PAIN MEDICINE | Facility: CLINIC | Age: 50
End: 2025-08-26
Payer: COMMERCIAL

## 2025-08-26 VITALS
WEIGHT: 183 LBS | DIASTOLIC BLOOD PRESSURE: 86 MMHG | SYSTOLIC BLOOD PRESSURE: 131 MMHG | HEIGHT: 62 IN | RESPIRATION RATE: 16 BRPM | HEART RATE: 87 BPM | BODY MASS INDEX: 33.68 KG/M2

## 2025-08-26 DIAGNOSIS — M47.12 CERVICAL SPONDYLOSIS WITH MYELOPATHY: ICD-10-CM

## 2025-08-26 DIAGNOSIS — M96.1 LUMBAR POST-LAMINECTOMY SYNDROME: Primary | ICD-10-CM

## 2025-08-26 PROCEDURE — 99214 OFFICE O/P EST MOD 30 MIN: CPT | Performed by: PHYSICAL MEDICINE & REHABILITATION

## 2025-08-26 PROCEDURE — 3008F BODY MASS INDEX DOCD: CPT | Performed by: PHYSICAL MEDICINE & REHABILITATION

## 2025-08-26 PROCEDURE — 3075F SYST BP GE 130 - 139MM HG: CPT | Performed by: PHYSICAL MEDICINE & REHABILITATION

## 2025-08-26 PROCEDURE — 3079F DIAST BP 80-89 MM HG: CPT | Performed by: PHYSICAL MEDICINE & REHABILITATION

## 2025-08-26 RX ORDER — GABAPENTIN 300 MG/1
600 CAPSULE ORAL 3 TIMES DAILY
Qty: 180 CAPSULE | Refills: 2 | Status: SHIPPED | OUTPATIENT
Start: 2025-08-26 | End: 2025-11-24

## 2025-08-26 RX ORDER — DIAZEPAM 5 MG/1
5 TABLET ORAL ONCE AS NEEDED
OUTPATIENT
Start: 2025-08-26

## 2025-08-26 SDOH — SOCIAL STABILITY: SOCIAL NETWORK: SOCIAL ACTIVITY:: 7

## 2025-08-26 ASSESSMENT — PAIN SCALES - GENERAL: PAINLEVEL_OUTOF10: 7

## 2025-08-28 ENCOUNTER — ANESTHESIA EVENT (OUTPATIENT)
Dept: OPERATING ROOM | Facility: HOSPITAL | Age: 50
End: 2025-08-28
Payer: COMMERCIAL

## 2025-08-28 ENCOUNTER — APPOINTMENT (OUTPATIENT)
Dept: RADIOLOGY | Facility: HOSPITAL | Age: 50
End: 2025-08-28
Payer: COMMERCIAL

## 2025-08-28 ENCOUNTER — ANESTHESIA (OUTPATIENT)
Dept: OPERATING ROOM | Facility: HOSPITAL | Age: 50
End: 2025-08-28
Payer: COMMERCIAL

## 2025-08-28 ENCOUNTER — HOSPITAL ENCOUNTER (INPATIENT)
Facility: HOSPITAL | Age: 50
LOS: 2 days | Discharge: HOME | End: 2025-08-30
Attending: ORTHOPAEDIC SURGERY | Admitting: ORTHOPAEDIC SURGERY
Payer: COMMERCIAL

## 2025-08-28 PROCEDURE — 2500000004 HC RX 250 GENERAL PHARMACY W/ HCPCS (ALT 636 FOR OP/ED): Performed by: ANESTHESIOLOGY

## 2025-08-28 PROCEDURE — RXMED WILLOW AMBULATORY MEDICATION CHARGE

## 2025-08-28 RX ORDER — ONDANSETRON HYDROCHLORIDE 2 MG/ML
INJECTION, SOLUTION INTRAVENOUS AS NEEDED
Status: DISCONTINUED | OUTPATIENT
Start: 2025-08-28 | End: 2025-08-30

## 2025-08-28 RX ORDER — KETOROLAC TROMETHAMINE 30 MG/ML
INJECTION, SOLUTION INTRAMUSCULAR; INTRAVENOUS AS NEEDED
Status: DISCONTINUED | OUTPATIENT
Start: 2025-08-28 | End: 2025-08-30

## 2025-08-28 RX ORDER — ROCURONIUM BROMIDE 10 MG/ML
INJECTION, SOLUTION INTRAVENOUS AS NEEDED
Status: DISCONTINUED | OUTPATIENT
Start: 2025-08-28 | End: 2025-08-30

## 2025-08-28 RX ORDER — TRANEXAMIC ACID 10 MG/ML
INJECTION, SOLUTION INTRAVENOUS AS NEEDED
Status: DISCONTINUED | OUTPATIENT
Start: 2025-08-28 | End: 2025-08-30

## 2025-08-28 RX ORDER — PROPOFOL 10 MG/ML
INJECTION, EMULSION INTRAVENOUS AS NEEDED
Status: DISCONTINUED | OUTPATIENT
Start: 2025-08-28 | End: 2025-08-30

## 2025-08-28 RX ORDER — FENTANYL CITRATE 50 UG/ML
INJECTION, SOLUTION INTRAMUSCULAR; INTRAVENOUS AS NEEDED
Status: DISCONTINUED | OUTPATIENT
Start: 2025-08-28 | End: 2025-08-30

## 2025-08-28 RX ORDER — SODIUM CHLORIDE, SODIUM LACTATE, POTASSIUM CHLORIDE, CALCIUM CHLORIDE 600; 310; 30; 20 MG/100ML; MG/100ML; MG/100ML; MG/100ML
INJECTION, SOLUTION INTRAVENOUS CONTINUOUS PRN
Status: DISCONTINUED | OUTPATIENT
Start: 2025-08-28 | End: 2025-08-30

## 2025-08-28 RX ORDER — MIDAZOLAM HYDROCHLORIDE 1 MG/ML
INJECTION, SOLUTION INTRAMUSCULAR; INTRAVENOUS AS NEEDED
Status: DISCONTINUED | OUTPATIENT
Start: 2025-08-28 | End: 2025-08-30

## 2025-08-28 RX ADMIN — DEXAMETHASONE SODIUM PHOSPHATE 10 MG: 4 INJECTION, SOLUTION INTRAMUSCULAR; INTRAVENOUS at 11:46

## 2025-08-28 RX ADMIN — ROCURONIUM BROMIDE 10 MG: 10 INJECTION, SOLUTION INTRAVENOUS at 11:43

## 2025-08-28 RX ADMIN — ROCURONIUM BROMIDE 50 MG: 10 INJECTION, SOLUTION INTRAVENOUS at 11:16

## 2025-08-28 RX ADMIN — KETOROLAC TROMETHAMINE 30 MG: 30 INJECTION, SOLUTION INTRAMUSCULAR at 12:15

## 2025-08-28 RX ADMIN — SODIUM CHLORIDE, POTASSIUM CHLORIDE, SODIUM LACTATE AND CALCIUM CHLORIDE: 600; 310; 30; 20 INJECTION, SOLUTION INTRAVENOUS at 11:04

## 2025-08-28 RX ADMIN — TRANEXAMIC ACID 1 MG: 10 INJECTION, SOLUTION INTRAVENOUS at 11:15

## 2025-08-28 RX ADMIN — ONDANSETRON 4 MG: 2 INJECTION, SOLUTION INTRAMUSCULAR; INTRAVENOUS at 12:15

## 2025-08-28 RX ADMIN — TRANEXAMIC ACID 1 MG: 10 INJECTION, SOLUTION INTRAVENOUS at 11:27

## 2025-08-28 RX ADMIN — FENTANYL CITRATE 50 MCG: 50 INJECTION, SOLUTION INTRAMUSCULAR; INTRAVENOUS at 11:16

## 2025-08-28 RX ADMIN — SUGAMMADEX 200 MG: 100 INJECTION, SOLUTION INTRAVENOUS at 12:39

## 2025-08-28 RX ADMIN — PROPOFOL 200 MG: 10 INJECTION, EMULSION INTRAVENOUS at 11:15

## 2025-08-28 RX ADMIN — FENTANYL CITRATE 50 MCG: 50 INJECTION, SOLUTION INTRAMUSCULAR; INTRAVENOUS at 11:44

## 2025-08-28 RX ADMIN — FENTANYL CITRATE 50 MCG: 50 INJECTION, SOLUTION INTRAMUSCULAR; INTRAVENOUS at 11:38

## 2025-08-28 RX ADMIN — MIDAZOLAM 2 MG: 1 INJECTION INTRAMUSCULAR; INTRAVENOUS at 11:16

## 2025-08-28 RX ADMIN — FENTANYL CITRATE 50 MCG: 50 INJECTION, SOLUTION INTRAMUSCULAR; INTRAVENOUS at 12:55

## 2025-08-28 SDOH — ECONOMIC STABILITY: FOOD INSECURITY: WITHIN THE PAST 12 MONTHS, THE FOOD YOU BOUGHT JUST DIDN'T LAST AND YOU DIDN'T HAVE MONEY TO GET MORE.: NEVER TRUE

## 2025-08-28 SDOH — SOCIAL STABILITY: SOCIAL INSECURITY: WITHIN THE LAST YEAR, HAVE YOU BEEN AFRAID OF YOUR PARTNER OR EX-PARTNER?: NO

## 2025-08-28 SDOH — ECONOMIC STABILITY: FOOD INSECURITY: WITHIN THE PAST 12 MONTHS, YOU WORRIED THAT YOUR FOOD WOULD RUN OUT BEFORE YOU GOT THE MONEY TO BUY MORE.: NEVER TRUE

## 2025-08-28 SDOH — SOCIAL STABILITY: SOCIAL INSECURITY: DO YOU FEEL ANYONE HAS EXPLOITED OR TAKEN ADVANTAGE OF YOU FINANCIALLY OR OF YOUR PERSONAL PROPERTY?: NO

## 2025-08-28 SDOH — SOCIAL STABILITY: SOCIAL INSECURITY: WITHIN THE LAST YEAR, HAVE YOU BEEN HUMILIATED OR EMOTIONALLY ABUSED IN OTHER WAYS BY YOUR PARTNER OR EX-PARTNER?: NO

## 2025-08-28 SDOH — ECONOMIC STABILITY: INCOME INSECURITY: IN THE PAST 12 MONTHS HAS THE ELECTRIC, GAS, OIL, OR WATER COMPANY THREATENED TO SHUT OFF SERVICES IN YOUR HOME?: NO

## 2025-08-28 SDOH — SOCIAL STABILITY: SOCIAL INSECURITY: HAVE YOU HAD THOUGHTS OF HARMING ANYONE ELSE?: NO

## 2025-08-28 SDOH — SOCIAL STABILITY: SOCIAL INSECURITY: HAS ANYONE EVER THREATENED TO HURT YOUR FAMILY OR YOUR PETS?: NO

## 2025-08-28 SDOH — SOCIAL STABILITY: SOCIAL INSECURITY: DOES ANYONE TRY TO KEEP YOU FROM HAVING/CONTACTING OTHER FRIENDS OR DOING THINGS OUTSIDE YOUR HOME?: NO

## 2025-08-28 SDOH — SOCIAL STABILITY: SOCIAL INSECURITY: HAVE YOU HAD ANY THOUGHTS OF HARMING ANYONE ELSE?: NO

## 2025-08-28 SDOH — SOCIAL STABILITY: SOCIAL INSECURITY: ABUSE: ADULT

## 2025-08-28 SDOH — SOCIAL STABILITY: SOCIAL INSECURITY: ARE YOU OR HAVE YOU BEEN THREATENED OR ABUSED PHYSICALLY, EMOTIONALLY, OR SEXUALLY BY ANYONE?: NO

## 2025-08-28 SDOH — SOCIAL STABILITY: SOCIAL INSECURITY: DO YOU FEEL UNSAFE GOING BACK TO THE PLACE WHERE YOU ARE LIVING?: NO

## 2025-08-28 SDOH — SOCIAL STABILITY: SOCIAL INSECURITY: WERE YOU ABLE TO COMPLETE ALL THE BEHAVIORAL HEALTH SCREENINGS?: YES

## 2025-08-28 SDOH — SOCIAL STABILITY: SOCIAL INSECURITY: ARE THERE ANY APPARENT SIGNS OF INJURIES/BEHAVIORS THAT COULD BE RELATED TO ABUSE/NEGLECT?: NO

## 2025-08-28 ASSESSMENT — ACTIVITIES OF DAILY LIVING (ADL)
DRESSING YOURSELF: INDEPENDENT
PATIENT'S MEMORY ADEQUATE TO SAFELY COMPLETE DAILY ACTIVITIES?: YES
JUDGMENT_ADEQUATE_SAFELY_COMPLETE_DAILY_ACTIVITIES: YES
HEARING - RIGHT EAR: FUNCTIONAL
BATHING: INDEPENDENT
ASSISTIVE_DEVICE: EYEGLASSES
TOILETING: INDEPENDENT
FEEDING YOURSELF: INDEPENDENT
ADEQUATE_TO_COMPLETE_ADL: YES
GROOMING: INDEPENDENT
WALKS IN HOME: INDEPENDENT
LACK_OF_TRANSPORTATION: NO
HEARING - LEFT EAR: FUNCTIONAL

## 2025-08-28 ASSESSMENT — LIFESTYLE VARIABLES
HOW OFTEN DO YOU HAVE A DRINK CONTAINING ALCOHOL: NEVER
HOW MANY STANDARD DRINKS CONTAINING ALCOHOL DO YOU HAVE ON A TYPICAL DAY: PATIENT DOES NOT DRINK
AUDIT-C TOTAL SCORE: 0
HOW OFTEN DO YOU HAVE 6 OR MORE DRINKS ON ONE OCCASION: NEVER
SKIP TO QUESTIONS 9-10: 1
AUDIT-C TOTAL SCORE: 0

## 2025-08-28 ASSESSMENT — PAIN SCALES - GENERAL
PAINLEVEL_OUTOF10: 6
PAINLEVEL_OUTOF10: 5 - MODERATE PAIN
PAINLEVEL_OUTOF10: 6
PAINLEVEL_OUTOF10: 5 - MODERATE PAIN
PAINLEVEL_OUTOF10: 6
PAINLEVEL_OUTOF10: 6
PAINLEVEL_OUTOF10: 9
PAINLEVEL_OUTOF10: 8
PAINLEVEL_OUTOF10: 8
PAINLEVEL_OUTOF10: 5 - MODERATE PAIN
PAINLEVEL_OUTOF10: 8
PAINLEVEL_OUTOF10: 8
PAINLEVEL_OUTOF10: 5 - MODERATE PAIN
PAINLEVEL_OUTOF10: 6

## 2025-08-28 ASSESSMENT — PAIN DESCRIPTION - LOCATION
LOCATION: HEAD
LOCATION: NECK
LOCATION: HEAD
LOCATION: NECK

## 2025-08-28 ASSESSMENT — COGNITIVE AND FUNCTIONAL STATUS - GENERAL
STANDING UP FROM CHAIR USING ARMS: A LITTLE
MOVING TO AND FROM BED TO CHAIR: A LITTLE
TURNING FROM BACK TO SIDE WHILE IN FLAT BAD: A LITTLE
WALKING IN HOSPITAL ROOM: A LITTLE
DAILY ACTIVITIY SCORE: 24
CLIMB 3 TO 5 STEPS WITH RAILING: A LITTLE
MOBILITY SCORE: 19
PATIENT BASELINE BEDBOUND: NO

## 2025-08-28 ASSESSMENT — ENCOUNTER SYMPTOMS
WEAKNESS: 0
BACK PAIN: 1
ACTIVITY CHANGE: 0
PSYCHIATRIC NEGATIVE: 1
RESPIRATORY NEGATIVE: 1
DIZZINESS: 0
ENDOCRINE NEGATIVE: 1
EYES NEGATIVE: 1
GASTROINTESTINAL NEGATIVE: 1
CONSTITUTIONAL NEGATIVE: 1
NEUROLOGICAL NEGATIVE: 1
HEMATOLOGIC/LYMPHATIC NEGATIVE: 1
CARDIOVASCULAR NEGATIVE: 1
NECK PAIN: 1
SEIZURES: 0

## 2025-08-28 ASSESSMENT — PAIN - FUNCTIONAL ASSESSMENT
PAIN_FUNCTIONAL_ASSESSMENT: 0-10

## 2025-08-28 ASSESSMENT — PAIN DESCRIPTION - DESCRIPTORS
DESCRIPTORS: ACHING
DESCRIPTORS: ACHING
DESCRIPTORS: HEADACHE
DESCRIPTORS: ACHING
DESCRIPTORS: HEADACHE
DESCRIPTORS: ACHING;DISCOMFORT

## 2025-08-28 ASSESSMENT — PATIENT HEALTH QUESTIONNAIRE - PHQ9
2. FEELING DOWN, DEPRESSED OR HOPELESS: NOT AT ALL
1. LITTLE INTEREST OR PLEASURE IN DOING THINGS: NOT AT ALL
SUM OF ALL RESPONSES TO PHQ9 QUESTIONS 1 & 2: 0

## 2025-08-28 ASSESSMENT — PAIN DESCRIPTION - ORIENTATION: ORIENTATION: UPPER;POSTERIOR

## 2025-08-29 ASSESSMENT — COGNITIVE AND FUNCTIONAL STATUS - GENERAL
TURNING FROM BACK TO SIDE WHILE IN FLAT BAD: A LITTLE
DRESSING REGULAR LOWER BODY CLOTHING: A LITTLE
MOVING TO AND FROM BED TO CHAIR: A LITTLE
TOILETING: A LITTLE
CLIMB 3 TO 5 STEPS WITH RAILING: A LITTLE
DRESSING REGULAR UPPER BODY CLOTHING: A LITTLE
MOBILITY SCORE: 18
DAILY ACTIVITIY SCORE: 20
WALKING IN HOSPITAL ROOM: A LITTLE
STANDING UP FROM CHAIR USING ARMS: A LITTLE
CLIMB 3 TO 5 STEPS WITH RAILING: A LITTLE
MOBILITY SCORE: 19
HELP NEEDED FOR BATHING: A LITTLE
HELP NEEDED FOR BATHING: A LITTLE
DAILY ACTIVITIY SCORE: 24
STANDING UP FROM CHAIR USING ARMS: A LITTLE
DRESSING REGULAR LOWER BODY CLOTHING: A LITTLE
MOBILITY SCORE: 24
MOVING TO AND FROM BED TO CHAIR: A LITTLE
DAILY ACTIVITIY SCORE: 21
WALKING IN HOSPITAL ROOM: A LITTLE
DRESSING REGULAR UPPER BODY CLOTHING: A LITTLE
TURNING FROM BACK TO SIDE WHILE IN FLAT BAD: A LITTLE
MOVING FROM LYING ON BACK TO SITTING ON SIDE OF FLAT BED WITH BEDRAILS: A LITTLE

## 2025-08-29 ASSESSMENT — PAIN - FUNCTIONAL ASSESSMENT
PAIN_FUNCTIONAL_ASSESSMENT: 0-10

## 2025-08-29 ASSESSMENT — PAIN DESCRIPTION - LOCATION
LOCATION: SHOULDER
LOCATION: NECK
LOCATION: SHOULDER
LOCATION: NECK

## 2025-08-29 ASSESSMENT — ACTIVITIES OF DAILY LIVING (ADL)
BATHING_LEVEL_OF_ASSISTANCE: CLOSE SUPERVISION
EFFECT OF PAIN ON DAILY ACTIVITIES: INTERRUPTED SLEEP
HOME_MANAGEMENT_TIME_ENTRY: 26
ADL_ASSISTANCE: INDEPENDENT
BATHING_ASSISTANCE: STAND BY
BATHING_WHERE_ASSESSED: SITTING SINKSIDE
ADL_ASSISTANCE: INDEPENDENT

## 2025-08-29 ASSESSMENT — PAIN SCALES - GENERAL
PAINLEVEL_OUTOF10: 6
PAINLEVEL_OUTOF10: 4
PAINLEVEL_OUTOF10: 8
PAINLEVEL_OUTOF10: 5 - MODERATE PAIN
PAINLEVEL_OUTOF10: 6
PAINLEVEL_OUTOF10: 8
PAINLEVEL_OUTOF10: 7
PAINLEVEL_OUTOF10: 7
PAINLEVEL_OUTOF10: 9
PAINLEVEL_OUTOF10: 8
PAINLEVEL_OUTOF10: 7
PAINLEVEL_OUTOF10: 6

## 2025-08-29 ASSESSMENT — PAIN DESCRIPTION - DESCRIPTORS
DESCRIPTORS: THROBBING
DESCRIPTORS: ACHING
DESCRIPTORS: ACHING
DESCRIPTORS: OTHER (COMMENT)

## 2025-08-29 ASSESSMENT — PAIN DESCRIPTION - ORIENTATION
ORIENTATION: RIGHT;LEFT
ORIENTATION: POSTERIOR
ORIENTATION: LEFT;RIGHT
ORIENTATION: POSTERIOR

## 2025-08-30 ENCOUNTER — PHARMACY VISIT (OUTPATIENT)
Dept: PHARMACY | Facility: CLINIC | Age: 50
End: 2025-08-30
Payer: MEDICAID

## 2025-08-30 SDOH — ECONOMIC STABILITY: FOOD INSECURITY

## 2025-08-30 SDOH — ECONOMIC STABILITY: INCOME INSECURITY: IN THE LAST 12 MONTHS, WAS THERE A TIME WHEN YOU WERE NOT ABLE TO PAY THE MORTGAGE OR RENT ON TIME?: YES

## 2025-08-30 SDOH — ECONOMIC STABILITY: FOOD INSECURITY: WITHIN THE PAST 12 MONTHS, THE FOOD YOU BOUGHT JUST DIDN'T LAST AND YOU DIDN'T HAVE MONEY TO GET MORE.: OFTEN TRUE

## 2025-08-30 SDOH — ECONOMIC STABILITY: TRANSPORTATION INSECURITY
IN THE PAST 12 MONTHS, HAS LACK OF TRANSPORTATION KEPT YOU FROM MEETINGS, WORK, OR FROM GETTING THINGS NEEDED FOR DAILY LIVING?: YES

## 2025-08-30 SDOH — ECONOMIC STABILITY: TRANSPORTATION INSECURITY: IN THE PAST 12 MONTHS, HAS LACK OF TRANSPORTATION KEPT YOU FROM MEDICAL APPOINTMENTS OR FROM GETTING MEDICATIONS?: YES

## 2025-08-30 SDOH — ECONOMIC STABILITY: FOOD INSECURITY: WITHIN THE PAST 12 MONTHS, YOU WORRIED THAT YOUR FOOD WOULD RUN OUT BEFORE YOU GOT MONEY TO BUY MORE.: SOMETIMES TRUE

## 2025-08-30 SDOH — ECONOMIC STABILITY: HOUSING INSECURITY

## 2025-08-30 SDOH — ECONOMIC STABILITY: HOUSING INSECURITY: IN THE LAST 12 MONTHS, HOW MANY PLACES HAVE YOU LIVED?: 1

## 2025-08-30 SDOH — ECONOMIC STABILITY: TRANSPORTATION INSECURITY

## 2025-08-30 SDOH — ECONOMIC STABILITY: GENERAL

## 2025-08-30 SDOH — ECONOMIC STABILITY: HOUSING INSECURITY: IN THE PAST 12 MONTHS HAS THE ELECTRIC, GAS, OIL, OR WATER COMPANY THREATENED TO SHUT OFF SERVICES IN YOUR HOME?: NO

## 2025-08-30 SDOH — ECONOMIC STABILITY: HOUSING INSECURITY: IN THE LAST 12 MONTHS, WAS THERE A TIME WHEN YOU WERE NOT ABLE TO PAY THE MORTGAGE OR RENT ON TIME?: YES

## 2025-08-30 SDOH — ECONOMIC STABILITY: TRANSPORTATION INSECURITY
IN THE PAST 12 MONTHS, HAS THE LACK OF TRANSPORTATION KEPT YOU FROM MEDICAL APPOINTMENTS OR FROM GETTING MEDICATIONS?: YES

## 2025-08-30 ASSESSMENT — PAIN SCALES - GENERAL
PAINLEVEL_OUTOF10: 7
PAINLEVEL_OUTOF10: 4
PAINLEVEL_OUTOF10: 8
PAINLEVEL_OUTOF10: 4
PAINLEVEL_OUTOF10: 7
PAINLEVEL_OUTOF10: 5 - MODERATE PAIN

## 2025-08-30 ASSESSMENT — COGNITIVE AND FUNCTIONAL STATUS - GENERAL
DAILY ACTIVITIY SCORE: 24
MOBILITY SCORE: 24

## 2025-08-30 ASSESSMENT — PAIN DESCRIPTION - LOCATION
LOCATION: NECK

## 2025-08-30 ASSESSMENT — PAIN - FUNCTIONAL ASSESSMENT
PAIN_FUNCTIONAL_ASSESSMENT: 0-10

## 2025-08-30 ASSESSMENT — PAIN DESCRIPTION - DESCRIPTORS
DESCRIPTORS: ACHING

## 2025-08-30 ASSESSMENT — ACTIVITIES OF DAILY LIVING (ADL): LACK_OF_TRANSPORTATION: YES

## 2025-08-30 ASSESSMENT — SOCIAL DETERMINANTS OF HEALTH (SDOH): IN THE PAST 12 MONTHS, HAS THE ELECTRIC, GAS, OIL, OR WATER COMPANY THREATENED TO SHUT OFF SERVICE IN YOUR HOME?: NO

## 2025-09-05 ENCOUNTER — HOSPITAL ENCOUNTER (EMERGENCY)
Facility: HOSPITAL | Age: 50
Discharge: HOME | End: 2025-09-06
Attending: EMERGENCY MEDICINE
Payer: COMMERCIAL

## 2025-09-05 DIAGNOSIS — G89.29 CHRONIC NECK PAIN: Primary | ICD-10-CM

## 2025-09-05 DIAGNOSIS — M54.2 CHRONIC NECK PAIN: Primary | ICD-10-CM

## 2025-09-05 DIAGNOSIS — G89.18 POST-OPERATIVE PAIN: ICD-10-CM

## 2025-09-05 PROCEDURE — 99284 EMERGENCY DEPT VISIT MOD MDM: CPT | Performed by: EMERGENCY MEDICINE

## 2025-09-05 PROCEDURE — 2500000004 HC RX 250 GENERAL PHARMACY W/ HCPCS (ALT 636 FOR OP/ED): Mod: JZ | Performed by: EMERGENCY MEDICINE

## 2025-09-05 PROCEDURE — 2500000001 HC RX 250 WO HCPCS SELF ADMINISTERED DRUGS (ALT 637 FOR MEDICARE OP): Performed by: EMERGENCY MEDICINE

## 2025-09-05 RX ORDER — HYDROMORPHONE HYDROCHLORIDE 1 MG/ML
1 INJECTION, SOLUTION INTRAMUSCULAR; INTRAVENOUS; SUBCUTANEOUS ONCE
Status: COMPLETED | OUTPATIENT
Start: 2025-09-05 | End: 2025-09-05

## 2025-09-05 RX ORDER — KETOROLAC TROMETHAMINE 15 MG/ML
15 INJECTION, SOLUTION INTRAMUSCULAR; INTRAVENOUS ONCE
Status: DISCONTINUED | OUTPATIENT
Start: 2025-09-05 | End: 2025-09-05

## 2025-09-05 RX ORDER — OXYCODONE AND ACETAMINOPHEN 5; 325 MG/1; MG/1
1 TABLET ORAL ONCE
Refills: 0 | Status: COMPLETED | OUTPATIENT
Start: 2025-09-05 | End: 2025-09-05

## 2025-09-05 RX ORDER — KETOROLAC TROMETHAMINE 15 MG/ML
15 INJECTION, SOLUTION INTRAMUSCULAR; INTRAVENOUS ONCE
Status: COMPLETED | OUTPATIENT
Start: 2025-09-05 | End: 2025-09-05

## 2025-09-05 RX ADMIN — KETOROLAC TROMETHAMINE 15 MG: 15 INJECTION, SOLUTION INTRAMUSCULAR; INTRAVENOUS at 22:39

## 2025-09-05 RX ADMIN — DEXAMETHASONE SODIUM PHOSPHATE 4 MG: 4 INJECTION, SOLUTION INTRAMUSCULAR; INTRAVENOUS at 23:28

## 2025-09-05 RX ADMIN — HYDROMORPHONE HYDROCHLORIDE 1 MG: 1 INJECTION, SOLUTION INTRAMUSCULAR; INTRAVENOUS; SUBCUTANEOUS at 23:28

## 2025-09-05 RX ADMIN — HYDROMORPHONE HYDROCHLORIDE 0.5 MG: 1 INJECTION, SOLUTION INTRAMUSCULAR; INTRAVENOUS; SUBCUTANEOUS at 22:40

## 2025-09-05 RX ADMIN — OXYCODONE HYDROCHLORIDE AND ACETAMINOPHEN 1 TABLET: 5; 325 TABLET ORAL at 22:40

## 2025-09-05 ASSESSMENT — PAIN DESCRIPTION - LOCATION
LOCATION: GENERALIZED
LOCATION: NECK
LOCATION: NECK

## 2025-09-05 ASSESSMENT — PAIN - FUNCTIONAL ASSESSMENT
PAIN_FUNCTIONAL_ASSESSMENT: 0-10

## 2025-09-05 ASSESSMENT — PAIN DESCRIPTION - PAIN TYPE
TYPE: ACUTE PAIN
TYPE: ACUTE PAIN

## 2025-09-05 ASSESSMENT — PAIN SCALES - GENERAL
PAINLEVEL_OUTOF10: 10 - WORST POSSIBLE PAIN
PAINLEVEL_OUTOF10: 0 - NO PAIN
PAINLEVEL_OUTOF10: 10 - WORST POSSIBLE PAIN

## 2025-09-05 ASSESSMENT — PAIN DESCRIPTION - FREQUENCY: FREQUENCY: CONSTANT/CONTINUOUS

## 2025-09-05 ASSESSMENT — PAIN DESCRIPTION - DESCRIPTORS: DESCRIPTORS: SHARP

## 2025-09-05 ASSESSMENT — PAIN DESCRIPTION - ORIENTATION: ORIENTATION: RIGHT

## 2025-09-05 ASSESSMENT — PAIN DESCRIPTION - ONSET: ONSET: SUDDEN

## 2025-09-05 ASSESSMENT — PAIN DESCRIPTION - PROGRESSION: CLINICAL_PROGRESSION: GRADUALLY WORSENING

## 2025-09-06 VITALS
BODY MASS INDEX: 33.67 KG/M2 | WEIGHT: 182.98 LBS | TEMPERATURE: 98.8 F | RESPIRATION RATE: 18 BRPM | DIASTOLIC BLOOD PRESSURE: 98 MMHG | HEIGHT: 62 IN | HEART RATE: 62 BPM | OXYGEN SATURATION: 95 % | SYSTOLIC BLOOD PRESSURE: 151 MMHG

## 2025-09-06 RX ORDER — OXYCODONE AND ACETAMINOPHEN 5; 325 MG/1; MG/1
2 TABLET ORAL EVERY 6 HOURS PRN
Qty: 15 TABLET | Refills: 0 | Status: SHIPPED | OUTPATIENT
Start: 2025-09-06 | End: 2025-09-09

## 2025-09-06 ASSESSMENT — PAIN - FUNCTIONAL ASSESSMENT: PAIN_FUNCTIONAL_ASSESSMENT: 0-10

## 2025-09-06 ASSESSMENT — PAIN SCALES - GENERAL
PAINLEVEL_OUTOF10: 8
PAINLEVEL_OUTOF10: 8

## 2025-09-06 ASSESSMENT — PAIN DESCRIPTION - LOCATION: LOCATION: NECK

## 2025-09-06 ASSESSMENT — PAIN DESCRIPTION - PAIN TYPE: TYPE: ACUTE PAIN

## 2025-09-18 ENCOUNTER — APPOINTMENT (OUTPATIENT)
Dept: ORTHOPEDIC SURGERY | Facility: CLINIC | Age: 50
End: 2025-09-18
Payer: COMMERCIAL

## 2025-11-17 ENCOUNTER — APPOINTMENT (OUTPATIENT)
Dept: SURGERY | Facility: CLINIC | Age: 50
End: 2025-11-17
Payer: COMMERCIAL

## 2026-01-28 ENCOUNTER — APPOINTMENT (OUTPATIENT)
Dept: NEPHROLOGY | Facility: CLINIC | Age: 51
End: 2026-01-28
Payer: COMMERCIAL

## 2026-04-28 ENCOUNTER — APPOINTMENT (OUTPATIENT)
Dept: ORTHOPEDIC SURGERY | Facility: CLINIC | Age: 51
End: 2026-04-28
Payer: COMMERCIAL

## (undated) DEVICE — SOLUTION, INJECTION, 0.9% SODIUM CHL, USP LIFECARE 1000 MI

## (undated) DEVICE — STAPLER, LINEAR ENDO RELOAD, 60MM, BLUE, DISP

## (undated) DEVICE — STAPLER,  LINEAR RELOAD, 60MM, WHITE, DISP

## (undated) DEVICE — SUTURE, SILK, 2-0, 30 IN, SH, BLACK

## (undated) DEVICE — STAPLER, ECHELON FLEX GST, POWERED PLUS, 60MM X 34CM, STANDARD

## (undated) DEVICE — STAPLER,  ENDO ECHELON 45MM RELOAD, BLUE

## (undated) DEVICE — SEALANT, FIBRIN, VISTASEAL HUMAN, 4ML, 12/PK

## (undated) DEVICE — Device

## (undated) DEVICE — SLEEVE, KII, Z-THREAD, 5X100CM

## (undated) DEVICE — SOLUTION, INJECTION, USP, LACTATED RINGERS, LIFECARE, 1000ML

## (undated) DEVICE — ENDO, TROCAR 12 X 100 BLADELESS, W / STABILITY SLEEVE

## (undated) DEVICE — SHEARS, HARMONIC 700, 45 CM CURVED

## (undated) DEVICE — SHEARS, HARMONIC 5 X 36 CVD ACE+7

## (undated) DEVICE — SCISSORS, HOOK, 5MM X 31CM, DISP

## (undated) DEVICE — SUTURE, VICRYL, 2-0, 27 IN, BR/SH 27, VIOLET

## (undated) DEVICE — APPLIER,  LIGACLIP, ENDO ROTATE, ROTATING, 10MM 20M/L, DISP

## (undated) DEVICE — TROCAR, KII OPTICAL BLADELESS 5MM Z THREAD 100MM LNGTH

## (undated) DEVICE — DRAIN, PENROSE, 0.25 X 12 IN, LF

## (undated) DEVICE — GLOVE, SURGICAL, PROTEXIS PI , 6.5, PF, LF

## (undated) DEVICE — TUBE SET, PNEUMOCLEAR, SMOKE EVACU, HIGH-FLOW

## (undated) DEVICE — CLIP, ABSORBABLE, VICRYL, LAPRA-TY, VIOLET

## (undated) DEVICE — GLOVE, SURGICAL, PROTEXIS PI BLUE W/NEUTHERA, 6.5, PF, LF

## (undated) DEVICE — SUTURE, ETHIBOND XTRA, 0, SHSH, 36IN, LF

## (undated) DEVICE — IRRIGATOR, WOUND, HYDRO SURG PLUS, W/O TIP, DISP

## (undated) DEVICE — STAPLER, EF POWERED PLUS, CUTTER 340MM, 45MM EFFECTOR, DISP

## (undated) DEVICE — CARE KIT, LAPAROSCOPIC, ADVANCED

## (undated) DEVICE — RETRIEVAL SYSTEM, MONARCH, 10MM DISP ENDOSCOPIC

## (undated) DEVICE — SPONGE, HEMOSTATIC, CELLULOSE, SURGICEL, 4 X 8 IN

## (undated) DEVICE — SOLUTION, IRRIGATION, X RX SODIUM CHL 0.9%, 1000ML BTL

## (undated) DEVICE — SUTURE, ETHIBOND, XTRA, 2-0, GREEN

## (undated) DEVICE — WATER STERILE, FOR IRRIGATION, 1000ML, W/HANGER

## (undated) DEVICE — SLEEVE, KII, Z-THREAD, 12X100CM

## (undated) DEVICE — APPLICATOR, DUAL LAPAROSCOPIC, VISTASEAL, 35CM, RIGID

## (undated) DEVICE — APPLICATOR, CHLORAPREP, W/ORANGE TINT, 26ML

## (undated) DEVICE — SUTURE, VICRYL, 3-0, 27 IN, SH, VIOLET

## (undated) DEVICE — STAPLER,  LINEAR ENDO 60MM RELOAD, BLACK, DISP